# Patient Record
Sex: MALE | Race: WHITE | Employment: OTHER | URBAN - METROPOLITAN AREA
[De-identification: names, ages, dates, MRNs, and addresses within clinical notes are randomized per-mention and may not be internally consistent; named-entity substitution may affect disease eponyms.]

---

## 2021-01-07 ENCOUNTER — HOSPITAL ENCOUNTER (EMERGENCY)
Age: 66
Discharge: HOME | End: 2021-01-07
Attending: EMERGENCY MEDICINE
Payer: OTHER GOVERNMENT

## 2021-01-07 ENCOUNTER — APPOINTMENT (OUTPATIENT)
Dept: GENERAL RADIOLOGY | Age: 66
End: 2021-01-07
Payer: OTHER GOVERNMENT

## 2021-01-07 VITALS
RESPIRATION RATE: 17 BRPM | BODY MASS INDEX: 35.79 KG/M2 | HEART RATE: 75 BPM | WEIGHT: 228 LBS | HEIGHT: 67 IN | DIASTOLIC BLOOD PRESSURE: 90 MMHG | OXYGEN SATURATION: 96 % | TEMPERATURE: 98 F | SYSTOLIC BLOOD PRESSURE: 165 MMHG

## 2021-01-07 DIAGNOSIS — R06.02 SHORTNESS OF BREATH: Primary | ICD-10-CM

## 2021-01-07 LAB
ALBUMIN SERPL-MCNC: 4.5 G/DL (ref 3.5–5.2)
ALP BLD-CCNC: 122 U/L (ref 40–129)
ALT SERPL-CCNC: 35 U/L (ref 0–40)
ANION GAP SERPL CALCULATED.3IONS-SCNC: 10 MMOL/L (ref 7–16)
AST SERPL-CCNC: 23 U/L (ref 0–39)
BACTERIA: ABNORMAL /HPF
BASOPHILS ABSOLUTE: 0.06 E9/L (ref 0–0.2)
BASOPHILS RELATIVE PERCENT: 0.8 % (ref 0–2)
BILIRUB SERPL-MCNC: 0.2 MG/DL (ref 0–1.2)
BILIRUBIN URINE: NEGATIVE
BLOOD, URINE: NEGATIVE
BUN BLDV-MCNC: 15 MG/DL (ref 8–23)
CALCIUM SERPL-MCNC: 9.3 MG/DL (ref 8.6–10.2)
CHLORIDE BLD-SCNC: 98 MMOL/L (ref 98–107)
CLARITY: CLEAR
CO2: 29 MMOL/L (ref 22–29)
COLOR: YELLOW
CREAT SERPL-MCNC: 0.9 MG/DL (ref 0.7–1.2)
CRYSTALS, UA: ABNORMAL /HPF
EOSINOPHILS ABSOLUTE: 0.2 E9/L (ref 0.05–0.5)
EOSINOPHILS RELATIVE PERCENT: 2.8 % (ref 0–6)
GFR AFRICAN AMERICAN: >60
GFR NON-AFRICAN AMERICAN: >60 ML/MIN/1.73
GLUCOSE BLD-MCNC: 267 MG/DL (ref 74–99)
GLUCOSE URINE: 500 MG/DL
HCT VFR BLD CALC: 43.5 % (ref 37–54)
HEMOGLOBIN: 14.2 G/DL (ref 12.5–16.5)
IMMATURE GRANULOCYTES #: 0.07 E9/L
IMMATURE GRANULOCYTES %: 1 % (ref 0–5)
KETONES, URINE: NEGATIVE MG/DL
LEUKOCYTE ESTERASE, URINE: NEGATIVE
LYMPHOCYTES ABSOLUTE: 1.92 E9/L (ref 1.5–4)
LYMPHOCYTES RELATIVE PERCENT: 27.1 % (ref 20–42)
MCH RBC QN AUTO: 27.4 PG (ref 26–35)
MCHC RBC AUTO-ENTMCNC: 32.6 % (ref 32–34.5)
MCV RBC AUTO: 83.8 FL (ref 80–99.9)
MONOCYTES ABSOLUTE: 0.67 E9/L (ref 0.1–0.95)
MONOCYTES RELATIVE PERCENT: 9.5 % (ref 2–12)
NEUTROPHILS ABSOLUTE: 4.16 E9/L (ref 1.8–7.3)
NEUTROPHILS RELATIVE PERCENT: 58.8 % (ref 43–80)
NITRITE, URINE: NEGATIVE
PDW BLD-RTO: 12.9 FL (ref 11.5–15)
PH UA: 5.5 (ref 5–9)
PLATELET # BLD: 269 E9/L (ref 130–450)
PMV BLD AUTO: 10.9 FL (ref 7–12)
POTASSIUM REFLEX MAGNESIUM: 3.6 MMOL/L (ref 3.5–5)
PRO-BNP: 59 PG/ML (ref 0–125)
PROTEIN UA: ABNORMAL MG/DL
RBC # BLD: 5.19 E12/L (ref 3.8–5.8)
RBC UA: ABNORMAL /HPF (ref 0–2)
SODIUM BLD-SCNC: 137 MMOL/L (ref 132–146)
SPECIFIC GRAVITY UA: >=1.03 (ref 1–1.03)
TOTAL PROTEIN: 7.3 G/DL (ref 6.4–8.3)
TROPONIN: <0.01 NG/ML (ref 0–0.03)
UROBILINOGEN, URINE: 0.2 E.U./DL
WBC # BLD: 7.1 E9/L (ref 4.5–11.5)
WBC UA: ABNORMAL /HPF (ref 0–5)

## 2021-01-07 PROCEDURE — 87088 URINE BACTERIA CULTURE: CPT

## 2021-01-07 PROCEDURE — 81001 URINALYSIS AUTO W/SCOPE: CPT

## 2021-01-07 PROCEDURE — 85025 COMPLETE CBC W/AUTO DIFF WBC: CPT

## 2021-01-07 PROCEDURE — 93005 ELECTROCARDIOGRAM TRACING: CPT | Performed by: STUDENT IN AN ORGANIZED HEALTH CARE EDUCATION/TRAINING PROGRAM

## 2021-01-07 PROCEDURE — 71045 X-RAY EXAM CHEST 1 VIEW: CPT

## 2021-01-07 PROCEDURE — 83880 ASSAY OF NATRIURETIC PEPTIDE: CPT

## 2021-01-07 PROCEDURE — 99284 EMERGENCY DEPT VISIT MOD MDM: CPT

## 2021-01-07 PROCEDURE — 80053 COMPREHEN METABOLIC PANEL: CPT

## 2021-01-07 PROCEDURE — 84484 ASSAY OF TROPONIN QUANT: CPT

## 2021-01-07 ASSESSMENT — ENCOUNTER SYMPTOMS
NAUSEA: 0
COUGH: 0
DIARRHEA: 0
BACK PAIN: 0
WHEEZING: 0
SORE THROAT: 0
ABDOMINAL PAIN: 0
SHORTNESS OF BREATH: 1
RHINORRHEA: 0
VOMITING: 0

## 2021-01-07 NOTE — ED NOTES
Pt resting in room. Nad. Vss. Came over from the [facility] due to SOB. Pt made an appointment at the [facility] due to increased KULWINDER when laying flat and was sent here due to unstable angina. Pt does have pmh of kidney cysts. Denies chest pain. Ccm and pulse ox in place.  [facility]     Crispin Allen RN  01/07/21 5908

## 2021-01-07 NOTE — ED PROVIDER NOTES
[facility]  Department of Emergency Medicine     Written by: Ruy Michele DO  Patient Name: Balnca Ferrera  Attending Provider: No att. providers found  Admit Date: 2021  5:28 PM  MRN: [id number]                   : 1955        Chief Complaint   Patient presents with    Shortness of Breath     [facility] rib pain w SOB when lying down x 2 years. - Chief complaint    The history is provided by the patient. Patient is a 61-year-old male with past medical history of anxiety and hypertension who presents to the ED due to shortness of breath, malodorous urine, and fatigue all of which have been intermittently occurring over the past 1 to 2 years. Patient states that he had an appointment at the [facility] today due to \"protein in my urine\", at which no work-up was done and he was sent to the ER due to his description of symptoms. Patient is asymptomatic on arrival, history is difficult to ascertain, patient is a poor historian. He does endorse dyspnea on exertion, orthopnea, and symptoms of acid reflux especially at night when lying down after he eats a heavy meal.  He denies any recent illnesses, fevers, chills, sore throat or cough, chest pain, palpitations, abdominal pain, nausea, vomiting, diarrhea, lower extremity edema or tenderness. Review of Systems   Constitutional: Positive for fatigue. Negative for chills and fever. HENT: Negative for congestion, rhinorrhea and sore throat. Eyes: Negative for visual disturbance. Respiratory: Positive for shortness of breath. Negative for cough and wheezing. Cardiovascular: Negative for chest pain, palpitations and leg swelling. Gastrointestinal: Negative for abdominal pain, diarrhea, nausea and vomiting. Endocrine: Negative for polydipsia and polyuria. Genitourinary: Negative for dysuria, frequency and hematuria.         Malodorous urine     Musculoskeletal: Negative for back pain and myalgias. Skin: Negative for rash and wound. Neurological: Negative for weakness and headaches. Psychiatric/Behavioral: Negative for confusion. Physical Exam  Vitals signs and nursing note reviewed. Constitutional:       General: He is not in acute distress. Appearance: He is not ill-appearing. HENT:      Head: Normocephalic and atraumatic. Right Ear: External ear normal.      Left Ear: External ear normal.      Nose: Nose normal. No rhinorrhea. Mouth/Throat:      Mouth: Mucous membranes are moist.      Pharynx: Oropharynx is clear. Eyes:      Extraocular Movements: Extraocular movements intact. Conjunctiva/sclera: Conjunctivae normal.      Pupils: Pupils are equal, round, and reactive to light. Neck:      Musculoskeletal: Normal range of motion and neck supple. Cardiovascular:      Rate and Rhythm: Normal rate and regular rhythm. Pulses: Normal pulses. Heart sounds: Normal heart sounds. Pulmonary:      Effort: Pulmonary effort is normal. No respiratory distress. Breath sounds: Normal breath sounds. No wheezing or rales. Abdominal:      General: Bowel sounds are normal.      Palpations: Abdomen is soft. Tenderness: There is no abdominal tenderness. There is no guarding. Comments: Protuberant due to body habitus     Musculoskeletal: Normal range of motion. General: No tenderness. Right lower leg: He exhibits no tenderness. No edema. Left lower leg: He exhibits no tenderness. No edema. Skin:     General: Skin is warm and dry. Capillary Refill: Capillary refill takes less than 2 seconds. Neurological:      General: No focal deficit present. Mental Status: He is alert and oriented to person, place, and time. Sensory: No sensory deficit.    Psychiatric:         Mood and Affect: Mood normal.         Behavior: Behavior normal.          Procedures       MDM     72-year-old male with past medical history of anxiety and hypertension who presents to the ED due to shortness of breath, malodorous urine, and fatigue all of which have been intermittently occurring over the past 1 to 2 years. On arrival patient's vitals are stable, he is in no acute distress, denies any current symptoms. EKG shows normal sinus rhythm. Chest x-ray shows no acute abnormalities. 500 glucose in urine and blood glucose 267, labs otherwise unremarkable. Troponin negative. BNP 61.  Patient informed of his lab results, advised to follow-up with his PCP outpatient; feel he is appropriate for discharge with return precautions. Discussed results and plan with the patient, he is amenable and his questions were answered at this time. He was made aware of signs/symptoms for which to return to the ER. I have discussed this patient with my attending, who has seen the patient and agrees with this disposition. Patient was seen and evaluated by myself and my attending No att. providers found. Assessment and Plan discussed with attending provider, please see attestation for final plan of care. ED Course as of Jan 07 2340   Thu Jan 07, 2021   1736 77; Normal sinus rhythm, LAD, normal AR interval, normal QRS duration, no QT prolongation, no ST elevations or abnormal T wave inversions. [VG]   2034 Labs unremarkable, on reassessment patient remains symptom-free and is resting comfortably in bed. Feel this patient is appropriate for discharge with outpatient follow-up and strict return precautions; discussed results and plan with the patient, he is amenable and his questions were answered at this time. [VG]      ED Course User Index  [VG] Don Six, DO       --------------------------------------------- PAST HISTORY ---------------------------------------------  Past Medical History:  has no past medical history on file. Past Surgical History:  has no past surgical history on file.     Social History:  reports that he has never smoked. He has never used smokeless tobacco. He reports previous alcohol use. Family History: family history is not on file. The patients home medications have been reviewed. Allergies: Patient has no known allergies.     -------------------------------------------------- RESULTS -------------------------------------------------  Labs:  Results for orders placed or performed during the hospital encounter of 01/07/21   CBC Auto Differential   Result Value Ref Range    WBC 7.1 4.5 - 11.5 E9/L    RBC 5.19 3.80 - 5.80 E12/L    Hemoglobin 14.2 12.5 - 16.5 g/dL    Hematocrit 43.5 37.0 - 54.0 %    MCV 83.8 80.0 - 99.9 fL    MCH 27.4 26.0 - 35.0 pg    MCHC 32.6 32.0 - 34.5 %    RDW 12.9 11.5 - 15.0 fL    Platelets 506 852 - 198 E9/L    MPV 10.9 7.0 - 12.0 fL    Neutrophils % 58.8 43.0 - 80.0 %    Immature Granulocytes % 1.0 0.0 - 5.0 %    Lymphocytes % 27.1 20.0 - 42.0 %    Monocytes % 9.5 2.0 - 12.0 %    Eosinophils % 2.8 0.0 - 6.0 %    Basophils % 0.8 0.0 - 2.0 %    Neutrophils Absolute 4.16 1.80 - 7.30 E9/L    Immature Granulocytes # 0.07 E9/L    Lymphocytes Absolute 1.92 1.50 - 4.00 E9/L    Monocytes Absolute 0.67 0.10 - 0.95 E9/L    Eosinophils Absolute 0.20 0.05 - 0.50 E9/L    Basophils Absolute 0.06 0.00 - 0.20 E9/L   Comprehensive Metabolic Panel w/ Reflex to MG   Result Value Ref Range    Sodium 137 132 - 146 mmol/L    Potassium reflex Magnesium 3.6 3.5 - 5.0 mmol/L    Chloride 98 98 - 107 mmol/L    CO2 29 22 - 29 mmol/L    Anion Gap 10 7 - 16 mmol/L    Glucose 267 (H) 74 - 99 mg/dL    BUN 15 8 - 23 mg/dL    CREATININE 0.9 0.7 - 1.2 mg/dL    GFR Non-African American >60 >=60 mL/min/1.73    GFR African American >60     Calcium 9.3 8.6 - 10.2 mg/dL    Total Protein 7.3 6.4 - 8.3 g/dL    Alb 4.5 3.5 - 5.2 g/dL    Total Bilirubin 0.2 0.0 - 1.2 mg/dL    Alkaline Phosphatase 122 40 - 129 U/L    ALT 35 0 - 40 U/L    AST 23 0 - 39 U/L   Troponin   Result Value Ref Range    Troponin <0.01 0.00 - 0.03 ng/mL   Brain Natriuretic Peptide   Result Value Ref Range    Pro-BNP 59 0 - 125 pg/mL   Urinalysis, reflex to microscopic   Result Value Ref Range    Color, UA Yellow Straw/Yellow    Clarity, UA Clear Clear    Glucose, Ur 500 (A) Negative mg/dL    Bilirubin Urine Negative Negative    Ketones, Urine Negative Negative mg/dL    Specific Gravity, UA >=1.030 1.005 - 1.030    Blood, Urine Negative Negative    pH, UA 5.5 5.0 - 9.0    Protein, UA TRACE Negative mg/dL    Urobilinogen, Urine 0.2 <2.0 E.U./dL    Nitrite, Urine Negative Negative    Leukocyte Esterase, Urine Negative Negative   Microscopic Urinalysis   Result Value Ref Range    WBC, UA NONE 0 - 5 /HPF    RBC, UA NONE 0 - 2 /HPF    Bacteria, UA NONE SEEN None Seen /HPF    Crystals, UA Few (A) None Seen /HPF       Radiology:  XR CHEST PORTABLE   Final Result   No acute process. EKG:  This EKG is signed and interpreted by the emergency department physician. Rate: 77  Rhythm: Sinus  Interpretation: Normal sinus rhythm, normal MA interval, normal QRS duration, no QT prolongation, no ST elevations or abnormal T wave inversions; poor R wave progression of precordial leads      ------------------------- NURSING NOTES AND VITALS REVIEWED ---------------------------  Date / Time Roomed:  1/7/2021  5:28 PM  ED Bed Assignment:  19/19    The nursing notes within the ED encounter and vital signs as below have been reviewed. BP (!) 165/90   Pulse 75   Temp 98 °F (36.7 °C)   Resp 17   Ht 5' 7\" (1.702 m)   Wt 228 lb (103.4 kg)   SpO2 96%   BMI 35.71 kg/m²   Oxygen Saturation Interpretation: Normal      ------------------------------------------ PROGRESS NOTES ------------------------------------------  11:39 PM EST  I have spoken with the patient and discussed todays results, in addition to providing specific details for the plan of care and counseling regarding the diagnosis and prognosis. Their questions are answered at this time and they are agreeable with the plan. I discussed at length with them reasons for immediate return here for re evaluation. They will followup with their primary care physician by calling their office tomorrow. --------------------------------- ADDITIONAL PROVIDER NOTES ---------------------------------  At this time the patient is without objective evidence of an acute process requiring hospitalization or inpatient management. They have remained hemodynamically stable throughout their entire ED visit and are stable for discharge with outpatient follow-up. The plan has been discussed in detail and they are aware of the specific conditions for emergent return, as well as the importance of follow-up. There are no discharge medications for this patient. Diagnosis:  1. Shortness of breath        Disposition:  Patient's disposition: Discharge to home  Patient's condition is stable.          Alanna Devi DO  Resident  01/07/21 0241

## 2021-01-08 LAB
EKG ATRIAL RATE: 77 BPM
EKG P AXIS: 55 DEGREES
EKG P-R INTERVAL: 144 MS
EKG Q-T INTERVAL: 382 MS
EKG QRS DURATION: 92 MS
EKG QTC CALCULATION (BAZETT): 432 MS
EKG R AXIS: -19 DEGREES
EKG T AXIS: 70 DEGREES
EKG VENTRICULAR RATE: 77 BPM

## 2021-01-09 LAB — URINE CULTURE, ROUTINE: NORMAL

## 2021-09-22 ENCOUNTER — HOSPITAL ENCOUNTER (INPATIENT)
Age: 66
LOS: 40 days | Discharge: NURSING, REHAB OR LONG-TERM CARE | DRG: 871 | End: 2021-11-02
Attending: EMERGENCY MEDICINE | Admitting: INTERNAL MEDICINE
Payer: OTHER GOVERNMENT

## 2021-09-22 ENCOUNTER — APPOINTMENT (OUTPATIENT)
Dept: CT IMAGING | Age: 66
DRG: 871 | End: 2021-09-22
Payer: OTHER GOVERNMENT

## 2021-09-22 ENCOUNTER — APPOINTMENT (OUTPATIENT)
Dept: GENERAL RADIOLOGY | Age: 66
DRG: 871 | End: 2021-09-22
Payer: OTHER GOVERNMENT

## 2021-09-22 DIAGNOSIS — J96.01 ACUTE RESPIRATORY FAILURE WITH HYPOXIA (HCC): ICD-10-CM

## 2021-09-22 DIAGNOSIS — U07.1 COVID-19: Primary | ICD-10-CM

## 2021-09-22 DIAGNOSIS — E87.6 HYPOKALEMIA: ICD-10-CM

## 2021-09-22 DIAGNOSIS — F41.9 ANXIETY: ICD-10-CM

## 2021-09-22 LAB
ALBUMIN SERPL-MCNC: 4 G/DL (ref 3.5–5.2)
ALP BLD-CCNC: 63 U/L (ref 40–129)
ALT SERPL-CCNC: 58 U/L (ref 0–40)
ANION GAP SERPL CALCULATED.3IONS-SCNC: 11 MMOL/L (ref 7–16)
AST SERPL-CCNC: 54 U/L (ref 0–39)
BASOPHILS ABSOLUTE: 0.01 E9/L (ref 0–0.2)
BASOPHILS RELATIVE PERCENT: 0.2 % (ref 0–2)
BILIRUB SERPL-MCNC: 0.3 MG/DL (ref 0–1.2)
BUN BLDV-MCNC: 18 MG/DL (ref 6–23)
CALCIUM SERPL-MCNC: 8.7 MG/DL (ref 8.6–10.2)
CHLORIDE BLD-SCNC: 90 MMOL/L (ref 98–107)
CO2: 29 MMOL/L (ref 22–29)
CREAT SERPL-MCNC: 0.8 MG/DL (ref 0.7–1.2)
EOSINOPHILS ABSOLUTE: 0 E9/L (ref 0.05–0.5)
EOSINOPHILS RELATIVE PERCENT: 0 % (ref 0–6)
GFR AFRICAN AMERICAN: >60
GFR NON-AFRICAN AMERICAN: >60 ML/MIN/1.73
GLUCOSE BLD-MCNC: 177 MG/DL (ref 74–99)
HCT VFR BLD CALC: 41.7 % (ref 37–54)
HEMOGLOBIN: 13.5 G/DL (ref 12.5–16.5)
IMMATURE GRANULOCYTES #: 0.02 E9/L
IMMATURE GRANULOCYTES %: 0.4 % (ref 0–5)
LACTIC ACID, SEPSIS: 1.1 MMOL/L (ref 0.5–1.9)
LYMPHOCYTES ABSOLUTE: 0.72 E9/L (ref 1.5–4)
LYMPHOCYTES RELATIVE PERCENT: 14.1 % (ref 20–42)
MCH RBC QN AUTO: 26.8 PG (ref 26–35)
MCHC RBC AUTO-ENTMCNC: 32.4 % (ref 32–34.5)
MCV RBC AUTO: 82.9 FL (ref 80–99.9)
MONOCYTES ABSOLUTE: 0.44 E9/L (ref 0.1–0.95)
MONOCYTES RELATIVE PERCENT: 8.6 % (ref 2–12)
NEUTROPHILS ABSOLUTE: 3.9 E9/L (ref 1.8–7.3)
NEUTROPHILS RELATIVE PERCENT: 76.7 % (ref 43–80)
PDW BLD-RTO: 14.1 FL (ref 11.5–15)
PLATELET # BLD: 207 E9/L (ref 130–450)
PMV BLD AUTO: 10.6 FL (ref 7–12)
POTASSIUM SERPL-SCNC: 3 MMOL/L (ref 3.5–5)
RBC # BLD: 5.03 E12/L (ref 3.8–5.8)
SARS-COV-2, NAAT: DETECTED
SODIUM BLD-SCNC: 130 MMOL/L (ref 132–146)
TOTAL PROTEIN: 7.2 G/DL (ref 6.4–8.3)
TROPONIN, HIGH SENSITIVITY: 16 NG/L (ref 0–11)
WBC # BLD: 5.1 E9/L (ref 4.5–11.5)

## 2021-09-22 PROCEDURE — 85025 COMPLETE CBC W/AUTO DIFF WBC: CPT

## 2021-09-22 PROCEDURE — 6360000004 HC RX CONTRAST MEDICATION: Performed by: RADIOLOGY

## 2021-09-22 PROCEDURE — 2580000003 HC RX 258: Performed by: EMERGENCY MEDICINE

## 2021-09-22 PROCEDURE — 96375 TX/PRO/DX INJ NEW DRUG ADDON: CPT

## 2021-09-22 PROCEDURE — 93005 ELECTROCARDIOGRAM TRACING: CPT | Performed by: EMERGENCY MEDICINE

## 2021-09-22 PROCEDURE — 84484 ASSAY OF TROPONIN QUANT: CPT

## 2021-09-22 PROCEDURE — 71045 X-RAY EXAM CHEST 1 VIEW: CPT

## 2021-09-22 PROCEDURE — 6370000000 HC RX 637 (ALT 250 FOR IP): Performed by: EMERGENCY MEDICINE

## 2021-09-22 PROCEDURE — 71275 CT ANGIOGRAPHY CHEST: CPT

## 2021-09-22 PROCEDURE — 96374 THER/PROPH/DIAG INJ IV PUSH: CPT

## 2021-09-22 PROCEDURE — 99285 EMERGENCY DEPT VISIT HI MDM: CPT

## 2021-09-22 PROCEDURE — 83605 ASSAY OF LACTIC ACID: CPT

## 2021-09-22 PROCEDURE — 80053 COMPREHEN METABOLIC PANEL: CPT

## 2021-09-22 PROCEDURE — 87635 SARS-COV-2 COVID-19 AMP PRB: CPT

## 2021-09-22 PROCEDURE — 6360000002 HC RX W HCPCS: Performed by: EMERGENCY MEDICINE

## 2021-09-22 RX ORDER — DEXAMETHASONE SODIUM PHOSPHATE 10 MG/ML
10 INJECTION, SOLUTION INTRAMUSCULAR; INTRAVENOUS ONCE
Status: COMPLETED | OUTPATIENT
Start: 2021-09-22 | End: 2021-09-22

## 2021-09-22 RX ORDER — 0.9 % SODIUM CHLORIDE 0.9 %
500 INTRAVENOUS SOLUTION INTRAVENOUS ONCE
Status: COMPLETED | OUTPATIENT
Start: 2021-09-22 | End: 2021-09-22

## 2021-09-22 RX ORDER — ACETAMINOPHEN 500 MG
1000 TABLET ORAL ONCE
Status: COMPLETED | OUTPATIENT
Start: 2021-09-22 | End: 2021-09-22

## 2021-09-22 RX ORDER — ONDANSETRON 2 MG/ML
4 INJECTION INTRAMUSCULAR; INTRAVENOUS ONCE
Status: COMPLETED | OUTPATIENT
Start: 2021-09-22 | End: 2021-09-22

## 2021-09-22 RX ADMIN — SODIUM CHLORIDE 500 ML: 9 INJECTION, SOLUTION INTRAVENOUS at 22:23

## 2021-09-22 RX ADMIN — DEXAMETHASONE SODIUM PHOSPHATE 10 MG: 10 INJECTION, SOLUTION INTRAMUSCULAR; INTRAVENOUS at 22:22

## 2021-09-22 RX ADMIN — IOPAMIDOL 75 ML: 755 INJECTION, SOLUTION INTRAVENOUS at 23:31

## 2021-09-22 RX ADMIN — ONDANSETRON 4 MG: 2 INJECTION INTRAMUSCULAR; INTRAVENOUS at 22:22

## 2021-09-22 RX ADMIN — ACETAMINOPHEN 1000 MG: 500 TABLET ORAL at 22:22

## 2021-09-22 ASSESSMENT — PAIN DESCRIPTION - LOCATION: LOCATION: GENERALIZED

## 2021-09-22 ASSESSMENT — PAIN SCALES - GENERAL
PAINLEVEL_OUTOF10: 5
PAINLEVEL_OUTOF10: 5

## 2021-09-22 ASSESSMENT — PAIN DESCRIPTION - PAIN TYPE: TYPE: ACUTE PAIN

## 2021-09-23 PROBLEM — U07.1 COVID-19: Status: ACTIVE | Noted: 2021-09-23

## 2021-09-23 PROBLEM — U07.1 ACUTE RESPIRATORY FAILURE DUE TO COVID-19 (HCC): Status: ACTIVE | Noted: 2021-09-23

## 2021-09-23 PROBLEM — J96.00 ACUTE RESPIRATORY FAILURE DUE TO COVID-19 (HCC): Status: ACTIVE | Noted: 2021-09-23

## 2021-09-23 LAB
ALBUMIN SERPL-MCNC: 4.1 G/DL (ref 3.5–5.2)
ALP BLD-CCNC: 60 U/L (ref 40–129)
ALT SERPL-CCNC: 79 U/L (ref 0–40)
ANION GAP SERPL CALCULATED.3IONS-SCNC: 11 MMOL/L (ref 7–16)
AST SERPL-CCNC: 82 U/L (ref 0–39)
BILIRUB SERPL-MCNC: 0.3 MG/DL (ref 0–1.2)
BILIRUBIN DIRECT: <0.2 MG/DL (ref 0–0.3)
BILIRUBIN, INDIRECT: ABNORMAL MG/DL (ref 0–1)
BUN BLDV-MCNC: 19 MG/DL (ref 6–23)
C-REACTIVE PROTEIN: 7.9 MG/DL (ref 0–0.4)
CALCIUM SERPL-MCNC: 8.6 MG/DL (ref 8.6–10.2)
CHLORIDE BLD-SCNC: 92 MMOL/L (ref 98–107)
CO2: 30 MMOL/L (ref 22–29)
CREAT SERPL-MCNC: 0.8 MG/DL (ref 0.7–1.2)
D DIMER: 213 NG/ML DDU
EKG ATRIAL RATE: 82 BPM
EKG P AXIS: 45 DEGREES
EKG P-R INTERVAL: 170 MS
EKG Q-T INTERVAL: 384 MS
EKG QRS DURATION: 92 MS
EKG QTC CALCULATION (BAZETT): 448 MS
EKG R AXIS: -14 DEGREES
EKG T AXIS: 26 DEGREES
EKG VENTRICULAR RATE: 82 BPM
FERRITIN: 991 NG/ML
GFR AFRICAN AMERICAN: >60
GFR NON-AFRICAN AMERICAN: >60 ML/MIN/1.73
GLUCOSE BLD-MCNC: 224 MG/DL (ref 74–99)
HCT VFR BLD CALC: 51.6 % (ref 37–54)
HEMOGLOBIN: 16.4 G/DL (ref 12.5–16.5)
LACTATE DEHYDROGENASE: 299 U/L (ref 135–225)
MAGNESIUM: 1.8 MG/DL (ref 1.6–2.6)
MCH RBC QN AUTO: 26.8 PG (ref 26–35)
MCHC RBC AUTO-ENTMCNC: 31.8 % (ref 32–34.5)
MCV RBC AUTO: 84.3 FL (ref 80–99.9)
METER GLUCOSE: 174 MG/DL (ref 74–99)
METER GLUCOSE: 236 MG/DL (ref 74–99)
METER GLUCOSE: 281 MG/DL (ref 74–99)
PDW BLD-RTO: 14.4 FL (ref 11.5–15)
PHOSPHORUS: 4.1 MG/DL (ref 2.5–4.5)
PLATELET # BLD: 143 E9/L (ref 130–450)
PMV BLD AUTO: 10.5 FL (ref 7–12)
POTASSIUM REFLEX MAGNESIUM: 3.6 MMOL/L (ref 3.5–5)
RBC # BLD: 6.12 E12/L (ref 3.8–5.8)
SODIUM BLD-SCNC: 133 MMOL/L (ref 132–146)
TOTAL PROTEIN: 7.1 G/DL (ref 6.4–8.3)
TROPONIN, HIGH SENSITIVITY: 16 NG/L (ref 0–11)
WBC # BLD: 3.7 E9/L (ref 4.5–11.5)

## 2021-09-23 PROCEDURE — 2580000003 HC RX 258: Performed by: INTERNAL MEDICINE

## 2021-09-23 PROCEDURE — 6360000002 HC RX W HCPCS: Performed by: INTERNAL MEDICINE

## 2021-09-23 PROCEDURE — 84484 ASSAY OF TROPONIN QUANT: CPT

## 2021-09-23 PROCEDURE — 82728 ASSAY OF FERRITIN: CPT

## 2021-09-23 PROCEDURE — 80048 BASIC METABOLIC PNL TOTAL CA: CPT

## 2021-09-23 PROCEDURE — 99222 1ST HOSP IP/OBS MODERATE 55: CPT | Performed by: INTERNAL MEDICINE

## 2021-09-23 PROCEDURE — 36415 COLL VENOUS BLD VENIPUNCTURE: CPT

## 2021-09-23 PROCEDURE — 82962 GLUCOSE BLOOD TEST: CPT

## 2021-09-23 PROCEDURE — 6370000000 HC RX 637 (ALT 250 FOR IP): Performed by: INTERNAL MEDICINE

## 2021-09-23 PROCEDURE — 80076 HEPATIC FUNCTION PANEL: CPT

## 2021-09-23 PROCEDURE — 85378 FIBRIN DEGRADE SEMIQUANT: CPT

## 2021-09-23 PROCEDURE — 85027 COMPLETE CBC AUTOMATED: CPT

## 2021-09-23 PROCEDURE — 6370000000 HC RX 637 (ALT 250 FOR IP): Performed by: EMERGENCY MEDICINE

## 2021-09-23 PROCEDURE — 83615 LACTATE (LD) (LDH) ENZYME: CPT

## 2021-09-23 PROCEDURE — 1200000000 HC SEMI PRIVATE

## 2021-09-23 PROCEDURE — 83735 ASSAY OF MAGNESIUM: CPT

## 2021-09-23 PROCEDURE — 84100 ASSAY OF PHOSPHORUS: CPT

## 2021-09-23 PROCEDURE — 86140 C-REACTIVE PROTEIN: CPT

## 2021-09-23 RX ORDER — DEXAMETHASONE SODIUM PHOSPHATE 10 MG/ML
6 INJECTION, SOLUTION INTRAMUSCULAR; INTRAVENOUS ONCE
Status: DISCONTINUED | OUTPATIENT
Start: 2021-09-23 | End: 2021-09-23 | Stop reason: RX

## 2021-09-23 RX ORDER — POTASSIUM CHLORIDE 20 MEQ/1
40 TABLET, EXTENDED RELEASE ORAL ONCE
Status: COMPLETED | OUTPATIENT
Start: 2021-09-23 | End: 2021-09-23

## 2021-09-23 RX ORDER — NICOTINE POLACRILEX 4 MG
15 LOZENGE BUCCAL PRN
Status: DISCONTINUED | OUTPATIENT
Start: 2021-09-23 | End: 2021-11-02 | Stop reason: HOSPADM

## 2021-09-23 RX ORDER — AMLODIPINE BESYLATE 5 MG/1
5 TABLET ORAL DAILY
Status: DISCONTINUED | OUTPATIENT
Start: 2021-09-23 | End: 2021-11-02 | Stop reason: HOSPADM

## 2021-09-23 RX ORDER — ONDANSETRON 2 MG/ML
4 INJECTION INTRAMUSCULAR; INTRAVENOUS EVERY 6 HOURS PRN
Status: DISCONTINUED | OUTPATIENT
Start: 2021-09-23 | End: 2021-11-02 | Stop reason: HOSPADM

## 2021-09-23 RX ORDER — AMLODIPINE BESYLATE 5 MG/1
5 TABLET ORAL DAILY
COMMUNITY

## 2021-09-23 RX ORDER — SODIUM CHLORIDE 9 MG/ML
INJECTION, SOLUTION INTRAVENOUS CONTINUOUS
Status: DISCONTINUED | OUTPATIENT
Start: 2021-09-23 | End: 2021-09-27

## 2021-09-23 RX ORDER — DEXAMETHASONE SODIUM PHOSPHATE 10 MG/ML
6 INJECTION INTRAMUSCULAR; INTRAVENOUS ONCE
Status: DISCONTINUED | OUTPATIENT
Start: 2021-09-23 | End: 2021-09-23

## 2021-09-23 RX ORDER — ONDANSETRON 4 MG/1
4 TABLET, ORALLY DISINTEGRATING ORAL EVERY 8 HOURS PRN
Status: DISCONTINUED | OUTPATIENT
Start: 2021-09-23 | End: 2021-11-02 | Stop reason: HOSPADM

## 2021-09-23 RX ORDER — PAROXETINE 30 MG/1
30 TABLET, FILM COATED ORAL 2 TIMES DAILY
COMMUNITY

## 2021-09-23 RX ORDER — SODIUM CHLORIDE 9 MG/ML
25 INJECTION, SOLUTION INTRAVENOUS PRN
Status: DISCONTINUED | OUTPATIENT
Start: 2021-09-23 | End: 2021-11-02 | Stop reason: HOSPADM

## 2021-09-23 RX ORDER — POLYETHYLENE GLYCOL 3350 17 G/17G
17 POWDER, FOR SOLUTION ORAL DAILY PRN
Status: DISCONTINUED | OUTPATIENT
Start: 2021-09-23 | End: 2021-11-02 | Stop reason: HOSPADM

## 2021-09-23 RX ORDER — DEXTROSE MONOHYDRATE 25 G/50ML
12.5 INJECTION, SOLUTION INTRAVENOUS PRN
Status: DISCONTINUED | OUTPATIENT
Start: 2021-09-23 | End: 2021-11-02 | Stop reason: HOSPADM

## 2021-09-23 RX ORDER — DEXTROSE MONOHYDRATE 50 MG/ML
100 INJECTION, SOLUTION INTRAVENOUS PRN
Status: DISCONTINUED | OUTPATIENT
Start: 2021-09-23 | End: 2021-11-02 | Stop reason: HOSPADM

## 2021-09-23 RX ORDER — ACETAMINOPHEN 325 MG/1
650 TABLET ORAL EVERY 6 HOURS PRN
Status: DISCONTINUED | OUTPATIENT
Start: 2021-09-23 | End: 2021-11-02 | Stop reason: HOSPADM

## 2021-09-23 RX ORDER — SODIUM CHLORIDE 0.9 % (FLUSH) 0.9 %
5-40 SYRINGE (ML) INJECTION PRN
Status: DISCONTINUED | OUTPATIENT
Start: 2021-09-23 | End: 2021-11-02 | Stop reason: HOSPADM

## 2021-09-23 RX ORDER — ACETAMINOPHEN 650 MG/1
650 SUPPOSITORY RECTAL EVERY 6 HOURS PRN
Status: DISCONTINUED | OUTPATIENT
Start: 2021-09-23 | End: 2021-11-02 | Stop reason: HOSPADM

## 2021-09-23 RX ORDER — SODIUM CHLORIDE 0.9 % (FLUSH) 0.9 %
5-40 SYRINGE (ML) INJECTION EVERY 12 HOURS SCHEDULED
Status: DISCONTINUED | OUTPATIENT
Start: 2021-09-23 | End: 2021-11-02 | Stop reason: HOSPADM

## 2021-09-23 RX ADMIN — AMLODIPINE BESYLATE 5 MG: 5 TABLET ORAL at 09:32

## 2021-09-23 RX ADMIN — PAROXETINE 30 MG: 10 TABLET, FILM COATED ORAL at 20:57

## 2021-09-23 RX ADMIN — INSULIN LISPRO 2 UNITS: 100 INJECTION, SOLUTION INTRAVENOUS; SUBCUTANEOUS at 12:06

## 2021-09-23 RX ADMIN — Medication 10 ML: at 09:32

## 2021-09-23 RX ADMIN — PAROXETINE 30 MG: 10 TABLET, FILM COATED ORAL at 12:07

## 2021-09-23 RX ADMIN — SODIUM CHLORIDE: 9 INJECTION, SOLUTION INTRAVENOUS at 20:56

## 2021-09-23 RX ADMIN — INSULIN LISPRO 2 UNITS: 100 INJECTION, SOLUTION INTRAVENOUS; SUBCUTANEOUS at 21:26

## 2021-09-23 RX ADMIN — Medication 10 ML: at 21:06

## 2021-09-23 RX ADMIN — ENOXAPARIN SODIUM 40 MG: 40 INJECTION SUBCUTANEOUS at 09:32

## 2021-09-23 RX ADMIN — INSULIN LISPRO 1 UNITS: 100 INJECTION, SOLUTION INTRAVENOUS; SUBCUTANEOUS at 16:34

## 2021-09-23 RX ADMIN — SODIUM CHLORIDE: 9 INJECTION, SOLUTION INTRAVENOUS at 12:18

## 2021-09-23 RX ADMIN — DEXAMETHASONE 6 MG: 4 TABLET ORAL at 12:07

## 2021-09-23 RX ADMIN — POTASSIUM CHLORIDE 40 MEQ: 1500 TABLET, EXTENDED RELEASE ORAL at 01:40

## 2021-09-23 RX ADMIN — ACETAMINOPHEN 650 MG: 325 TABLET ORAL at 12:24

## 2021-09-23 RX ADMIN — ACETAMINOPHEN 650 MG: 325 TABLET ORAL at 21:06

## 2021-09-23 ASSESSMENT — PAIN SCALES - GENERAL
PAINLEVEL_OUTOF10: 6
PAINLEVEL_OUTOF10: 7
PAINLEVEL_OUTOF10: 5
PAINLEVEL_OUTOF10: 4
PAINLEVEL_OUTOF10: 5
PAINLEVEL_OUTOF10: 0
PAINLEVEL_OUTOF10: 1

## 2021-09-23 ASSESSMENT — PAIN DESCRIPTION - LOCATION
LOCATION: HEAD

## 2021-09-23 ASSESSMENT — PAIN DESCRIPTION - ONSET: ONSET: GRADUAL

## 2021-09-23 ASSESSMENT — PAIN DESCRIPTION - DESCRIPTORS
DESCRIPTORS: ACHING

## 2021-09-23 ASSESSMENT — PAIN DESCRIPTION - PROGRESSION: CLINICAL_PROGRESSION: GRADUALLY IMPROVING

## 2021-09-23 ASSESSMENT — PAIN - FUNCTIONAL ASSESSMENT: PAIN_FUNCTIONAL_ASSESSMENT: ACTIVITIES ARE NOT PREVENTED

## 2021-09-23 ASSESSMENT — PAIN DESCRIPTION - PAIN TYPE
TYPE: ACUTE PAIN

## 2021-09-23 NOTE — H&P
[facility] History and Physical    CHIEF COMPLAINT: Dyspnea    History of Present Illness: Patient is a 28-year-old male with past medical history significant for hypertension who presented to the emergency department with 1 week of generalized symptoms, mainly fatigue. He also has had worsening shortness of breath over the past few days. This is worsened by exertion. Is also had intermittent diarrhea as well as nausea, fever and chills, nonproductive cough. He reports doing a over-the-counter Covid test which was positive. He presented to the emergency department for further evaluation. In the ED, he is noted to be hypoxic on room air and was placed on supplemental oxygen. His work-up is significant for positive Covid test as well as a CT of the chest showing no evidence of PE, but bilateral infiltrates concerning for COVID-19 pneumonia. Medicine was asked to admit for further treatment. REVIEW OF SYSTEMS:  A comprehensive review of systems was negative except for: what is in the HPI    PMH:  Past Medical History:   Diagnosis Date    Hypertension        Surgical History:  History reviewed. No pertinent surgical history. Medications Prior to Admission:    Prior to Admission medications    Not on File       Allergies:    Patient has no known allergies. Social History:    reports that he has never smoked. He has never used smokeless tobacco.    Family History:   family history is not on file. Reviewed, not pertinent to admission.       PHYSICAL EXAM:  Vitals:  BP (!) 172/87   Pulse 86   Temp 102.1 °F (38.9 °C) (Oral)   Resp 18   SpO2 99%     General Appearance: alert and oriented to person, place and time and in no acute distress  Skin: warm and dry  Head: normocephalic and atraumatic  Eyes: pupils equal, round, and reactive to light, extraocular eye movements intact, conjunctivae normal  Neck: neck supple and non tender without mass   Pulmonary/Chest: clear to auscultation bilaterally- no wheezes, rales or rhonchi, normal air movement, no respiratory distress  Cardiovascular: normal rate, normal S1 and S2 and no carotid bruits  Abdomen: soft, non-tender, non-distended, normal bowel sounds, no masses or organomegaly  Extremities: no cyanosis, no clubbing and no edema  Neurologic: no cranial nerve deficit and speech normal        LABS:  Recent Labs     09/22/21 2218   *   K 3.0*   CL 90*   CO2 29   BUN 18   CREATININE 0.8   GLUCOSE 177*   CALCIUM 8.7       Recent Labs     09/22/21 2218   WBC 5.1   RBC 5.03   HGB 13.5   HCT 41.7   MCV 82.9   MCH 26.8   MCHC 32.4   RDW 14.1      MPV 10.6       No results for input(s): POCGLU in the last 72 hours. CBC:   Lab Results   Component Value Date    WBC 5.1 09/22/2021    RBC 5.03 09/22/2021    HGB 13.5 09/22/2021    HCT 41.7 09/22/2021    MCV 82.9 09/22/2021    MCH 26.8 09/22/2021    MCHC 32.4 09/22/2021    RDW 14.1 09/22/2021     09/22/2021    MPV 10.6 09/22/2021     CMP:    Lab Results   Component Value Date     09/22/2021    K 3.0 09/22/2021    CL 90 09/22/2021    CO2 29 09/22/2021    BUN 18 09/22/2021    CREATININE 0.8 09/22/2021    GFRAA >60 09/22/2021    LABGLOM >60 09/22/2021    GLUCOSE 177 09/22/2021    PROT 7.2 09/22/2021    LABALBU 4.0 09/22/2021    CALCIUM 8.7 09/22/2021    BILITOT 0.3 09/22/2021    ALKPHOS 63 09/22/2021    AST 54 09/22/2021    ALT 58 09/22/2021       Radiology:   CTA PULMONARY W CONTRAST   Final Result   Within described exam limitations, no evidence of pulmonary embolism. Scattered multifocal low density infiltrates throughout both lungs, most   suggestive of COVID pneumonia in context of current pandemic. At least moderate diffuse hepatic steatosis. Cholecystectomy. RECOMMENDATIONS:   Multiple pulmonary nodules. Most severe: 4 mm left solid pulmonary nodule   within the upper lobe.  A non-contrast Chest CT at 12 months is optional. If   performed and the nodule is stable at 12 months, no further follow-up is   recommended. These guidelines do not apply to immunocompromised patients and patients with   cancer. Follow up in patients with significant comorbidities as clinically   warranted. For lung cancer screening, adhere to Lung-RADS guidelines. Reference: Radiology. 2017; 284(1):228-43. XR CHEST PORTABLE   Final Result   There are bilateral multifocal ground-glass areas of consolidation with the   lungs concerning for in infectious or inflammatory process and developing   ARDS or viral pneumonia could give this appearance. ASSESSMENT:      Active Problems:    COVID-19  Resolved Problems:    * No resolved hospital problems. *    PLAN:    Acute hypoxic respiratory failure  COVID-19 pneumonia  77-year-old male with 1 week of generalized symptoms and a few days of worsening dyspnea on exertion, found to be hypoxic in the ED on room air and placed on supplemental oxygen. Work-up is significant for CTA of the chest with no evidence of PE, but bilateral infiltrates and a positive COVID-19 swab. -Admit to medical unit  -Maintain on telemetry  -Supplemental oxygen, wean as tolerated  -Decadron daily  -Check CRP, LD, ferritin    Code Status: Full  DVT prophylaxis: Lovenox      NOTE: This report was transcribed using voice recognition software. Every effort was made to ensure accuracy; however, inadvertent computerized transcription errors may be present.   Electronically signed by Aundrea Walsh DO on 9/23/2021 at 1:40 AM

## 2021-09-23 NOTE — ED PROVIDER NOTES
HPI:  9/22/21,   Time: 10:10 PM EDT       Alberto Lopez is a 61 y.o. male presenting to the ED for increasing fatigue and shortness of breath, beginning 1 week ago. The complaint has been persistent, moderate in severity, and worsened by light exertion. Patient 72-year-old gentleman states he has not felt well for 1 week he has had nausea occasional diarrhea no abdominal pain no vomiting. He said intermittent fevers or chills. He is had a dry cough nothing productive increasing shortness of breath with ambulating no chest pain no pleuritic pain. Is been feeling more short of breath. Friend who is a nurse come over and \"test him and he was positive for Covid today\". Patient denies any urinary complaints he was not vaccinated for Covid. He is a non-smoker. No history of asthma or COPD. No recent leg pain or leg swelling. Review of Systems:   Pertinent positives and negatives are stated within HPI, all other systems reviewed and are negative.          --------------------------------------------- PAST HISTORY ---------------------------------------------  Past Medical History:  has a past medical history of Hypertension. Past Surgical History:  has no past surgical history on file. Social History:  reports that he has never smoked. He has never used smokeless tobacco.    Family History: family history is not on file. The patients home medications have been reviewed. Allergies: Patient has no known allergies.         ---------------------------------------------------PHYSICAL EXAM--------------------------------------    Constitutional/General: Alert and oriented x3, ill  appearing, non toxic in NAD   Head: Normocephalic and atraumatic  Eyes: PERRL, EOMI, conjunctive normal, sclera non icteric  Mouth: Oropharynx clear, handling secretions, no trismus, no asymmetry of the posterior oropharynx or uvular edema  Neck: Supple, full ROM, non tender to palpation in the midline, no stridor, no crepitus, no meningeal signs  Respiratory: Patient is clear from bilaterally slightly decreased breath sounds at the bases. No wheezes rales or rhonchi. No tachypnea or accessory muscle use or retractions speaks in moderate length sentences but obvious fatigue. Cardiovascular:  Regular rate. Regular rhythm. No murmurs, gallops, or rubs. 2+ distal pulses  Chest: No chest wall tenderness  GI:  Abdomen Soft, Non tender, Non distended. +BS. No organomegaly, no palpable masses,  No rebound, guarding, or rigidity. Musculoskeletal: Moves all extremities x 4. Warm and well perfused, no clubbing, cyanosis, or edema. Capillary refill <3 seconds  Integument: skin warm and dry. No rashes. Lymphatic: no lymphadenopathy noted  Neurologic: GCS 15, no focal deficits, symmetric strength 5/5 in the upper and lower extremities bilaterally  Psychiatric: Normal Affect    -------------------------------------------------- RESULTS -------------------------------------------------  I have personally reviewed all laboratory and imaging results for this patient. Results are listed below.      LABS:  Results for orders placed or performed during the hospital encounter of 09/22/21   COVID-19, Rapid    Specimen: Nasopharyngeal Swab   Result Value Ref Range    SARS-CoV-2, NAAT DETECTED (A) Not Detected   CBC Auto Differential   Result Value Ref Range    WBC 5.1 4.5 - 11.5 E9/L    RBC 5.03 3.80 - 5.80 E12/L    Hemoglobin 13.5 12.5 - 16.5 g/dL    Hematocrit 41.7 37.0 - 54.0 %    MCV 82.9 80.0 - 99.9 fL    MCH 26.8 26.0 - 35.0 pg    MCHC 32.4 32.0 - 34.5 %    RDW 14.1 11.5 - 15.0 fL    Platelets 837 518 - 809 E9/L    MPV 10.6 7.0 - 12.0 fL    Neutrophils % 76.7 43.0 - 80.0 %    Immature Granulocytes % 0.4 0.0 - 5.0 %    Lymphocytes % 14.1 (L) 20.0 - 42.0 %    Monocytes % 8.6 2.0 - 12.0 %    Eosinophils % 0.0 0.0 - 6.0 %    Basophils % 0.2 0.0 - 2.0 %    Neutrophils Absolute 3.90 1.80 - 7.30 E9/L    Immature Granulocytes # 0.02 E9/L Lymphocytes Absolute 0.72 (L) 1.50 - 4.00 E9/L    Monocytes Absolute 0.44 0.10 - 0.95 E9/L    Eosinophils Absolute 0.00 (L) 0.05 - 0.50 E9/L    Basophils Absolute 0.01 0.00 - 0.20 E9/L   Comprehensive Metabolic Panel   Result Value Ref Range    Sodium 130 (L) 132 - 146 mmol/L    Potassium 3.0 (L) 3.5 - 5.0 mmol/L    Chloride 90 (L) 98 - 107 mmol/L    CO2 29 22 - 29 mmol/L    Anion Gap 11 7 - 16 mmol/L    Glucose 177 (H) 74 - 99 mg/dL    BUN 18 6 - 23 mg/dL    CREATININE 0.8 0.7 - 1.2 mg/dL    GFR Non-African American >60 >=60 mL/min/1.73    GFR African American >60     Calcium 8.7 8.6 - 10.2 mg/dL    Total Protein 7.2 6.4 - 8.3 g/dL    Albumin 4.0 3.5 - 5.2 g/dL    Total Bilirubin 0.3 0.0 - 1.2 mg/dL    Alkaline Phosphatase 63 40 - 129 U/L    ALT 58 (H) 0 - 40 U/L    AST 54 (H) 0 - 39 U/L   Troponin   Result Value Ref Range    Troponin, High Sensitivity 16 (H) 0 - 11 ng/L   Lactate, Sepsis   Result Value Ref Range    Lactic Acid, Sepsis 1.1 0.5 - 1.9 mmol/L   EKG 12 Lead   Result Value Ref Range    Ventricular Rate 82 BPM    Atrial Rate 82 BPM    P-R Interval 170 ms    QRS Duration 92 ms    Q-T Interval 384 ms    QTc Calculation (Bazett) 448 ms    P Axis 45 degrees    R Axis -14 degrees    T Axis 26 degrees       RADIOLOGY:  Interpreted by Radiologist.  CTA PULMONARY W CONTRAST   Final Result   Within described exam limitations, no evidence of pulmonary embolism. Scattered multifocal low density infiltrates throughout both lungs, most   suggestive of COVID pneumonia in context of current pandemic. At least moderate diffuse hepatic steatosis. Cholecystectomy. RECOMMENDATIONS:   Multiple pulmonary nodules. Most severe: 4 mm left solid pulmonary nodule   within the upper lobe. A non-contrast Chest CT at 12 months is optional. If   performed and the nodule is stable at 12 months, no further follow-up is   recommended.    These guidelines do not apply to immunocompromised patients and patients with cancer. Follow up in patients with significant comorbidities as clinically   warranted. For lung cancer screening, adhere to Lung-RADS guidelines. Reference: Radiology. 2017; 284(1):228-43. XR CHEST PORTABLE   Final Result   There are bilateral multifocal ground-glass areas of consolidation with the   lungs concerning for in infectious or inflammatory process and developing   ARDS or viral pneumonia could give this appearance. EKG:  EKG showed normal sinus rhythm 82 beats minute no signs of any ST changes. No previous EKG for comparison . EKG interpreted by myself.      ------------------------- NURSING NOTES AND VITALS REVIEWED ---------------------------   The nursing notes within the ED encounter and vital signs as below have been reviewed by myself. BP (!) 172/87   Pulse 86   Temp 102.1 °F (38.9 °C) (Oral)   Resp 18   SpO2 99%   Oxygen Saturation Interpretation: Abnormal    The patients available past medical records and past encounters were reviewed. ------------------------------ ED COURSE/MEDICAL DECISION MAKING----------------------  Medications   dexamethasone (PF) (DECADRON) injection 10 mg (10 mg IntraVENous Given 9/22/21 2222)   0.9 % sodium chloride bolus (500 mLs IntraVENous New Bag 9/22/21 2223)   ondansetron (ZOFRAN) injection 4 mg (4 mg IntraVENous Given 9/22/21 2222)   acetaminophen (TYLENOL) tablet 1,000 mg (1,000 mg Oral Given 9/22/21 2222)   iopamidol (ISOVUE-370) 76 % injection 75 mL (75 mLs IntraVENous Given 9/22/21 2331)   potassium chloride (KLOR-CON M) extended release tablet 40 mEq (40 mEq Oral Given 9/23/21 0140)         ED COURSE:  ED Course as of Sep 23 0141   Thu Sep 23, 2021   0138 Spoke to hospitalist,Dr. Zenaida Fatima, who accepted the patient for admission.     [KK]      ED Course User Index  [KK] Jose Alfredo Smith MD       Medical Decision Making:    Patient presented emergency room 68-year-old gentleman not vaccinated for Covid states he tested positive as an outpatient today saturation was 88% on room air with ambulation reports increased fatigue nausea and shortness of breath. Tylenol for fever rehydrate give Decadron Zofran IV fluids to do a CT of the chest to rule for PE. Patient will require admission does well on 2 L nasal cannula 97%. Differential diagnosis hypoxia Covid pneumonia CHF PE dehydration ELIAS dysrhythmia MI      This patient has remained hemodynamically stable during their ED course. EKG shows normal sinus rhythm 82 beats minute no signs of any ST changes. Patient was patient placed on 2 L nasal cannula as he does desaturate with ambulation given IV fluids IV Decadron and IV Zofran as well. Doing well on 2 L at 97% on room air. CBC was normal.  Chemistry was normal other than a potassium of 3.0 this was orally repleted. Initial troponin was 16-second was sixteen delta of zero. Lactic acid was normal at 1.1. Covid test was confirmed positive. Chest x-ray showed bilateral Groundglass opacities. CTA of the chest was performed that showed no signs of PE chest bilateral Covid pneumonia. So she was made to admit the patient with marked dyspnea on exertion and saturations dropped into the 80s. Does well on 2 L nasal cannula. Admitted to the hospitalist service to monitored bed. Re-Evaluations:             Re-evaluation. Patients symptoms are improving    Re-examination  9/22/21   10:10 PM EDT          Vital Signs:   Vitals:    09/22/21 2145 09/22/21 2147 09/22/21 2234   BP: (!) 188/79  (!) 172/87   Pulse: 81  86   Resp: 18  18   Temp: 102.1 °F (38.9 °C)     TempSrc: Oral     SpO2: (!) 88% 97% 99%           Counseling: The emergency provider has spoken with the patient and discussed todays results, in addition to providing specific details for the plan of care and counseling regarding the diagnosis and prognosis. Questions are answered at this time and they are agreeable with the plan. CRITICAL CARE:  36 MINUTES. Please note that the withdrawal or failure to initiate urgent interventions for this patient would likely result in a life threatening deterioration or permanent disability. Accordingly this patient received the above mentioned time, excluding separately billable procedures. --------------------------------- IMPRESSION AND DISPOSITION ---------------------------------    IMPRESSION  1. COVID-19    2. Acute respiratory failure with hypoxia (HCC)    3. Hypokalemia        DISPOSITION  Disposition: Admit to telemetry  Patient condition is fair    NOTE: This report was transcribed using voice recognition software.  Every effort was made to ensure accuracy; however, inadvertent computerized transcription errors may be present        Abi Olivares MD  09/23/21 2120 no

## 2021-09-23 NOTE — ED NOTES
Bed: 22  Expected date:   Expected time:   Means of arrival:   Comments:  350 Desmond Gusman RN  09/22/21 9300

## 2021-09-24 LAB
ALBUMIN SERPL-MCNC: 3.7 G/DL (ref 3.5–5.2)
ALP BLD-CCNC: 61 U/L (ref 40–129)
ALT SERPL-CCNC: 61 U/L (ref 0–40)
ANION GAP SERPL CALCULATED.3IONS-SCNC: 11 MMOL/L (ref 7–16)
AST SERPL-CCNC: 44 U/L (ref 0–39)
BASOPHILS ABSOLUTE: 0.01 E9/L (ref 0–0.2)
BASOPHILS RELATIVE PERCENT: 0.1 % (ref 0–2)
BILIRUB SERPL-MCNC: 0.3 MG/DL (ref 0–1.2)
BUN BLDV-MCNC: 19 MG/DL (ref 6–23)
C-REACTIVE PROTEIN: 3 MG/DL (ref 0–0.4)
CALCIUM SERPL-MCNC: 8.5 MG/DL (ref 8.6–10.2)
CHLORIDE BLD-SCNC: 97 MMOL/L (ref 98–107)
CO2: 26 MMOL/L (ref 22–29)
CREAT SERPL-MCNC: 0.7 MG/DL (ref 0.7–1.2)
D DIMER: <200 NG/ML DDU
EOSINOPHILS ABSOLUTE: 0 E9/L (ref 0.05–0.5)
EOSINOPHILS RELATIVE PERCENT: 0 % (ref 0–6)
GFR AFRICAN AMERICAN: >60
GFR NON-AFRICAN AMERICAN: >60 ML/MIN/1.73
GLUCOSE BLD-MCNC: 130 MG/DL (ref 74–99)
HCT VFR BLD CALC: 38.7 % (ref 37–54)
HEMOGLOBIN: 12.6 G/DL (ref 12.5–16.5)
IMMATURE GRANULOCYTES #: 0.05 E9/L
IMMATURE GRANULOCYTES %: 0.7 % (ref 0–5)
LYMPHOCYTES ABSOLUTE: 1.16 E9/L (ref 1.5–4)
LYMPHOCYTES RELATIVE PERCENT: 15.4 % (ref 20–42)
MAGNESIUM: 1.5 MG/DL (ref 1.6–2.6)
MCH RBC QN AUTO: 26.9 PG (ref 26–35)
MCHC RBC AUTO-ENTMCNC: 32.6 % (ref 32–34.5)
MCV RBC AUTO: 82.5 FL (ref 80–99.9)
METER GLUCOSE: 124 MG/DL (ref 74–99)
METER GLUCOSE: 134 MG/DL (ref 74–99)
METER GLUCOSE: 137 MG/DL (ref 74–99)
METER GLUCOSE: 150 MG/DL (ref 74–99)
MONOCYTES ABSOLUTE: 0.45 E9/L (ref 0.1–0.95)
MONOCYTES RELATIVE PERCENT: 6 % (ref 2–12)
NEUTROPHILS ABSOLUTE: 5.84 E9/L (ref 1.8–7.3)
NEUTROPHILS RELATIVE PERCENT: 77.8 % (ref 43–80)
PDW BLD-RTO: 14.2 FL (ref 11.5–15)
PHOSPHORUS: 2 MG/DL (ref 2.5–4.5)
PLATELET # BLD: 221 E9/L (ref 130–450)
PMV BLD AUTO: 10.5 FL (ref 7–12)
POTASSIUM SERPL-SCNC: 3.4 MMOL/L (ref 3.5–5)
PROCALCITONIN: 0.12 NG/ML (ref 0–0.08)
RBC # BLD: 4.69 E12/L (ref 3.8–5.8)
SODIUM BLD-SCNC: 134 MMOL/L (ref 132–146)
TOTAL PROTEIN: 6.6 G/DL (ref 6.4–8.3)
WBC # BLD: 7.5 E9/L (ref 4.5–11.5)

## 2021-09-24 PROCEDURE — 84100 ASSAY OF PHOSPHORUS: CPT

## 2021-09-24 PROCEDURE — 36415 COLL VENOUS BLD VENIPUNCTURE: CPT

## 2021-09-24 PROCEDURE — 6360000002 HC RX W HCPCS: Performed by: INTERNAL MEDICINE

## 2021-09-24 PROCEDURE — 99233 SBSQ HOSP IP/OBS HIGH 50: CPT | Performed by: INTERNAL MEDICINE

## 2021-09-24 PROCEDURE — 85378 FIBRIN DEGRADE SEMIQUANT: CPT

## 2021-09-24 PROCEDURE — 6370000000 HC RX 637 (ALT 250 FOR IP): Performed by: FAMILY MEDICINE

## 2021-09-24 PROCEDURE — 6370000000 HC RX 637 (ALT 250 FOR IP): Performed by: INTERNAL MEDICINE

## 2021-09-24 PROCEDURE — 85025 COMPLETE CBC W/AUTO DIFF WBC: CPT

## 2021-09-24 PROCEDURE — 2580000003 HC RX 258: Performed by: INTERNAL MEDICINE

## 2021-09-24 PROCEDURE — 83735 ASSAY OF MAGNESIUM: CPT

## 2021-09-24 PROCEDURE — 80053 COMPREHEN METABOLIC PANEL: CPT

## 2021-09-24 PROCEDURE — 2500000003 HC RX 250 WO HCPCS: Performed by: INTERNAL MEDICINE

## 2021-09-24 PROCEDURE — 84145 PROCALCITONIN (PCT): CPT

## 2021-09-24 PROCEDURE — 86140 C-REACTIVE PROTEIN: CPT

## 2021-09-24 PROCEDURE — 82962 GLUCOSE BLOOD TEST: CPT

## 2021-09-24 PROCEDURE — 1200000000 HC SEMI PRIVATE

## 2021-09-24 RX ORDER — LANOLIN ALCOHOL/MO/W.PET/CERES
4.5 CREAM (GRAM) TOPICAL NIGHTLY PRN
Status: DISCONTINUED | OUTPATIENT
Start: 2021-09-24 | End: 2021-11-02 | Stop reason: HOSPADM

## 2021-09-24 RX ORDER — MAGNESIUM SULFATE IN WATER 40 MG/ML
2000 INJECTION, SOLUTION INTRAVENOUS ONCE
Status: COMPLETED | OUTPATIENT
Start: 2021-09-24 | End: 2021-09-24

## 2021-09-24 RX ADMIN — SODIUM CHLORIDE: 9 INJECTION, SOLUTION INTRAVENOUS at 17:40

## 2021-09-24 RX ADMIN — DEXAMETHASONE 6 MG: 4 TABLET ORAL at 10:38

## 2021-09-24 RX ADMIN — Medication 4.5 MG: at 22:57

## 2021-09-24 RX ADMIN — BARICITINIB 4 MG: 2 TABLET, FILM COATED ORAL at 10:38

## 2021-09-24 RX ADMIN — ONDANSETRON 4 MG: 2 INJECTION INTRAMUSCULAR; INTRAVENOUS at 10:37

## 2021-09-24 RX ADMIN — PAROXETINE 30 MG: 10 TABLET, FILM COATED ORAL at 20:25

## 2021-09-24 RX ADMIN — POTASSIUM PHOSPHATE, MONOBASIC AND POTASSIUM PHOSPHATE, DIBASIC 10 MMOL: 224; 236 INJECTION, SOLUTION, CONCENTRATE INTRAVENOUS at 17:40

## 2021-09-24 RX ADMIN — PAROXETINE 30 MG: 10 TABLET, FILM COATED ORAL at 10:39

## 2021-09-24 RX ADMIN — INSULIN LISPRO 1 UNITS: 100 INJECTION, SOLUTION INTRAVENOUS; SUBCUTANEOUS at 17:40

## 2021-09-24 RX ADMIN — ACETAMINOPHEN 650 MG: 325 TABLET ORAL at 20:25

## 2021-09-24 RX ADMIN — AMLODIPINE BESYLATE 5 MG: 5 TABLET ORAL at 10:39

## 2021-09-24 RX ADMIN — ENOXAPARIN SODIUM 40 MG: 40 INJECTION SUBCUTANEOUS at 10:39

## 2021-09-24 RX ADMIN — Medication 10 ML: at 10:39

## 2021-09-24 RX ADMIN — Medication 10 ML: at 20:25

## 2021-09-24 RX ADMIN — MAGNESIUM SULFATE HEPTAHYDRATE 2000 MG: 40 INJECTION, SOLUTION INTRAVENOUS at 10:40

## 2021-09-24 ASSESSMENT — PAIN DESCRIPTION - PAIN TYPE: TYPE: ACUTE PAIN

## 2021-09-24 ASSESSMENT — PAIN SCALES - GENERAL
PAINLEVEL_OUTOF10: 7
PAINLEVEL_OUTOF10: 0
PAINLEVEL_OUTOF10: 1

## 2021-09-24 ASSESSMENT — PAIN DESCRIPTION - LOCATION: LOCATION: HEAD

## 2021-09-24 NOTE — PROGRESS NOTES
Pt seen and examined earlier today by night physician who admitted pt. Chart reviewed and updated by nursing    Give iv fluids and with continued improvement and no further hypoxia, consider discharge tomorrow.

## 2021-09-25 LAB
ALBUMIN SERPL-MCNC: 3.4 G/DL (ref 3.5–5.2)
ALP BLD-CCNC: 52 U/L (ref 40–129)
ALT SERPL-CCNC: 103 U/L (ref 0–40)
ANION GAP SERPL CALCULATED.3IONS-SCNC: 11 MMOL/L (ref 7–16)
AST SERPL-CCNC: 94 U/L (ref 0–39)
BASOPHILS ABSOLUTE: 0.01 E9/L (ref 0–0.2)
BASOPHILS RELATIVE PERCENT: 0.2 % (ref 0–2)
BILIRUB SERPL-MCNC: 0.4 MG/DL (ref 0–1.2)
BUN BLDV-MCNC: 16 MG/DL (ref 6–23)
CALCIUM SERPL-MCNC: 7.9 MG/DL (ref 8.6–10.2)
CHLORIDE BLD-SCNC: 95 MMOL/L (ref 98–107)
CO2: 28 MMOL/L (ref 22–29)
CREAT SERPL-MCNC: 0.8 MG/DL (ref 0.7–1.2)
D DIMER: 382 NG/ML DDU
EOSINOPHILS ABSOLUTE: 0 E9/L (ref 0.05–0.5)
EOSINOPHILS RELATIVE PERCENT: 0 % (ref 0–6)
GFR AFRICAN AMERICAN: >60
GFR NON-AFRICAN AMERICAN: >60 ML/MIN/1.73
GLUCOSE BLD-MCNC: 145 MG/DL (ref 74–99)
HCT VFR BLD CALC: 34.4 % (ref 37–54)
HEMOGLOBIN: 10.7 G/DL (ref 12.5–16.5)
IMMATURE GRANULOCYTES #: 0.03 E9/L
IMMATURE GRANULOCYTES %: 0.5 % (ref 0–5)
LYMPHOCYTES ABSOLUTE: 0.81 E9/L (ref 1.5–4)
LYMPHOCYTES RELATIVE PERCENT: 14.3 % (ref 20–42)
MCH RBC QN AUTO: 26.8 PG (ref 26–35)
MCHC RBC AUTO-ENTMCNC: 31.1 % (ref 32–34.5)
MCV RBC AUTO: 86.2 FL (ref 80–99.9)
METER GLUCOSE: 118 MG/DL (ref 74–99)
METER GLUCOSE: 180 MG/DL (ref 74–99)
METER GLUCOSE: 196 MG/DL (ref 74–99)
METER GLUCOSE: 231 MG/DL (ref 74–99)
MONOCYTES ABSOLUTE: 0.34 E9/L (ref 0.1–0.95)
MONOCYTES RELATIVE PERCENT: 6 % (ref 2–12)
NEUTROPHILS ABSOLUTE: 4.49 E9/L (ref 1.8–7.3)
NEUTROPHILS RELATIVE PERCENT: 79 % (ref 43–80)
PDW BLD-RTO: 14.6 FL (ref 11.5–15)
PLATELET # BLD: 211 E9/L (ref 130–450)
PMV BLD AUTO: 10.6 FL (ref 7–12)
POTASSIUM SERPL-SCNC: 3.7 MMOL/L (ref 3.5–5)
RBC # BLD: 3.99 E12/L (ref 3.8–5.8)
SODIUM BLD-SCNC: 134 MMOL/L (ref 132–146)
TOTAL PROTEIN: 6.3 G/DL (ref 6.4–8.3)
WBC # BLD: 5.7 E9/L (ref 4.5–11.5)

## 2021-09-25 PROCEDURE — 6370000000 HC RX 637 (ALT 250 FOR IP): Performed by: INTERNAL MEDICINE

## 2021-09-25 PROCEDURE — 2700000000 HC OXYGEN THERAPY PER DAY

## 2021-09-25 PROCEDURE — 85025 COMPLETE CBC W/AUTO DIFF WBC: CPT

## 2021-09-25 PROCEDURE — 99232 SBSQ HOSP IP/OBS MODERATE 35: CPT | Performed by: INTERNAL MEDICINE

## 2021-09-25 PROCEDURE — 82962 GLUCOSE BLOOD TEST: CPT

## 2021-09-25 PROCEDURE — 80053 COMPREHEN METABOLIC PANEL: CPT

## 2021-09-25 PROCEDURE — 6360000002 HC RX W HCPCS: Performed by: INTERNAL MEDICINE

## 2021-09-25 PROCEDURE — 1200000000 HC SEMI PRIVATE

## 2021-09-25 PROCEDURE — 85378 FIBRIN DEGRADE SEMIQUANT: CPT

## 2021-09-25 PROCEDURE — 36415 COLL VENOUS BLD VENIPUNCTURE: CPT

## 2021-09-25 RX ADMIN — PAROXETINE 30 MG: 10 TABLET, FILM COATED ORAL at 08:10

## 2021-09-25 RX ADMIN — INSULIN LISPRO 2 UNITS: 100 INJECTION, SOLUTION INTRAVENOUS; SUBCUTANEOUS at 16:29

## 2021-09-25 RX ADMIN — AMLODIPINE BESYLATE 5 MG: 5 TABLET ORAL at 08:11

## 2021-09-25 RX ADMIN — ACETAMINOPHEN 650 MG: 325 TABLET ORAL at 22:31

## 2021-09-25 RX ADMIN — BARICITINIB 4 MG: 2 TABLET, FILM COATED ORAL at 08:28

## 2021-09-25 RX ADMIN — DEXAMETHASONE 6 MG: 4 TABLET ORAL at 08:10

## 2021-09-25 RX ADMIN — ACETAMINOPHEN 650 MG: 325 TABLET ORAL at 08:09

## 2021-09-25 RX ADMIN — INSULIN LISPRO 1 UNITS: 100 INJECTION, SOLUTION INTRAVENOUS; SUBCUTANEOUS at 12:14

## 2021-09-25 RX ADMIN — PAROXETINE 30 MG: 10 TABLET, FILM COATED ORAL at 22:24

## 2021-09-25 ASSESSMENT — PAIN - FUNCTIONAL ASSESSMENT: PAIN_FUNCTIONAL_ASSESSMENT: ACTIVITIES ARE NOT PREVENTED

## 2021-09-25 ASSESSMENT — PAIN DESCRIPTION - FREQUENCY: FREQUENCY: CONTINUOUS

## 2021-09-25 ASSESSMENT — PAIN SCALES - GENERAL
PAINLEVEL_OUTOF10: 0
PAINLEVEL_OUTOF10: 8
PAINLEVEL_OUTOF10: 8
PAINLEVEL_OUTOF10: 0
PAINLEVEL_OUTOF10: 0
PAINLEVEL_OUTOF10: 4

## 2021-09-25 ASSESSMENT — PAIN DESCRIPTION - PROGRESSION: CLINICAL_PROGRESSION: GRADUALLY WORSENING

## 2021-09-25 ASSESSMENT — PAIN DESCRIPTION - ONSET: ONSET: GRADUAL

## 2021-09-25 ASSESSMENT — PAIN DESCRIPTION - DESCRIPTORS: DESCRIPTORS: ACHING

## 2021-09-25 ASSESSMENT — PAIN DESCRIPTION - LOCATION: LOCATION: HEAD

## 2021-09-25 ASSESSMENT — PAIN DESCRIPTION - ORIENTATION: ORIENTATION: MID

## 2021-09-25 ASSESSMENT — PAIN DESCRIPTION - PAIN TYPE: TYPE: ACUTE PAIN

## 2021-09-25 NOTE — PROGRESS NOTES
[facility] Hospitalist   Progress Note    Admitting Date and Time: 9/22/2021  9:32 PM  Admit Dx: Hypokalemia [E87.6]  Acute respiratory failure with hypoxia ([address] [J96.01]  COVID-19 [U07.1]  Acute respiratory failure due to COVID-19 (HCC) [U07.1, J96.00]    Subjective:    Patient was admitted with Hypokalemia [E87.6]  Acute respiratory failure with hypoxia ([address] [J96.01]  COVID-19 [U07.1]  Acute respiratory failure due to COVID-19 ([address] [U07.1, J96.00]. Patient denies fever, chills, cp, n/v. Pt with some sob.       baricitinib  4 mg Oral Daily    sodium chloride flush  5-40 mL IntraVENous 2 times per day    enoxaparin  40 mg SubCUTAneous Daily    PARoxetine  30 mg Oral BID    amLODIPine  5 mg Oral Daily    insulin lispro  0-6 Units SubCUTAneous TID WC    insulin lispro  0-3 Units SubCUTAneous Nightly    dexamethasone  6 mg Oral Daily     melatonin, 4.5 mg, Nightly PRN  sodium chloride flush, 5-40 mL, PRN  sodium chloride, 25 mL, PRN  ondansetron, 4 mg, Q8H PRN   Or  ondansetron, 4 mg, Q6H PRN  polyethylene glycol, 17 g, Daily PRN  acetaminophen, 650 mg, Q6H PRN   Or  acetaminophen, 650 mg, Q6H PRN  glucose, 15 g, PRN  dextrose, 12.5 g, PRN  glucagon (rDNA), 1 mg, PRN  dextrose, 100 mL/hr, PRN         Objective:    BP (!) 162/84   Pulse 71   Temp 98.8 °F (37.1 °C) (Oral)   Resp 22   Ht 5' 7\" (1.702 m)   Wt 184 lb 11.9 oz (83.8 kg)   SpO2 94%   BMI 28.94 kg/m²   Skin: warm and dry, no rash or erythema  Pulmonary/Chest: clear to auscultation bilaterally- no wheezes, rales or rhonchi, normal air movement, no respiratory distress  Cardiovascular: rhythm reg at rate of 72  Abdomen: soft, non-tender, non-distended, normal bowel sounds, no masses or organomegaly  Extremities: no cyanosis, no clubbing and no edema      Recent Labs     09/23/21  0435 09/24/21  0850 09/25/21  0505    134 134   K 3.6 3.4* 3.7   CL 92* 97* 95*   CO2 30* 26 28   BUN 19 19 16   CREATININE 0.8 0.7 0.8   GLUCOSE 224* 130* 145*   CALCIUM 8.6 8.5* 7.9*       Recent Labs     09/23/21  0435 09/24/21  0850 09/25/21  0505   WBC 3.7* 7.5 5.7   RBC 6.12* 4.69 3.99   HGB 16.4 12.6 10.7*   HCT 51.6 38.7 34.4*   MCV 84.3 82.5 86.2   MCH 26.8 26.9 26.8   MCHC 31.8* 32.6 31.1*   RDW 14.4 14.2 14.6    221 211   MPV 10.5 10.5 10.6       CBC with Differential:    Lab Results   Component Value Date    WBC 5.7 09/25/2021    RBC 3.99 09/25/2021    HGB 10.7 09/25/2021    HCT 34.4 09/25/2021     09/25/2021    MCV 86.2 09/25/2021    MCH 26.8 09/25/2021    MCHC 31.1 09/25/2021    RDW 14.6 09/25/2021    LYMPHOPCT 14.3 09/25/2021    MONOPCT 6.0 09/25/2021    BASOPCT 0.2 09/25/2021    MONOSABS 0.34 09/25/2021    LYMPHSABS 0.81 09/25/2021    EOSABS 0.00 09/25/2021    BASOSABS 0.01 09/25/2021     CMP:    Lab Results   Component Value Date     09/25/2021    K 3.7 09/25/2021    K 3.6 09/23/2021    CL 95 09/25/2021    CO2 28 09/25/2021    BUN 16 09/25/2021    CREATININE 0.8 09/25/2021    GFRAA >60 09/25/2021    LABGLOM >60 09/25/2021    GLUCOSE 145 09/25/2021    PROT 6.3 09/25/2021    LABALBU 3.4 09/25/2021    CALCIUM 7.9 09/25/2021    BILITOT 0.4 09/25/2021    ALKPHOS 52 09/25/2021    AST 94 09/25/2021     09/25/2021        Radiology:   CTA PULMONARY W CONTRAST   Final Result   Within described exam limitations, no evidence of pulmonary embolism. Scattered multifocal low density infiltrates throughout both lungs, most   suggestive of COVID pneumonia in context of current pandemic. At least moderate diffuse hepatic steatosis. Cholecystectomy. RECOMMENDATIONS:   Multiple pulmonary nodules. Most severe: 4 mm left solid pulmonary nodule   within the upper lobe. A non-contrast Chest CT at 12 months is optional. If   performed and the nodule is stable at 12 months, no further follow-up is   recommended. These guidelines do not apply to immunocompromised patients and patients with   cancer.  Follow up in patients with significant comorbidities as clinically   warranted. For lung cancer screening, adhere to Lung-RADS guidelines. Reference: Radiology. 2017; 284(1):228-43. XR CHEST PORTABLE   Final Result   There are bilateral multifocal ground-glass areas of consolidation with the   lungs concerning for in infectious or inflammatory process and developing   ARDS or viral pneumonia could give this appearance. Assessment:    Active Problems:    COVID-19    Acute respiratory failure due to COVID-19 [facility])    Acute respiratory failure with hypoxia (HCC)    Pneumonia due to COVID-19 virus    Elevated LFTs    Hypokalemia  Resolved Problems:    * No resolved hospital problems. *      Plan:  1. Acute respiratory failure with hypoxia (88%o2sat) continue o2 and adjust as needed  2. Pneumonia due to covid 19 continue to monitor labs. continue steroids and baricitinib. 3. Elevated lfts monitor  4. Hyperglycemia monitor and tx with insulin ssi  5. Hypokalemia monitor and replace prn  improving  6. Hypophosphatemia monitor and replace prn  7. Hypomagnesemia monitor and replace prn  8.  Htn continue med        Electronically signed by Robbin Handley DO on 9/25/2021 at 3:16 PM

## 2021-09-26 LAB
ALBUMIN SERPL-MCNC: 3.3 G/DL (ref 3.5–5.2)
ALP BLD-CCNC: 55 U/L (ref 40–129)
ALT SERPL-CCNC: 87 U/L (ref 0–40)
ANION GAP SERPL CALCULATED.3IONS-SCNC: 12 MMOL/L (ref 7–16)
AST SERPL-CCNC: 51 U/L (ref 0–39)
BASOPHILS ABSOLUTE: 0.01 E9/L (ref 0–0.2)
BASOPHILS RELATIVE PERCENT: 0.1 % (ref 0–2)
BILIRUB SERPL-MCNC: 0.4 MG/DL (ref 0–1.2)
BUN BLDV-MCNC: 19 MG/DL (ref 6–23)
CALCIUM SERPL-MCNC: 8.2 MG/DL (ref 8.6–10.2)
CHLORIDE BLD-SCNC: 97 MMOL/L (ref 98–107)
CO2: 26 MMOL/L (ref 22–29)
CREAT SERPL-MCNC: 0.6 MG/DL (ref 0.7–1.2)
EOSINOPHILS ABSOLUTE: 0 E9/L (ref 0.05–0.5)
EOSINOPHILS RELATIVE PERCENT: 0 % (ref 0–6)
GFR AFRICAN AMERICAN: >60
GFR NON-AFRICAN AMERICAN: >60 ML/MIN/1.73
GLUCOSE BLD-MCNC: 161 MG/DL (ref 74–99)
HCT VFR BLD CALC: 38 % (ref 37–54)
HEMOGLOBIN: 12.5 G/DL (ref 12.5–16.5)
IMMATURE GRANULOCYTES #: 0.06 E9/L
IMMATURE GRANULOCYTES %: 0.8 % (ref 0–5)
LYMPHOCYTES ABSOLUTE: 0.77 E9/L (ref 1.5–4)
LYMPHOCYTES RELATIVE PERCENT: 10.1 % (ref 20–42)
MCH RBC QN AUTO: 27.1 PG (ref 26–35)
MCHC RBC AUTO-ENTMCNC: 32.9 % (ref 32–34.5)
MCV RBC AUTO: 82.3 FL (ref 80–99.9)
METER GLUCOSE: 139 MG/DL (ref 74–99)
METER GLUCOSE: 170 MG/DL (ref 74–99)
METER GLUCOSE: 182 MG/DL (ref 74–99)
METER GLUCOSE: 193 MG/DL (ref 74–99)
MONOCYTES ABSOLUTE: 0.49 E9/L (ref 0.1–0.95)
MONOCYTES RELATIVE PERCENT: 6.5 % (ref 2–12)
NEUTROPHILS ABSOLUTE: 6.26 E9/L (ref 1.8–7.3)
NEUTROPHILS RELATIVE PERCENT: 82.5 % (ref 43–80)
PDW BLD-RTO: 13.9 FL (ref 11.5–15)
PLATELET # BLD: 237 E9/L (ref 130–450)
PMV BLD AUTO: 10.3 FL (ref 7–12)
POTASSIUM SERPL-SCNC: 3.8 MMOL/L (ref 3.5–5)
RBC # BLD: 4.62 E12/L (ref 3.8–5.8)
SODIUM BLD-SCNC: 135 MMOL/L (ref 132–146)
TOTAL PROTEIN: 6.4 G/DL (ref 6.4–8.3)
WBC # BLD: 7.6 E9/L (ref 4.5–11.5)

## 2021-09-26 PROCEDURE — 6370000000 HC RX 637 (ALT 250 FOR IP): Performed by: INTERNAL MEDICINE

## 2021-09-26 PROCEDURE — 99232 SBSQ HOSP IP/OBS MODERATE 35: CPT | Performed by: INTERNAL MEDICINE

## 2021-09-26 PROCEDURE — 82962 GLUCOSE BLOOD TEST: CPT

## 2021-09-26 PROCEDURE — 2580000003 HC RX 258: Performed by: INTERNAL MEDICINE

## 2021-09-26 PROCEDURE — 6360000002 HC RX W HCPCS: Performed by: INTERNAL MEDICINE

## 2021-09-26 PROCEDURE — 80053 COMPREHEN METABOLIC PANEL: CPT

## 2021-09-26 PROCEDURE — 2700000000 HC OXYGEN THERAPY PER DAY

## 2021-09-26 PROCEDURE — 6370000000 HC RX 637 (ALT 250 FOR IP): Performed by: FAMILY MEDICINE

## 2021-09-26 PROCEDURE — 85025 COMPLETE CBC W/AUTO DIFF WBC: CPT

## 2021-09-26 PROCEDURE — 1200000000 HC SEMI PRIVATE

## 2021-09-26 PROCEDURE — 36415 COLL VENOUS BLD VENIPUNCTURE: CPT

## 2021-09-26 RX ORDER — DEXAMETHASONE SODIUM PHOSPHATE 10 MG/ML
6 INJECTION INTRAMUSCULAR; INTRAVENOUS EVERY 12 HOURS
Status: DISCONTINUED | OUTPATIENT
Start: 2021-09-27 | End: 2021-09-26 | Stop reason: SDUPTHER

## 2021-09-26 RX ORDER — DEXAMETHASONE SODIUM PHOSPHATE 10 MG/ML
10 INJECTION INTRAMUSCULAR; INTRAVENOUS DAILY
Status: DISCONTINUED | OUTPATIENT
Start: 2021-09-26 | End: 2021-09-26

## 2021-09-26 RX ORDER — DEXAMETHASONE SODIUM PHOSPHATE 4 MG/ML
6 INJECTION, SOLUTION INTRA-ARTICULAR; INTRALESIONAL; INTRAMUSCULAR; INTRAVENOUS; SOFT TISSUE EVERY 12 HOURS
Status: DISCONTINUED | OUTPATIENT
Start: 2021-09-27 | End: 2021-09-27

## 2021-09-26 RX ORDER — GUAIFENESIN/DEXTROMETHORPHAN 100-10MG/5
5 SYRUP ORAL EVERY 4 HOURS PRN
Status: DISCONTINUED | OUTPATIENT
Start: 2021-09-26 | End: 2021-09-30

## 2021-09-26 RX ORDER — ALBUTEROL SULFATE 90 UG/1
2 AEROSOL, METERED RESPIRATORY (INHALATION) 4 TIMES DAILY
Status: DISCONTINUED | OUTPATIENT
Start: 2021-09-26 | End: 2021-11-02 | Stop reason: HOSPADM

## 2021-09-26 RX ADMIN — Medication 10 ML: at 20:51

## 2021-09-26 RX ADMIN — SODIUM CHLORIDE: 9 INJECTION, SOLUTION INTRAVENOUS at 17:38

## 2021-09-26 RX ADMIN — BARICITINIB 4 MG: 2 TABLET, FILM COATED ORAL at 08:13

## 2021-09-26 RX ADMIN — AMLODIPINE BESYLATE 5 MG: 5 TABLET ORAL at 08:13

## 2021-09-26 RX ADMIN — Medication 10 ML: at 08:19

## 2021-09-26 RX ADMIN — GUAIFENESIN AND DEXTROMETHORPHAN 5 ML: 100; 10 SYRUP ORAL at 08:13

## 2021-09-26 RX ADMIN — DEXAMETHASONE 6 MG: 4 TABLET ORAL at 08:13

## 2021-09-26 RX ADMIN — Medication 4.5 MG: at 20:49

## 2021-09-26 RX ADMIN — ALBUTEROL SULFATE 2 PUFF: 90 AEROSOL, METERED RESPIRATORY (INHALATION) at 20:50

## 2021-09-26 RX ADMIN — PAROXETINE 30 MG: 10 TABLET, FILM COATED ORAL at 20:49

## 2021-09-26 RX ADMIN — ACETAMINOPHEN 650 MG: 325 TABLET ORAL at 08:14

## 2021-09-26 RX ADMIN — GUAIFENESIN AND DEXTROMETHORPHAN 5 ML: 100; 10 SYRUP ORAL at 23:16

## 2021-09-26 RX ADMIN — INSULIN LISPRO 1 UNITS: 100 INJECTION, SOLUTION INTRAVENOUS; SUBCUTANEOUS at 11:29

## 2021-09-26 RX ADMIN — PAROXETINE 30 MG: 10 TABLET, FILM COATED ORAL at 08:14

## 2021-09-26 RX ADMIN — ONDANSETRON 4 MG: 2 INJECTION INTRAMUSCULAR; INTRAVENOUS at 17:51

## 2021-09-26 RX ADMIN — INSULIN LISPRO 1 UNITS: 100 INJECTION, SOLUTION INTRAVENOUS; SUBCUTANEOUS at 17:07

## 2021-09-26 RX ADMIN — DEXAMETHASONE SODIUM PHOSPHATE 10 MG: 10 INJECTION INTRAMUSCULAR; INTRAVENOUS at 17:38

## 2021-09-26 RX ADMIN — INSULIN LISPRO 1 UNITS: 100 INJECTION, SOLUTION INTRAVENOUS; SUBCUTANEOUS at 20:57

## 2021-09-26 RX ADMIN — GUAIFENESIN AND DEXTROMETHORPHAN 5 ML: 100; 10 SYRUP ORAL at 16:01

## 2021-09-26 ASSESSMENT — PAIN DESCRIPTION - FREQUENCY: FREQUENCY: CONTINUOUS

## 2021-09-26 ASSESSMENT — PAIN SCALES - GENERAL
PAINLEVEL_OUTOF10: 8
PAINLEVEL_OUTOF10: 8
PAINLEVEL_OUTOF10: 0
PAINLEVEL_OUTOF10: 0

## 2021-09-26 ASSESSMENT — PAIN DESCRIPTION - ORIENTATION: ORIENTATION: MID

## 2021-09-26 ASSESSMENT — PAIN - FUNCTIONAL ASSESSMENT: PAIN_FUNCTIONAL_ASSESSMENT: PREVENTS OR INTERFERES SOME ACTIVE ACTIVITIES AND ADLS

## 2021-09-26 ASSESSMENT — PAIN DESCRIPTION - LOCATION: LOCATION: CHEST

## 2021-09-26 ASSESSMENT — PAIN DESCRIPTION - PAIN TYPE: TYPE: ACUTE PAIN

## 2021-09-26 ASSESSMENT — PAIN DESCRIPTION - ONSET: ONSET: ON-GOING

## 2021-09-26 ASSESSMENT — PAIN DESCRIPTION - PROGRESSION: CLINICAL_PROGRESSION: NOT CHANGED

## 2021-09-26 ASSESSMENT — PAIN DESCRIPTION - DESCRIPTORS: DESCRIPTORS: HEAVINESS

## 2021-09-26 NOTE — PROGRESS NOTES
Consult placed to Dr. Jelly Grimm (Dr. Emigdio Hunter covering)    Dr. Emigdio Hunter called back and discussed consult.

## 2021-09-26 NOTE — PROGRESS NOTES
Vital Signs taken during hourly rounding. Dr. Jackie Amezquita was on the floor during assessment and is aware of pt's current condition. Educated pt on importance of Incentive Spirometer, proning, and moving around more frequently.

## 2021-09-26 NOTE — PLAN OF CARE
Problem: Airway Clearance - Ineffective  Goal: Achieve or maintain patent airway  6/22/0799 7066 by Lavern Davis RN  Outcome: Ongoing  9/25/2021 1828 by Richard Arndt RN  Outcome: Met This Shift     Problem: Gas Exchange - Impaired  Goal: Absence of hypoxia  2/96/0331 3033 by Lavern Davis RN  Outcome: Ongoing  9/25/2021 1828 by Richard Arndt RN  Outcome: Met This Shift  Goal: Promote optimal lung function  6/74/5208 3342 by Lavern Davis RN  Outcome: Ongoing  9/25/2021 1828 by Richard Arndt RN  Outcome: Met This Shift     Problem: Breathing Pattern - Ineffective  Goal: Ability to achieve and maintain a regular respiratory rate  7/35/1067 8168 by Lavern Davis RN  Outcome: Ongoing  9/25/2021 1828 by Richard Arndt RN  Outcome: Met This Shift

## 2021-09-26 NOTE — PROGRESS NOTES
Assisted patient to the chair. Tolerated ambulation well. Sat in chair for approx. 15 mins and used incentive spirometer before transferring back to the bed. SpO2 sustained in the low 90s during and after ambulation. Further educated on the importance of increased movement and using the incentive spirometer. Patient verbalized and demonstrated understanding; was very willing to learn.

## 2021-09-26 NOTE — PROGRESS NOTES
Patient on 15 liters high flow. Doctor contacted for breathing treatments non ordered.  Told to monitor patient

## 2021-09-26 NOTE — PLAN OF CARE
Deficit  Goal: Maintain normal heart rhythm  9/26/2021 1803 by Skyler Hodge RN  Outcome: Met This Shift  5/31/3509 7937 by Susan Rowe RN  Outcome: Ongoing  Goal: Maintain absence of muscle cramping  9/26/2021 1803 by Skyler Hodge RN  Outcome: Met This Shift  2/88/5921 9502 by Susan Rowe RN  Outcome: Ongoing  Goal: Maintain normal serum potassium, sodium, calcium, phosphorus, and pH  9/26/2021 1803 by Skyler Hodge RN  Outcome: Met This Shift  0/83/0569 6455 by Susan Rowe RN  Outcome: Ongoing     Problem: Loneliness or Risk for Loneliness  Goal: Demonstrate positive use of time alone when socialization is not possible  9/26/2021 1803 by Skyler Hodge RN  Outcome: Met This Shift  3/58/1477 2456 by Susan Rowe RN  Outcome: Ongoing     Problem: Fatigue  Goal: Verbalize increase energy and improved vitality  9/26/2021 1803 by Skyler Hodge RN  Outcome: Met This Shift  3/11/8608 5609 by Susan Rowe RN  Outcome: Ongoing     Problem: Patient Education: Go to Patient Education Activity  Goal: Patient/Family Education  9/26/2021 1803 by Skyler Hodge RN  Outcome: Met This Shift  1/60/8826 9810 by Susan Rowe RN  Outcome: Ongoing     Problem: Pain:  Goal: Pain level will decrease  Description: Pain level will decrease  9/26/2021 1803 by Skyler Hodge RN  Outcome: Met This Shift  5/88/9479 5712 by Susan Rowe RN  Outcome: Ongoing  Goal: Control of acute pain  Description: Control of acute pain  9/26/2021 1803 by Skyler Hodge RN  Outcome: Met This Shift  6/20/8112 8220 by Susan Rowe RN  Outcome: Ongoing  Goal: Control of chronic pain  Description: Control of chronic pain  9/26/2021 1803 by Skyler Hodge RN  Outcome: Met This Shift  4/32/5765 6726 by Susan Rowe RN  Outcome: Ongoing

## 2021-09-27 ENCOUNTER — APPOINTMENT (OUTPATIENT)
Dept: GENERAL RADIOLOGY | Age: 66
DRG: 871 | End: 2021-09-27
Payer: OTHER GOVERNMENT

## 2021-09-27 LAB
ALBUMIN SERPL-MCNC: 3.1 G/DL (ref 3.5–5.2)
ALP BLD-CCNC: 55 U/L (ref 40–129)
ALT SERPL-CCNC: 59 U/L (ref 0–40)
ANION GAP SERPL CALCULATED.3IONS-SCNC: 11 MMOL/L (ref 7–16)
AST SERPL-CCNC: 36 U/L (ref 0–39)
B.E.: 0.6 MMOL/L (ref -3–3)
BASOPHILS ABSOLUTE: 0.01 E9/L (ref 0–0.2)
BASOPHILS RELATIVE PERCENT: 0.1 % (ref 0–2)
BILIRUB SERPL-MCNC: 0.3 MG/DL (ref 0–1.2)
BUN BLDV-MCNC: 22 MG/DL (ref 6–23)
C-REACTIVE PROTEIN: 11.5 MG/DL (ref 0–0.4)
CALCIUM SERPL-MCNC: 8.2 MG/DL (ref 8.6–10.2)
CHLORIDE BLD-SCNC: 99 MMOL/L (ref 98–107)
CO2: 24 MMOL/L (ref 22–29)
COHB: 0.7 % (ref 0–1.5)
CREAT SERPL-MCNC: 0.6 MG/DL (ref 0.7–1.2)
CRITICAL: ABNORMAL
D DIMER: 862 NG/ML DDU
DATE ANALYZED: ABNORMAL
DATE OF COLLECTION: ABNORMAL
EOSINOPHILS ABSOLUTE: 0 E9/L (ref 0.05–0.5)
EOSINOPHILS RELATIVE PERCENT: 0 % (ref 0–6)
FERRITIN: 1511 NG/ML
GFR AFRICAN AMERICAN: >60
GFR NON-AFRICAN AMERICAN: >60 ML/MIN/1.73
GLUCOSE BLD-MCNC: 184 MG/DL (ref 74–99)
HCO3: 25 MMOL/L (ref 22–26)
HCT VFR BLD CALC: 38.3 % (ref 37–54)
HEMOGLOBIN: 12.3 G/DL (ref 12.5–16.5)
HHB: 14.8 % (ref 0–5)
IMMATURE GRANULOCYTES #: 0.08 E9/L
IMMATURE GRANULOCYTES %: 1.1 % (ref 0–5)
LAB: ABNORMAL
LYMPHOCYTES ABSOLUTE: 0.61 E9/L (ref 1.5–4)
LYMPHOCYTES RELATIVE PERCENT: 8.4 % (ref 20–42)
Lab: ABNORMAL
MAGNESIUM: 2.3 MG/DL (ref 1.6–2.6)
MCH RBC QN AUTO: 26.5 PG (ref 26–35)
MCHC RBC AUTO-ENTMCNC: 32.1 % (ref 32–34.5)
MCV RBC AUTO: 82.5 FL (ref 80–99.9)
METER GLUCOSE: 178 MG/DL (ref 74–99)
METER GLUCOSE: 180 MG/DL (ref 74–99)
METER GLUCOSE: 184 MG/DL (ref 74–99)
METER GLUCOSE: 205 MG/DL (ref 74–99)
METHB: 0.3 % (ref 0–1.5)
MODE: ABNORMAL
MONOCYTES ABSOLUTE: 0.44 E9/L (ref 0.1–0.95)
MONOCYTES RELATIVE PERCENT: 6 % (ref 2–12)
NEUTROPHILS ABSOLUTE: 6.16 E9/L (ref 1.8–7.3)
NEUTROPHILS RELATIVE PERCENT: 84.4 % (ref 43–80)
O2 CONTENT: 15.4 ML/DL
O2 SATURATION: 85.1 % (ref 92–98.5)
O2HB: 84.2 % (ref 94–97)
OPERATOR ID: 8213
PATIENT TEMP: 37 C
PCO2: 39.1 MMHG (ref 35–45)
PDW BLD-RTO: 14 FL (ref 11.5–15)
PH BLOOD GAS: 7.42 (ref 7.35–7.45)
PHOSPHORUS: 2.7 MG/DL (ref 2.5–4.5)
PLATELET # BLD: 296 E9/L (ref 130–450)
PMV BLD AUTO: 10.7 FL (ref 7–12)
PO2: 50.3 MMHG (ref 75–100)
POTASSIUM SERPL-SCNC: 4 MMOL/L (ref 3.5–5)
RBC # BLD: 4.64 E12/L (ref 3.8–5.8)
SODIUM BLD-SCNC: 134 MMOL/L (ref 132–146)
SOURCE, BLOOD GAS: ABNORMAL
THB: 13 G/DL (ref 11.5–16.5)
TIME ANALYZED: 741
TOTAL PROTEIN: 6.2 G/DL (ref 6.4–8.3)
WBC # BLD: 7.3 E9/L (ref 4.5–11.5)

## 2021-09-27 PROCEDURE — 36415 COLL VENOUS BLD VENIPUNCTURE: CPT

## 2021-09-27 PROCEDURE — 2700000000 HC OXYGEN THERAPY PER DAY

## 2021-09-27 PROCEDURE — 6360000002 HC RX W HCPCS: Performed by: INTERNAL MEDICINE

## 2021-09-27 PROCEDURE — 82805 BLOOD GASES W/O2 SATURATION: CPT

## 2021-09-27 PROCEDURE — 82728 ASSAY OF FERRITIN: CPT

## 2021-09-27 PROCEDURE — 85025 COMPLETE CBC W/AUTO DIFF WBC: CPT

## 2021-09-27 PROCEDURE — 85378 FIBRIN DEGRADE SEMIQUANT: CPT

## 2021-09-27 PROCEDURE — 1200000000 HC SEMI PRIVATE

## 2021-09-27 PROCEDURE — 94761 N-INVAS EAR/PLS OXIMETRY MLT: CPT

## 2021-09-27 PROCEDURE — 83735 ASSAY OF MAGNESIUM: CPT

## 2021-09-27 PROCEDURE — 6370000000 HC RX 637 (ALT 250 FOR IP): Performed by: INTERNAL MEDICINE

## 2021-09-27 PROCEDURE — 71045 X-RAY EXAM CHEST 1 VIEW: CPT

## 2021-09-27 PROCEDURE — 80053 COMPREHEN METABOLIC PANEL: CPT

## 2021-09-27 PROCEDURE — 82962 GLUCOSE BLOOD TEST: CPT

## 2021-09-27 PROCEDURE — 99291 CRITICAL CARE FIRST HOUR: CPT | Performed by: INTERNAL MEDICINE

## 2021-09-27 PROCEDURE — 86140 C-REACTIVE PROTEIN: CPT

## 2021-09-27 PROCEDURE — 84100 ASSAY OF PHOSPHORUS: CPT

## 2021-09-27 PROCEDURE — 6370000000 HC RX 637 (ALT 250 FOR IP): Performed by: FAMILY MEDICINE

## 2021-09-27 PROCEDURE — 94660 CPAP INITIATION&MGMT: CPT

## 2021-09-27 PROCEDURE — 2580000003 HC RX 258: Performed by: INTERNAL MEDICINE

## 2021-09-27 RX ORDER — MAGNESIUM HYDROXIDE/ALUMINUM HYDROXICE/SIMETHICONE 120; 1200; 1200 MG/30ML; MG/30ML; MG/30ML
15 SUSPENSION ORAL EVERY 6 HOURS PRN
Status: DISCONTINUED | OUTPATIENT
Start: 2021-09-27 | End: 2021-11-02 | Stop reason: HOSPADM

## 2021-09-27 RX ORDER — DEXAMETHASONE SODIUM PHOSPHATE 4 MG/ML
10 INJECTION, SOLUTION INTRA-ARTICULAR; INTRALESIONAL; INTRAMUSCULAR; INTRAVENOUS; SOFT TISSUE EVERY 12 HOURS
Status: DISCONTINUED | OUTPATIENT
Start: 2021-09-27 | End: 2021-10-19

## 2021-09-27 RX ADMIN — PAROXETINE 30 MG: 10 TABLET, FILM COATED ORAL at 08:31

## 2021-09-27 RX ADMIN — Medication 10 ML: at 21:42

## 2021-09-27 RX ADMIN — BARICITINIB 4 MG: 1 TABLET, FILM COATED ORAL at 11:25

## 2021-09-27 RX ADMIN — INSULIN LISPRO 1 UNITS: 100 INJECTION, SOLUTION INTRAVENOUS; SUBCUTANEOUS at 13:41

## 2021-09-27 RX ADMIN — AMLODIPINE BESYLATE 5 MG: 5 TABLET ORAL at 08:31

## 2021-09-27 RX ADMIN — Medication 4.5 MG: at 21:40

## 2021-09-27 RX ADMIN — ALBUTEROL SULFATE 2 PUFF: 90 AEROSOL, METERED RESPIRATORY (INHALATION) at 13:22

## 2021-09-27 RX ADMIN — ALBUTEROL SULFATE 2 PUFF: 90 AEROSOL, METERED RESPIRATORY (INHALATION) at 08:37

## 2021-09-27 RX ADMIN — ALBUTEROL SULFATE 2 PUFF: 90 AEROSOL, METERED RESPIRATORY (INHALATION) at 17:19

## 2021-09-27 RX ADMIN — ALUMINUM HYDROXIDE, MAGNESIUM HYDROXIDE, AND SIMETHICONE 15 ML: 200; 200; 20 SUSPENSION ORAL at 00:14

## 2021-09-27 RX ADMIN — DEXAMETHASONE SODIUM PHOSPHATE 10 MG: 4 INJECTION, SOLUTION INTRA-ARTICULAR; INTRALESIONAL; INTRAMUSCULAR; INTRAVENOUS; SOFT TISSUE at 21:39

## 2021-09-27 RX ADMIN — ACETAMINOPHEN 650 MG: 325 TABLET ORAL at 07:28

## 2021-09-27 RX ADMIN — PAROXETINE 30 MG: 10 TABLET, FILM COATED ORAL at 22:33

## 2021-09-27 RX ADMIN — DEXAMETHASONE SODIUM PHOSPHATE 10 MG: 4 INJECTION, SOLUTION INTRA-ARTICULAR; INTRALESIONAL; INTRAMUSCULAR; INTRAVENOUS; SOFT TISSUE at 08:31

## 2021-09-27 RX ADMIN — INSULIN LISPRO 1 UNITS: 100 INJECTION, SOLUTION INTRAVENOUS; SUBCUTANEOUS at 17:30

## 2021-09-27 RX ADMIN — INSULIN LISPRO 1 UNITS: 100 INJECTION, SOLUTION INTRAVENOUS; SUBCUTANEOUS at 08:00

## 2021-09-27 RX ADMIN — ALBUTEROL SULFATE 2 PUFF: 90 AEROSOL, METERED RESPIRATORY (INHALATION) at 21:41

## 2021-09-27 RX ADMIN — ENOXAPARIN SODIUM 80 MG: 100 INJECTION SUBCUTANEOUS at 21:40

## 2021-09-27 RX ADMIN — INSULIN LISPRO 1 UNITS: 100 INJECTION, SOLUTION INTRAVENOUS; SUBCUTANEOUS at 21:56

## 2021-09-27 RX ADMIN — ENOXAPARIN SODIUM 40 MG: 40 INJECTION SUBCUTANEOUS at 08:31

## 2021-09-27 ASSESSMENT — PAIN SCALES - GENERAL
PAINLEVEL_OUTOF10: 0
PAINLEVEL_OUTOF10: 0

## 2021-09-27 NOTE — CONSULTS
Pulmonary Consultation    Admit Date: 9/22/2021  Requesting Physician: No primary care provider on file. Reason for consultation:  · COVID-19 pneumonia  HPI:  · The patient is a 65-year-old white male who presented to this institution after testing positive for COVID-19. Initially, the patient was doing quite well but over the past several days has really taken a turn for the worse. A chest radiograph shows substantial evolution of bilateral infiltrates were initially seen on the CT scan of the chest.  The CT scan also failed to evidence pulmonary emboli. · From pulmonary standpoint, the patient smoked 20 years ago and worked some in [employer]. His CT also showed several 4 mm nodules that will eventually need follow-up. · This p.m., the patient is overtly breathless as he tries to eat dinner. His saturations are between 75 and 80% on nasal cannula oxygen. He is not a big fan of BiPAP. Since admission, the patient has been given baricitinib and steroids. He was not given remdesivir. He is prophylactically anticoagulated. PMH:    Past Medical History:   Diagnosis Date    Hypertension      PSH:   History reviewed. No pertinent surgical history. Review of Systems:     · Constitutional: As noted in the HPI. · Eyes: No visual changes or diplopia. No scleral icterus. · ENT: No headaches, hearing loss or vertigo. No nasal congestion, or sore throat. · Cardiovascular: No chest pain, dyspnea on exertion, or palpitations. · Respiratory: See above  · Gastrointestinal: No abdominal pain, nausea or emesis. No diarrhea or rectal bleeding or melena. No change in bowel habits. · Genitourinary: No dysuria, urinary frequency, or incontinence. No hematuria. · Musculoskeletal: No gait disturbance, weakness or joint complaints. · Integumentary: No rash or pruritis. No abnormal pigmentation, hair or nail changes.   · Neurological: No headache, diplopia, dizziness, tremor, change in muscle strength, numbness or tingling. No change in gait, balance, coordination, mood, affect, memory, mentation, behavior. · Psychiatric: No anxiety or depression. · Endocrine: No temperature intolerance, excessive thirst, fluid intake, urinary frequency, excessive appetite, or recent weight change. · Hematologic/Lymphatic: No abnormal bruising or bleeding, blood clots or swollen lymph nodes. No anemia, fever, chills, night sweats, or swollen glands. · Allergic/Immunologic: No seasonal or perenial allergies. No history of hives or atopic dermatitis. Social History:  · Alcohol: No  · Tobacco:   Past  · Employment:  no silica or asbestos exposure  · Family:  No family history of lung disease    Medications:   sodium chloride      dextrose        dexamethasone  10 mg IntraVENous Q12H    baricitinib  4 mg Oral Daily    albuterol sulfate HFA  2 puff Inhalation 4x daily    sodium chloride flush  5-40 mL IntraVENous 2 times per day    enoxaparin  40 mg SubCUTAneous Daily    PARoxetine  30 mg Oral BID    amLODIPine  5 mg Oral Daily    insulin lispro  0-6 Units SubCUTAneous TID WC    insulin lispro  0-3 Units SubCUTAneous Nightly       Vitals:  Tmax:  VITALS:  BP (!) 149/77   Pulse 61   Temp 97.8 °F (36.6 °C) (Axillary)   Resp (!) 45   Ht 5' 7\" (1.702 m)   Wt 184 lb 11.9 oz (83.8 kg)   SpO2 99%   BMI 28.94 kg/m²   24HR INTAKE/OUTPUT:      Intake/Output Summary (Last 24 hours) at 2021 1226  Last data filed at 2021 0353  Gross per 24 hour   Intake --   Output 400 ml   Net -400 ml     CURRENT PULSE OXIMETRY:  SpO2: 99 %  24HR PULSE OXIMETRY RANGE:  SpO2  Av %  Min: 81 %  Max: 99 %    EXAM:  General: Moderate distress. Alert. Eyes: PERRL. No sclera icterus. No conjunctival injection. ENT: No discharge. Pharynx clear. Neck: Trachea midline. Normal thyroid. No jvd, no hjr. Resp: No wheezing. No accessory muscle use. Bilateral rales. No rhonchi. CV: Regular rate. Regular rhythm.  No and patients with   cancer. Follow up in patients with significant comorbidities as clinically   warranted. For lung cancer screening, adhere to Lung-RADS guidelines. Reference: Radiology. 2017; 284(1):228-43. XR CHEST PORTABLE   Final Result   There are bilateral multifocal ground-glass areas of consolidation with the   lungs concerning for in infectious or inflammatory process and developing   ARDS or viral pneumonia could give this appearance. .        Assessment:  1. COVID-19 pneumonia with significant worsening despite steroids and baricitinib      Plan:  1. Increase to full dose anticoagulation  2. The patient may benefit from an air Vo system versus BiPAP as he appears to be ventilating well, just not oxygenating well. 3. BiPAP as needed  4. Transfer to higher level of care as needed. Thanks for letting us see this patient in consultation. Total time in reviewing the previous admissions and records, reviewing the current x-rays, labs, and discussing with clinical staff including nursing and physicians, exceeded 50 minutes. Please contact us with any questions. Office [phone] or after hours through [employer], x [phone]. Please note that voice recognition technology was used (while wearing a Covid mandated mask) in the preparation of this note and make therefore it may contain inadvertent transcription errors. If the patient is a COVID 19 isolation patient, the above physical exam reflects that of the examining physician for the day. Clara Shirley MD,  M.D., F.C.C.P.     [facility], [address] [place] [address], [address] [place] [address]

## 2021-09-27 NOTE — PROGRESS NOTES
Staff went to room to do morning report. Patient satting at 69% on continuous pulse ox. Patient took off non-rebreather and high flow nasal cannula. Staff put oxygen back on patient. Patients oxygen went up to 80% and would not go above 82-83%. Rapid response called. Patient placed on BiPAP, satting at 96%.

## 2021-09-27 NOTE — PLAN OF CARE
Problem: Airway Clearance - Ineffective  Goal: Achieve or maintain patent airway  9/27/2021 0634 by Peri Fields RN  Outcome: Met This Shift  9/26/2021 1803 by Cristian Monteiro RN  Outcome: Met This Shift     Problem: Gas Exchange - Impaired  Goal: Absence of hypoxia  9/27/2021 0634 by Peri Fields RN  Outcome: Met This Shift  9/26/2021 1803 by Cristian Monteiro RN  Outcome: Met This Shift  Goal: Promote optimal lung function  9/27/2021 0634 by Peri Fields RN  Outcome: Met This Shift  9/26/2021 1803 by Cristian Monteiro RN  Outcome: Met This Shift     Problem: Breathing Pattern - Ineffective  Goal: Ability to achieve and maintain a regular respiratory rate  9/27/2021 0634 by Peri Fiedls RN  Outcome: Met This Shift  9/26/2021 1803 by Cristian Monteiro RN  Outcome: Met This Shift     Problem:  Body Temperature -  Risk of, Imbalanced  Goal: Ability to maintain a body temperature within defined limits  9/27/2021 0634 by Peri Fields RN  Outcome: Met This Shift  9/26/2021 1803 by Cristian Monteiro RN  Outcome: Met This Shift  Goal: Will regain or maintain usual level of consciousness  9/27/2021 0634 by Peri Fields RN  Outcome: Met This Shift  9/26/2021 1803 by Cristian Monteiro RN  Outcome: Met This Shift  Goal: Complications related to the disease process, condition or treatment will be avoided or minimized  9/27/2021 0634 by Peri Fields RN  Outcome: Met This Shift  9/26/2021 1803 by Cristian Monteiro RN  Outcome: Met This Shift     Problem: Isolation Precautions - Risk of Spread of Infection  Goal: Prevent transmission of infection  9/27/2021 0634 by Peri Fields RN  Outcome: Met This Shift  9/26/2021 1803 by Cristian Monteiro RN  Outcome: Met This Shift     Problem: Nutrition Deficits  Goal: Optimize nutritional status  9/27/2021 0634 by Peri Fields RN  Outcome: Met This Shift  9/26/2021 1803 by Cristian Monteiro RN  Outcome: Met This Shift Problem: Risk for Fluid Volume Deficit  Goal: Maintain normal heart rhythm  9/27/2021 0634 by Promise Mckeon RN  Outcome: Met This Shift  9/26/2021 1803 by Vernell Ortiz RN  Outcome: Met This Shift  Goal: Maintain absence of muscle cramping  9/27/2021 0634 by Promise Mckeon RN  Outcome: Met This Shift  9/26/2021 1803 by Vernell Ortiz RN  Outcome: Met This Shift  Goal: Maintain normal serum potassium, sodium, calcium, phosphorus, and pH  9/27/2021 0634 by Promise Mckeon RN  Outcome: Met This Shift  9/26/2021 1803 by Vernell Ortiz RN  Outcome: Met This Shift     Problem: Loneliness or Risk for Loneliness  Goal: Demonstrate positive use of time alone when socialization is not possible  9/27/2021 0634 by Promise Mckeon RN  Outcome: Met This Shift  9/26/2021 1803 by Vernell Ortiz RN  Outcome: Met This Shift     Problem: Fatigue  Goal: Verbalize increase energy and improved vitality  9/27/2021 0634 by Promise Mckeon RN  Outcome: Met This Shift  9/26/2021 1803 by Vernell Ortiz RN  Outcome: Met This Shift     Problem: Patient Education: Go to Patient Education Activity  Goal: Patient/Family Education  9/27/2021 0634 by Promise Mckeon RN  Outcome: Met This Shift  9/26/2021 1803 by Vernell Ortiz RN  Outcome: Met This Shift     Problem: Pain:  Goal: Pain level will decrease  Description: Pain level will decrease  9/27/2021 0634 by Promise Mckeon RN  Outcome: Met This Shift  9/26/2021 1803 by Vernell Ortiz RN  Outcome: Met This Shift  Goal: Control of acute pain  Description: Control of acute pain  9/27/2021 0634 by Promise Mckeon RN  Outcome: Met This Shift  9/26/2021 1803 by Vernell Ortiz RN  Outcome: Met This Shift  Goal: Control of chronic pain  Description: Control of chronic pain  9/27/2021 0634 by Promise Mckeon RN  Outcome: Met This Shift  9/26/2021 1803 by Vernell Ortiz RN  Outcome: Met This Shift

## 2021-09-27 NOTE — PROGRESS NOTES
Educated the patient on the importance of keeping nasal cannula in nose and non-rebreather on to keep pulse ox above 90%.

## 2021-09-27 NOTE — PROGRESS NOTES
Updated Donna Pink, daughter, on patient. Answered all questions. Okay to call her any time during day or night for update.

## 2021-09-27 NOTE — PROGRESS NOTES
[facility] Hospitalist   Progress Note    Admitting Date and Time: 9/22/2021  9:32 PM  Admit Dx: Hypokalemia [E87.6]  Acute respiratory failure with hypoxia ([address] [J96.01]  COVID-19 [U07.1]  Acute respiratory failure due to COVID-19 (HCC) [U07.1, J96.00]    Subjective:    Patient was admitted with Hypokalemia [E87.6]  Acute respiratory failure with hypoxia ([address] [J96.01]  COVID-19 [U07.1]  Acute respiratory failure due to COVID-19 ([address] [U07.1, J96.00]. Patient enies fever, chills, cp, n/v. Pt with some sob.  albuterol sulfate HFA  2 puff Inhalation 4x daily    [START ON 9/27/2021] dexamethasone  6 mg IntraVENous Q12H    baricitinib  4 mg Oral Daily    sodium chloride flush  5-40 mL IntraVENous 2 times per day    enoxaparin  40 mg SubCUTAneous Daily    PARoxetine  30 mg Oral BID    amLODIPine  5 mg Oral Daily    insulin lispro  0-6 Units SubCUTAneous TID WC    insulin lispro  0-3 Units SubCUTAneous Nightly     guaiFENesin-dextromethorphan, 5 mL, Q4H PRN  melatonin, 4.5 mg, Nightly PRN  sodium chloride flush, 5-40 mL, PRN  sodium chloride, 25 mL, PRN  ondansetron, 4 mg, Q8H PRN   Or  ondansetron, 4 mg, Q6H PRN  polyethylene glycol, 17 g, Daily PRN  acetaminophen, 650 mg, Q6H PRN   Or  acetaminophen, 650 mg, Q6H PRN  glucose, 15 g, PRN  dextrose, 12.5 g, PRN  glucagon (rDNA), 1 mg, PRN  dextrose, 100 mL/hr, PRN         Objective:    BP (!) 158/76   Pulse 68   Temp 98.7 °F (37.1 °C) (Oral)   Resp 22   Ht 5' 7\" (1.702 m)   Wt 184 lb 11.9 oz (83.8 kg)   SpO2 91%   BMI 28.94 kg/m²     General:  Appears uncomfortable. Answers questions appropriately and cooperative with exam  HEENT:  Mucous membranes moist. No erythema, rhinorrhea, or post-nasal drip noted. Neck:  No carotid bruits. Heart:  Rhythm regular at rate of 64  Lungs:  CTA. No wheeze, rales, or rhonchi  Abdomen:  Positive bowel sounds positive. Soft. Non-tender.  No guarding, rebound or rigidity. Breast/Rectal/Genitourinary: not pertinent. Extremities:  Negative for lower extremity edema  Skin:  Very warm. dry  Vascular: 2/4 Dorsalis Pedis pulses bilaterally. Neuro:  Cranial nerves 2-12 grossly intact, no focal weakness or change in sensation noted. Extraocular muscles intact. Pupils equal, round, reactive to light. Recent Labs     09/24/21  0850 09/25/21  0505 09/26/21 0337    134 135   K 3.4* 3.7 3.8   CL 97* 95* 97*   CO2 26 28 26   BUN 19 16 19   CREATININE 0.7 0.8 0.6*   GLUCOSE 130* 145* 161*   CALCIUM 8.5* 7.9* 8.2*       Recent Labs     09/24/21  0850 09/25/21  0505 09/26/21  0337   WBC 7.5 5.7 7.6   RBC 4.69 3.99 4.62   HGB 12.6 10.7* 12.5   HCT 38.7 34.4* 38.0   MCV 82.5 86.2 82.3   MCH 26.9 26.8 27.1   MCHC 32.6 31.1* 32.9   RDW 14.2 14.6 13.9    211 237   MPV 10.5 10.6 10.3       CBC with Differential:    Lab Results   Component Value Date    WBC 7.6 09/26/2021    RBC 4.62 09/26/2021    HGB 12.5 09/26/2021    HCT 38.0 09/26/2021     09/26/2021    MCV 82.3 09/26/2021    MCH 27.1 09/26/2021    MCHC 32.9 09/26/2021    RDW 13.9 09/26/2021    LYMPHOPCT 10.1 09/26/2021    MONOPCT 6.5 09/26/2021    BASOPCT 0.1 09/26/2021    MONOSABS 0.49 09/26/2021    LYMPHSABS 0.77 09/26/2021    EOSABS 0.00 09/26/2021    BASOSABS 0.01 09/26/2021     CMP:    Lab Results   Component Value Date     09/26/2021    K 3.8 09/26/2021    K 3.6 09/23/2021    CL 97 09/26/2021    CO2 26 09/26/2021    BUN 19 09/26/2021    CREATININE 0.6 09/26/2021    GFRAA >60 09/26/2021    LABGLOM >60 09/26/2021    GLUCOSE 161 09/26/2021    PROT 6.4 09/26/2021    LABALBU 3.3 09/26/2021    CALCIUM 8.2 09/26/2021    BILITOT 0.4 09/26/2021    ALKPHOS 55 09/26/2021    AST 51 09/26/2021    ALT 87 09/26/2021        Radiology:   CTA PULMONARY W CONTRAST   Final Result   Within described exam limitations, no evidence of pulmonary embolism.       Scattered multifocal low density infiltrates throughout both lungs, most   suggestive of COVID pneumonia in context of current pandemic. At least moderate diffuse hepatic steatosis. Cholecystectomy. RECOMMENDATIONS:   Multiple pulmonary nodules. Most severe: 4 mm left solid pulmonary nodule   within the upper lobe. A non-contrast Chest CT at 12 months is optional. If   performed and the nodule is stable at 12 months, no further follow-up is   recommended. These guidelines do not apply to immunocompromised patients and patients with   cancer. Follow up in patients with significant comorbidities as clinically   warranted. For lung cancer screening, adhere to Lung-RADS guidelines. Reference: Radiology. 2017; 284(1):228-43. XR CHEST PORTABLE   Final Result   There are bilateral multifocal ground-glass areas of consolidation with the   lungs concerning for in infectious or inflammatory process and developing   ARDS or viral pneumonia could give this appearance. Assessment:    Active Problems:    COVID-19    Acute respiratory failure due to COVID-19 [facility])    Acute respiratory failure with hypoxia (HCC)    Pneumonia due to COVID-19 virus    Elevated LFTs    Hypokalemia    Essential hypertension  Resolved Problems:    * No resolved hospital problems. *      Plan:  1. Acute respiratory failure with hypoxia (88%o2sat) continue o2 and adjust as needed  2. Pneumonia due to covid 19 continue to monitor labs. continue steroids and baricitinib. 3. Elevated lfts monitor  4. Hyperglycemia monitor and tx with insulin ssi  5. Hypokalemia monitor and replace prn  improving  6. Hypophosphatemia monitor and replace prn  7. Hypomagnesemia monitor and replace prn  8. Htn continue med    Pt seen earlier this morning and found to be febrile. Given increasing o2 requirements, will ask pulmonary to see pt.     Electronically signed by Ezra Kat DO on 9/26/2021 at 11:05 PM

## 2021-09-28 LAB
ALBUMIN SERPL-MCNC: 3.5 G/DL (ref 3.5–5.2)
ALP BLD-CCNC: 75 U/L (ref 40–129)
ALT SERPL-CCNC: 57 U/L (ref 0–40)
ANION GAP SERPL CALCULATED.3IONS-SCNC: 11 MMOL/L (ref 7–16)
AST SERPL-CCNC: 35 U/L (ref 0–39)
BASOPHILS ABSOLUTE: 0.02 E9/L (ref 0–0.2)
BASOPHILS RELATIVE PERCENT: 0.2 % (ref 0–2)
BILIRUB SERPL-MCNC: 0.4 MG/DL (ref 0–1.2)
BUN BLDV-MCNC: 26 MG/DL (ref 6–23)
C-REACTIVE PROTEIN: 5.3 MG/DL (ref 0–0.4)
CALCIUM SERPL-MCNC: 8.7 MG/DL (ref 8.6–10.2)
CHLORIDE BLD-SCNC: 100 MMOL/L (ref 98–107)
CO2: 27 MMOL/L (ref 22–29)
CREAT SERPL-MCNC: 0.6 MG/DL (ref 0.7–1.2)
D DIMER: 683 NG/ML DDU
EOSINOPHILS ABSOLUTE: 0 E9/L (ref 0.05–0.5)
EOSINOPHILS RELATIVE PERCENT: 0 % (ref 0–6)
FERRITIN: 1258 NG/ML
GFR AFRICAN AMERICAN: >60
GFR NON-AFRICAN AMERICAN: >60 ML/MIN/1.73
GLUCOSE BLD-MCNC: 192 MG/DL (ref 74–99)
HCT VFR BLD CALC: 37.3 % (ref 37–54)
HEMOGLOBIN: 12 G/DL (ref 12.5–16.5)
IMMATURE GRANULOCYTES #: 0.1 E9/L
IMMATURE GRANULOCYTES %: 1.1 % (ref 0–5)
LYMPHOCYTES ABSOLUTE: 0.74 E9/L (ref 1.5–4)
LYMPHOCYTES RELATIVE PERCENT: 8.2 % (ref 20–42)
MCH RBC QN AUTO: 26.7 PG (ref 26–35)
MCHC RBC AUTO-ENTMCNC: 32.2 % (ref 32–34.5)
MCV RBC AUTO: 82.9 FL (ref 80–99.9)
METER GLUCOSE: 187 MG/DL (ref 74–99)
METER GLUCOSE: 194 MG/DL (ref 74–99)
METER GLUCOSE: 194 MG/DL (ref 74–99)
METER GLUCOSE: 237 MG/DL (ref 74–99)
MONOCYTES ABSOLUTE: 0.51 E9/L (ref 0.1–0.95)
MONOCYTES RELATIVE PERCENT: 5.6 % (ref 2–12)
NEUTROPHILS ABSOLUTE: 7.68 E9/L (ref 1.8–7.3)
NEUTROPHILS RELATIVE PERCENT: 84.9 % (ref 43–80)
PDW BLD-RTO: 14.1 FL (ref 11.5–15)
PLATELET # BLD: 366 E9/L (ref 130–450)
PMV BLD AUTO: 10.6 FL (ref 7–12)
POTASSIUM SERPL-SCNC: 4.4 MMOL/L (ref 3.5–5)
RBC # BLD: 4.5 E12/L (ref 3.8–5.8)
SODIUM BLD-SCNC: 138 MMOL/L (ref 132–146)
TOTAL PROTEIN: 6.6 G/DL (ref 6.4–8.3)
WBC # BLD: 9.1 E9/L (ref 4.5–11.5)

## 2021-09-28 PROCEDURE — 82962 GLUCOSE BLOOD TEST: CPT

## 2021-09-28 PROCEDURE — 99232 SBSQ HOSP IP/OBS MODERATE 35: CPT | Performed by: INTERNAL MEDICINE

## 2021-09-28 PROCEDURE — 2580000003 HC RX 258: Performed by: INTERNAL MEDICINE

## 2021-09-28 PROCEDURE — 94660 CPAP INITIATION&MGMT: CPT

## 2021-09-28 PROCEDURE — 82728 ASSAY OF FERRITIN: CPT

## 2021-09-28 PROCEDURE — 6370000000 HC RX 637 (ALT 250 FOR IP): Performed by: FAMILY MEDICINE

## 2021-09-28 PROCEDURE — 85025 COMPLETE CBC W/AUTO DIFF WBC: CPT

## 2021-09-28 PROCEDURE — 36415 COLL VENOUS BLD VENIPUNCTURE: CPT

## 2021-09-28 PROCEDURE — 6370000000 HC RX 637 (ALT 250 FOR IP): Performed by: INTERNAL MEDICINE

## 2021-09-28 PROCEDURE — 86140 C-REACTIVE PROTEIN: CPT

## 2021-09-28 PROCEDURE — 6360000002 HC RX W HCPCS: Performed by: INTERNAL MEDICINE

## 2021-09-28 PROCEDURE — 2700000000 HC OXYGEN THERAPY PER DAY

## 2021-09-28 PROCEDURE — 80053 COMPREHEN METABOLIC PANEL: CPT

## 2021-09-28 PROCEDURE — 1200000000 HC SEMI PRIVATE

## 2021-09-28 PROCEDURE — 85378 FIBRIN DEGRADE SEMIQUANT: CPT

## 2021-09-28 RX ADMIN — DEXAMETHASONE SODIUM PHOSPHATE 10 MG: 4 INJECTION, SOLUTION INTRA-ARTICULAR; INTRALESIONAL; INTRAMUSCULAR; INTRAVENOUS; SOFT TISSUE at 22:00

## 2021-09-28 RX ADMIN — ALBUTEROL SULFATE 2 PUFF: 90 AEROSOL, METERED RESPIRATORY (INHALATION) at 08:19

## 2021-09-28 RX ADMIN — ALBUTEROL SULFATE 2 PUFF: 90 AEROSOL, METERED RESPIRATORY (INHALATION) at 16:27

## 2021-09-28 RX ADMIN — ENOXAPARIN SODIUM 80 MG: 100 INJECTION SUBCUTANEOUS at 08:17

## 2021-09-28 RX ADMIN — Medication 4.5 MG: at 22:03

## 2021-09-28 RX ADMIN — PAROXETINE 30 MG: 10 TABLET, FILM COATED ORAL at 22:04

## 2021-09-28 RX ADMIN — DEXAMETHASONE SODIUM PHOSPHATE 10 MG: 4 INJECTION, SOLUTION INTRA-ARTICULAR; INTRALESIONAL; INTRAMUSCULAR; INTRAVENOUS; SOFT TISSUE at 08:18

## 2021-09-28 RX ADMIN — INSULIN LISPRO 1 UNITS: 100 INJECTION, SOLUTION INTRAVENOUS; SUBCUTANEOUS at 22:21

## 2021-09-28 RX ADMIN — Medication 10 ML: at 08:19

## 2021-09-28 RX ADMIN — PAROXETINE 30 MG: 10 TABLET, FILM COATED ORAL at 08:18

## 2021-09-28 RX ADMIN — INSULIN LISPRO 2 UNITS: 100 INJECTION, SOLUTION INTRAVENOUS; SUBCUTANEOUS at 12:01

## 2021-09-28 RX ADMIN — BARICITINIB 4 MG: 1 TABLET, FILM COATED ORAL at 08:17

## 2021-09-28 RX ADMIN — Medication 10 ML: at 22:08

## 2021-09-28 RX ADMIN — INSULIN LISPRO 1 UNITS: 100 INJECTION, SOLUTION INTRAVENOUS; SUBCUTANEOUS at 08:10

## 2021-09-28 RX ADMIN — ACETAMINOPHEN 650 MG: 325 TABLET ORAL at 22:02

## 2021-09-28 RX ADMIN — INSULIN LISPRO 1 UNITS: 100 INJECTION, SOLUTION INTRAVENOUS; SUBCUTANEOUS at 18:13

## 2021-09-28 RX ADMIN — ALBUTEROL SULFATE 2 PUFF: 90 AEROSOL, METERED RESPIRATORY (INHALATION) at 22:00

## 2021-09-28 RX ADMIN — GUAIFENESIN AND DEXTROMETHORPHAN 5 ML: 100; 10 SYRUP ORAL at 22:03

## 2021-09-28 RX ADMIN — GUAIFENESIN AND DEXTROMETHORPHAN 5 ML: 100; 10 SYRUP ORAL at 13:45

## 2021-09-28 RX ADMIN — AMLODIPINE BESYLATE 5 MG: 5 TABLET ORAL at 08:20

## 2021-09-28 RX ADMIN — ALBUTEROL SULFATE 2 PUFF: 90 AEROSOL, METERED RESPIRATORY (INHALATION) at 11:49

## 2021-09-28 RX ADMIN — ENOXAPARIN SODIUM 80 MG: 100 INJECTION SUBCUTANEOUS at 22:04

## 2021-09-28 ASSESSMENT — PAIN DESCRIPTION - FREQUENCY: FREQUENCY: INTERMITTENT

## 2021-09-28 ASSESSMENT — PAIN SCALES - GENERAL
PAINLEVEL_OUTOF10: 7
PAINLEVEL_OUTOF10: 7
PAINLEVEL_OUTOF10: 0
PAINLEVEL_OUTOF10: 0

## 2021-09-28 ASSESSMENT — PAIN DESCRIPTION - PROGRESSION: CLINICAL_PROGRESSION: NOT CHANGED

## 2021-09-28 ASSESSMENT — PAIN DESCRIPTION - ORIENTATION: ORIENTATION: RIGHT;LEFT;MID

## 2021-09-28 ASSESSMENT — PAIN DESCRIPTION - ONSET: ONSET: ON-GOING

## 2021-09-28 ASSESSMENT — PAIN DESCRIPTION - LOCATION: LOCATION: HEAD

## 2021-09-28 ASSESSMENT — PAIN - FUNCTIONAL ASSESSMENT: PAIN_FUNCTIONAL_ASSESSMENT: PREVENTS OR INTERFERES SOME ACTIVE ACTIVITIES AND ADLS

## 2021-09-28 ASSESSMENT — PAIN DESCRIPTION - DESCRIPTORS: DESCRIPTORS: HEADACHE;SORE

## 2021-09-28 ASSESSMENT — PAIN DESCRIPTION - PAIN TYPE: TYPE: ACUTE PAIN

## 2021-09-28 NOTE — PROGRESS NOTES
Patient called out on call light, stating that he felt short of breath. Patient was fluctuating between 88-90% on 60 L 100% high flow oxygen. Encouraged patient to take slow deep breaths and reassured him that his oxygen level was WNL Patient still felt short of breath, so bipap was reapplied. Oxygen level now 92%. Will monitor this patient closely.

## 2021-09-28 NOTE — PROGRESS NOTES
Patient on heated high flow 60L, 100%, saturating at 90-92% and resting comfortably. Instructed on the importance of using the call light to ask for help to get up to the bathroom. Bed alarm initiated.

## 2021-09-28 NOTE — PROGRESS NOTES
[facility] Hospitalist   Progress Note    Admitting Date and Time: 9/22/2021  9:32 PM  Admit Dx: Hypokalemia [E87.6]  Acute respiratory failure with hypoxia ([address] [J96.01]  COVID-19 [U07.1]  Acute respiratory failure due to COVID-19 (HCC) [U07.1, J96.00]      Pt seen during RRT during this morning. Please see below for review of event. Subjective:    Patient was admitted with Hypokalemia [E87.6]  Acute respiratory failure with hypoxia ([address] [J96.01]  COVID-19 [U07.1]  Acute respiratory failure due to COVID-19 ([address] [U07.1, J96.00]. Patient denies fever, chills, cp, n/v. Pt with sob.  dexamethasone  10 mg IntraVENous Q12H    baricitinib  4 mg Oral Daily    enoxaparin  1 mg/kg SubCUTAneous BID    albuterol sulfate HFA  2 puff Inhalation 4x daily    sodium chloride flush  5-40 mL IntraVENous 2 times per day    PARoxetine  30 mg Oral BID    amLODIPine  5 mg Oral Daily    insulin lispro  0-6 Units SubCUTAneous TID WC    insulin lispro  0-3 Units SubCUTAneous Nightly     aluminum & magnesium hydroxide-simethicone, 15 mL, Q6H PRN  guaiFENesin-dextromethorphan, 5 mL, Q4H PRN  melatonin, 4.5 mg, Nightly PRN  sodium chloride flush, 5-40 mL, PRN  sodium chloride, 25 mL, PRN  ondansetron, 4 mg, Q8H PRN   Or  ondansetron, 4 mg, Q6H PRN  polyethylene glycol, 17 g, Daily PRN  acetaminophen, 650 mg, Q6H PRN   Or  acetaminophen, 650 mg, Q6H PRN  glucose, 15 g, PRN  dextrose, 12.5 g, PRN  glucagon (rDNA), 1 mg, PRN  dextrose, 100 mL/hr, PRN         Objective:        PHYSICAL EXAM:    Vitals:  BP (!) 149/77   Pulse 61   Temp 97.8 °F (36.6 °C) (Axillary)   Resp 28   Ht 5' 7\" (1.702 m)   Wt 184 lb 11.9 oz (83.8 kg)   SpO2 97%   BMI 28.94 kg/m²     General:  Appears uncomfortable. Answers questions appropriately and cooperative with exam  HEENT:  Mucous membranes moist. No erythema, rhinorrhea, or post-nasal drip noted. Neck:  No carotid bruits. Heart:  Rhythm regular at rate of 64  Lungs:  CTA. No wheeze, rales, or rhonchi. Pt tachypneic and mild use of accessory muscles  Abdomen:  Positive bowel sounds positive. Soft. Non-tender. No guarding, rebound or rigidity. Breast/Rectal/Genitourinary: not pertinent. Extremities:  Negative for lower extremity edema  Skin:  Warm and dry  Vascular: 2/4 Dorsalis Pedis pulses bilaterally. Neuro:  Cranial nerves 2-12 grossly intact, no focal weakness or change in sensation noted. Extraocular muscles intact. Pupils equal, round, reactive to light.             Recent Labs     09/25/21  0505 09/26/21 0337 09/27/21 0620    135 134   K 3.7 3.8 4.0   CL 95* 97* 99   CO2 28 26 24   BUN 16 19 22   CREATININE 0.8 0.6* 0.6*   GLUCOSE 145* 161* 184*   CALCIUM 7.9* 8.2* 8.2*       Recent Labs     09/25/21  0505 09/26/21 0337 09/27/21  0620   WBC 5.7 7.6 7.3   RBC 3.99 4.62 4.64   HGB 10.7* 12.5 12.3*   HCT 34.4* 38.0 38.3   MCV 86.2 82.3 82.5   MCH 26.8 27.1 26.5   MCHC 31.1* 32.9 32.1   RDW 14.6 13.9 14.0    237 296   MPV 10.6 10.3 10.7       CBC with Differential:    Lab Results   Component Value Date    WBC 7.3 09/27/2021    RBC 4.64 09/27/2021    HGB 12.3 09/27/2021    HCT 38.3 09/27/2021     09/27/2021    MCV 82.5 09/27/2021    MCH 26.5 09/27/2021    MCHC 32.1 09/27/2021    RDW 14.0 09/27/2021    LYMPHOPCT 8.4 09/27/2021    MONOPCT 6.0 09/27/2021    BASOPCT 0.1 09/27/2021    MONOSABS 0.44 09/27/2021    LYMPHSABS 0.61 09/27/2021    EOSABS 0.00 09/27/2021    BASOSABS 0.01 09/27/2021     CMP:    Lab Results   Component Value Date     09/27/2021    K 4.0 09/27/2021    K 3.6 09/23/2021    CL 99 09/27/2021    CO2 24 09/27/2021    BUN 22 09/27/2021    CREATININE 0.6 09/27/2021    GFRAA >60 09/27/2021    LABGLOM >60 09/27/2021    GLUCOSE 184 09/27/2021    PROT 6.2 09/27/2021    LABALBU 3.1 09/27/2021    CALCIUM 8.2 09/27/2021    BILITOT 0.3 09/27/2021    ALKPHOS 55 09/27/2021    AST 36 09/27/2021    ALT 59 09/27/2021     Magnesium:    Lab Results Component Value Date    MG 2.3 09/27/2021     Phosphorus:    Lab Results   Component Value Date    PHOS 2.7 09/27/2021     ABG:    Lab Results   Component Value Date    PH 7.423 09/27/2021    PCO2 39.1 09/27/2021    PO2 50.3 09/27/2021    HCO3 25.0 09/27/2021    BE 0.6 09/27/2021    O2SAT 85.1 09/27/2021        Radiology:   XR CHEST PORTABLE   Final Result   1. Significant worsening of the multifocal bilateral pneumonia when compared   with the prior CT scan of 09/22/2021         CTA PULMONARY W CONTRAST   Final Result   Within described exam limitations, no evidence of pulmonary embolism. Scattered multifocal low density infiltrates throughout both lungs, most   suggestive of COVID pneumonia in context of current pandemic. At least moderate diffuse hepatic steatosis. Cholecystectomy. RECOMMENDATIONS:   Multiple pulmonary nodules. Most severe: 4 mm left solid pulmonary nodule   within the upper lobe. A non-contrast Chest CT at 12 months is optional. If   performed and the nodule is stable at 12 months, no further follow-up is   recommended. These guidelines do not apply to immunocompromised patients and patients with   cancer. Follow up in patients with significant comorbidities as clinically   warranted. For lung cancer screening, adhere to Lung-RADS guidelines. Reference: Radiology. 2017; 284(1):228-43. XR CHEST PORTABLE   Final Result   There are bilateral multifocal ground-glass areas of consolidation with the   lungs concerning for in infectious or inflammatory process and developing   ARDS or viral pneumonia could give this appearance. Assessment:    Active Problems:    COVID-19    Acute respiratory failure due to COVID-19 [facility])    Acute respiratory failure with hypoxia (HCC)    Pneumonia due to COVID-19 virus    Elevated LFTs    Hypokalemia    Essential hypertension  Resolved Problems:    * No resolved hospital problems. *      Plan:  1.  Acute respiratory failure with hypoxia (88%o2sat) continue o2 and adjust as needed  2. Pneumonia due to covid 19 continue to monitor labs. continue steroids and baricitinib. 3. Elevated lfts monitor  4. Hyperglycemia monitor and tx with insulin ssi  5. Hypokalemia monitor and replace prn  improving  6. Hypophosphatemia monitor and replace prn  7. Hypomagnesemia monitor and replace prn  8. Htn continue med    CTSP for hypoxia    Chart reviewed and updated by nursing    Pt noted to be taking his oxygen during the night and was found off nrb and nc. Pt c/o sob but denied fevers, chills,n/v. Exam as above. Pt was placed back on oxygen but saturation still only in mid 80's at best.    Fluids were stopped given that bp was doing well. Pt given tylenol despite afebrile as pt had some distress yesterday and I found him to be febrile. Workup ordered     Pt placed on bipap. Saturations were in mid 90's and pt states he felt better. Decision to keep pt on current floor. Pt reminded to not take o2 off. Discussed with him that this could put him a position where he may needed to be tubed and ventilated in icu. Pulmonary consulted and note reviewed. Input appreciated.      Critical care time is 35 min      Electronically signed by Ezra Kat DO on 9/27/2021 at 9:17 PM

## 2021-09-28 NOTE — PROGRESS NOTES
Pulmonary Progress Note    Admit Date: 2021  Hospital day                               PCP: No primary care provider on file. Chief Complaint (s):  Patient Active Problem List   Diagnosis    COVID-19    Acute respiratory failure due to COVID-19 [facility])    Acute respiratory failure with hypoxia ([address]    Pneumonia due to COVID-19 virus    Elevated LFTs    Hypokalemia    Essential hypertension       Subjective:  · Significant dyspnea this p.m. BiPAP pressures adjusted to increase EPAP and IPAP. Oxygenation improved to 94% saturation. Vitals:  VITALS:  BP (!) 120/58   Pulse 58   Temp 97.1 °F (36.2 °C) (Axillary)   Resp 28   Ht 5' 7\" (1.702 m)   Wt 184 lb 11.9 oz (83.8 kg)   SpO2 93%   BMI 28.94 kg/m²     24HR INTAKE/OUTPUT:      Intake/Output Summary (Last 24 hours) at 2021 1753  Last data filed at 2021 0234  Gross per 24 hour   Intake 240 ml   Output 700 ml   Net -460 ml       24HR PULSE OXIMETRY RANGE:    SpO2  Av %  Min: 89 %  Max: 100 %    Medications:  IV:   sodium chloride      dextrose         Scheduled Meds:   dexamethasone  10 mg IntraVENous Q12H    baricitinib  4 mg Oral Daily    enoxaparin  1 mg/kg SubCUTAneous BID    albuterol sulfate HFA  2 puff Inhalation 4x daily    sodium chloride flush  5-40 mL IntraVENous 2 times per day    PARoxetine  30 mg Oral BID    amLODIPine  5 mg Oral Daily    insulin lispro  0-6 Units SubCUTAneous TID WC    insulin lispro  0-3 Units SubCUTAneous Nightly       Diet:   ADULT DIET; Regular     EXAM:  General: No distress. Alert. Eyes: PERRL. No sclera icterus. No conjunctival injection. ENT: No discharge. Pharynx clear. Neck: Trachea midline. Normal thyroid. Resp: No accessory muscle use. No rales. No wheezing. No rhonchi. CV: Regular rate. Regular rhythm. No murmur or rub. Abd: Non-tender. Non-distended. No masses. No organomegaly. Normal bowel sounds. Skin: Warm and dry.  No nodule on exposed extremities. No rash on exposed extremities. Ext: No cyanosis, clubbing, edema  Lymph: No cervical LAD. No supraclavicular LAD. M/S: No cyanosis. No joint deformity. No clubbing. Neuro: Awake. Follows commands. Positive pupils/gag/corneals. Normal pain response. Results:  CBC:   Recent Labs     09/26/21  0337 09/27/21 0620 09/28/21 0621   WBC 7.6 7.3 9.1   HGB 12.5 12.3* 12.0*   HCT 38.0 38.3 37.3   MCV 82.3 82.5 82.9    296 366     BMP:   Recent Labs     09/26/21  0337 09/27/21 0620 09/27/21  1500 09/28/21 0621    134  --  138   K 3.8 4.0  --  4.4   CL 97* 99  --  100   CO2 26 24  --  27   PHOS  --   --  2.7  --    BUN 19 22  --  26*   CREATININE 0.6* 0.6*  --  0.6*     LIVER PROFILE:   Recent Labs     09/26/21 0337 09/27/21 0620 09/28/21 0621   AST 51* 36 35   ALT 87* 59* 57*   BILITOT 0.4 0.3 0.4   ALKPHOS 55 55 75     PT/INR: No results for input(s): PROTIME, INR in the last 72 hours. APTT: No results for input(s): APTT in the last 72 hours. Pathology:  1. N/A      Microbiology:  1. None    Recent ABG:   Recent Labs     09/27/21  0741   PH 7.423   PO2 50.3*   PCO2 39.1   HCO3 25.0   BE 0.6   O2SAT 85.1*   METHB 0.3   O2HB 84.2*   COHB 0.7   O2CON 15.4   HHB 14.8*   THB 13.0     FiO2 : 100 %       Recent Films:  XR CHEST PORTABLE   Final Result   1. Significant worsening of the multifocal bilateral pneumonia when compared   with the prior CT scan of 09/22/2021         CTA PULMONARY W CONTRAST   Final Result   Within described exam limitations, no evidence of pulmonary embolism. Scattered multifocal low density infiltrates throughout both lungs, most   suggestive of COVID pneumonia in context of current pandemic. At least moderate diffuse hepatic steatosis. Cholecystectomy. RECOMMENDATIONS:   Multiple pulmonary nodules. Most severe: 4 mm left solid pulmonary nodule   within the upper lobe.  A non-contrast Chest CT at 12 months is optional. If   performed and the nodule is stable at 12 months, no further follow-up is   recommended. These guidelines do not apply to immunocompromised patients and patients with   cancer. Follow up in patients with significant comorbidities as clinically   warranted. For lung cancer screening, adhere to Lung-RADS guidelines. Reference: Radiology. 2017; 284(1):228-43. XR CHEST PORTABLE   Final Result   There are bilateral multifocal ground-glass areas of consolidation with the   lungs concerning for in infectious or inflammatory process and developing   ARDS or viral pneumonia could give this appearance.                      Assessment:  1. COVID-19 pneumonia with significant worsening despite steroids and baricitinib        Plan:  1. Continue full dose anticoagulation  2. BiPAP adjusted at the bedside. 3. Transfer to higher level of care as needed.       Time at the bedside, reviewing labs and radiographs, reviewing updated notes and consultations, discussing with staff and family was more than 35 minutes. Please note that voice recognition technology was used in the preparation of this note and make therefore it may contain inadvertent transcription errors. If the patient is a COVID 19 isolation patient, the above physical exam reflects that of the examining physician for the day. Sugey Rodriguez MD,  MFRANCIS., F.C.C.P.     [facility], [address] [place] [address], [address] [place] [address]

## 2021-09-28 NOTE — PROGRESS NOTES
09/28/21 0916   NIV Type   Skin Assessment Clean, dry, & intact   Skin Protection for O2 Device Yes   Location Nose   NIV Started/Stopped On  (pt found on )   Equipment Type v60   Mode Bilevel   Mask Type Full face mask   Mask Size Large   Settings/Measurements   IPAP 18 cmH20   CPAP/EPAP 6 cmH2O   Rate Ordered 12  (BUR)   Resp (!) 47  (pt found in bathroom on bipap)   FiO2  100 %   I Time/ I Time % 0.9 s   Vt Exhaled 672 mL   Minute Volume 26.4 Liters   Mask Leak (lpm) 73 lpm   Comfort Level Fair   Using Accessory Muscles Yes   SpO2 88 [] : Fellow

## 2021-09-28 NOTE — CARE COORDINATION
Social Work / Discharge Planning : Sudie Schilder now on 60 Liters of [place]. Patient has [address]. SW has several calls out to see Vern cardenas. AWait return call. TANMAY to follow.  Electronically signed by ALIZA Alford on 9/28/21 at 12:07 PM EDT

## 2021-09-29 LAB
ALBUMIN SERPL-MCNC: 3.1 G/DL (ref 3.5–5.2)
ALP BLD-CCNC: 91 U/L (ref 40–129)
ALT SERPL-CCNC: 54 U/L (ref 0–40)
ANION GAP SERPL CALCULATED.3IONS-SCNC: 9 MMOL/L (ref 7–16)
AST SERPL-CCNC: 36 U/L (ref 0–39)
BILIRUB SERPL-MCNC: 0.4 MG/DL (ref 0–1.2)
BUN BLDV-MCNC: 29 MG/DL (ref 6–23)
C-REACTIVE PROTEIN: 2.2 MG/DL (ref 0–0.4)
CALCIUM SERPL-MCNC: 8.2 MG/DL (ref 8.6–10.2)
CHLORIDE BLD-SCNC: 100 MMOL/L (ref 98–107)
CO2: 28 MMOL/L (ref 22–29)
CREAT SERPL-MCNC: 0.6 MG/DL (ref 0.7–1.2)
D DIMER: 745 NG/ML DDU
FERRITIN: 1099 NG/ML
GFR AFRICAN AMERICAN: >60
GFR NON-AFRICAN AMERICAN: >60 ML/MIN/1.73
GLUCOSE BLD-MCNC: 225 MG/DL (ref 74–99)
METER GLUCOSE: 175 MG/DL (ref 74–99)
METER GLUCOSE: 178 MG/DL (ref 74–99)
METER GLUCOSE: 193 MG/DL (ref 74–99)
METER GLUCOSE: 205 MG/DL (ref 74–99)
POTASSIUM SERPL-SCNC: 4.1 MMOL/L (ref 3.5–5)
PROCALCITONIN: 0.2 NG/ML (ref 0–0.08)
SODIUM BLD-SCNC: 137 MMOL/L (ref 132–146)
TOTAL PROTEIN: 6.1 G/DL (ref 6.4–8.3)

## 2021-09-29 PROCEDURE — 2700000000 HC OXYGEN THERAPY PER DAY

## 2021-09-29 PROCEDURE — 6360000002 HC RX W HCPCS: Performed by: INTERNAL MEDICINE

## 2021-09-29 PROCEDURE — 82728 ASSAY OF FERRITIN: CPT

## 2021-09-29 PROCEDURE — 2580000003 HC RX 258: Performed by: INTERNAL MEDICINE

## 2021-09-29 PROCEDURE — 36415 COLL VENOUS BLD VENIPUNCTURE: CPT

## 2021-09-29 PROCEDURE — 99232 SBSQ HOSP IP/OBS MODERATE 35: CPT | Performed by: INTERNAL MEDICINE

## 2021-09-29 PROCEDURE — 6370000000 HC RX 637 (ALT 250 FOR IP): Performed by: INTERNAL MEDICINE

## 2021-09-29 PROCEDURE — 94761 N-INVAS EAR/PLS OXIMETRY MLT: CPT

## 2021-09-29 PROCEDURE — 82962 GLUCOSE BLOOD TEST: CPT

## 2021-09-29 PROCEDURE — 94660 CPAP INITIATION&MGMT: CPT

## 2021-09-29 PROCEDURE — 84145 PROCALCITONIN (PCT): CPT

## 2021-09-29 PROCEDURE — 86140 C-REACTIVE PROTEIN: CPT

## 2021-09-29 PROCEDURE — 80053 COMPREHEN METABOLIC PANEL: CPT

## 2021-09-29 PROCEDURE — 1200000000 HC SEMI PRIVATE

## 2021-09-29 PROCEDURE — 85378 FIBRIN DEGRADE SEMIQUANT: CPT

## 2021-09-29 RX ADMIN — INSULIN LISPRO 1 UNITS: 100 INJECTION, SOLUTION INTRAVENOUS; SUBCUTANEOUS at 21:48

## 2021-09-29 RX ADMIN — GUAIFENESIN AND DEXTROMETHORPHAN 5 ML: 100; 10 SYRUP ORAL at 10:11

## 2021-09-29 RX ADMIN — ALBUTEROL SULFATE 2 PUFF: 90 AEROSOL, METERED RESPIRATORY (INHALATION) at 16:00

## 2021-09-29 RX ADMIN — GUAIFENESIN AND DEXTROMETHORPHAN 5 ML: 100; 10 SYRUP ORAL at 16:00

## 2021-09-29 RX ADMIN — INSULIN LISPRO 1 UNITS: 100 INJECTION, SOLUTION INTRAVENOUS; SUBCUTANEOUS at 12:17

## 2021-09-29 RX ADMIN — ENOXAPARIN SODIUM 80 MG: 100 INJECTION SUBCUTANEOUS at 21:24

## 2021-09-29 RX ADMIN — Medication 10 ML: at 10:10

## 2021-09-29 RX ADMIN — AMLODIPINE BESYLATE 5 MG: 5 TABLET ORAL at 10:10

## 2021-09-29 RX ADMIN — INSULIN LISPRO 1 UNITS: 100 INJECTION, SOLUTION INTRAVENOUS; SUBCUTANEOUS at 06:49

## 2021-09-29 RX ADMIN — PAROXETINE 30 MG: 10 TABLET, FILM COATED ORAL at 10:09

## 2021-09-29 RX ADMIN — Medication 10 ML: at 21:25

## 2021-09-29 RX ADMIN — ALBUTEROL SULFATE 2 PUFF: 90 AEROSOL, METERED RESPIRATORY (INHALATION) at 12:16

## 2021-09-29 RX ADMIN — BARICITINIB 4 MG: 1 TABLET, FILM COATED ORAL at 10:09

## 2021-09-29 RX ADMIN — INSULIN LISPRO 1 UNITS: 100 INJECTION, SOLUTION INTRAVENOUS; SUBCUTANEOUS at 16:01

## 2021-09-29 RX ADMIN — ENOXAPARIN SODIUM 80 MG: 100 INJECTION SUBCUTANEOUS at 10:09

## 2021-09-29 RX ADMIN — PAROXETINE 30 MG: 10 TABLET, FILM COATED ORAL at 21:23

## 2021-09-29 RX ADMIN — DEXAMETHASONE SODIUM PHOSPHATE 10 MG: 4 INJECTION, SOLUTION INTRA-ARTICULAR; INTRALESIONAL; INTRAMUSCULAR; INTRAVENOUS; SOFT TISSUE at 10:08

## 2021-09-29 RX ADMIN — DEXAMETHASONE SODIUM PHOSPHATE 10 MG: 4 INJECTION, SOLUTION INTRA-ARTICULAR; INTRALESIONAL; INTRAMUSCULAR; INTRAVENOUS; SOFT TISSUE at 21:23

## 2021-09-29 RX ADMIN — GUAIFENESIN AND DEXTROMETHORPHAN 5 ML: 100; 10 SYRUP ORAL at 21:23

## 2021-09-29 RX ADMIN — ALBUTEROL SULFATE 2 PUFF: 90 AEROSOL, METERED RESPIRATORY (INHALATION) at 10:06

## 2021-09-29 RX ADMIN — ALBUTEROL SULFATE 2 PUFF: 90 AEROSOL, METERED RESPIRATORY (INHALATION) at 21:24

## 2021-09-29 ASSESSMENT — PAIN DESCRIPTION - FREQUENCY: FREQUENCY: INTERMITTENT

## 2021-09-29 ASSESSMENT — PAIN DESCRIPTION - PROGRESSION: CLINICAL_PROGRESSION: NOT CHANGED

## 2021-09-29 ASSESSMENT — PAIN DESCRIPTION - ONSET: ONSET: ON-GOING

## 2021-09-29 ASSESSMENT — PAIN SCALES - GENERAL
PAINLEVEL_OUTOF10: 3
PAINLEVEL_OUTOF10: 0

## 2021-09-29 ASSESSMENT — PAIN DESCRIPTION - DESCRIPTORS: DESCRIPTORS: HEADACHE;DISCOMFORT

## 2021-09-29 ASSESSMENT — PAIN DESCRIPTION - LOCATION: LOCATION: HEAD

## 2021-09-29 ASSESSMENT — PAIN DESCRIPTION - PAIN TYPE: TYPE: ACUTE PAIN

## 2021-09-29 ASSESSMENT — PAIN - FUNCTIONAL ASSESSMENT: PAIN_FUNCTIONAL_ASSESSMENT: PREVENTS OR INTERFERES SOME ACTIVE ACTIVITIES AND ADLS

## 2021-09-29 NOTE — PROGRESS NOTES
Pt removed oxygen and SAO2 45% on room air. PAP with FIO2 100% reapplied as previously and  SAO2 98%. Teaching done with pt regarding need of high levels of oxygen and possible complications if removed including death. Pt verbalized understanding and is agreeable to having PAP remain in place at this time.

## 2021-09-29 NOTE — PLAN OF CARE
Problem: Breathing Pattern - Ineffective  Goal: Ability to achieve and maintain a regular respiratory rate  Outcome: Met This Shift

## 2021-09-29 NOTE — CARE COORDINATION
Social Work / Discharge Planning : Patient remains on 60 liters of HF at [address] spoke to Douglas Viramontes at [facility] and they accept patient Vern Guillen but is researching if he has medicare benefit. SW unsuccessful speaking to patient due to current status. Call placed to daughter Ning Mckeon. VM left. AWait response. SW to follow.  Electronically signed by ALIZA Mcpherson on 9/29/21 at 1:14 PM EDT

## 2021-09-29 NOTE — PROGRESS NOTES
Marilu Kitchen Hospitalist   Progress Note    Admitting Date and Time: 9/22/2021  9:32 PM  Admit Dx: Hypokalemia [E87.6]  Acute respiratory failure with hypoxia ([address] [J96.01]  COVID-19 [U07.1]  Acute respiratory failure due to COVID-19 (HCC) [U07.1, J96.00]    Subjective:    Patient was admitted with Hypokalemia [E87.6]  Acute respiratory failure with hypoxia ([address] [J96.01]  COVID-19 [U07.1]  Acute respiratory failure due to COVID-19 ([address] [U07.1, J96.00]. Patient denies fever, chills, cp, n/v.  Pt with sob but notes some mild improvement     dexamethasone  10 mg IntraVENous Q12H    baricitinib  4 mg Oral Daily    enoxaparin  1 mg/kg SubCUTAneous BID    albuterol sulfate HFA  2 puff Inhalation 4x daily    sodium chloride flush  5-40 mL IntraVENous 2 times per day    PARoxetine  30 mg Oral BID    amLODIPine  5 mg Oral Daily    insulin lispro  0-6 Units SubCUTAneous TID WC    insulin lispro  0-3 Units SubCUTAneous Nightly     aluminum & magnesium hydroxide-simethicone, 15 mL, Q6H PRN  guaiFENesin-dextromethorphan, 5 mL, Q4H PRN  melatonin, 4.5 mg, Nightly PRN  sodium chloride flush, 5-40 mL, PRN  sodium chloride, 25 mL, PRN  ondansetron, 4 mg, Q8H PRN   Or  ondansetron, 4 mg, Q6H PRN  polyethylene glycol, 17 g, Daily PRN  acetaminophen, 650 mg, Q6H PRN   Or  acetaminophen, 650 mg, Q6H PRN  glucose, 15 g, PRN  dextrose, 12.5 g, PRN  glucagon (rDNA), 1 mg, PRN  dextrose, 100 mL/hr, PRN         Objective:    BP (!) 146/81   Pulse 67   Temp 98.2 °F (36.8 °C) (Oral)   Resp 30   Ht 5' 7\" (1.702 m)   Wt 184 lb 11.9 oz (83.8 kg)   SpO2 92%   BMI 28.94 kg/m²   Skin: warm and dry, no rash or erythema  Pulmonary/Chest: clear to auscultation bilaterally- no wheezes, rales or rhonchi, normal air movement, no respiratory distress  Cardiovascular: rhythm reg at rate of 64  Abdomen: soft, non-tender, non-distended, normal bowel sounds, no masses or organomegaly  Extremities: no cyanosis, no clubbing and no edema      Recent Labs     09/26/21  0337 09/27/21  0620 09/28/21 0621    134 138   K 3.8 4.0 4.4   CL 97* 99 100   CO2 26 24 27   BUN 19 22 26*   CREATININE 0.6* 0.6* 0.6*   GLUCOSE 161* 184* 192*   CALCIUM 8.2* 8.2* 8.7       Recent Labs     09/26/21  0337 09/27/21  0620 09/28/21 0621   WBC 7.6 7.3 9.1   RBC 4.62 4.64 4.50   HGB 12.5 12.3* 12.0*   HCT 38.0 38.3 37.3   MCV 82.3 82.5 82.9   MCH 27.1 26.5 26.7   MCHC 32.9 32.1 32.2   RDW 13.9 14.0 14.1    296 366   MPV 10.3 10.7 10.6       CBC with Differential:    Lab Results   Component Value Date    WBC 9.1 09/28/2021    RBC 4.50 09/28/2021    HGB 12.0 09/28/2021    HCT 37.3 09/28/2021     09/28/2021    MCV 82.9 09/28/2021    MCH 26.7 09/28/2021    MCHC 32.2 09/28/2021    RDW 14.1 09/28/2021    LYMPHOPCT 8.2 09/28/2021    MONOPCT 5.6 09/28/2021    BASOPCT 0.2 09/28/2021    MONOSABS 0.51 09/28/2021    LYMPHSABS 0.74 09/28/2021    EOSABS 0.00 09/28/2021    BASOSABS 0.02 09/28/2021     CMP:    Lab Results   Component Value Date     09/28/2021    K 4.4 09/28/2021    K 3.6 09/23/2021     09/28/2021    CO2 27 09/28/2021    BUN 26 09/28/2021    CREATININE 0.6 09/28/2021    GFRAA >60 09/28/2021    LABGLOM >60 09/28/2021    GLUCOSE 192 09/28/2021    PROT 6.6 09/28/2021    LABALBU 3.5 09/28/2021    CALCIUM 8.7 09/28/2021    BILITOT 0.4 09/28/2021    ALKPHOS 75 09/28/2021    AST 35 09/28/2021    ALT 57 09/28/2021        Radiology:   XR CHEST PORTABLE   Final Result   1. Significant worsening of the multifocal bilateral pneumonia when compared   with the prior CT scan of 09/22/2021         CTA PULMONARY W CONTRAST   Final Result   Within described exam limitations, no evidence of pulmonary embolism. Scattered multifocal low density infiltrates throughout both lungs, most   suggestive of COVID pneumonia in context of current pandemic. At least moderate diffuse hepatic steatosis. Cholecystectomy.       RECOMMENDATIONS: Multiple pulmonary nodules. Most severe: 4 mm left solid pulmonary nodule   within the upper lobe. A non-contrast Chest CT at 12 months is optional. If   performed and the nodule is stable at 12 months, no further follow-up is   recommended. These guidelines do not apply to immunocompromised patients and patients with   cancer. Follow up in patients with significant comorbidities as clinically   warranted. For lung cancer screening, adhere to Lung-RADS guidelines. Reference: Radiology. 2017; 284(1):228-43. XR CHEST PORTABLE   Final Result   There are bilateral multifocal ground-glass areas of consolidation with the   lungs concerning for in infectious or inflammatory process and developing   ARDS or viral pneumonia could give this appearance. Assessment:    Active Problems:    COVID-19    Acute respiratory failure due to COVID-19 [facility])    Acute respiratory failure with hypoxia (HCC)    Pneumonia due to COVID-19 virus    Elevated LFTs    Hypokalemia    Essential hypertension  Resolved Problems:    * No resolved hospital problems. *      Plan:  1. Acute respiratory failure with hypoxia (88%o2sat) continue o2 and adjust as needed. Pulmonary following. bipap per pulm  2. Pneumonia due to covid 19 continue to monitor labs. continue steroids and baricitinib. 3. Elevated lfts monitor    4. Hyperglycemia monitor and tx with insulin ssi  5. Hypokalemia monitor and replace prn  improving  6. Hypophosphatemia monitor and replace prn  7. Hypomagnesemia monitor and replace prn  8.  Htn continue med    Encourage IS and increase activity as able      Electronically signed by Primo Romero DO on 9/28/2021 at 11:49 PM

## 2021-09-29 NOTE — PROGRESS NOTES
Date: 9/28/2021    Time: 11:30 PM    Patient Placed On BIPAP/CPAP/ Non-Invasive Ventilation? Yes    If no must comment. Facial area red/color change? No           If YES are Blister/Lesion present? No   If yes must notify nursing staff  BIPAP/CPAP skin barrier?   Yes    Skin barrier type:mepilexlite       Comments:        Trevor Blackburn RCP

## 2021-09-29 NOTE — PROGRESS NOTES
Pulmonary Progress Note    Admit Date: 2021  Hospital day                               PCP: No primary care provider on file. Chief Complaint (s):  Patient Active Problem List   Diagnosis    COVID-19    Acute respiratory failure due to COVID-19 [facility])    Acute respiratory failure with hypoxia ([address]    Pneumonia due to COVID-19 virus    Elevated LFTs    Hypokalemia    Essential hypertension       Subjective:  · Again this morning, the patient decided to see how he would do without supplemental oxygen. His saturation went to the mid 40% range. He is on a nonrebreather using the commode right now with a sat in the high 80s. Vitals:  VITALS:  BP (!) 147/84   Pulse 57   Temp 98.2 °F (36.8 °C) (Axillary)   Resp (!) 32   Ht 5' 7\" (1.702 m)   Wt 184 lb 11.9 oz (83.8 kg)   SpO2 96%   BMI 28.94 kg/m²     24HR INTAKE/OUTPUT:      Intake/Output Summary (Last 24 hours) at 2021 1422  Last data filed at 2021 1251  Gross per 24 hour   Intake --   Output 552 ml   Net -552 ml       24HR PULSE OXIMETRY RANGE:    SpO2  Av %  Min: 92 %  Max: 98 %    Medications:  IV:   sodium chloride      dextrose         Scheduled Meds:   dexamethasone  10 mg IntraVENous Q12H    baricitinib  4 mg Oral Daily    enoxaparin  1 mg/kg SubCUTAneous BID    albuterol sulfate HFA  2 puff Inhalation 4x daily    sodium chloride flush  5-40 mL IntraVENous 2 times per day    PARoxetine  30 mg Oral BID    amLODIPine  5 mg Oral Daily    insulin lispro  0-6 Units SubCUTAneous TID     insulin lispro  0-3 Units SubCUTAneous Nightly       Diet:   ADULT DIET; Regular     EXAM:  General: No distress. Alert. Eyes: PERRL. No sclera icterus. No conjunctival injection. ENT: No discharge. Pharynx clear. Neck: Trachea midline. Normal thyroid. Resp: No accessory muscle use. No rales. No wheezing. No rhonchi. CV: Regular rate. Regular rhythm. No murmur or rub. Abd: Non-tender. Non-distended.  No Chest CT at 12 months is optional. If   performed and the nodule is stable at 12 months, no further follow-up is   recommended. These guidelines do not apply to immunocompromised patients and patients with   cancer. Follow up in patients with significant comorbidities as clinically   warranted. For lung cancer screening, adhere to Lung-RADS guidelines. Reference: Radiology. 2017; 284(1):228-43. XR CHEST PORTABLE   Final Result   There are bilateral multifocal ground-glass areas of consolidation with the   lungs concerning for in infectious or inflammatory process and developing   ARDS or viral pneumonia could give this appearance.                      Assessment:  1. COVID-19 pneumonia with significant worsening despite steroids and baricitinib        Plan:  1. Continue full dose anticoagulation  2. BiPAP/avaps  3. Transfer to higher level of care as needed.       Time at the bedside, reviewing labs and radiographs, reviewing updated notes and consultations, discussing with staff and family was more than 35 minutes. Please note that voice recognition technology was used in the preparation of this note and make therefore it may contain inadvertent transcription errors. If the patient is a COVID 19 isolation patient, the above physical exam reflects that of the examining physician for the day. Rachana Beckford MD,  M.D., F.C.C.P.     [facility], [address] [place] [address], [address], [facility] [place]

## 2021-09-30 ENCOUNTER — APPOINTMENT (OUTPATIENT)
Dept: GENERAL RADIOLOGY | Age: 66
DRG: 871 | End: 2021-09-30
Payer: OTHER GOVERNMENT

## 2021-09-30 LAB
ALBUMIN SERPL-MCNC: 3.2 G/DL (ref 3.5–5.2)
ALP BLD-CCNC: 118 U/L (ref 40–129)
ALT SERPL-CCNC: 75 U/L (ref 0–40)
ANION GAP SERPL CALCULATED.3IONS-SCNC: 11 MMOL/L (ref 7–16)
AST SERPL-CCNC: 48 U/L (ref 0–39)
BILIRUB SERPL-MCNC: 0.6 MG/DL (ref 0–1.2)
BUN BLDV-MCNC: 26 MG/DL (ref 6–23)
C-REACTIVE PROTEIN: 1.7 MG/DL (ref 0–0.4)
CALCIUM SERPL-MCNC: 8.3 MG/DL (ref 8.6–10.2)
CHLORIDE BLD-SCNC: 101 MMOL/L (ref 98–107)
CO2: 26 MMOL/L (ref 22–29)
CREAT SERPL-MCNC: 0.6 MG/DL (ref 0.7–1.2)
D DIMER: 1252 NG/ML DDU
FERRITIN: 1122 NG/ML
GFR AFRICAN AMERICAN: >60
GFR NON-AFRICAN AMERICAN: >60 ML/MIN/1.73
GLUCOSE BLD-MCNC: 183 MG/DL (ref 74–99)
METER GLUCOSE: 159 MG/DL (ref 74–99)
METER GLUCOSE: 186 MG/DL (ref 74–99)
METER GLUCOSE: 203 MG/DL (ref 74–99)
METER GLUCOSE: 261 MG/DL (ref 74–99)
POTASSIUM SERPL-SCNC: 4.5 MMOL/L (ref 3.5–5)
SODIUM BLD-SCNC: 138 MMOL/L (ref 132–146)
TOTAL PROTEIN: 6 G/DL (ref 6.4–8.3)

## 2021-09-30 PROCEDURE — 99233 SBSQ HOSP IP/OBS HIGH 50: CPT | Performed by: INTERNAL MEDICINE

## 2021-09-30 PROCEDURE — 2700000000 HC OXYGEN THERAPY PER DAY

## 2021-09-30 PROCEDURE — 6360000002 HC RX W HCPCS: Performed by: INTERNAL MEDICINE

## 2021-09-30 PROCEDURE — 82728 ASSAY OF FERRITIN: CPT

## 2021-09-30 PROCEDURE — 71045 X-RAY EXAM CHEST 1 VIEW: CPT

## 2021-09-30 PROCEDURE — 94660 CPAP INITIATION&MGMT: CPT

## 2021-09-30 PROCEDURE — 86140 C-REACTIVE PROTEIN: CPT

## 2021-09-30 PROCEDURE — 80053 COMPREHEN METABOLIC PANEL: CPT

## 2021-09-30 PROCEDURE — 6370000000 HC RX 637 (ALT 250 FOR IP): Performed by: INTERNAL MEDICINE

## 2021-09-30 PROCEDURE — 82962 GLUCOSE BLOOD TEST: CPT

## 2021-09-30 PROCEDURE — 36415 COLL VENOUS BLD VENIPUNCTURE: CPT

## 2021-09-30 PROCEDURE — 85378 FIBRIN DEGRADE SEMIQUANT: CPT

## 2021-09-30 PROCEDURE — 2580000003 HC RX 258: Performed by: INTERNAL MEDICINE

## 2021-09-30 PROCEDURE — 1200000000 HC SEMI PRIVATE

## 2021-09-30 RX ORDER — ALPRAZOLAM 0.25 MG/1
0.25 TABLET ORAL EVERY 4 HOURS PRN
Status: DISCONTINUED | OUTPATIENT
Start: 2021-09-30 | End: 2021-10-03

## 2021-09-30 RX ORDER — CODEINE PHOSPHATE AND GUAIFENESIN 10; 100 MG/5ML; MG/5ML
10 SOLUTION ORAL
Status: DISCONTINUED | OUTPATIENT
Start: 2021-09-30 | End: 2021-10-26

## 2021-09-30 RX ORDER — ALPRAZOLAM 0.25 MG/1
0.25 TABLET ORAL ONCE
Status: COMPLETED | OUTPATIENT
Start: 2021-09-30 | End: 2021-09-30

## 2021-09-30 RX ADMIN — DEXAMETHASONE SODIUM PHOSPHATE 10 MG: 4 INJECTION, SOLUTION INTRA-ARTICULAR; INTRALESIONAL; INTRAMUSCULAR; INTRAVENOUS; SOFT TISSUE at 20:47

## 2021-09-30 RX ADMIN — GUAIFENESIN AND CODEINE PHOSPHATE 10 ML: 10; 100 LIQUID ORAL at 14:06

## 2021-09-30 RX ADMIN — INSULIN LISPRO 2 UNITS: 100 INJECTION, SOLUTION INTRAVENOUS; SUBCUTANEOUS at 17:20

## 2021-09-30 RX ADMIN — Medication 10 ML: at 09:21

## 2021-09-30 RX ADMIN — GUAIFENESIN AND DEXTROMETHORPHAN 5 ML: 100; 10 SYRUP ORAL at 09:20

## 2021-09-30 RX ADMIN — ENOXAPARIN SODIUM 80 MG: 100 INJECTION SUBCUTANEOUS at 20:47

## 2021-09-30 RX ADMIN — ALPRAZOLAM 0.25 MG: 0.25 TABLET ORAL at 14:07

## 2021-09-30 RX ADMIN — BARICITINIB 4 MG: 1 TABLET, FILM COATED ORAL at 09:20

## 2021-09-30 RX ADMIN — ALBUTEROL SULFATE 2 PUFF: 90 AEROSOL, METERED RESPIRATORY (INHALATION) at 11:39

## 2021-09-30 RX ADMIN — ALBUTEROL SULFATE 2 PUFF: 90 AEROSOL, METERED RESPIRATORY (INHALATION) at 06:24

## 2021-09-30 RX ADMIN — INSULIN LISPRO 1 UNITS: 100 INJECTION, SOLUTION INTRAVENOUS; SUBCUTANEOUS at 11:39

## 2021-09-30 RX ADMIN — AMLODIPINE BESYLATE 5 MG: 5 TABLET ORAL at 09:20

## 2021-09-30 RX ADMIN — ALBUTEROL SULFATE 2 PUFF: 90 AEROSOL, METERED RESPIRATORY (INHALATION) at 17:19

## 2021-09-30 RX ADMIN — GUAIFENESIN AND CODEINE PHOSPHATE 10 ML: 10; 100 LIQUID ORAL at 17:19

## 2021-09-30 RX ADMIN — PAROXETINE 30 MG: 10 TABLET, FILM COATED ORAL at 21:05

## 2021-09-30 RX ADMIN — PAROXETINE 30 MG: 10 TABLET, FILM COATED ORAL at 09:20

## 2021-09-30 RX ADMIN — ENOXAPARIN SODIUM 80 MG: 100 INJECTION SUBCUTANEOUS at 09:21

## 2021-09-30 RX ADMIN — INSULIN LISPRO 2 UNITS: 100 INJECTION, SOLUTION INTRAVENOUS; SUBCUTANEOUS at 20:47

## 2021-09-30 RX ADMIN — DEXAMETHASONE SODIUM PHOSPHATE 10 MG: 4 INJECTION, SOLUTION INTRA-ARTICULAR; INTRALESIONAL; INTRAMUSCULAR; INTRAVENOUS; SOFT TISSUE at 09:20

## 2021-09-30 RX ADMIN — INSULIN LISPRO 1 UNITS: 100 INJECTION, SOLUTION INTRAVENOUS; SUBCUTANEOUS at 06:38

## 2021-09-30 ASSESSMENT — PAIN SCALES - GENERAL
PAINLEVEL_OUTOF10: 0
PAINLEVEL_OUTOF10: 0

## 2021-09-30 NOTE — PROGRESS NOTES
09/30/21 0410   Oxygen Therapy/Pulse Ox   O2 Therapy Oxygen humidified  (airvo 60/100/1/2 full running standby)

## 2021-09-30 NOTE — PROGRESS NOTES
Marilu Kitchen Hospitalist   Progress Note    Admitting Date and Time: 9/22/2021  9:32 PM  Admit Dx: Hypokalemia [E87.6]  Acute respiratory failure with hypoxia ([address] [J96.01]  COVID-19 [U07.1]  Acute respiratory failure due to COVID-19 (HCC) [U07.1, J96.00]    Subjective:    Patient was admitted with Hypokalemia [E87.6]  Acute respiratory failure with hypoxia ([address] [J96.01]  COVID-19 [U07.1]  Acute respiratory failure due to COVID-19 ([address] [U07.1, J96.00]. Patient denies fever, chills, cp, n/v. Pt with some sob.       dexamethasone  10 mg IntraVENous Q12H    baricitinib  4 mg Oral Daily    enoxaparin  1 mg/kg SubCUTAneous BID    albuterol sulfate HFA  2 puff Inhalation 4x daily    sodium chloride flush  5-40 mL IntraVENous 2 times per day    PARoxetine  30 mg Oral BID    amLODIPine  5 mg Oral Daily    insulin lispro  0-6 Units SubCUTAneous TID WC    insulin lispro  0-3 Units SubCUTAneous Nightly     aluminum & magnesium hydroxide-simethicone, 15 mL, Q6H PRN  guaiFENesin-dextromethorphan, 5 mL, Q4H PRN  melatonin, 4.5 mg, Nightly PRN  sodium chloride flush, 5-40 mL, PRN  sodium chloride, 25 mL, PRN  ondansetron, 4 mg, Q8H PRN   Or  ondansetron, 4 mg, Q6H PRN  polyethylene glycol, 17 g, Daily PRN  acetaminophen, 650 mg, Q6H PRN   Or  acetaminophen, 650 mg, Q6H PRN  glucose, 15 g, PRN  dextrose, 12.5 g, PRN  glucagon (rDNA), 1 mg, PRN  dextrose, 100 mL/hr, PRN         Objective:    BP (!) 153/76   Pulse 57   Temp 96.9 °F (36.1 °C) (Temporal)   Resp 28   Ht 5' 7\" (1.702 m)   Wt 184 lb 11.9 oz (83.8 kg)   SpO2 99%   BMI 28.94 kg/m²   Skin: warm and dry, no rash or erythema  Pulmonary/Chest: clear to auscultation bilaterally- no wheezes, rales or rhonchi, normal air movement, no respiratory distress  Cardiovascular: rhythm reg at rate of 58  Abdomen: soft, non-tender, non-distended, normal bowel sounds, no masses or organomegaly  Extremities: no cyanosis, no clubbing and no edema      Recent Labs     09/27/21  0620 09/28/21  0621 09/29/21  1056    138 137   K 4.0 4.4 4.1   CL 99 100 100   CO2 24 27 28   BUN 22 26* 29*   CREATININE 0.6* 0.6* 0.6*   GLUCOSE 184* 192* 225*   CALCIUM 8.2* 8.7 8.2*       Recent Labs     09/27/21  0620 09/28/21  0621   WBC 7.3 9.1   RBC 4.64 4.50   HGB 12.3* 12.0*   HCT 38.3 37.3   MCV 82.5 82.9   MCH 26.5 26.7   MCHC 32.1 32.2   RDW 14.0 14.1    366   MPV 10.7 10.6       CMP:    Lab Results   Component Value Date     09/29/2021    K 4.1 09/29/2021    K 3.6 09/23/2021     09/29/2021    CO2 28 09/29/2021    BUN 29 09/29/2021    CREATININE 0.6 09/29/2021    GFRAA >60 09/29/2021    LABGLOM >60 09/29/2021    GLUCOSE 225 09/29/2021    PROT 6.1 09/29/2021    LABALBU 3.1 09/29/2021    CALCIUM 8.2 09/29/2021    BILITOT 0.4 09/29/2021    ALKPHOS 91 09/29/2021    AST 36 09/29/2021    ALT 54 09/29/2021        Radiology:   XR CHEST PORTABLE   Final Result   1. Significant worsening of the multifocal bilateral pneumonia when compared   with the prior CT scan of 09/22/2021         CTA PULMONARY W CONTRAST   Final Result   Within described exam limitations, no evidence of pulmonary embolism. Scattered multifocal low density infiltrates throughout both lungs, most   suggestive of COVID pneumonia in context of current pandemic. At least moderate diffuse hepatic steatosis. Cholecystectomy. RECOMMENDATIONS:   Multiple pulmonary nodules. Most severe: 4 mm left solid pulmonary nodule   within the upper lobe. A non-contrast Chest CT at 12 months is optional. If   performed and the nodule is stable at 12 months, no further follow-up is   recommended. These guidelines do not apply to immunocompromised patients and patients with   cancer. Follow up in patients with significant comorbidities as clinically   warranted. For lung cancer screening, adhere to Lung-RADS guidelines. Reference: Radiology. 2017; 284(1):228-43.          XR CHEST PORTABLE   Final Result   There are bilateral multifocal ground-glass areas of consolidation with the   lungs concerning for in infectious or inflammatory process and developing   ARDS or viral pneumonia could give this appearance. Assessment:    Active Problems:    COVID-19    Acute respiratory failure due to COVID-19 [facility])    Acute respiratory failure with hypoxia (HCC)    Pneumonia due to COVID-19 virus    Elevated LFTs    Hypokalemia    Essential hypertension  Resolved Problems:    * No resolved hospital problems. *      Plan:  1. Acute respiratory failure with hypoxia (88%o2sat) continue o2 and wean as able. Pulmonary following. bipap per pulm  2. Pneumonia due to covid 19 continue to monitor labs. continue steroids and baricitinib. 3. Elevated lfts monitor    4. Hyperglycemia monitor and tx with insulin ssi  5. Hypokalemia monitor and replace prn  improving  6. Hypophosphatemia monitor and replace prn  7. Hypomagnesemia monitor and replace prn  8. Htn continue med    Encourage activity and IS.  Pt notes some improvement symptomatically but still requiring high amount of oxygen    Electronically signed by Dilshad Silveira DO on 9/29/2021 at 11:50 PM

## 2021-09-30 NOTE — PROGRESS NOTES
Pulmonary Progress Note    Admit Date: 2021  Hospital day                               PCP: No primary care provider on file. Chief Complaint (s):  Patient Active Problem List   Diagnosis    COVID-19    Acute respiratory failure due to COVID-19 [facility])    Acute respiratory failure with hypoxia ([address]    Pneumonia due to COVID-19 virus    Elevated LFTs    Hypokalemia    Essential hypertension       Subjective:  · Oxygen levels remain tenuous with oxygen in place. Inflammatory indices are reviewed. Chest radiograph is unchanged. Vitals:  VITALS:  BP (!) 152/77   Pulse 61   Temp 97.3 °F (36.3 °C) (Axillary)   Resp 28   Ht 5' 7\" (1.702 m)   Wt 184 lb 11.9 oz (83.8 kg)   SpO2 98%   BMI 28.94 kg/m²     24HR INTAKE/OUTPUT:      Intake/Output Summary (Last 24 hours) at 2021 1813  Last data filed at 2021 0645  Gross per 24 hour   Intake 360 ml   Output 500 ml   Net -140 ml       24HR PULSE OXIMETRY RANGE:    SpO2  Av.2 %  Min: 94 %  Max: 99 %    Medications:  IV:   sodium chloride      dextrose         Scheduled Meds:   guaiFENesin-codeine  10 mL Oral Q4H While awake    dexamethasone  10 mg IntraVENous Q12H    baricitinib  4 mg Oral Daily    enoxaparin  1 mg/kg SubCUTAneous BID    albuterol sulfate HFA  2 puff Inhalation 4x daily    sodium chloride flush  5-40 mL IntraVENous 2 times per day    PARoxetine  30 mg Oral BID    amLODIPine  5 mg Oral Daily    insulin lispro  0-6 Units SubCUTAneous TID WC    insulin lispro  0-3 Units SubCUTAneous Nightly       Diet:   ADULT DIET; Regular; 5 carb choices (75 gm/meal)     EXAM:  General: No distress. Alert. Eyes: PERRL. No sclera icterus. No conjunctival injection. ENT: No discharge. Pharynx clear. Neck: Trachea midline. Normal thyroid. Resp: No accessory muscle use. No rales. No wheezing. No rhonchi. CV: Regular rate. Regular rhythm. No murmur or rub. Abd: Non-tender. Non-distended. No masses.  No recommended. These guidelines do not apply to immunocompromised patients and patients with   cancer. Follow up in patients with significant comorbidities as clinically   warranted. For lung cancer screening, adhere to Lung-RADS guidelines. Reference: Radiology. 2017; 284(1):228-43. XR CHEST PORTABLE   Final Result   There are bilateral multifocal ground-glass areas of consolidation with the   lungs concerning for in infectious or inflammatory process and developing   ARDS or viral pneumonia could give this appearance.                      Assessment:  1. COVID-19 pneumonia with significant worsening despite steroids and baricitinib. Inflammatory markers and chest radiograph are reviewed.        Plan:  1. Continue full dose anticoagulation  2. BiPAP/avaps  3. Transfer to higher level of care as needed.       Time at the bedside, reviewing labs and radiographs, reviewing updated notes and consultations, discussing with staff and family was more than 35 minutes. Please note that voice recognition technology was used in the preparation of this note and make therefore it may contain inadvertent transcription errors. If the patient is a COVID 19 isolation patient, the above physical exam reflects that of the examining physician for the day. Maryann Hodgkins, MD,  M.ZAY., F.C.C.P.     [facility], [address] [place] [address], [address] [place] [address]

## 2021-09-30 NOTE — PROGRESS NOTES
09/30/21 0041   Oxygen Therapy/Pulse Ox   O2 Therapy Oxygen  (airvo running standby at 60/100 and full)   $Oxygen $Daily Charge   O2 Device PAP (positive airway pressure)   FiO2  100 %  (decreased to 80)   Resp (!) 36   Skin Assessment Clean, dry, & intact   Skin Protection for O2 Device Yes

## 2021-09-30 NOTE — PROGRESS NOTES
[facility]) Hospitalist Progress Note    Admission Date  9/22/2021  9:32 PM  Chief Complaint Nausea, fatigue  Admit Dx   Hypokalemia [E87.6]  Acute respiratory failure with hypoxia (HCC) [J96.01]  COVID-19 [U07.1]  Acute respiratory failure due to COVID-19 (HCC) [U07.1, J96.00]    Subjective  History of Present Illness  Pt seen at bedside  Appears anxious  Satting 92 on NRB + HFNC  Says he feels like he is not breathing much better vs a week ago  +Cough, nonproductive, says this contributes a lot to his SOB  No GI complaints  No new issues identified today  Case was discussed with nursing; state pt has appeared extremely anxious    Review of Systems - 12-point review of systems has been reviewed and is otherwise negative except as listed in the HPI    Objective  Physical Exam  Vitals: BP (!) 152/77   Pulse 61   Temp 97.3 °F (36.3 °C) (Axillary)   Resp (!) 36   Ht 5' 7\" (1.702 m)   Wt 184 lb 11.9 oz (83.8 kg)   SpO2 97%   BMI 28.94 kg/m²   General: well-developed, well-nourished, no acute distress, cooperative  Skin: warm, dry, intact, normal color without cyanosis  HEENT: normocephalic, atraumatic, mucous membranes normal; +HFNC O2 and NRB on top of it  Respiratory: diminished to auscultation bilaterally without respiratory distress  Cardiovascular: regular rate and rhythm without murmur / rub / gallop  Abdominal: soft, nontender, nondistended, normoactive bowel sounds  Extremities: no mottling, pulses intact, no edema, many tattoos  Neurologic: awake, alert, no focal deficits  Psychiatric: normal affect, cooperative    Assessment / Plan  Acute hypoxic resp failure d/t COVID pneumonia   COVID+ as of 9/22   Imaging 3d ago shows significant worsening of multifocal bl infiltrates - recheck CXR   No fevers past 24h   O2 requirement currently NRB + 15L HFNC   Inflammatory markers - CRP and ferritin decreasing, d-dimer increasing  o Recheck in AM   Decadron 10 mg IV bid   Baricitinib day 9   Pulmonology input reviewed and appreciated    Symptom mgmt for cough, fevers, nausea, diarrhea, body aches if present   o Cough syrup to codeine q4h while awake    Anxiety - feel this is contributing to pt's SOB; start low-dose Xanax once now then prn    NIDDM - hold orals / diabetic diet / ISS + BG checks ACHS / hypoglycemic protocol / target -180 with  this AM; continue current regimen    Code status  Full    DVT prophylaxis Lovenox full therapeutic dosing  Disposition  To be determined; case mgmt note reviewed and Magi Duel following but cannot take until at least 10/5; have also discussed w liaison for [facility].     Electronically signed by Dioni Sauceda DO on 9/30/2021 at 12:49 PM

## 2021-09-30 NOTE — PROGRESS NOTES
09/30/21 0041   NIV Type   $NIV $Daily Charge   Skin Assessment Clean, dry, & intact   Skin Protection for O2 Device Yes   Mode Bilevel   Mask Type Full face mask   Mask Size Large   Settings/Measurements   IPAP 20 cmH20   CPAP/EPAP 10 cmH2O   Rate Ordered 12   Resp (!) 36   FiO2  100 %  (decreased to 80)   Vt Exhaled 798 mL   Minute Volume 31 Liters   Mask Leak (lpm) 31 lpm   Comfort Level Good   Using Accessory Muscles No   SpO2 100  (o2 weaned due to sat =100)   awake and asking about taking care of this problem

## 2021-09-30 NOTE — CARE COORDINATION
CASE MANAGEMENT. ... COVID + 9/22. Currently requiring bipap. Continues on iv decadron q12hrs and olumiant. Rufino Roman following for discharge. However, facility cannot accept patient until 20 days from start of s/s. Patients s/s began on 9/15. The earliest [facility]ufino Roman can take is 10/5. [facility]. Will need to discuss plans with patient once able to use the phone (on bipap). Will cont to follow along.

## 2021-09-30 NOTE — CARE COORDINATION
Social Work / Discharge Planning : SW was able to speak to patient sister Stella Zamora [phone]  as well as Umair Blank, patient daughter. . SW updated family in regards to current status and [facility] following. Both in agreement with LTAC . Prior to admit, patient very active. Patient has Pitney Wilmer and once stable, will initiate pre-cert in which Emmanuel Dair at [facility] updated CM that it can be a quick process. SW to follow.  Electronically signed by ALIZA Lea on 9/30/21 at 1:22 PM EDT

## 2021-09-30 NOTE — PROGRESS NOTES
Juan Daniel Sabillon notified of patients Ddimer    Electronically signed by Carolina Frey RN on 9/30/2021 at 12:51 PM

## 2021-10-01 LAB
ALBUMIN SERPL-MCNC: 2.9 G/DL (ref 3.5–5.2)
ALP BLD-CCNC: 146 U/L (ref 40–129)
ALT SERPL-CCNC: 76 U/L (ref 0–40)
ANION GAP SERPL CALCULATED.3IONS-SCNC: 8 MMOL/L (ref 7–16)
AST SERPL-CCNC: 44 U/L (ref 0–39)
BILIRUB SERPL-MCNC: 0.5 MG/DL (ref 0–1.2)
BUN BLDV-MCNC: 24 MG/DL (ref 6–23)
C-REACTIVE PROTEIN: 1.8 MG/DL (ref 0–0.4)
CALCIUM SERPL-MCNC: 7.9 MG/DL (ref 8.6–10.2)
CHLORIDE BLD-SCNC: 100 MMOL/L (ref 98–107)
CO2: 26 MMOL/L (ref 22–29)
CREAT SERPL-MCNC: 0.5 MG/DL (ref 0.7–1.2)
D DIMER: 1726 NG/ML DDU
FERRITIN: 1191 NG/ML
GFR AFRICAN AMERICAN: >60
GFR NON-AFRICAN AMERICAN: >60 ML/MIN/1.73
GLUCOSE BLD-MCNC: 189 MG/DL (ref 74–99)
HCT VFR BLD CALC: 35.8 % (ref 37–54)
HEMOGLOBIN: 11.3 G/DL (ref 12.5–16.5)
MCH RBC QN AUTO: 26.9 PG (ref 26–35)
MCHC RBC AUTO-ENTMCNC: 31.6 % (ref 32–34.5)
MCV RBC AUTO: 85.2 FL (ref 80–99.9)
METER GLUCOSE: 172 MG/DL (ref 74–99)
METER GLUCOSE: 177 MG/DL (ref 74–99)
METER GLUCOSE: 194 MG/DL (ref 74–99)
METER GLUCOSE: 199 MG/DL (ref 74–99)
PDW BLD-RTO: 14.2 FL (ref 11.5–15)
PLATELET # BLD: 405 E9/L (ref 130–450)
PMV BLD AUTO: 10.3 FL (ref 7–12)
POTASSIUM SERPL-SCNC: 4.5 MMOL/L (ref 3.5–5)
RBC # BLD: 4.2 E12/L (ref 3.8–5.8)
SODIUM BLD-SCNC: 134 MMOL/L (ref 132–146)
TOTAL PROTEIN: 5.9 G/DL (ref 6.4–8.3)
WBC # BLD: 11.6 E9/L (ref 4.5–11.5)

## 2021-10-01 PROCEDURE — 94660 CPAP INITIATION&MGMT: CPT

## 2021-10-01 PROCEDURE — 2700000000 HC OXYGEN THERAPY PER DAY

## 2021-10-01 PROCEDURE — 86140 C-REACTIVE PROTEIN: CPT

## 2021-10-01 PROCEDURE — 6370000000 HC RX 637 (ALT 250 FOR IP): Performed by: INTERNAL MEDICINE

## 2021-10-01 PROCEDURE — 82728 ASSAY OF FERRITIN: CPT

## 2021-10-01 PROCEDURE — 99233 SBSQ HOSP IP/OBS HIGH 50: CPT | Performed by: INTERNAL MEDICINE

## 2021-10-01 PROCEDURE — 6360000002 HC RX W HCPCS: Performed by: INTERNAL MEDICINE

## 2021-10-01 PROCEDURE — 1200000000 HC SEMI PRIVATE

## 2021-10-01 PROCEDURE — 82962 GLUCOSE BLOOD TEST: CPT

## 2021-10-01 PROCEDURE — 80053 COMPREHEN METABOLIC PANEL: CPT

## 2021-10-01 PROCEDURE — 85027 COMPLETE CBC AUTOMATED: CPT

## 2021-10-01 PROCEDURE — 85378 FIBRIN DEGRADE SEMIQUANT: CPT

## 2021-10-01 PROCEDURE — 2580000003 HC RX 258: Performed by: INTERNAL MEDICINE

## 2021-10-01 PROCEDURE — 36415 COLL VENOUS BLD VENIPUNCTURE: CPT

## 2021-10-01 RX ADMIN — Medication 10 ML: at 10:06

## 2021-10-01 RX ADMIN — GUAIFENESIN AND CODEINE PHOSPHATE 10 ML: 10; 100 LIQUID ORAL at 01:02

## 2021-10-01 RX ADMIN — PAROXETINE 30 MG: 10 TABLET, FILM COATED ORAL at 10:00

## 2021-10-01 RX ADMIN — Medication 10 ML: at 22:42

## 2021-10-01 RX ADMIN — ENOXAPARIN SODIUM 80 MG: 100 INJECTION SUBCUTANEOUS at 21:47

## 2021-10-01 RX ADMIN — INSULIN LISPRO 1 UNITS: 100 INJECTION, SOLUTION INTRAVENOUS; SUBCUTANEOUS at 11:56

## 2021-10-01 RX ADMIN — DEXAMETHASONE SODIUM PHOSPHATE 10 MG: 4 INJECTION, SOLUTION INTRA-ARTICULAR; INTRALESIONAL; INTRAMUSCULAR; INTRAVENOUS; SOFT TISSUE at 21:47

## 2021-10-01 RX ADMIN — ALBUTEROL SULFATE 2 PUFF: 90 AEROSOL, METERED RESPIRATORY (INHALATION) at 10:10

## 2021-10-01 RX ADMIN — GUAIFENESIN AND CODEINE PHOSPHATE 10 ML: 10; 100 LIQUID ORAL at 10:23

## 2021-10-01 RX ADMIN — DEXAMETHASONE SODIUM PHOSPHATE 10 MG: 4 INJECTION, SOLUTION INTRA-ARTICULAR; INTRALESIONAL; INTRAMUSCULAR; INTRAVENOUS; SOFT TISSUE at 10:03

## 2021-10-01 RX ADMIN — BARICITINIB 4 MG: 1 TABLET, FILM COATED ORAL at 10:00

## 2021-10-01 RX ADMIN — INSULIN LISPRO 1 UNITS: 100 INJECTION, SOLUTION INTRAVENOUS; SUBCUTANEOUS at 08:00

## 2021-10-01 RX ADMIN — GUAIFENESIN AND CODEINE PHOSPHATE 10 ML: 10; 100 LIQUID ORAL at 13:10

## 2021-10-01 RX ADMIN — AMLODIPINE BESYLATE 5 MG: 5 TABLET ORAL at 10:06

## 2021-10-01 RX ADMIN — Medication 10 ML: at 00:19

## 2021-10-01 RX ADMIN — ENOXAPARIN SODIUM 80 MG: 100 INJECTION SUBCUTANEOUS at 10:06

## 2021-10-01 RX ADMIN — GUAIFENESIN AND CODEINE PHOSPHATE 10 ML: 10; 100 LIQUID ORAL at 21:47

## 2021-10-01 RX ADMIN — ALBUTEROL SULFATE 2 PUFF: 90 AEROSOL, METERED RESPIRATORY (INHALATION) at 16:55

## 2021-10-01 RX ADMIN — GUAIFENESIN AND CODEINE PHOSPHATE 10 ML: 10; 100 LIQUID ORAL at 17:00

## 2021-10-01 RX ADMIN — INSULIN LISPRO 1 UNITS: 100 INJECTION, SOLUTION INTRAVENOUS; SUBCUTANEOUS at 16:22

## 2021-10-01 RX ADMIN — INSULIN LISPRO 1 UNITS: 100 INJECTION, SOLUTION INTRAVENOUS; SUBCUTANEOUS at 22:09

## 2021-10-01 RX ADMIN — PAROXETINE 30 MG: 10 TABLET, FILM COATED ORAL at 21:47

## 2021-10-01 RX ADMIN — ALBUTEROL SULFATE 2 PUFF: 90 AEROSOL, METERED RESPIRATORY (INHALATION) at 13:10

## 2021-10-01 ASSESSMENT — PAIN SCALES - GENERAL
PAINLEVEL_OUTOF10: 0
PAINLEVEL_OUTOF10: 0

## 2021-10-01 NOTE — PROGRESS NOTES
Date: 9/30/2021    Time: 10:50 PM    Patient Placed On BIPAP/CPAP/ Non-Invasive Ventilation? Yes    If no must comment. Facial area red/color change? No           If YES are Blister/Lesion present? No   If yes must notify nursing staff  BIPAP/CPAP skin barrier?   Yes    Skin barrier type:mepilexlite       Comments:        Sara Saldaña RCP

## 2021-10-01 NOTE — PROGRESS NOTES
Comprehensive Nutrition Assessment    Type and Reason for Visit:  Initial, RD Nutrition Re-Screen/LOS    Nutrition Recommendations/Plan: Continue Diet. Will Start Glucerna Diabetic ONS BID. Nutrition Assessment:  Pt adm w/ worsening SOB/Fatigue and intermittent nausea/diarrhea x ~1wk pta. No noted significant PMHx pta. Pt at risk d/t sporadic poor intake since adm d/t SOB/BiPAP 2/2 COVID19+ PNA. Will Start ONS and monitor. Malnutrition Assessment:  Malnutrition Status: At risk for malnutrition (Comment)    Context:  Acute Illness     Findings of the 6 clinical characteristics of malnutrition:  Energy Intake:  1 - 75% or less of estimated energy requirements for 7 or more days  Weight Loss:  Unable to assess (2/2 poor EMR wt hx pta)     Body Fat Loss:  Unable to assess (2/2 COVID Iso)     Muscle Mass Loss:  Unable to assess    Fluid Accumulation:  No significant fluid accumulation     Strength:  Not Performed    Estimated Daily Nutrient Needs:  Energy (kcal):  1983-1702 (MSJx1. 3SF); Weight Used for Energy Requirements:  Current     Protein (g):   (1.3-1.5); Weight Used for Protein Requirements:  Ideal        Fluid (ml/day):  0384-2397; Method Used for Fluid Requirements:  1 ml/kcal      Nutrition Related Findings:  A&O, dentition WNL, Abd/BS WDL, No edema, I/O's WNL, +BiPAP      Wounds:  None       Current Nutrition Therapies:    ADULT DIET; Regular; 5 carb choices (75 gm/meal)    Anthropometric Measures:  · Height: 5' 7\" (170.2 cm)  · Current Body Weight: 184 lb (83.5 kg) (actual 9/23)   · Admission Body Weight: 184 lb (83.5 kg) (actual 9/23)    · Usual Body Weight:  (UTO UBW 2/2 poor EMR wt hx pta)     · Ideal Body Weight: 148 lbs; % Ideal Body Weight     · BMI: 28.8  · Adjusted Body Weight:  ; No Adjustment   · Adjusted BMI:      · BMI Categories: Overweight (BMI 25.0-29. 9)       Nutrition Diagnosis:   · Inadequate oral intake related to impaired respiratory function (2/2 COVID19+ PNA) as evidenced by intake 51-75%, nausea, diarrhea      Nutrition Interventions:   Food and/or Nutrient Delivery:  Continue Current Diet, Start Oral Nutrition Supplement (Continue Diet. Will Start Glucerna Diabetic ONS BID.)  Nutrition Education/Counseling:  Education not indicated   Coordination of Nutrition Care:  Continue to monitor while inpatient    Goals:  PO intake >75% of meals/ONS. Nutrition Monitoring and Evaluation:   Behavioral-Environmental Outcomes:  None Identified   Food/Nutrient Intake Outcomes:  Diet Advancement/Tolerance, Food and Nutrient Intake, Supplement Intake  Physical Signs/Symptoms Outcomes:  Nutrition Focused Physical Findings, Biochemical Data, Diarrhea, GI Status, Nausea or Vomiting, Fluid Status or Edema, Skin, Weight     Discharge Planning:     Too soon to determine     Electronically signed by Cherie Pastor RD, LD on 10/1/21 at 2:37 PM EDT    Contact: ext [phone]

## 2021-10-01 NOTE — PROGRESS NOTES
[facility] Hospitalist Progress Note    Admission Date  9/22/2021  9:32 PM  Chief Complaint Nausea, fatigue  Admit Dx   Hypokalemia [E87.6]  Acute respiratory failure with hypoxia (HCC) [J96.01]  COVID-19 [U07.1]  Acute respiratory failure due to COVID-19 (HCC) [U07.1, J96.00]    Subjective  History of Present Illness  Seen at bedside, lying on his side w BiPAP in place  Nursing staff / resp therapist notes tachypnea and shallow breaths  Finally able to cut FiO2 on BiPAP down a bit to 80%, satting 99% while I was there  Actually did not c/o cough today  No new issues  No family present  Discussed w charge nurse    Review of Systems - 12-point review of systems has been reviewed and is otherwise negative except as listed in the HPI    Objective  Physical Exam  Vitals: BP (!) 151/70   Pulse 61   Temp 97.5 °F (36.4 °C) (Axillary)   Resp (!) 31   Ht 5' 7\" (1.702 m)   Wt 184 lb 11.9 oz (83.8 kg)   SpO2 98%   BMI 28.94 kg/m²   General: well-developed, well-nourished, no acute distress, cooperative  Skin: warm, dry, intact, normal color without cyanosis  HEENT: normocephalic, atraumatic, mucous membranes normal; +BiPAP  Respiratory: diminished to auscultation bilaterally without respiratory distress  Cardiovascular: regular rate and rhythm without murmur / rub / gallop  Abdominal: soft, nontender, nondistended, normoactive bowel sounds  Extremities: no mottling, pulses intact, no edema, many tattoos  Neurologic: awake, alert, no focal deficits  Psychiatric: normal affect, cooperative    Assessment / Plan  Acute hypoxic resp failure d/t COVID pneumonia   COVID+ as of 9/22   Imaging 3d ago shows significant worsening of multifocal bl infiltrates - recheck CXR   No fevers past 24h   O2 requirement currently BiPAP   Inflammatory markers - CRP and ferritin decreasing, d-dimer increasing  o CRP about the same at 1.8, ferritin about the same at 1200, dimer increasing at 1700 today - already on full dose therapeutic Lovenox   Decadron 10 mg IV bid   Baricitinib day 8   Pulmonology input reviewed and appreciated    Symptom mgmt for cough, fevers, nausea, diarrhea, body aches if present   o Cough syrup - codeine q4h while awake    Anxiety - feel this is contributing to pt's SOB; started low-dose Xanax 9/30    NIDDM - hold orals / diabetic diet / ISS + BG checks ACHS / hypoglycemic protocol / target -180 with  this AM; continue current regimen    Code status  Full    DVT prophylaxis Lovenox full therapeutic dosing  Disposition  To be determined; case mgmt note reviewed and Narinder Chavez following but cannot take until at least 10/5; have also discussed w liaison for [facility].     Electronically signed by Alexandro Chairez DO on 10/1/2021 at 1:56 PM

## 2021-10-01 NOTE — PROGRESS NOTES
Non-tender. Non-distended. No masses. No organomegaly. Normal bowel sounds. Skin: Warm and dry. No nodule on exposed extremities. No rash on exposed extremities. Ext: No cyanosis, clubbing, edema  Lymph: No cervical LAD. No supraclavicular LAD. M/S: No cyanosis. No joint deformity. No clubbing. Neuro: Awake. Follows commands. Positive pupils/gag/corneals. Normal pain response. Results:  CBC:   Recent Labs     10/01/21  0332   WBC 11.6*   HGB 11.3*   HCT 35.8*   MCV 85.2        BMP:   Recent Labs     09/29/21  1056 09/30/21  1125 10/01/21  0332    138 134   K 4.1 4.5 4.5    101 100   CO2 28 26 26   BUN 29* 26* 24*   CREATININE 0.6* 0.6* 0.5*     LIVER PROFILE:   Recent Labs     09/29/21  1056 09/30/21  1125 10/01/21  0332   AST 36 48* 44*   ALT 54* 75* 76*   BILITOT 0.4 0.6 0.5   ALKPHOS 91 118 146*     PT/INR: No results for input(s): PROTIME, INR in the last 72 hours. APTT: No results for input(s): APTT in the last 72 hours. Pathology:  1. N/A      Microbiology:  1. None    Recent ABG:   No results for input(s): PH, PO2, PCO2, HCO3, BE, O2SAT, METHB, O2HB, COHB, O2CON, HHB, THB in the last 72 hours. FiO2 : 80 %       Recent Films:  XR CHEST PORTABLE   Final Result   No interval change         XR CHEST PORTABLE   Final Result   1. Significant worsening of the multifocal bilateral pneumonia when compared   with the prior CT scan of 09/22/2021         CTA PULMONARY W CONTRAST   Final Result   Within described exam limitations, no evidence of pulmonary embolism. Scattered multifocal low density infiltrates throughout both lungs, most   suggestive of COVID pneumonia in context of current pandemic. At least moderate diffuse hepatic steatosis. Cholecystectomy. RECOMMENDATIONS:   Multiple pulmonary nodules. Most severe: 4 mm left solid pulmonary nodule   within the upper lobe.  A non-contrast Chest CT at 12 months is optional. If   performed and the nodule is stable at 12 months, no further follow-up is   recommended. These guidelines do not apply to immunocompromised patients and patients with   cancer. Follow up in patients with significant comorbidities as clinically   warranted. For lung cancer screening, adhere to Lung-RADS guidelines. Reference: Radiology. 2017; 284(1):228-43. XR CHEST PORTABLE   Final Result   There are bilateral multifocal ground-glass areas of consolidation with the   lungs concerning for in infectious or inflammatory process and developing   ARDS or viral pneumonia could give this appearance.                      Assessment:  1. COVID-19 pneumonia with significant worsening despite steroids and baricitinib. Inflammatory markers and chest radiograph are reviewed.        Plan:  1. Continue full dose anticoagulation  2. BiPAP/avaps pretty much continuously.       Time at the bedside, reviewing labs and radiographs, reviewing updated notes and consultations, discussing with staff and family was more than 35 minutes. Please note that voice recognition technology was used in the preparation of this note and make therefore it may contain inadvertent transcription errors. If the patient is a COVID 19 isolation patient, the above physical exam reflects that of the examining physician for the day. Hardeep Abreu MD,  M.D., F.C.C.P.     [facility], [address] [place] [address], [address], [facility]

## 2021-10-02 LAB
ALBUMIN SERPL-MCNC: 3 G/DL (ref 3.5–5.2)
ALP BLD-CCNC: 176 U/L (ref 40–129)
ALT SERPL-CCNC: 95 U/L (ref 0–40)
ANION GAP SERPL CALCULATED.3IONS-SCNC: 9 MMOL/L (ref 7–16)
AST SERPL-CCNC: 48 U/L (ref 0–39)
BILIRUB SERPL-MCNC: 0.6 MG/DL (ref 0–1.2)
BUN BLDV-MCNC: 24 MG/DL (ref 6–23)
C-REACTIVE PROTEIN: 4.2 MG/DL (ref 0–0.4)
CALCIUM SERPL-MCNC: 8 MG/DL (ref 8.6–10.2)
CHLORIDE BLD-SCNC: 100 MMOL/L (ref 98–107)
CO2: 27 MMOL/L (ref 22–29)
CREAT SERPL-MCNC: 0.6 MG/DL (ref 0.7–1.2)
D DIMER: 2611 NG/ML DDU
FERRITIN: 1286 NG/ML
GFR AFRICAN AMERICAN: >60
GFR NON-AFRICAN AMERICAN: >60 ML/MIN/1.73
GLUCOSE BLD-MCNC: 200 MG/DL (ref 74–99)
HCT VFR BLD CALC: 35.4 % (ref 37–54)
HEMOGLOBIN: 11.1 G/DL (ref 12.5–16.5)
MCH RBC QN AUTO: 26.7 PG (ref 26–35)
MCHC RBC AUTO-ENTMCNC: 31.4 % (ref 32–34.5)
MCV RBC AUTO: 85.1 FL (ref 80–99.9)
METER GLUCOSE: 176 MG/DL (ref 74–99)
METER GLUCOSE: 186 MG/DL (ref 74–99)
METER GLUCOSE: 205 MG/DL (ref 74–99)
METER GLUCOSE: 218 MG/DL (ref 74–99)
PDW BLD-RTO: 14 FL (ref 11.5–15)
PLATELET # BLD: 361 E9/L (ref 130–450)
PMV BLD AUTO: 10.6 FL (ref 7–12)
POTASSIUM SERPL-SCNC: 4.8 MMOL/L (ref 3.5–5)
RBC # BLD: 4.16 E12/L (ref 3.8–5.8)
SODIUM BLD-SCNC: 136 MMOL/L (ref 132–146)
TOTAL PROTEIN: 6 G/DL (ref 6.4–8.3)
WBC # BLD: 10.1 E9/L (ref 4.5–11.5)

## 2021-10-02 PROCEDURE — 1200000000 HC SEMI PRIVATE

## 2021-10-02 PROCEDURE — 85027 COMPLETE CBC AUTOMATED: CPT

## 2021-10-02 PROCEDURE — 6360000002 HC RX W HCPCS: Performed by: INTERNAL MEDICINE

## 2021-10-02 PROCEDURE — 2700000000 HC OXYGEN THERAPY PER DAY

## 2021-10-02 PROCEDURE — 94660 CPAP INITIATION&MGMT: CPT

## 2021-10-02 PROCEDURE — 86140 C-REACTIVE PROTEIN: CPT

## 2021-10-02 PROCEDURE — 36415 COLL VENOUS BLD VENIPUNCTURE: CPT

## 2021-10-02 PROCEDURE — 82728 ASSAY OF FERRITIN: CPT

## 2021-10-02 PROCEDURE — 80053 COMPREHEN METABOLIC PANEL: CPT

## 2021-10-02 PROCEDURE — 82962 GLUCOSE BLOOD TEST: CPT

## 2021-10-02 PROCEDURE — 2580000003 HC RX 258: Performed by: INTERNAL MEDICINE

## 2021-10-02 PROCEDURE — 99233 SBSQ HOSP IP/OBS HIGH 50: CPT | Performed by: INTERNAL MEDICINE

## 2021-10-02 PROCEDURE — 85378 FIBRIN DEGRADE SEMIQUANT: CPT

## 2021-10-02 PROCEDURE — 6370000000 HC RX 637 (ALT 250 FOR IP): Performed by: INTERNAL MEDICINE

## 2021-10-02 RX ADMIN — INSULIN LISPRO 2 UNITS: 100 INJECTION, SOLUTION INTRAVENOUS; SUBCUTANEOUS at 13:01

## 2021-10-02 RX ADMIN — INSULIN LISPRO 2 UNITS: 100 INJECTION, SOLUTION INTRAVENOUS; SUBCUTANEOUS at 17:36

## 2021-10-02 RX ADMIN — INSULIN LISPRO 1 UNITS: 100 INJECTION, SOLUTION INTRAVENOUS; SUBCUTANEOUS at 08:20

## 2021-10-02 RX ADMIN — ENOXAPARIN SODIUM 80 MG: 100 INJECTION SUBCUTANEOUS at 08:12

## 2021-10-02 RX ADMIN — PAROXETINE 30 MG: 10 TABLET, FILM COATED ORAL at 20:40

## 2021-10-02 RX ADMIN — DEXAMETHASONE SODIUM PHOSPHATE 10 MG: 4 INJECTION, SOLUTION INTRA-ARTICULAR; INTRALESIONAL; INTRAMUSCULAR; INTRAVENOUS; SOFT TISSUE at 20:40

## 2021-10-02 RX ADMIN — BARICITINIB 4 MG: 1 TABLET, FILM COATED ORAL at 08:20

## 2021-10-02 RX ADMIN — ALBUTEROL SULFATE 2 PUFF: 90 AEROSOL, METERED RESPIRATORY (INHALATION) at 08:19

## 2021-10-02 RX ADMIN — Medication 10 ML: at 08:20

## 2021-10-02 RX ADMIN — Medication 10 ML: at 23:25

## 2021-10-02 RX ADMIN — GUAIFENESIN AND CODEINE PHOSPHATE 10 ML: 10; 100 LIQUID ORAL at 06:07

## 2021-10-02 RX ADMIN — ALBUTEROL SULFATE 2 PUFF: 90 AEROSOL, METERED RESPIRATORY (INHALATION) at 17:02

## 2021-10-02 RX ADMIN — INSULIN LISPRO 1 UNITS: 100 INJECTION, SOLUTION INTRAVENOUS; SUBCUTANEOUS at 20:28

## 2021-10-02 RX ADMIN — GUAIFENESIN AND CODEINE PHOSPHATE 10 ML: 10; 100 LIQUID ORAL at 12:59

## 2021-10-02 RX ADMIN — ENOXAPARIN SODIUM 80 MG: 100 INJECTION SUBCUTANEOUS at 20:50

## 2021-10-02 RX ADMIN — AMLODIPINE BESYLATE 5 MG: 5 TABLET ORAL at 08:19

## 2021-10-02 RX ADMIN — GUAIFENESIN AND CODEINE PHOSPHATE 10 ML: 10; 100 LIQUID ORAL at 20:40

## 2021-10-02 RX ADMIN — PAROXETINE 30 MG: 10 TABLET, FILM COATED ORAL at 08:19

## 2021-10-02 RX ADMIN — ALBUTEROL SULFATE 2 PUFF: 90 AEROSOL, METERED RESPIRATORY (INHALATION) at 13:00

## 2021-10-02 RX ADMIN — DEXAMETHASONE SODIUM PHOSPHATE 10 MG: 4 INJECTION, SOLUTION INTRA-ARTICULAR; INTRALESIONAL; INTRAMUSCULAR; INTRAVENOUS; SOFT TISSUE at 08:13

## 2021-10-02 ASSESSMENT — PAIN SCALES - GENERAL: PAINLEVEL_OUTOF10: 0

## 2021-10-02 ASSESSMENT — PAIN DESCRIPTION - PAIN TYPE: TYPE: ACUTE PAIN

## 2021-10-02 NOTE — PLAN OF CARE
Problem: Airway Clearance - Ineffective  Goal: Achieve or maintain patent airway  Outcome: Met This Shift     Problem: Breathing Pattern - Ineffective  Goal: Ability to achieve and maintain a regular respiratory rate  Outcome: Met This Shift

## 2021-10-02 NOTE — PROGRESS NOTES
[facility]) Hospitalist Progress Note    Admission Date  9/22/2021  9:32 PM  Chief Complaint Nausea, fatigue  Admit Dx   Hypokalemia [E87.6]  Acute respiratory failure with hypoxia (HCC) [J96.01]  COVID-19 [U07.1]  Acute respiratory failure due to COVID-19 (HCC) [U07.1, J96.00]    Subjective  History of Present Illness  Seen at bedside, currently on BiPAP but has been using Optiflow more  FiO2 on BiPAP down a bit more to 70 today  Pt sleepy today, no distress / overt anxiety during my visit  No new issues  No family present  Discussed w charge nurse    Review of Systems - 12-point review of systems has been reviewed and is otherwise negative except as listed in the HPI    Objective  Physical Exam  Vitals: BP (!) 145/74   Pulse 60   Temp 97.7 °F (36.5 °C) (Axillary)   Resp 30   Ht 5' 7\" (1.702 m)   Wt 184 lb 11.9 oz (83.8 kg)   SpO2 96%   BMI 28.94 kg/m²   General: well-developed, well-nourished, no acute distress, cooperative  Skin: warm, dry, intact, normal color without cyanosis  HEENT: normocephalic, atraumatic, mucous membranes normal; +BiPAP  Respiratory: diminished to auscultation bilaterally without respiratory distress  Cardiovascular: regular rate and rhythm without murmur / rub / gallop  Abdominal: soft, nontender, nondistended, normoactive bowel sounds  Extremities: no mottling, pulses intact, no edema, many tattoos  Neurologic: awake, alert, no focal deficits  Psychiatric: normal affect, cooperative    Assessment / Plan  Acute hypoxic resp failure d/t COVID pneumonia   COVID+ as of 9/22   Imaging 3d ago shows significant worsening of multifocal bl infiltrates - recheck CXR 9/30 was unchanged   No fevers past 24h   O2 requirement currently BiPAP FiO2 70% alternating w Optiflow 60L @ 90% FiO2   Inflammatory markers - CRP and ferritin decreasing, d-dimer increasing  o CRP about the same at 1.8, ferritin about the same at 1200, dimer increasing at 1700 today - already on full dose therapeutic Lovenox   Decadron 10 mg IV bid   Baricitinib day 9   Pulmonology input reviewed and appreciated    Symptom mgmt for cough, fevers, nausea, diarrhea, body aches if present   o Cough syrup - codeine q4h while awake    Anxiety - feel this is contributing to pt's SOB; started low-dose Xanax 9/30    NIDDM - hold orals / diabetic diet / ISS + BG checks ACHS / hypoglycemic protocol / target -180 with  this AM; continue current regimen    Code status  Full    DVT prophylaxis Lovenox full therapeutic dosing  Disposition  To be determined; case mgmt note reviewed and Amber Waldrop following but cannot take until at least 10/5; have also discussed w liaison for [facility].     Electronically signed by Patience Ramirez DO on 10/2/2021 at 2:40 PM

## 2021-10-02 NOTE — PROGRESS NOTES
Pulmonary Progress Note    Admit Date: 2021  Hospital day                               PCP: No primary care provider on file. Chief Complaint (s):  Patient Active Problem List   Diagnosis    COVID-19    Acute respiratory failure due to COVID-19 [facility])    Acute respiratory failure with hypoxia ([address]    Pneumonia due to COVID-19 virus    Elevated LFTs    Hypokalemia    Essential hypertension       Subjective:  · Today, the SPO2 is up to 97%. Vitals:  VITALS:  BP (!) 145/74   Pulse 60   Temp 97.7 °F (36.5 °C) (Axillary)   Resp 30   Ht 5' 7\" (1.702 m)   Wt 184 lb 11.9 oz (83.8 kg)   SpO2 96%   BMI 28.94 kg/m²     24HR INTAKE/OUTPUT:      Intake/Output Summary (Last 24 hours) at 10/2/2021 1601  Last data filed at 10/2/2021 1408  Gross per 24 hour   Intake --   Output 800 ml   Net -800 ml       24HR PULSE OXIMETRY RANGE:    SpO2  Av.7 %  Min: 94 %  Max: 97 %    Medications:  IV:   sodium chloride      dextrose         Scheduled Meds:   guaiFENesin-codeine  10 mL Oral Q4H While awake    dexamethasone  10 mg IntraVENous Q12H    baricitinib  4 mg Oral Daily    enoxaparin  1 mg/kg SubCUTAneous BID    albuterol sulfate HFA  2 puff Inhalation 4x daily    sodium chloride flush  5-40 mL IntraVENous 2 times per day    PARoxetine  30 mg Oral BID    amLODIPine  5 mg Oral Daily    insulin lispro  0-6 Units SubCUTAneous TID WC    insulin lispro  0-3 Units SubCUTAneous Nightly       Diet:   ADULT DIET; Regular; 5 carb choices (75 gm/meal)  Adult Oral Nutrition Supplement; Diabetic Oral Supplement     EXAM:  General: No distress. Alert. Eyes: PERRL. No sclera icterus. No conjunctival injection. ENT: No discharge. Pharynx clear. Neck: Trachea midline. Normal thyroid. Resp: No accessory muscle use. No rales. No wheezing. No rhonchi. CV: Regular rate. Regular rhythm. No murmur or rub. Abd: Non-tender. Non-distended. No masses. No organomegaly. Normal bowel sounds.    Skin: Warm and dry. No nodule on exposed extremities. No rash on exposed extremities. Ext: No cyanosis, clubbing, edema  Lymph: No cervical LAD. No supraclavicular LAD. M/S: No cyanosis. No joint deformity. No clubbing. Neuro: Awake. Follows commands. Positive pupils/gag/corneals. Normal pain response. Results:  CBC:   Recent Labs     10/01/21  0332 10/02/21  0502   WBC 11.6* 10.1   HGB 11.3* 11.1*   HCT 35.8* 35.4*   MCV 85.2 85.1    361     BMP:   Recent Labs     09/30/21  1125 10/01/21  0332 10/02/21  0502    134 136   K 4.5 4.5 4.8    100 100   CO2 26 26 27   BUN 26* 24* 24*   CREATININE 0.6* 0.5* 0.6*     LIVER PROFILE:   Recent Labs     09/30/21  1125 10/01/21  0332 10/02/21  0502   AST 48* 44* 48*   ALT 75* 76* 95*   BILITOT 0.6 0.5 0.6   ALKPHOS 118 146* 176*     PT/INR: No results for input(s): PROTIME, INR in the last 72 hours. APTT: No results for input(s): APTT in the last 72 hours. Pathology:  1. N/A      Microbiology:  1. None    Recent ABG:   No results for input(s): PH, PO2, PCO2, HCO3, BE, O2SAT, METHB, O2HB, COHB, O2CON, HHB, THB in the last 72 hours. FiO2 : 90 %       Recent Films:  XR CHEST PORTABLE   Final Result   No interval change         XR CHEST PORTABLE   Final Result   1. Significant worsening of the multifocal bilateral pneumonia when compared   with the prior CT scan of 09/22/2021         CTA PULMONARY W CONTRAST   Final Result   Within described exam limitations, no evidence of pulmonary embolism. Scattered multifocal low density infiltrates throughout both lungs, most   suggestive of COVID pneumonia in context of current pandemic. At least moderate diffuse hepatic steatosis. Cholecystectomy. RECOMMENDATIONS:   Multiple pulmonary nodules. Most severe: 4 mm left solid pulmonary nodule   within the upper lobe.  A non-contrast Chest CT at 12 months is optional. If   performed and the nodule is stable at 12 months, no further follow-up is recommended. These guidelines do not apply to immunocompromised patients and patients with   cancer. Follow up in patients with significant comorbidities as clinically   warranted. For lung cancer screening, adhere to Lung-RADS guidelines. Reference: Radiology. 2017; 284(1):228-43. XR CHEST PORTABLE   Final Result   There are bilateral multifocal ground-glass areas of consolidation with the   lungs concerning for in infectious or inflammatory process and developing   ARDS or viral pneumonia could give this appearance.                      Assessment:  1. COVID-19 pneumonia with significant worsening despite steroids and baricitinib. Inflammatory markers and chest radiograph are reviewed.        Plan:  1. Continue full dose anticoagulation  2. Wean FiO2 as tolerated    Time at the bedside, reviewing labs and radiographs, reviewing updated notes and consultations, discussing with staff and family was more than 35 minutes. Please note that voice recognition technology was used in the preparation of this note and make therefore it may contain inadvertent transcription errors. If the patient is a COVID 19 isolation patient, the above physical exam reflects that of the examining physician for the day. Chalo Chase MD,  M.ZAY., F.C.C.P.     [facility] [address] [facility], [address] [facility], [address] [place] [address]

## 2021-10-03 LAB
ALBUMIN SERPL-MCNC: 2.9 G/DL (ref 3.5–5.2)
ALP BLD-CCNC: 193 U/L (ref 40–129)
ALT SERPL-CCNC: 101 U/L (ref 0–40)
ANION GAP SERPL CALCULATED.3IONS-SCNC: 7 MMOL/L (ref 7–16)
AST SERPL-CCNC: 42 U/L (ref 0–39)
BILIRUB SERPL-MCNC: 0.5 MG/DL (ref 0–1.2)
BUN BLDV-MCNC: 23 MG/DL (ref 6–23)
C-REACTIVE PROTEIN: 2 MG/DL (ref 0–0.4)
CALCIUM SERPL-MCNC: 8.1 MG/DL (ref 8.6–10.2)
CHLORIDE BLD-SCNC: 98 MMOL/L (ref 98–107)
CO2: 26 MMOL/L (ref 22–29)
CREAT SERPL-MCNC: 0.6 MG/DL (ref 0.7–1.2)
D DIMER: 2758 NG/ML DDU
FERRITIN: 1293 NG/ML
GFR AFRICAN AMERICAN: >60
GFR NON-AFRICAN AMERICAN: >60 ML/MIN/1.73
GLUCOSE BLD-MCNC: 215 MG/DL (ref 74–99)
HCT VFR BLD CALC: 34.9 % (ref 37–54)
HEMOGLOBIN: 11.2 G/DL (ref 12.5–16.5)
MCH RBC QN AUTO: 27.2 PG (ref 26–35)
MCHC RBC AUTO-ENTMCNC: 32.1 % (ref 32–34.5)
MCV RBC AUTO: 84.7 FL (ref 80–99.9)
METER GLUCOSE: 142 MG/DL (ref 74–99)
METER GLUCOSE: 159 MG/DL (ref 74–99)
METER GLUCOSE: 173 MG/DL (ref 74–99)
METER GLUCOSE: 184 MG/DL (ref 74–99)
PDW BLD-RTO: 13.9 FL (ref 11.5–15)
PLATELET # BLD: 336 E9/L (ref 130–450)
PMV BLD AUTO: 10.9 FL (ref 7–12)
POTASSIUM SERPL-SCNC: 4.7 MMOL/L (ref 3.5–5)
RBC # BLD: 4.12 E12/L (ref 3.8–5.8)
SODIUM BLD-SCNC: 131 MMOL/L (ref 132–146)
TOTAL PROTEIN: 5.9 G/DL (ref 6.4–8.3)
WBC # BLD: 12 E9/L (ref 4.5–11.5)

## 2021-10-03 PROCEDURE — 6370000000 HC RX 637 (ALT 250 FOR IP): Performed by: INTERNAL MEDICINE

## 2021-10-03 PROCEDURE — 82728 ASSAY OF FERRITIN: CPT

## 2021-10-03 PROCEDURE — 1200000000 HC SEMI PRIVATE

## 2021-10-03 PROCEDURE — 94660 CPAP INITIATION&MGMT: CPT

## 2021-10-03 PROCEDURE — 99233 SBSQ HOSP IP/OBS HIGH 50: CPT | Performed by: INTERNAL MEDICINE

## 2021-10-03 PROCEDURE — 6360000002 HC RX W HCPCS: Performed by: INTERNAL MEDICINE

## 2021-10-03 PROCEDURE — 85027 COMPLETE CBC AUTOMATED: CPT

## 2021-10-03 PROCEDURE — 85378 FIBRIN DEGRADE SEMIQUANT: CPT

## 2021-10-03 PROCEDURE — 2580000003 HC RX 258: Performed by: INTERNAL MEDICINE

## 2021-10-03 PROCEDURE — 6370000000 HC RX 637 (ALT 250 FOR IP): Performed by: FAMILY MEDICINE

## 2021-10-03 PROCEDURE — 36415 COLL VENOUS BLD VENIPUNCTURE: CPT

## 2021-10-03 PROCEDURE — 82962 GLUCOSE BLOOD TEST: CPT

## 2021-10-03 PROCEDURE — 86140 C-REACTIVE PROTEIN: CPT

## 2021-10-03 PROCEDURE — 2700000000 HC OXYGEN THERAPY PER DAY

## 2021-10-03 PROCEDURE — 94761 N-INVAS EAR/PLS OXIMETRY MLT: CPT

## 2021-10-03 PROCEDURE — 80053 COMPREHEN METABOLIC PANEL: CPT

## 2021-10-03 RX ORDER — ALPRAZOLAM 0.25 MG/1
0.25 TABLET ORAL
Status: DISCONTINUED | OUTPATIENT
Start: 2021-10-03 | End: 2021-11-01

## 2021-10-03 RX ADMIN — GUAIFENESIN AND CODEINE PHOSPHATE 10 ML: 10; 100 LIQUID ORAL at 10:00

## 2021-10-03 RX ADMIN — INSULIN LISPRO 1 UNITS: 100 INJECTION, SOLUTION INTRAVENOUS; SUBCUTANEOUS at 07:05

## 2021-10-03 RX ADMIN — ALBUTEROL SULFATE 2 PUFF: 90 AEROSOL, METERED RESPIRATORY (INHALATION) at 17:12

## 2021-10-03 RX ADMIN — INSULIN LISPRO 1 UNITS: 100 INJECTION, SOLUTION INTRAVENOUS; SUBCUTANEOUS at 12:22

## 2021-10-03 RX ADMIN — DEXAMETHASONE SODIUM PHOSPHATE 10 MG: 4 INJECTION, SOLUTION INTRA-ARTICULAR; INTRALESIONAL; INTRAMUSCULAR; INTRAVENOUS; SOFT TISSUE at 10:03

## 2021-10-03 RX ADMIN — ALBUTEROL SULFATE 2 PUFF: 90 AEROSOL, METERED RESPIRATORY (INHALATION) at 12:05

## 2021-10-03 RX ADMIN — DEXAMETHASONE SODIUM PHOSPHATE 10 MG: 4 INJECTION, SOLUTION INTRA-ARTICULAR; INTRALESIONAL; INTRAMUSCULAR; INTRAVENOUS; SOFT TISSUE at 22:33

## 2021-10-03 RX ADMIN — INSULIN LISPRO 1 UNITS: 100 INJECTION, SOLUTION INTRAVENOUS; SUBCUTANEOUS at 16:59

## 2021-10-03 RX ADMIN — PAROXETINE 30 MG: 10 TABLET, FILM COATED ORAL at 22:48

## 2021-10-03 RX ADMIN — ALBUTEROL SULFATE 2 PUFF: 90 AEROSOL, METERED RESPIRATORY (INHALATION) at 10:03

## 2021-10-03 RX ADMIN — ENOXAPARIN SODIUM 80 MG: 100 INJECTION SUBCUTANEOUS at 10:01

## 2021-10-03 RX ADMIN — AMLODIPINE BESYLATE 5 MG: 5 TABLET ORAL at 10:01

## 2021-10-03 RX ADMIN — Medication 4.5 MG: at 22:34

## 2021-10-03 RX ADMIN — ENOXAPARIN SODIUM 80 MG: 100 INJECTION SUBCUTANEOUS at 22:32

## 2021-10-03 RX ADMIN — ALPRAZOLAM 0.25 MG: 0.25 TABLET ORAL at 22:46

## 2021-10-03 RX ADMIN — BARICITINIB 4 MG: 1 TABLET, FILM COATED ORAL at 10:01

## 2021-10-03 RX ADMIN — ALBUTEROL SULFATE 2 PUFF: 90 AEROSOL, METERED RESPIRATORY (INHALATION) at 22:37

## 2021-10-03 RX ADMIN — GUAIFENESIN AND CODEINE PHOSPHATE 10 ML: 10; 100 LIQUID ORAL at 22:33

## 2021-10-03 RX ADMIN — GUAIFENESIN AND CODEINE PHOSPHATE 10 ML: 10; 100 LIQUID ORAL at 06:59

## 2021-10-03 RX ADMIN — Medication 10 ML: at 22:48

## 2021-10-03 RX ADMIN — ALPRAZOLAM 0.25 MG: 0.25 TABLET ORAL at 17:11

## 2021-10-03 RX ADMIN — GUAIFENESIN AND CODEINE PHOSPHATE 10 ML: 10; 100 LIQUID ORAL at 00:57

## 2021-10-03 RX ADMIN — GUAIFENESIN AND CODEINE PHOSPHATE 10 ML: 10; 100 LIQUID ORAL at 17:12

## 2021-10-03 RX ADMIN — PAROXETINE 30 MG: 10 TABLET, FILM COATED ORAL at 10:01

## 2021-10-03 RX ADMIN — Medication 10 ML: at 10:03

## 2021-10-03 RX ADMIN — ALPRAZOLAM 0.25 MG: 0.25 TABLET ORAL at 12:04

## 2021-10-03 ASSESSMENT — PAIN SCALES - GENERAL
PAINLEVEL_OUTOF10: 0

## 2021-10-03 ASSESSMENT — PAIN DESCRIPTION - LOCATION: LOCATION: HEAD

## 2021-10-03 ASSESSMENT — PAIN DESCRIPTION - PAIN TYPE
TYPE: ACUTE PAIN
TYPE: ACUTE PAIN

## 2021-10-03 NOTE — PROGRESS NOTES
Date: 10/3/2021    Time: 3:52 PM    Patient Placed On BIPAP/CPAP/ Non-Invasive Ventilation? No, patient already placed on bipap    If no must comment. Facial area red/color change? No           If YES are Blister/Lesion present? No   If yes must notify nursing staff  BIPAP/CPAP skin barrier?   No    Skin barrier type: no, patient refused       Comments:        Dangelo Rg RCP

## 2021-10-03 NOTE — PLAN OF CARE
Problem: Airway Clearance - Ineffective  Goal: Achieve or maintain patent airway  Outcome: Met This Shift     Problem: Gas Exchange - Impaired  Goal: Promote optimal lung function  10/3/2021 1801 by Juan Miguel Barron RN  Outcome: Met This Shift  10/3/2021 1801 by Juan Miguel Barron RN  Outcome: Met This Shift

## 2021-10-03 NOTE — PROGRESS NOTES
Pulmonary Progress Note    Admit Date: 2021  Hospital day                               PCP: No primary care provider on file. Chief Complaint (s):  Patient Active Problem List   Diagnosis    COVID-19    Acute respiratory failure due to COVID-19 [facility])    Acute respiratory failure with hypoxia ([address]    Pneumonia due to COVID-19 virus    Elevated LFTs    Hypokalemia    Essential hypertension       Subjective:  · Oxygenation continues to improve but gradually. FiO2 is weaning. Patient is resting comfortably this p.m. Vitals:  VITALS:  BP (!) 155/75   Pulse 61   Temp 97.9 °F (36.6 °C) (Oral)   Resp 28   Ht 5' 7\" (1.702 m)   Wt 184 lb 11.9 oz (83.8 kg)   SpO2 91%   BMI 28.94 kg/m²     24HR INTAKE/OUTPUT:      Intake/Output Summary (Last 24 hours) at 10/3/2021 1753  Last data filed at 10/3/2021 1541  Gross per 24 hour   Intake --   Output 350 ml   Net -350 ml       24HR PULSE OXIMETRY RANGE:    SpO2  Av.4 %  Min: 91 %  Max: 92 %    Medications:  IV:   sodium chloride      dextrose         Scheduled Meds:   ALPRAZolam  0.25 mg Oral Q4H While awake    guaiFENesin-codeine  10 mL Oral Q4H While awake    dexamethasone  10 mg IntraVENous Q12H    baricitinib  4 mg Oral Daily    enoxaparin  1 mg/kg SubCUTAneous BID    albuterol sulfate HFA  2 puff Inhalation 4x daily    sodium chloride flush  5-40 mL IntraVENous 2 times per day    PARoxetine  30 mg Oral BID    amLODIPine  5 mg Oral Daily    insulin lispro  0-6 Units SubCUTAneous TID WC    insulin lispro  0-3 Units SubCUTAneous Nightly       Diet:   ADULT DIET; Regular; 5 carb choices (75 gm/meal)  Adult Oral Nutrition Supplement; Diabetic Oral Supplement     EXAM:  General: No distress. Alert. Eyes: PERRL. No sclera icterus. No conjunctival injection. ENT: No discharge. Pharynx clear. Neck: Trachea midline. Normal thyroid. Resp: No accessory muscle use. No rales. No wheezing. No rhonchi. CV: Regular rate.  Regular rhythm. No murmur or rub. Abd: Non-tender. Non-distended. No masses. No organomegaly. Normal bowel sounds. Skin: Warm and dry. No nodule on exposed extremities. No rash on exposed extremities. Ext: No cyanosis, clubbing, edema  Lymph: No cervical LAD. No supraclavicular LAD. M/S: No cyanosis. No joint deformity. No clubbing. Neuro: Awake. Follows commands. Positive pupils/gag/corneals. Normal pain response. Results:  CBC:   Recent Labs     10/01/21  0332 10/02/21  0502 10/03/21  0310   WBC 11.6* 10.1 12.0*   HGB 11.3* 11.1* 11.2*   HCT 35.8* 35.4* 34.9*   MCV 85.2 85.1 84.7    361 336     BMP:   Recent Labs     10/01/21  0332 10/02/21  0502 10/03/21  0310    136 131*   K 4.5 4.8 4.7    100 98   CO2 26 27 26   BUN 24* 24* 23   CREATININE 0.5* 0.6* 0.6*     LIVER PROFILE:   Recent Labs     10/01/21  0332 10/02/21  0502 10/03/21  0310   AST 44* 48* 42*   ALT 76* 95* 101*   BILITOT 0.5 0.6 0.5   ALKPHOS 146* 176* 193*     PT/INR: No results for input(s): PROTIME, INR in the last 72 hours. APTT: No results for input(s): APTT in the last 72 hours. Pathology:  1. N/A      Microbiology:  1. None    Recent ABG:   No results for input(s): PH, PO2, PCO2, HCO3, BE, O2SAT, METHB, O2HB, COHB, O2CON, HHB, THB in the last 72 hours. FiO2 : 70 %       Recent Films:  XR CHEST PORTABLE   Final Result   No interval change         XR CHEST PORTABLE   Final Result   1. Significant worsening of the multifocal bilateral pneumonia when compared   with the prior CT scan of 09/22/2021         CTA PULMONARY W CONTRAST   Final Result   Within described exam limitations, no evidence of pulmonary embolism. Scattered multifocal low density infiltrates throughout both lungs, most   suggestive of COVID pneumonia in context of current pandemic. At least moderate diffuse hepatic steatosis. Cholecystectomy. RECOMMENDATIONS:   Multiple pulmonary nodules.  Most severe: 4 mm left solid pulmonary nodule   within the upper lobe. A non-contrast Chest CT at 12 months is optional. If   performed and the nodule is stable at 12 months, no further follow-up is   recommended. These guidelines do not apply to immunocompromised patients and patients with   cancer. Follow up in patients with significant comorbidities as clinically   warranted. For lung cancer screening, adhere to Lung-RADS guidelines. Reference: Radiology. 2017; 284(1):228-43. XR CHEST PORTABLE   Final Result   There are bilateral multifocal ground-glass areas of consolidation with the   lungs concerning for in infectious or inflammatory process and developing   ARDS or viral pneumonia could give this appearance. XR CHEST PORTABLE    (Results Pending)                Assessment:  1. COVID-19 pneumonia with significant worsening despite steroids and baricitinib. Inflammatory markers and chest radiograph are reviewed.        Plan:  1. Continue full dose anticoagulation  2. Wean FiO2 as tolerated    Time at the bedside, reviewing labs and radiographs, reviewing updated notes and consultations, discussing with staff and family was more than 35 minutes. Please note that voice recognition technology was used in the preparation of this note and make therefore it may contain inadvertent transcription errors. If the patient is a COVID 19 isolation patient, the above physical exam reflects that of the examining physician for the day. All Gorman MD,  M.D., F.C.C.P.     [facility], [address] [facility], [address] [place] [address]

## 2021-10-03 NOTE — PLAN OF CARE
Problem: Gas Exchange - Impaired  Goal: Promote optimal lung function  Outcome: Met This Shift     Problem:  Body Temperature -  Risk of, Imbalanced  Goal: Ability to maintain a body temperature within defined limits  Outcome: Met This Shift

## 2021-10-03 NOTE — PROGRESS NOTES
Date: 10/2/2021    Time: 9:17 PM    Patient Placed On BIPAP/CPAP/ Non-Invasive Ventilation? NO    If no must comment.         Comments: Patient on bipap prior to check        Lubna Pisano RCP

## 2021-10-03 NOTE — PROGRESS NOTES
[facility]) Hospitalist Progress Note    Admission Date  9/22/2021  9:32 PM  Chief Complaint Nausea, fatigue  Admit Dx   Hypokalemia [E87.6]  Acute respiratory failure with hypoxia (HCC) [J96.01]  COVID-19 [U07.1]  Acute respiratory failure due to COVID-19 (HCC) [U07.1, J96.00]    Subjective  History of Present Illness  Seen at bedside, BiPAP in place currently  Still alternating w Optiflow, BiPAP FiO2 remains at 70%  Forward progress is extremely slow  Pt anxious otherwise no new issues  No family present  Discussed w charge nurse    Review of Systems - 12-point review of systems has been reviewed and is otherwise negative except as listed in the HPI    Objective  Physical Exam  Vitals: BP (!) 155/75   Pulse 61   Temp 97.9 °F (36.6 °C) (Oral)   Resp 24   Ht 5' 7\" (1.702 m)   Wt 184 lb 11.9 oz (83.8 kg)   SpO2 91%   BMI 28.94 kg/m²   General: well-developed, well-nourished, no acute distress, cooperative  Skin: warm, dry, intact, normal color without cyanosis  HEENT: normocephalic, atraumatic, mucous membranes normal; +BiPAP  Respiratory: diminished to auscultation bilaterally without respiratory distress  Cardiovascular: regular rate and rhythm without murmur / rub / gallop  Abdominal: soft, nontender, nondistended, normoactive bowel sounds  Extremities: no mottling, pulses intact, no edema, many tattoos  Neurologic: awake, alert, no focal deficits  Psychiatric: normal affect, cooperative    Assessment / Plan  Acute hypoxic resp failure d/t COVID pneumonia   COVID+ as of 9/22   Imaging 3d ago shows significant worsening of multifocal bl infiltrates - recheck CXR 9/30 was unchanged, check again in AM   No fevers past 24h   O2 requirement currently BiPAP FiO2 70% alternating w Optiflow 60L @ 90% FiO2 unchanged from yesterday -- very slow forward progress, if any   Inflammatory markers - CRP and ferritin decreasing, d-dimer increasing  o CRP about the same at 1.8, ferritin about the same at 1200, dimer increasing at 1700 today - already on full dose therapeutic Lovenox   Decadron 10 mg IV bid   Baricitinib day 10   Pulmonology input reviewed and appreciated    Symptom mgmt for cough, fevers, nausea, diarrhea, body aches if present   o Cough syrup - codeine q4h while awake    Anxiety - feel this is contributing to pt's SOB; started low-dose Xanax 9/30 but today moving from prn to q4h while awake in hopes of controlling anxiety and thereby improving resp status    NIDDM - hold orals / diabetic diet / ISS + BG checks ACHS / hypoglycemic protocol / target -180 with  this AM; continue current regimen    Code status  Full    DVT prophylaxis Lovenox full therapeutic dosing  Disposition  To be determined; case mgmt note reviewed and Ned Babcock following but cannot take until at least 10/5; have also discussed w liaison for [facility].   Given the extremely slow progress being made, feel pt would benefit from [facility]    Electronically signed by Riddhi Dominguez DO on 10/3/2021 at 3:12 PM

## 2021-10-04 ENCOUNTER — APPOINTMENT (OUTPATIENT)
Dept: GENERAL RADIOLOGY | Age: 66
DRG: 871 | End: 2021-10-04
Payer: OTHER GOVERNMENT

## 2021-10-04 LAB
ALBUMIN SERPL-MCNC: 2.8 G/DL (ref 3.5–5.2)
ALP BLD-CCNC: 202 U/L (ref 40–129)
ALT SERPL-CCNC: 94 U/L (ref 0–40)
ANION GAP SERPL CALCULATED.3IONS-SCNC: 9 MMOL/L (ref 7–16)
AST SERPL-CCNC: 42 U/L (ref 0–39)
BILIRUB SERPL-MCNC: 0.4 MG/DL (ref 0–1.2)
BUN BLDV-MCNC: 24 MG/DL (ref 6–23)
C-REACTIVE PROTEIN: 2.4 MG/DL (ref 0–0.4)
CALCIUM SERPL-MCNC: 8.2 MG/DL (ref 8.6–10.2)
CHLORIDE BLD-SCNC: 98 MMOL/L (ref 98–107)
CO2: 25 MMOL/L (ref 22–29)
CREAT SERPL-MCNC: 0.5 MG/DL (ref 0.7–1.2)
D DIMER: 2187 NG/ML DDU
FERRITIN: 1305 NG/ML
GFR AFRICAN AMERICAN: >60
GFR NON-AFRICAN AMERICAN: >60 ML/MIN/1.73
GLUCOSE BLD-MCNC: 188 MG/DL (ref 74–99)
HCT VFR BLD CALC: 33.6 % (ref 37–54)
HEMOGLOBIN: 10.7 G/DL (ref 12.5–16.5)
MCH RBC QN AUTO: 26.7 PG (ref 26–35)
MCHC RBC AUTO-ENTMCNC: 31.8 % (ref 32–34.5)
MCV RBC AUTO: 83.8 FL (ref 80–99.9)
METER GLUCOSE: 167 MG/DL (ref 74–99)
METER GLUCOSE: 179 MG/DL (ref 74–99)
METER GLUCOSE: 191 MG/DL (ref 74–99)
METER GLUCOSE: 213 MG/DL (ref 74–99)
PDW BLD-RTO: 14 FL (ref 11.5–15)
PLATELET # BLD: 273 E9/L (ref 130–450)
PMV BLD AUTO: 10.6 FL (ref 7–12)
POTASSIUM SERPL-SCNC: 4.7 MMOL/L (ref 3.5–5)
RBC # BLD: 4.01 E12/L (ref 3.8–5.8)
SODIUM BLD-SCNC: 132 MMOL/L (ref 132–146)
TOTAL PROTEIN: 5.7 G/DL (ref 6.4–8.3)
WBC # BLD: 8.7 E9/L (ref 4.5–11.5)

## 2021-10-04 PROCEDURE — 6370000000 HC RX 637 (ALT 250 FOR IP): Performed by: INTERNAL MEDICINE

## 2021-10-04 PROCEDURE — 94660 CPAP INITIATION&MGMT: CPT

## 2021-10-04 PROCEDURE — 2700000000 HC OXYGEN THERAPY PER DAY

## 2021-10-04 PROCEDURE — 36415 COLL VENOUS BLD VENIPUNCTURE: CPT

## 2021-10-04 PROCEDURE — 80053 COMPREHEN METABOLIC PANEL: CPT

## 2021-10-04 PROCEDURE — 2580000003 HC RX 258: Performed by: INTERNAL MEDICINE

## 2021-10-04 PROCEDURE — 94761 N-INVAS EAR/PLS OXIMETRY MLT: CPT

## 2021-10-04 PROCEDURE — 6370000000 HC RX 637 (ALT 250 FOR IP): Performed by: FAMILY MEDICINE

## 2021-10-04 PROCEDURE — 99233 SBSQ HOSP IP/OBS HIGH 50: CPT | Performed by: INTERNAL MEDICINE

## 2021-10-04 PROCEDURE — 71045 X-RAY EXAM CHEST 1 VIEW: CPT

## 2021-10-04 PROCEDURE — 85027 COMPLETE CBC AUTOMATED: CPT

## 2021-10-04 PROCEDURE — 82962 GLUCOSE BLOOD TEST: CPT

## 2021-10-04 PROCEDURE — 82728 ASSAY OF FERRITIN: CPT

## 2021-10-04 PROCEDURE — 6360000002 HC RX W HCPCS: Performed by: INTERNAL MEDICINE

## 2021-10-04 PROCEDURE — 85378 FIBRIN DEGRADE SEMIQUANT: CPT

## 2021-10-04 PROCEDURE — 1200000000 HC SEMI PRIVATE

## 2021-10-04 PROCEDURE — 86140 C-REACTIVE PROTEIN: CPT

## 2021-10-04 RX ADMIN — ALPRAZOLAM 0.25 MG: 0.25 TABLET ORAL at 06:43

## 2021-10-04 RX ADMIN — ALPRAZOLAM 0.25 MG: 0.25 TABLET ORAL at 14:09

## 2021-10-04 RX ADMIN — BARICITINIB 4 MG: 1 TABLET, FILM COATED ORAL at 07:55

## 2021-10-04 RX ADMIN — INSULIN LISPRO 1 UNITS: 100 INJECTION, SOLUTION INTRAVENOUS; SUBCUTANEOUS at 12:00

## 2021-10-04 RX ADMIN — Medication 4.5 MG: at 21:56

## 2021-10-04 RX ADMIN — ALPRAZOLAM 0.25 MG: 0.25 TABLET ORAL at 10:22

## 2021-10-04 RX ADMIN — GUAIFENESIN AND CODEINE PHOSPHATE 10 ML: 10; 100 LIQUID ORAL at 14:09

## 2021-10-04 RX ADMIN — ENOXAPARIN SODIUM 80 MG: 100 INJECTION SUBCUTANEOUS at 21:56

## 2021-10-04 RX ADMIN — ALPRAZOLAM 0.25 MG: 0.25 TABLET ORAL at 21:56

## 2021-10-04 RX ADMIN — ALBUTEROL SULFATE 2 PUFF: 90 AEROSOL, METERED RESPIRATORY (INHALATION) at 17:00

## 2021-10-04 RX ADMIN — GUAIFENESIN AND CODEINE PHOSPHATE 10 ML: 10; 100 LIQUID ORAL at 06:43

## 2021-10-04 RX ADMIN — DEXAMETHASONE SODIUM PHOSPHATE 10 MG: 4 INJECTION, SOLUTION INTRA-ARTICULAR; INTRALESIONAL; INTRAMUSCULAR; INTRAVENOUS; SOFT TISSUE at 07:55

## 2021-10-04 RX ADMIN — INSULIN LISPRO 1 UNITS: 100 INJECTION, SOLUTION INTRAVENOUS; SUBCUTANEOUS at 23:18

## 2021-10-04 RX ADMIN — Medication 10 ML: at 07:56

## 2021-10-04 RX ADMIN — PAROXETINE 30 MG: 10 TABLET, FILM COATED ORAL at 21:55

## 2021-10-04 RX ADMIN — Medication 5 ML: at 23:21

## 2021-10-04 RX ADMIN — GUAIFENESIN AND CODEINE PHOSPHATE 10 ML: 10; 100 LIQUID ORAL at 10:22

## 2021-10-04 RX ADMIN — PAROXETINE 30 MG: 10 TABLET, FILM COATED ORAL at 07:55

## 2021-10-04 RX ADMIN — INSULIN LISPRO 1 UNITS: 100 INJECTION, SOLUTION INTRAVENOUS; SUBCUTANEOUS at 09:17

## 2021-10-04 RX ADMIN — ENOXAPARIN SODIUM 80 MG: 100 INJECTION SUBCUTANEOUS at 07:55

## 2021-10-04 RX ADMIN — INSULIN LISPRO 1 UNITS: 100 INJECTION, SOLUTION INTRAVENOUS; SUBCUTANEOUS at 01:03

## 2021-10-04 RX ADMIN — ALPRAZOLAM 0.25 MG: 0.25 TABLET ORAL at 18:16

## 2021-10-04 RX ADMIN — GUAIFENESIN AND CODEINE PHOSPHATE 10 ML: 10; 100 LIQUID ORAL at 18:16

## 2021-10-04 RX ADMIN — INSULIN LISPRO 2 UNITS: 100 INJECTION, SOLUTION INTRAVENOUS; SUBCUTANEOUS at 18:29

## 2021-10-04 RX ADMIN — AMLODIPINE BESYLATE 5 MG: 5 TABLET ORAL at 07:55

## 2021-10-04 RX ADMIN — GUAIFENESIN AND CODEINE PHOSPHATE 10 ML: 10; 100 LIQUID ORAL at 21:55

## 2021-10-04 RX ADMIN — DEXAMETHASONE SODIUM PHOSPHATE 10 MG: 4 INJECTION, SOLUTION INTRA-ARTICULAR; INTRALESIONAL; INTRAMUSCULAR; INTRAVENOUS; SOFT TISSUE at 21:56

## 2021-10-04 ASSESSMENT — PAIN SCALES - GENERAL
PAINLEVEL_OUTOF10: 0
PAINLEVEL_OUTOF10: 0

## 2021-10-04 NOTE — PROGRESS NOTES
10/04/21 1245   Oxygen Therapy/Pulse Ox   O2 Therapy Oxygen humidified   O2 Device Heated high flow cannula   O2 Flow Rate (L/min) 60 L/min   FiO2  100 %   Resp 27   SpO2 90 %   Equipment Changed Humidification   Humidification Source Heated wire   Humidification Temp 31   Humidification Temp Measured 31   Pulse Oximeter Device Mode Continuous   Pulse Oximeter Device Location Finger

## 2021-10-04 NOTE — PROGRESS NOTES
Date: 10/3/2021    Time: 8:04 PM    Patient Placed On BIPAP/CPAP/ Non-Invasive Ventilation? Yes    If no must comment. Facial area red/color change? No           If YES are Blister/Lesion present? No   If yes must notify nursing staff  BIPAP/CPAP skin barrier?   Yes    Skin barrier type:mepilexlite       Comments:        Yareli Jones RCP

## 2021-10-04 NOTE — PROGRESS NOTES
[facility]) Hospitalist Progress Note    Admission Date  9/22/2021  9:32 PM  Chief Complaint Nausea, fatigue  Admit Dx   Hypokalemia [E87.6]  Acute respiratory failure with hypoxia (HCC) [J96.01]  COVID-19 [U07.1]  Acute respiratory failure due to COVID-19 (HCC) [U07.1, J96.00]    Subjective  History of Present Illness  Seen at bedside, sleeping soundly  Less anxious, says finally was able to sleep following Xanax  However O2 requirement back up to 60 L at 100%  Pt sleepy, just wants to rest and sleep  No family present  No new issues today  Discussed w charge nurse    Review of Systems - 12-point review of systems has been reviewed and is otherwise negative except as listed in the HPI    Objective  Physical Exam  Vitals: BP (!) 120/55   Pulse 51   Temp 97.9 °F (36.6 °C) (Axillary)   Resp 27   Ht 5' 7\" (1.702 m)   Wt 184 lb 11.9 oz (83.8 kg)   SpO2 90%   BMI 28.94 kg/m²   General: well-developed, well-nourished, no acute distress, cooperative  Skin: warm, dry, intact, normal color without cyanosis  HEENT: normocephalic, atraumatic, mucous membranes normal; +BiPAP  Respiratory: diminished to auscultation bilaterally without respiratory distress  Cardiovascular: regular rate and rhythm without murmur / rub / gallop  Abdominal: soft, nontender, nondistended, normoactive bowel sounds  Extremities: no mottling, pulses intact, no edema, many tattoos  Neurologic: awake, alert, no focal deficits  Psychiatric: normal affect, cooperative    Assessment / Plan  Acute hypoxic resp failure d/t COVID pneumonia   COVID+ as of 9/22   Imaging 3d ago shows significant worsening of multifocal bl infiltrates - recheck CXR 9/30 was unchanged, repeat this AM shows no change since 9/30   No fevers past 24h   O2 requirement currently a bit up today at [address] @ 100% FiO2 -- very slow forward progress, if any  o Inflammatory markers - CRP and dimer increased a bit today   Decadron 10 mg IV bid   Baricitinib day 6   Pulmonology input reviewed and appreciated    Symptom mgmt for cough, fevers, nausea, diarrhea, body aches if present   o Cough syrup - codeine q4h while awake    Anxiety - feel this is contributing to pt's SOB; started low-dose Xanax 9/30 but moved from prn to q4h while awake w much improvement in anxiety / pt finally able to sleep    NIDDM - hold orals / diabetic diet / ISS + BG checks ACHS / hypoglycemic protocol / target -180 with  this AM; continue current regimen    Code status  Full    DVT prophylaxis Lovenox full therapeutic dosing  Disposition  Given the extremely slow progress being made, feel pt would benefit from LTAC.   Case mgmt note from today reviewed, notes Courtney Luu can take once stable and once Courtney Luu can accept, then [facility] precert will be initiated    Electronically signed by Inga Restrepo DO on 10/4/2021 at 3:18 PM

## 2021-10-04 NOTE — CARE COORDINATION
Social wOrk / Discharge Planning : Patient currently on heated high flow 60 at FI02 100%. [facility] is following patient and once stable and Duglas Mendez can accept, then [facility] will be imitated. SW to follow.  Electronically signed by ALIZA Sebastian on 10/4/21 at 3:16 PM EDT

## 2021-10-04 NOTE — PLAN OF CARE
Problem: Gas Exchange - Impaired  Goal: Absence of hypoxia  Outcome: Ongoing  Goal: Promote optimal lung function  60/4/6024 1412 by Shireen Damian RN  Outcome: Ongoing  10/3/2021 1801 by Catina Huber RN  Outcome: Met This Shift  10/3/2021 1801 by Catina Huber RN  Outcome: Met This Shift     Problem: Breathing Pattern - Ineffective  Goal: Ability to achieve and maintain a regular respiratory rate  Outcome: Ongoing

## 2021-10-05 LAB
ALBUMIN SERPL-MCNC: 2.8 G/DL (ref 3.5–5.2)
ALP BLD-CCNC: 215 U/L (ref 40–129)
ALT SERPL-CCNC: 85 U/L (ref 0–40)
ANION GAP SERPL CALCULATED.3IONS-SCNC: 7 MMOL/L (ref 7–16)
AST SERPL-CCNC: 37 U/L (ref 0–39)
BILIRUB SERPL-MCNC: 0.5 MG/DL (ref 0–1.2)
BUN BLDV-MCNC: 26 MG/DL (ref 6–23)
C-REACTIVE PROTEIN: 1.9 MG/DL (ref 0–0.4)
CALCIUM SERPL-MCNC: 8.3 MG/DL (ref 8.6–10.2)
CHLORIDE BLD-SCNC: 100 MMOL/L (ref 98–107)
CO2: 27 MMOL/L (ref 22–29)
CREAT SERPL-MCNC: 0.6 MG/DL (ref 0.7–1.2)
D DIMER: 2116 NG/ML DDU
FERRITIN: 1369 NG/ML
GFR AFRICAN AMERICAN: >60
GFR NON-AFRICAN AMERICAN: >60 ML/MIN/1.73
GLUCOSE BLD-MCNC: 222 MG/DL (ref 74–99)
HCT VFR BLD CALC: 34.1 % (ref 37–54)
HEMOGLOBIN: 10.7 G/DL (ref 12.5–16.5)
MCH RBC QN AUTO: 26.6 PG (ref 26–35)
MCHC RBC AUTO-ENTMCNC: 31.4 % (ref 32–34.5)
MCV RBC AUTO: 84.6 FL (ref 80–99.9)
METER GLUCOSE: 134 MG/DL (ref 74–99)
METER GLUCOSE: 179 MG/DL (ref 74–99)
METER GLUCOSE: 200 MG/DL (ref 74–99)
METER GLUCOSE: 245 MG/DL (ref 74–99)
PDW BLD-RTO: 14 FL (ref 11.5–15)
PLATELET # BLD: 253 E9/L (ref 130–450)
PMV BLD AUTO: 10.9 FL (ref 7–12)
POTASSIUM SERPL-SCNC: 4.9 MMOL/L (ref 3.5–5)
RBC # BLD: 4.03 E12/L (ref 3.8–5.8)
SODIUM BLD-SCNC: 134 MMOL/L (ref 132–146)
TOTAL PROTEIN: 5.7 G/DL (ref 6.4–8.3)
WBC # BLD: 12.4 E9/L (ref 4.5–11.5)

## 2021-10-05 PROCEDURE — 85027 COMPLETE CBC AUTOMATED: CPT

## 2021-10-05 PROCEDURE — 2700000000 HC OXYGEN THERAPY PER DAY

## 2021-10-05 PROCEDURE — 6370000000 HC RX 637 (ALT 250 FOR IP): Performed by: INTERNAL MEDICINE

## 2021-10-05 PROCEDURE — 1200000000 HC SEMI PRIVATE

## 2021-10-05 PROCEDURE — 82728 ASSAY OF FERRITIN: CPT

## 2021-10-05 PROCEDURE — 85378 FIBRIN DEGRADE SEMIQUANT: CPT

## 2021-10-05 PROCEDURE — 82962 GLUCOSE BLOOD TEST: CPT

## 2021-10-05 PROCEDURE — 86140 C-REACTIVE PROTEIN: CPT

## 2021-10-05 PROCEDURE — 2580000003 HC RX 258: Performed by: INTERNAL MEDICINE

## 2021-10-05 PROCEDURE — 6370000000 HC RX 637 (ALT 250 FOR IP): Performed by: FAMILY MEDICINE

## 2021-10-05 PROCEDURE — 99233 SBSQ HOSP IP/OBS HIGH 50: CPT | Performed by: INTERNAL MEDICINE

## 2021-10-05 PROCEDURE — 94660 CPAP INITIATION&MGMT: CPT

## 2021-10-05 PROCEDURE — 36415 COLL VENOUS BLD VENIPUNCTURE: CPT

## 2021-10-05 PROCEDURE — 6360000002 HC RX W HCPCS: Performed by: INTERNAL MEDICINE

## 2021-10-05 PROCEDURE — 80053 COMPREHEN METABOLIC PANEL: CPT

## 2021-10-05 RX ADMIN — ENOXAPARIN SODIUM 80 MG: 100 INJECTION SUBCUTANEOUS at 08:52

## 2021-10-05 RX ADMIN — ALBUTEROL SULFATE 2 PUFF: 90 AEROSOL, METERED RESPIRATORY (INHALATION) at 22:26

## 2021-10-05 RX ADMIN — PAROXETINE 30 MG: 10 TABLET, FILM COATED ORAL at 22:24

## 2021-10-05 RX ADMIN — DEXAMETHASONE SODIUM PHOSPHATE 10 MG: 4 INJECTION, SOLUTION INTRA-ARTICULAR; INTRALESIONAL; INTRAMUSCULAR; INTRAVENOUS; SOFT TISSUE at 08:52

## 2021-10-05 RX ADMIN — BARICITINIB 4 MG: 1 TABLET, FILM COATED ORAL at 08:52

## 2021-10-05 RX ADMIN — ALPRAZOLAM 0.25 MG: 0.25 TABLET ORAL at 22:25

## 2021-10-05 RX ADMIN — Medication 4.5 MG: at 22:25

## 2021-10-05 RX ADMIN — INSULIN LISPRO 2 UNITS: 100 INJECTION, SOLUTION INTRAVENOUS; SUBCUTANEOUS at 17:07

## 2021-10-05 RX ADMIN — DEXAMETHASONE SODIUM PHOSPHATE 10 MG: 4 INJECTION, SOLUTION INTRA-ARTICULAR; INTRALESIONAL; INTRAMUSCULAR; INTRAVENOUS; SOFT TISSUE at 22:24

## 2021-10-05 RX ADMIN — PAROXETINE 30 MG: 10 TABLET, FILM COATED ORAL at 08:52

## 2021-10-05 RX ADMIN — GUAIFENESIN AND CODEINE PHOSPHATE 10 ML: 10; 100 LIQUID ORAL at 22:25

## 2021-10-05 RX ADMIN — ALPRAZOLAM 0.25 MG: 0.25 TABLET ORAL at 17:02

## 2021-10-05 RX ADMIN — INSULIN LISPRO 2 UNITS: 100 INJECTION, SOLUTION INTRAVENOUS; SUBCUTANEOUS at 06:50

## 2021-10-05 RX ADMIN — GUAIFENESIN AND CODEINE PHOSPHATE 10 ML: 10; 100 LIQUID ORAL at 16:59

## 2021-10-05 RX ADMIN — GUAIFENESIN AND CODEINE PHOSPHATE 10 ML: 10; 100 LIQUID ORAL at 05:51

## 2021-10-05 RX ADMIN — GUAIFENESIN AND CODEINE PHOSPHATE 10 ML: 10; 100 LIQUID ORAL at 11:03

## 2021-10-05 RX ADMIN — ALBUTEROL SULFATE 2 PUFF: 90 AEROSOL, METERED RESPIRATORY (INHALATION) at 17:16

## 2021-10-05 RX ADMIN — ALBUTEROL SULFATE 2 PUFF: 90 AEROSOL, METERED RESPIRATORY (INHALATION) at 08:00

## 2021-10-05 RX ADMIN — ENOXAPARIN SODIUM 80 MG: 100 INJECTION SUBCUTANEOUS at 22:25

## 2021-10-05 RX ADMIN — Medication 10 ML: at 08:53

## 2021-10-05 RX ADMIN — ALPRAZOLAM 0.25 MG: 0.25 TABLET ORAL at 05:51

## 2021-10-05 RX ADMIN — AMLODIPINE BESYLATE 5 MG: 5 TABLET ORAL at 08:52

## 2021-10-05 RX ADMIN — INSULIN LISPRO 1 UNITS: 100 INJECTION, SOLUTION INTRAVENOUS; SUBCUTANEOUS at 12:00

## 2021-10-05 RX ADMIN — ALPRAZOLAM 0.25 MG: 0.25 TABLET ORAL at 11:03

## 2021-10-05 RX ADMIN — ALBUTEROL SULFATE 2 PUFF: 90 AEROSOL, METERED RESPIRATORY (INHALATION) at 11:07

## 2021-10-05 RX ADMIN — Medication 10 ML: at 22:25

## 2021-10-05 ASSESSMENT — PAIN SCALES - GENERAL: PAINLEVEL_OUTOF10: 0

## 2021-10-05 NOTE — PLAN OF CARE
Problem: Gas Exchange - Impaired  Goal: Absence of hypoxia  Outcome: Ongoing  Goal: Promote optimal lung function  Outcome: Ongoing

## 2021-10-05 NOTE — PROGRESS NOTES
Pulmonary Progress Note    Admit Date: 2021  Hospital day                               PCP: No primary care provider on file. Chief Complaint (s):  Patient Active Problem List   Diagnosis    COVID-19    Acute respiratory failure due to COVID-19 [facility])    Acute respiratory failure with hypoxia ([address]    Pneumonia due to COVID-19 virus    Elevated LFTs    Hypokalemia    Essential hypertension       Subjective:  · Sleeping this p.m., the patient's nurse notes that he has been doing quite well. Oxygenation has gradually trended upward with a decreasing FiO2. Vitals:  VITALS:  BP (!) 143/77   Pulse 58   Temp 97.5 °F (36.4 °C) (Axillary)   Resp 28   Ht 5' 7\" (1.702 m)   Wt 184 lb 11.9 oz (83.8 kg)   SpO2 (!) 88%   BMI 28.94 kg/m²     24HR INTAKE/OUTPUT:      Intake/Output Summary (Last 24 hours) at 10/5/2021 1751  Last data filed at 10/5/2021 1659  Gross per 24 hour   Intake --   Output 500 ml   Net -500 ml       24HR PULSE OXIMETRY RANGE:    SpO2  Av.8 %  Min: 88 %  Max: 95 %    Medications:  IV:   sodium chloride      dextrose         Scheduled Meds:   ALPRAZolam  0.25 mg Oral Q4H While awake    guaiFENesin-codeine  10 mL Oral Q4H While awake    dexamethasone  10 mg IntraVENous Q12H    baricitinib  4 mg Oral Daily    enoxaparin  1 mg/kg SubCUTAneous BID    albuterol sulfate HFA  2 puff Inhalation 4x daily    sodium chloride flush  5-40 mL IntraVENous 2 times per day    PARoxetine  30 mg Oral BID    amLODIPine  5 mg Oral Daily    insulin lispro  0-6 Units SubCUTAneous TID     insulin lispro  0-3 Units SubCUTAneous Nightly       Diet:   ADULT DIET; Regular; 5 carb choices (75 gm/meal)  Adult Oral Nutrition Supplement; Diabetic Oral Supplement     EXAM:  General: No distress. Alert. Eyes: PERRL. No sclera icterus. No conjunctival injection. ENT: No discharge. Pharynx clear. Neck: Trachea midline. Normal thyroid. Resp: No accessory muscle use. No rales.  No wheezing. No rhonchi. CV: Regular rate. Regular rhythm. No murmur or rub. Abd: Non-tender. Non-distended. No masses. No organomegaly. Normal bowel sounds. Skin: Warm and dry. No nodule on exposed extremities. No rash on exposed extremities. Ext: No cyanosis, clubbing, edema  Lymph: No cervical LAD. No supraclavicular LAD. M/S: No cyanosis. No joint deformity. No clubbing. Neuro: Awake. Follows commands. Positive pupils/gag/corneals. Normal pain response. Results:  CBC:   Recent Labs     10/03/21  0310 10/04/21  1147 10/05/21  0447   WBC 12.0* 8.7 12.4*   HGB 11.2* 10.7* 10.7*   HCT 34.9* 33.6* 34.1*   MCV 84.7 83.8 84.6    273 253     BMP:   Recent Labs     10/03/21  0310 10/04/21  1147 10/05/21  0447   * 132 134   K 4.7 4.7 4.9   CL 98 98 100   CO2 26 25 27   BUN 23 24* 26*   CREATININE 0.6* 0.5* 0.6*     LIVER PROFILE:   Recent Labs     10/03/21  0310 10/04/21  1147 10/05/21  0447   AST 42* 42* 37   * 94* 85*   BILITOT 0.5 0.4 0.5   ALKPHOS 193* 202* 215*     PT/INR: No results for input(s): PROTIME, INR in the last 72 hours. APTT: No results for input(s): APTT in the last 72 hours. Pathology:  1. N/A      Microbiology:  1. None    Recent ABG:   No results for input(s): PH, PO2, PCO2, HCO3, BE, O2SAT, METHB, O2HB, COHB, O2CON, HHB, THB in the last 72 hours. FiO2 : 100 %       Recent Films:  XR CHEST PORTABLE   Final Result   1. No interval change in the extensive multifocal bilateral airspace disease. XR CHEST PORTABLE   Final Result   No interval change         XR CHEST PORTABLE   Final Result   1. Significant worsening of the multifocal bilateral pneumonia when compared   with the prior CT scan of 09/22/2021         CTA PULMONARY W CONTRAST   Final Result   Within described exam limitations, no evidence of pulmonary embolism.       Scattered multifocal low density infiltrates throughout both lungs, most   suggestive of COVID pneumonia in context of current pandemic. At least moderate diffuse hepatic steatosis. Cholecystectomy. RECOMMENDATIONS:   Multiple pulmonary nodules. Most severe: 4 mm left solid pulmonary nodule   within the upper lobe. A non-contrast Chest CT at 12 months is optional. If   performed and the nodule is stable at 12 months, no further follow-up is   recommended. These guidelines do not apply to immunocompromised patients and patients with   cancer. Follow up in patients with significant comorbidities as clinically   warranted. For lung cancer screening, adhere to Lung-RADS guidelines. Reference: Radiology. 2017; 284(1):228-43. XR CHEST PORTABLE   Final Result   There are bilateral multifocal ground-glass areas of consolidation with the   lungs concerning for in infectious or inflammatory process and developing   ARDS or viral pneumonia could give this appearance.                      Assessment:  1. COVID-19 pneumonia with significant worsening despite steroids and baricitinib. Inflammatory markers and chest radiograph are reviewed. More recently, there has been slow improvement.        Plan:  1. Continue full dose anticoagulation  2. Wean FiO2 as tolerated    Time at the bedside, reviewing labs and radiographs, reviewing updated notes and consultations, discussing with staff and family was more than 35 minutes. Please note that voice recognition technology was used in the preparation of this note and make therefore it may contain inadvertent transcription errors. If the patient is a COVID 19 isolation patient, the above physical exam reflects that of the examining physician for the day. Geraldo Rodrigues MD,  M.D., F.C.C.P.     [facility], [address], [address], [facility]

## 2021-10-05 NOTE — PROGRESS NOTES
Date: 10/4/2021    Time: 8:44 PM  Patient Placed On BIPAP/CPAP/ Non-Invasive Ventilation? Yes    If no must comment. Facial area red/color change? No           If YES are Blister/Lesion present? No   If yes must notify nursing staff  BIPAP/CPAP skin barrier?   Yes    Skin barrier type:mepilexlite       Comments:        Kelsey Stanford RCP

## 2021-10-05 NOTE — PROGRESS NOTES
[facility]) Hospitalist Progress Note    Admission Date  9/22/2021  9:32 PM  Chief Complaint Nausea, fatigue  Admit Dx   Hypokalemia [E87.6]  Acute respiratory failure with hypoxia (HCC) [J96.01]  COVID-19 [U07.1]  Acute respiratory failure due to COVID-19 (HCC) [U07.1, J96.00]    Subjective  History of Present Illness  Seen at bedside  Appears a bit disheveled but in no distress  On Optiflow + NRB on top  Pt wants to know about his \"lab oxygen level\"   He says it started at about 1500 then 500 then 700   Says it needs to be down to 5 so he can be done with COVID and take off oxygen   He says \"the doctor\" told him about this number   I have no idea what he's talking about  No new issues today  No family present  Discussed w pt's nurse and charge nurse    Review of Systems - 12-point review of systems has been reviewed and is otherwise negative except as listed in the HPI    Objective  Physical Exam  Vitals: BP (!) 143/77   Pulse 58   Temp 97.5 °F (36.4 °C) (Axillary)   Resp 28   Ht 5' 7\" (1.702 m)   Wt 184 lb 11.9 oz (83.8 kg)   SpO2 93%   BMI 28.94 kg/m²   General: well-developed, well-nourished, no acute distress, cooperative  Skin: warm, dry, intact, normal color without cyanosis  HEENT: normocephalic, atraumatic, mucous membranes normal; +BiPAP  Respiratory: diminished to auscultation bilaterally without respiratory distress  Cardiovascular: regular rate and rhythm without murmur / rub / gallop  Abdominal: soft, nontender, nondistended, normoactive bowel sounds  Extremities: no mottling, pulses intact, no edema, many tattoos  Neurologic: awake, alert, no focal deficits  Psychiatric: normal affect, cooperative    Assessment / Plan  Acute hypoxic resp failure d/t COVID pneumonia   COVID+ as of 9/22   Imaging 3d ago shows significant worsening of multifocal bl infiltrates - recheck CXR 9/30 was unchanged, repeat 10/4 showed no change since 9/30   No fevers past 24h   O2 requirement currently Optiflow 60L @ 100% FiO2 + NRB during the day and BiPAP overnight   o Inflammatory markers - very minimally improved today   Decadron 10 mg IV bid   Baricitinib day 12   Pulmonology input reviewed and appreciated    Does not appear to be making any progress   Symptom mgmt for cough, fevers, nausea, diarrhea, body aches if present   o Cough syrup - codeine q4h while awake    Anxiety - feel this is contributing to pt's SOB; started low-dose Xanax 9/30 but moved from prn to q4h while awake w much improvement in anxiety / pt finally able to sleep    NIDDM - hold orals / diabetic diet / ISS + BG checks ACHS / hypoglycemic protocol / target -180 with  this AM; continue current regimen    Code status  Full    DVT prophylaxis Lovenox full therapeutic dosing  Disposition  Given the extremely slow progress being made, feel pt would benefit from LTAC.   Case mgmt note from today reviewed, notes [facility]oya can take once stable and once Ramandeep Montoya can accept, then [place] precert will be initiated    Electronically signed by Chip Malone DO on 10/5/2021 at 11:10 AM

## 2021-10-05 NOTE — CARE COORDINATION
CASE MANAGEMENT. ... COVID + 9/22. Patient requiring 60liters 100% airvo plus NRB. Alternating with Bipap. Continues on iv decadron bid and olumiant. Plan is [facility] at discharge. [facility]. Will cont to follow and assist with needs accordingly.

## 2021-10-06 LAB
ALBUMIN SERPL-MCNC: 2.8 G/DL (ref 3.5–5.2)
ALP BLD-CCNC: 232 U/L (ref 40–129)
ALT SERPL-CCNC: 75 U/L (ref 0–40)
ANION GAP SERPL CALCULATED.3IONS-SCNC: 10 MMOL/L (ref 7–16)
AST SERPL-CCNC: 33 U/L (ref 0–39)
BILIRUB SERPL-MCNC: 0.6 MG/DL (ref 0–1.2)
BUN BLDV-MCNC: 22 MG/DL (ref 6–23)
C-REACTIVE PROTEIN: 1.7 MG/DL (ref 0–0.4)
CALCIUM SERPL-MCNC: 7.9 MG/DL (ref 8.6–10.2)
CHLORIDE BLD-SCNC: 97 MMOL/L (ref 98–107)
CO2: 25 MMOL/L (ref 22–29)
CREAT SERPL-MCNC: 0.6 MG/DL (ref 0.7–1.2)
D DIMER: 2078 NG/ML DDU
FERRITIN: 1453 NG/ML
GFR AFRICAN AMERICAN: >60
GFR NON-AFRICAN AMERICAN: >60 ML/MIN/1.73
GLUCOSE BLD-MCNC: 216 MG/DL (ref 74–99)
HCT VFR BLD CALC: 32.7 % (ref 37–54)
HEMOGLOBIN: 10.7 G/DL (ref 12.5–16.5)
MCH RBC QN AUTO: 27.2 PG (ref 26–35)
MCHC RBC AUTO-ENTMCNC: 32.7 % (ref 32–34.5)
MCV RBC AUTO: 83 FL (ref 80–99.9)
METER GLUCOSE: 156 MG/DL (ref 74–99)
METER GLUCOSE: 175 MG/DL (ref 74–99)
METER GLUCOSE: 191 MG/DL (ref 74–99)
METER GLUCOSE: 208 MG/DL (ref 74–99)
PDW BLD-RTO: 13.9 FL (ref 11.5–15)
PLATELET # BLD: 222 E9/L (ref 130–450)
PMV BLD AUTO: 10.8 FL (ref 7–12)
POTASSIUM SERPL-SCNC: 4.7 MMOL/L (ref 3.5–5)
RBC # BLD: 3.94 E12/L (ref 3.8–5.8)
SODIUM BLD-SCNC: 132 MMOL/L (ref 132–146)
TOTAL PROTEIN: 5.6 G/DL (ref 6.4–8.3)
WBC # BLD: 13.9 E9/L (ref 4.5–11.5)

## 2021-10-06 PROCEDURE — 6360000002 HC RX W HCPCS: Performed by: INTERNAL MEDICINE

## 2021-10-06 PROCEDURE — 82728 ASSAY OF FERRITIN: CPT

## 2021-10-06 PROCEDURE — 1200000000 HC SEMI PRIVATE

## 2021-10-06 PROCEDURE — 80053 COMPREHEN METABOLIC PANEL: CPT

## 2021-10-06 PROCEDURE — 82962 GLUCOSE BLOOD TEST: CPT

## 2021-10-06 PROCEDURE — 99233 SBSQ HOSP IP/OBS HIGH 50: CPT | Performed by: INTERNAL MEDICINE

## 2021-10-06 PROCEDURE — 36415 COLL VENOUS BLD VENIPUNCTURE: CPT

## 2021-10-06 PROCEDURE — 85378 FIBRIN DEGRADE SEMIQUANT: CPT

## 2021-10-06 PROCEDURE — 6370000000 HC RX 637 (ALT 250 FOR IP): Performed by: FAMILY MEDICINE

## 2021-10-06 PROCEDURE — 85027 COMPLETE CBC AUTOMATED: CPT

## 2021-10-06 PROCEDURE — 86140 C-REACTIVE PROTEIN: CPT

## 2021-10-06 PROCEDURE — 6370000000 HC RX 637 (ALT 250 FOR IP): Performed by: INTERNAL MEDICINE

## 2021-10-06 PROCEDURE — 2700000000 HC OXYGEN THERAPY PER DAY

## 2021-10-06 PROCEDURE — 94660 CPAP INITIATION&MGMT: CPT

## 2021-10-06 PROCEDURE — 2580000003 HC RX 258: Performed by: INTERNAL MEDICINE

## 2021-10-06 RX ADMIN — ALPRAZOLAM 0.25 MG: 0.25 TABLET ORAL at 06:08

## 2021-10-06 RX ADMIN — ALBUTEROL SULFATE 2 PUFF: 90 AEROSOL, METERED RESPIRATORY (INHALATION) at 11:16

## 2021-10-06 RX ADMIN — ENOXAPARIN SODIUM 80 MG: 100 INJECTION SUBCUTANEOUS at 11:16

## 2021-10-06 RX ADMIN — ALBUTEROL SULFATE 2 PUFF: 90 AEROSOL, METERED RESPIRATORY (INHALATION) at 20:31

## 2021-10-06 RX ADMIN — GUAIFENESIN AND CODEINE PHOSPHATE 10 ML: 10; 100 LIQUID ORAL at 20:31

## 2021-10-06 RX ADMIN — ENOXAPARIN SODIUM 80 MG: 100 INJECTION SUBCUTANEOUS at 20:31

## 2021-10-06 RX ADMIN — INSULIN LISPRO 1 UNITS: 100 INJECTION, SOLUTION INTRAVENOUS; SUBCUTANEOUS at 17:34

## 2021-10-06 RX ADMIN — ALBUTEROL SULFATE 2 PUFF: 90 AEROSOL, METERED RESPIRATORY (INHALATION) at 17:25

## 2021-10-06 RX ADMIN — DEXAMETHASONE SODIUM PHOSPHATE 10 MG: 4 INJECTION, SOLUTION INTRA-ARTICULAR; INTRALESIONAL; INTRAMUSCULAR; INTRAVENOUS; SOFT TISSUE at 20:31

## 2021-10-06 RX ADMIN — INSULIN LISPRO 1 UNITS: 100 INJECTION, SOLUTION INTRAVENOUS; SUBCUTANEOUS at 07:40

## 2021-10-06 RX ADMIN — AMLODIPINE BESYLATE 5 MG: 5 TABLET ORAL at 11:21

## 2021-10-06 RX ADMIN — ALPRAZOLAM 0.25 MG: 0.25 TABLET ORAL at 20:34

## 2021-10-06 RX ADMIN — INSULIN LISPRO 1 UNITS: 100 INJECTION, SOLUTION INTRAVENOUS; SUBCUTANEOUS at 12:20

## 2021-10-06 RX ADMIN — GUAIFENESIN AND CODEINE PHOSPHATE 10 ML: 10; 100 LIQUID ORAL at 11:17

## 2021-10-06 RX ADMIN — BARICITINIB 4 MG: 1 TABLET, FILM COATED ORAL at 11:17

## 2021-10-06 RX ADMIN — GUAIFENESIN AND CODEINE PHOSPHATE 10 ML: 10; 100 LIQUID ORAL at 06:08

## 2021-10-06 RX ADMIN — PAROXETINE 30 MG: 10 TABLET, FILM COATED ORAL at 20:47

## 2021-10-06 RX ADMIN — PAROXETINE 30 MG: 10 TABLET, FILM COATED ORAL at 11:18

## 2021-10-06 RX ADMIN — Medication 4.5 MG: at 20:31

## 2021-10-06 RX ADMIN — INSULIN LISPRO 1 UNITS: 100 INJECTION, SOLUTION INTRAVENOUS; SUBCUTANEOUS at 22:25

## 2021-10-06 RX ADMIN — ALPRAZOLAM 0.25 MG: 0.25 TABLET ORAL at 11:18

## 2021-10-06 RX ADMIN — DEXAMETHASONE SODIUM PHOSPHATE 10 MG: 4 INJECTION, SOLUTION INTRA-ARTICULAR; INTRALESIONAL; INTRAMUSCULAR; INTRAVENOUS; SOFT TISSUE at 11:16

## 2021-10-06 RX ADMIN — Medication 10 ML: at 11:21

## 2021-10-06 ASSESSMENT — PAIN SCALES - GENERAL
PAINLEVEL_OUTOF10: 0

## 2021-10-06 NOTE — PROGRESS NOTES
[facility]) Hospitalist Progress Note    Admission Date  9/22/2021  9:32 PM  Chief Complaint Nausea, fatigue  Admit Dx   Hypokalemia [E87.6]  Acute respiratory failure with hypoxia (HCC) [J96.01]  COVID-19 [U07.1]  Acute respiratory failure due to COVID-19 (HCC) [U07.1, J96.00]    Subjective  History of Present Illness  Seen at bedside  On BiPAP, FiO2 was at 85% at the time of my visit  Some slow minor forward progress being made  No new issues  No family present  Discussed w charge nurse    Review of Systems - 12-point review of systems has been reviewed and is otherwise negative except as listed in the HPI    Objective  Physical Exam  Vitals: /63   Pulse 63   Temp 97.4 °F (36.3 °C) (Axillary)   Resp 26   Ht 5' 7\" (1.702 m)   Wt 184 lb 11.9 oz (83.8 kg)   SpO2 93%   BMI 28.94 kg/m²   General: well-developed, well-nourished, no acute distress, cooperative  Skin: warm, dry, intact, normal color without cyanosis  HEENT: normocephalic, atraumatic, mucous membranes normal; +BiPAP  Respiratory: diminished to auscultation bilaterally without respiratory distress  Cardiovascular: regular rate and rhythm without murmur / rub / gallop  Abdominal: soft, nontender, nondistended, normoactive bowel sounds  Extremities: no mottling, pulses intact, no edema, many tattoos  Neurologic: awake, alert, no focal deficits  Psychiatric: normal affect, cooperative    Assessment / Plan  Acute hypoxic resp failure d/t COVID pneumonia   COVID+ as of 9/22   Imaging 3d ago shows significant worsening of multifocal bl infiltrates - recheck CXR 9/30 was unchanged, repeat 10/4 showed no change since 9/30   No fevers past 24h   O2 requirement currently BiPAP, FiO2 85%  o Inflammatory markers - v slow improvement   Decadron 10 mg IV bid   Baricitinib day 12   Pulmonology input reviewed and appreciated   Symptom mgmt for cough, fevers, nausea, diarrhea, body aches if present   o Cough syrup - codeine q4h while awake    Anxiety - feel this is contributing to pt's SOB; started low-dose Xanax 9/30 but moved from prn to q4h while awake w much improvement in anxiety / pt finally able to sleep    NIDDM - hold orals / diabetic diet / ISS + BG checks ACHS / hypoglycemic protocol / target -180 with  this AM; continue current regimen    Code status  Full    DVT prophylaxis Lovenox full therapeutic dosing  Disposition  Given the extremely slow progress being made, feel pt would benefit from LTAC.   Case mgmt note from today reviewed, notes [facility] can take once stable and once [facility] can accept, then [place] precert will be initiated    Electronically signed by Yonny Malik DO on 10/6/2021 at 2:44 PM

## 2021-10-06 NOTE — PROGRESS NOTES
Physician Progress Note      PATIENTSeveriano Broach  CSN #:                  [id number]  :                       1955  ADMIT DATE:       2021 9:32 PM  DISCH DATE:  RESPONDING  PROVIDER #:        Caro Harada LOCKSO DO          QUERY TEXT:    Dr. Joseph Knox,    Patient admitted with COVID pneumonia and noted to have a fever, elevated crp,   and elevated procalcitonin. If possible, please document in the progress   notes and discharge summary if you are evaluating and /or treating any of the   following: The medical record reflects the following:  Risk Factors: COVID pneumonia  Clinical Indicators: Max temp 102.1, crp up to 11.5, procalcitonin  0.20  Treatment: Supplemental O2, baricitinib, decadron    Thank you,  Rajan Cruz RN  Clinical Documentation Improvement  [phone]  Options provided:  -- Sepsis, present on admission  -- COVID pneumonia without Sepsis  -- Sepsis was ruled out  -- Other - I will add my own diagnosis  -- Disagree - Not applicable / Not valid  -- Disagree - Clinically unable to determine / Unknown  -- Refer to Clinical Documentation Reviewer    PROVIDER RESPONSE TEXT:    This patient has sepsis which was present on admission.     Query created by: River Hennessy on 10/6/2021 12:17 PM      Electronically signed by:  Alberto Corona DO 10/6/2021 4:36 PM

## 2021-10-06 NOTE — PROGRESS NOTES
10/06/21 0022   Oxygen Therapy/Pulse Ox   O2 Therapy Oxygen  (airvo running standby at 60/100 ,1/2 full)   $Oxygen $Daily Charge   FiO2  85 %   Resp 28   SpO2 92 %   Pulse Oximeter Device Mode Continuous

## 2021-10-06 NOTE — PROGRESS NOTES
Date: 10/5/2021    Time: 10:18 PM    Patient Placed On BIPAP/CPAP/ Non-Invasive Ventilation? Yes    If no must comment. Facial area red/color change? No           If YES are Blister/Lesion present? No   If yes must notify nursing staff  BIPAP/CPAP skin barrier?   Yes    Skin barrier type:mepilexlite       Comments:        Cezar Saez RCP

## 2021-10-06 NOTE — PROGRESS NOTES
Pulmonary Progress Note    Admit Date: 2021  Hospital day                               PCP: No primary care provider on file. Chief Complaint (s):  Patient Active Problem List   Diagnosis    COVID-19    Acute respiratory failure due to COVID-19 [facility])    Acute respiratory failure with hypoxia ([address]    Pneumonia due to COVID-19 virus    Elevated LFTs    Hypokalemia    Essential hypertension       Subjective:  · , Alert and conversant. Lots of questions regarding his oxygen needs which unfortunately really have not changed in the past 48 hours. Vitals:  VITALS:  /63   Pulse 63   Temp 97.4 °F (36.3 °C) (Axillary)   Resp 30   Ht 5' 7\" (1.702 m)   Wt 184 lb 11.9 oz (83.8 kg)   SpO2 90%   BMI 28.94 kg/m²     24HR INTAKE/OUTPUT:      Intake/Output Summary (Last 24 hours) at 10/6/2021 1800  Last data filed at 10/6/2021 0556  Gross per 24 hour   Intake --   Output 600 ml   Net -600 ml       24HR PULSE OXIMETRY RANGE:    SpO2  Av %  Min: 90 %  Max: 95 %    Medications:  IV:   sodium chloride      dextrose         Scheduled Meds:   ALPRAZolam  0.25 mg Oral Q4H While awake    guaiFENesin-codeine  10 mL Oral Q4H While awake    dexamethasone  10 mg IntraVENous Q12H    baricitinib  4 mg Oral Daily    enoxaparin  1 mg/kg SubCUTAneous BID    albuterol sulfate HFA  2 puff Inhalation 4x daily    sodium chloride flush  5-40 mL IntraVENous 2 times per day    PARoxetine  30 mg Oral BID    amLODIPine  5 mg Oral Daily    insulin lispro  0-6 Units SubCUTAneous TID     insulin lispro  0-3 Units SubCUTAneous Nightly       Diet:   ADULT DIET; Regular; 5 carb choices (75 gm/meal)  Adult Oral Nutrition Supplement; Diabetic Oral Supplement     EXAM:  General: No distress. Alert. Eyes: PERRL. No sclera icterus. No conjunctival injection. ENT: No discharge. Pharynx clear. Neck: Trachea midline. Normal thyroid. Resp: No accessory muscle use. No rales. No wheezing. No rhonchi. CV: Regular rate. Regular rhythm. No murmur or rub. Abd: Non-tender. Non-distended. No masses. No organomegaly. Normal bowel sounds. Skin: Warm and dry. No nodule on exposed extremities. No rash on exposed extremities. Ext: No cyanosis, clubbing, edema  Lymph: No cervical LAD. No supraclavicular LAD. M/S: No cyanosis. No joint deformity. No clubbing. Neuro: Awake. Follows commands. Positive pupils/gag/corneals. Normal pain response. Results:  CBC:   Recent Labs     10/04/21  1147 10/05/21  0447 10/06/21  0438   WBC 8.7 12.4* 13.9*   HGB 10.7* 10.7* 10.7*   HCT 33.6* 34.1* 32.7*   MCV 83.8 84.6 83.0    253 222     BMP:   Recent Labs     10/04/21  1147 10/05/21  0447 10/06/21  0438    134 132   K 4.7 4.9 4.7   CL 98 100 97*   CO2 25 27 25   BUN 24* 26* 22   CREATININE 0.5* 0.6* 0.6*     LIVER PROFILE:   Recent Labs     10/04/21  1147 10/05/21  0447 10/06/21  0438   AST 42* 37 33   ALT 94* 85* 75*   BILITOT 0.4 0.5 0.6   ALKPHOS 202* 215* 232*     PT/INR: No results for input(s): PROTIME, INR in the last 72 hours. APTT: No results for input(s): APTT in the last 72 hours. Pathology:  1. N/A      Microbiology:  1. None    Recent ABG:   No results for input(s): PH, PO2, PCO2, HCO3, BE, O2SAT, METHB, O2HB, COHB, O2CON, HHB, THB in the last 72 hours. FiO2 : 100 %       Recent Films:  XR CHEST PORTABLE   Final Result   1. No interval change in the extensive multifocal bilateral airspace disease. XR CHEST PORTABLE   Final Result   No interval change         XR CHEST PORTABLE   Final Result   1. Significant worsening of the multifocal bilateral pneumonia when compared   with the prior CT scan of 09/22/2021         CTA PULMONARY W CONTRAST   Final Result   Within described exam limitations, no evidence of pulmonary embolism. Scattered multifocal low density infiltrates throughout both lungs, most   suggestive of COVID pneumonia in context of current pandemic.       At least moderate diffuse hepatic steatosis. Cholecystectomy. RECOMMENDATIONS:   Multiple pulmonary nodules. Most severe: 4 mm left solid pulmonary nodule   within the upper lobe. A non-contrast Chest CT at 12 months is optional. If   performed and the nodule is stable at 12 months, no further follow-up is   recommended. These guidelines do not apply to immunocompromised patients and patients with   cancer. Follow up in patients with significant comorbidities as clinically   warranted. For lung cancer screening, adhere to Lung-RADS guidelines. Reference: Radiology. 2017; 284(1):228-43. XR CHEST PORTABLE   Final Result   There are bilateral multifocal ground-glass areas of consolidation with the   lungs concerning for in infectious or inflammatory process and developing   ARDS or viral pneumonia could give this appearance.                      Assessment:  1. COVID-19 pneumonia with significant worsening despite steroids and baricitinib. Inflammatory markers and chest radiograph are reviewed. More recently, there has been slow improvement.        Plan:  1. Continue full dose anticoagulation  2. Wean FiO2 as tolerated  3. Okay to [place] when they can accept him. Time at the bedside, reviewing labs and radiographs, reviewing updated notes and consultations, discussing with staff and family was more than 35 minutes. Please note that voice recognition technology was used in the preparation of this note and make therefore it may contain inadvertent transcription errors. If the patient is a COVID 19 isolation patient, the above physical exam reflects that of the examining physician for the day. Benita Abbott MD,  M.D., F.C.C.P.     [facility], [address] [facility], [address] [place] [address]

## 2021-10-07 LAB
ALBUMIN SERPL-MCNC: 2.8 G/DL (ref 3.5–5.2)
ALP BLD-CCNC: 229 U/L (ref 40–129)
ALT SERPL-CCNC: 70 U/L (ref 0–40)
ANION GAP SERPL CALCULATED.3IONS-SCNC: 7 MMOL/L (ref 7–16)
AST SERPL-CCNC: 32 U/L (ref 0–39)
BILIRUB SERPL-MCNC: 0.5 MG/DL (ref 0–1.2)
BUN BLDV-MCNC: 21 MG/DL (ref 6–23)
CALCIUM SERPL-MCNC: 8.5 MG/DL (ref 8.6–10.2)
CHLORIDE BLD-SCNC: 95 MMOL/L (ref 98–107)
CO2: 27 MMOL/L (ref 22–29)
CREAT SERPL-MCNC: 0.5 MG/DL (ref 0.7–1.2)
D DIMER: 1382 NG/ML DDU
FERRITIN: 1683 NG/ML
GFR AFRICAN AMERICAN: >60
GFR NON-AFRICAN AMERICAN: >60 ML/MIN/1.73
GLUCOSE BLD-MCNC: 181 MG/DL (ref 74–99)
HCT VFR BLD CALC: 35.5 % (ref 37–54)
HEMOGLOBIN: 11.3 G/DL (ref 12.5–16.5)
MCH RBC QN AUTO: 27 PG (ref 26–35)
MCHC RBC AUTO-ENTMCNC: 31.8 % (ref 32–34.5)
MCV RBC AUTO: 84.7 FL (ref 80–99.9)
METER GLUCOSE: 157 MG/DL (ref 74–99)
METER GLUCOSE: 160 MG/DL (ref 74–99)
METER GLUCOSE: 164 MG/DL (ref 74–99)
METER GLUCOSE: 186 MG/DL (ref 74–99)
PDW BLD-RTO: 14 FL (ref 11.5–15)
PLATELET # BLD: 233 E9/L (ref 130–450)
PMV BLD AUTO: 11.4 FL (ref 7–12)
POTASSIUM SERPL-SCNC: 4.7 MMOL/L (ref 3.5–5)
RBC # BLD: 4.19 E12/L (ref 3.8–5.8)
SODIUM BLD-SCNC: 129 MMOL/L (ref 132–146)
TOTAL PROTEIN: 6 G/DL (ref 6.4–8.3)
WBC # BLD: 12.5 E9/L (ref 4.5–11.5)

## 2021-10-07 PROCEDURE — 36415 COLL VENOUS BLD VENIPUNCTURE: CPT

## 2021-10-07 PROCEDURE — 82728 ASSAY OF FERRITIN: CPT

## 2021-10-07 PROCEDURE — 6370000000 HC RX 637 (ALT 250 FOR IP): Performed by: INTERNAL MEDICINE

## 2021-10-07 PROCEDURE — 94660 CPAP INITIATION&MGMT: CPT

## 2021-10-07 PROCEDURE — 6360000002 HC RX W HCPCS: Performed by: INTERNAL MEDICINE

## 2021-10-07 PROCEDURE — 6370000000 HC RX 637 (ALT 250 FOR IP)

## 2021-10-07 PROCEDURE — 99222 1ST HOSP IP/OBS MODERATE 55: CPT | Performed by: NURSE PRACTITIONER

## 2021-10-07 PROCEDURE — 80053 COMPREHEN METABOLIC PANEL: CPT

## 2021-10-07 PROCEDURE — 1200000000 HC SEMI PRIVATE

## 2021-10-07 PROCEDURE — 82962 GLUCOSE BLOOD TEST: CPT

## 2021-10-07 PROCEDURE — 2580000003 HC RX 258: Performed by: INTERNAL MEDICINE

## 2021-10-07 PROCEDURE — 86140 C-REACTIVE PROTEIN: CPT

## 2021-10-07 PROCEDURE — 85027 COMPLETE CBC AUTOMATED: CPT

## 2021-10-07 PROCEDURE — 94761 N-INVAS EAR/PLS OXIMETRY MLT: CPT

## 2021-10-07 PROCEDURE — 85378 FIBRIN DEGRADE SEMIQUANT: CPT

## 2021-10-07 PROCEDURE — 2700000000 HC OXYGEN THERAPY PER DAY

## 2021-10-07 RX ORDER — CODEINE PHOSPHATE AND GUAIFENESIN 10; 100 MG/5ML; MG/5ML
SOLUTION ORAL
Status: COMPLETED
Start: 2021-10-07 | End: 2021-10-07

## 2021-10-07 RX ADMIN — ALBUTEROL SULFATE 2 PUFF: 90 AEROSOL, METERED RESPIRATORY (INHALATION) at 16:53

## 2021-10-07 RX ADMIN — GUAIFENESIN AND CODEINE PHOSPHATE 10 ML: 10; 100 LIQUID ORAL at 21:13

## 2021-10-07 RX ADMIN — BARICITINIB 4 MG: 1 TABLET, FILM COATED ORAL at 07:54

## 2021-10-07 RX ADMIN — ALBUTEROL SULFATE 2 PUFF: 90 AEROSOL, METERED RESPIRATORY (INHALATION) at 07:53

## 2021-10-07 RX ADMIN — GUAIFENESIN AND CODEINE PHOSPHATE 5 ML: 10; 100 LIQUID ORAL at 06:20

## 2021-10-07 RX ADMIN — INSULIN LISPRO 1 UNITS: 100 INJECTION, SOLUTION INTRAVENOUS; SUBCUTANEOUS at 17:05

## 2021-10-07 RX ADMIN — PAROXETINE 30 MG: 10 TABLET, FILM COATED ORAL at 07:55

## 2021-10-07 RX ADMIN — DEXAMETHASONE SODIUM PHOSPHATE 10 MG: 4 INJECTION, SOLUTION INTRA-ARTICULAR; INTRALESIONAL; INTRAMUSCULAR; INTRAVENOUS; SOFT TISSUE at 21:12

## 2021-10-07 RX ADMIN — ENOXAPARIN SODIUM 80 MG: 100 INJECTION SUBCUTANEOUS at 21:12

## 2021-10-07 RX ADMIN — ENOXAPARIN SODIUM 80 MG: 100 INJECTION SUBCUTANEOUS at 07:54

## 2021-10-07 RX ADMIN — ALBUTEROL SULFATE 2 PUFF: 90 AEROSOL, METERED RESPIRATORY (INHALATION) at 21:12

## 2021-10-07 RX ADMIN — ALPRAZOLAM 0.25 MG: 0.25 TABLET ORAL at 16:53

## 2021-10-07 RX ADMIN — INSULIN LISPRO 1 UNITS: 100 INJECTION, SOLUTION INTRAVENOUS; SUBCUTANEOUS at 11:51

## 2021-10-07 RX ADMIN — AMLODIPINE BESYLATE 5 MG: 5 TABLET ORAL at 07:55

## 2021-10-07 RX ADMIN — GUAIFENESIN AND CODEINE PHOSPHATE 10 ML: 10; 100 LIQUID ORAL at 11:35

## 2021-10-07 RX ADMIN — ALBUTEROL SULFATE 2 PUFF: 90 AEROSOL, METERED RESPIRATORY (INHALATION) at 11:35

## 2021-10-07 RX ADMIN — GUAIFENESIN AND CODEINE PHOSPHATE 10 ML: 10; 100 LIQUID ORAL at 16:53

## 2021-10-07 RX ADMIN — ALPRAZOLAM 0.25 MG: 0.25 TABLET ORAL at 06:22

## 2021-10-07 RX ADMIN — INSULIN LISPRO 1 UNITS: 100 INJECTION, SOLUTION INTRAVENOUS; SUBCUTANEOUS at 21:21

## 2021-10-07 RX ADMIN — ALPRAZOLAM 0.25 MG: 0.25 TABLET ORAL at 11:36

## 2021-10-07 RX ADMIN — INSULIN LISPRO 1 UNITS: 100 INJECTION, SOLUTION INTRAVENOUS; SUBCUTANEOUS at 06:34

## 2021-10-07 RX ADMIN — ALPRAZOLAM 0.25 MG: 0.25 TABLET ORAL at 21:13

## 2021-10-07 RX ADMIN — Medication 10 ML: at 07:56

## 2021-10-07 RX ADMIN — DEXAMETHASONE SODIUM PHOSPHATE 10 MG: 4 INJECTION, SOLUTION INTRA-ARTICULAR; INTRALESIONAL; INTRAMUSCULAR; INTRAVENOUS; SOFT TISSUE at 07:55

## 2021-10-07 ASSESSMENT — PAIN SCALES - GENERAL
PAINLEVEL_OUTOF10: 0
PAINLEVEL_OUTOF10: 0

## 2021-10-07 NOTE — PROGRESS NOTES
[address] [facility] Hospitalist   Progress Note    Admitting Date and Time: 9/22/2021  9:32 PM  Admit Dx: Hypokalemia [E87.6]  Acute respiratory failure with hypoxia ([address] [J96.01]  COVID-19 [U07.1]  Acute respiratory failure due to COVID-19 (HCC) [U07.1, J96.00]    Subjective:    Pt feels \"ok\" today. Remains on Airvo 44B721%. Awaiting [facility]ra placement. No new concerns noted. Per RN: Remains on 60L 100%. ROS: denies fever, chills, cp, sob, n/v, HA unless stated above.      ALPRAZolam  0.25 mg Oral Q4H While awake    guaiFENesin-codeine  10 mL Oral Q4H While awake    dexamethasone  10 mg IntraVENous Q12H    enoxaparin  1 mg/kg SubCUTAneous BID    albuterol sulfate HFA  2 puff Inhalation 4x daily    sodium chloride flush  5-40 mL IntraVENous 2 times per day    PARoxetine  30 mg Oral BID    amLODIPine  5 mg Oral Daily    insulin lispro  0-6 Units SubCUTAneous TID WC    insulin lispro  0-3 Units SubCUTAneous Nightly     aluminum & magnesium hydroxide-simethicone, 15 mL, Q6H PRN  melatonin, 4.5 mg, Nightly PRN  sodium chloride flush, 5-40 mL, PRN  sodium chloride, 25 mL, PRN  ondansetron, 4 mg, Q8H PRN   Or  ondansetron, 4 mg, Q6H PRN  polyethylene glycol, 17 g, Daily PRN  acetaminophen, 650 mg, Q6H PRN   Or  acetaminophen, 650 mg, Q6H PRN  glucose, 15 g, PRN  dextrose, 12.5 g, PRN  glucagon (rDNA), 1 mg, PRN  dextrose, 100 mL/hr, PRN         Objective:    /69   Pulse 52   Temp 97.5 °F (36.4 °C) (Axillary)   Resp 24   Ht 5' 7\" (1.702 m)   Wt 184 lb 11.9 oz (83.8 kg)   SpO2 94%   BMI 28.94 kg/m²   General Appearance: alert and oriented to person, place and time and in no acute distress  Skin: warm and dry  Head: normocephalic and atraumatic  Eyes: pupils equal, round, and reactive to light, extraocular eye movements intact, conjunctivae normal  Neck: neck supple and non tender without mass   Pulmonary/Chest:Diminished to auscultation bilaterally- no wheezes, rales or rhonchi, normal air movement, no respiratory distress  Cardiovascular: normal rate, normal S1 and S2 and no RGM  Abdomen: soft, non-tender, non-distended, normal bowel sounds  Extremities: no cyanosis, no clubbing and no edema  Neurologic: no cranial nerve deficit and speech normal      Recent Labs     10/05/21  0447 10/06/21  0438 10/07/21  0318    132 129*   K 4.9 4.7 4.7    97* 95*   CO2 27 25 27   BUN 26* 22 21   CREATININE 0.6* 0.6* 0.5*   GLUCOSE 222* 216* 181*   CALCIUM 8.3* 7.9* 8.5*       Recent Labs     10/05/21  0447 10/06/21  0438 10/07/21  0318   ALKPHOS 215* 232* 229*   PROT 5.7* 5.6* 6.0*   LABALBU 2.8* 2.8* 2.8*   BILITOT 0.5 0.6 0.5   AST 37 33 32   ALT 85* 75* 70*       Recent Labs     10/05/21  0447 10/06/21  0438 10/07/21  0318   WBC 12.4* 13.9* 12.5*   RBC 4.03 3.94 4.19   HGB 10.7* 10.7* 11.3*   HCT 34.1* 32.7* 35.5*   MCV 84.6 83.0 84.7   MCH 26.6 27.2 27.0   MCHC 31.4* 32.7 31.8*   RDW 14.0 13.9 14.0    222 233   MPV 10.9 10.8 11.4           Radiology:   XR CHEST PORTABLE   Final Result   1. No interval change in the extensive multifocal bilateral airspace disease. XR CHEST PORTABLE   Final Result   No interval change         XR CHEST PORTABLE   Final Result   1. Significant worsening of the multifocal bilateral pneumonia when compared   with the prior CT scan of 09/22/2021         CTA PULMONARY W CONTRAST   Final Result   Within described exam limitations, no evidence of pulmonary embolism. Scattered multifocal low density infiltrates throughout both lungs, most   suggestive of COVID pneumonia in context of current pandemic. At least moderate diffuse hepatic steatosis. Cholecystectomy. RECOMMENDATIONS:   Multiple pulmonary nodules. Most severe: 4 mm left solid pulmonary nodule   within the upper lobe. A non-contrast Chest CT at 12 months is optional. If   performed and the nodule is stable at 12 months, no further follow-up is   recommended.    These guidelines do not apply to immunocompromised patients and patients with   cancer. Follow up in patients with significant comorbidities as clinically   warranted. For lung cancer screening, adhere to Lung-RADS guidelines. Reference: Radiology. 2017; 284(1):228-43. XR CHEST PORTABLE   Final Result   There are bilateral multifocal ground-glass areas of consolidation with the   lungs concerning for in infectious or inflammatory process and developing   ARDS or viral pneumonia could give this appearance. Assessment:  Active Problems:    COVID-19    Acute respiratory failure due to COVID-19 [facility])    Acute respiratory failure with hypoxia (HCC)    Pneumonia due to COVID-19 virus    Elevated LFTs    Hypokalemia    Essential hypertension  Resolved Problems:    * No resolved hospital problems. *      Plan:  1. Acute Respiratory Failure with Hypoxia- 2/2 COVID-19 CXR significant worsening multifocal infiltrates 10/4 CXR no change in imaging. Continue dexamethasone/Albuterol/. Continue to wean as tolerated. Maintain SPO2>92%. Pulmonology input appreciated. 2. COVID-19 Viral Pneumonia- COVID-19+The current medical regimen is effective;  continue present plan and medications. Baricitinib/Dexamethasone/Vitamin Supplementation. Follow inflammatory markers. Lovenox 1mg/kg. Encourage IS/Pronating. Isolation per protocol Supportive care. 3. Anxiety- Continue Xanax Q4 W/A. 4. DM- Continue SSI/hypoglycemic protocol/Carb Control diet. Follow adjust as needed. 5. HTN- Continue Amlodipine. 6. Disposition- Vibra following. Referral made to [place]. casemanagement following. NOTE: This report was transcribed using voice recognition software. Every effort was made to ensure accuracy; however, inadvertent computerized transcription errors may be present. Electronically signed by Alpesh Zeng [facility] - CNP on 10/7/2021 at 10:52 AM

## 2021-10-07 NOTE — PROGRESS NOTES
Removed NRB from pts face spo2 94%. Instructed patient to keep NRB mask off so we can start weaning his oxygen down.

## 2021-10-07 NOTE — PROGRESS NOTES
Pulmonary Progress Note    Admit Date: 2021  Hospital day                               PCP: No primary care provider on file. Chief Complaint (s):  Patient Active Problem List   Diagnosis    COVID-19    Acute respiratory failure due to COVID-19 [facility])    Acute respiratory failure with hypoxia ([address]    Pneumonia due to COVID-19 virus    Elevated LFTs    Hypokalemia    Essential hypertension       Subjective:  · Sleeping soundly when seen this p.m. The patient is currently still on the air Vo system with a saturation of 93%. Vitals:  VITALS:  /69   Pulse 52   Temp 97.5 °F (36.4 °C) (Axillary)   Resp 20   Ht 5' 7\" (1.702 m)   Wt 184 lb 11.9 oz (83.8 kg)   SpO2 90%   BMI 28.94 kg/m²     24HR INTAKE/OUTPUT:      Intake/Output Summary (Last 24 hours) at 10/7/2021 1725  Last data filed at 10/7/2021 1657  Gross per 24 hour   Intake --   Output 750 ml   Net -750 ml       24HR PULSE OXIMETRY RANGE:    SpO2  Av.4 %  Min: 90 %  Max: 94 %    Medications:  IV:   sodium chloride      dextrose         Scheduled Meds:   ALPRAZolam  0.25 mg Oral Q4H While awake    guaiFENesin-codeine  10 mL Oral Q4H While awake    dexamethasone  10 mg IntraVENous Q12H    enoxaparin  1 mg/kg SubCUTAneous BID    albuterol sulfate HFA  2 puff Inhalation 4x daily    sodium chloride flush  5-40 mL IntraVENous 2 times per day    PARoxetine  30 mg Oral BID    amLODIPine  5 mg Oral Daily    insulin lispro  0-6 Units SubCUTAneous TID     insulin lispro  0-3 Units SubCUTAneous Nightly       Diet:   ADULT DIET; Regular; 5 carb choices (75 gm/meal)  Adult Oral Nutrition Supplement; Diabetic Oral Supplement     EXAM:  General: No distress. Alert. Eyes: PERRL. No sclera icterus. No conjunctival injection. ENT: No discharge. Pharynx clear. Neck: Trachea midline. Normal thyroid. Resp: No accessory muscle use. No rales. No wheezing. No rhonchi. CV: Regular rate. Regular rhythm.  No murmur or Cholecystectomy. RECOMMENDATIONS:   Multiple pulmonary nodules. Most severe: 4 mm left solid pulmonary nodule   within the upper lobe. A non-contrast Chest CT at 12 months is optional. If   performed and the nodule is stable at 12 months, no further follow-up is   recommended. These guidelines do not apply to immunocompromised patients and patients with   cancer. Follow up in patients with significant comorbidities as clinically   warranted. For lung cancer screening, adhere to Lung-RADS guidelines. Reference: Radiology. 2017; 284(1):228-43. XR CHEST PORTABLE   Final Result   There are bilateral multifocal ground-glass areas of consolidation with the   lungs concerning for in infectious or inflammatory process and developing   ARDS or viral pneumonia could give this appearance.                      Assessment:  1. COVID-19 pneumonia with significant worsening despite steroids and baricitinib. Inflammatory markers and chest radiograph are reviewed. More recently, there has been slow improvement.        Plan:  1. Continue full dose anticoagulation  2. Wean FiO2 as tolerated  3. Okay to [address] [place] [address] when they can accept him. Time at the bedside, reviewing labs and radiographs, reviewing updated notes and consultations, discussing with staff and family was more than 35 minutes. Please note that voice recognition technology was used in the preparation of this note and make therefore it may contain inadvertent transcription errors. If the patient is a COVID 19 isolation patient, the above physical exam reflects that of the examining physician for the day. Jennifer Galaviz MD,  M.D., F.C.C.P.     [facility], [address] [facility], [address] [place] [address]

## 2021-10-08 LAB
ALBUMIN SERPL-MCNC: 3.1 G/DL (ref 3.5–5.2)
ALP BLD-CCNC: 237 U/L (ref 40–129)
ALT SERPL-CCNC: 73 U/L (ref 0–40)
ANION GAP SERPL CALCULATED.3IONS-SCNC: 10 MMOL/L (ref 7–16)
AST SERPL-CCNC: 32 U/L (ref 0–39)
BILIRUB SERPL-MCNC: 0.5 MG/DL (ref 0–1.2)
BUN BLDV-MCNC: 20 MG/DL (ref 6–23)
C-REACTIVE PROTEIN: 6 MG/DL (ref 0–0.4)
CALCIUM SERPL-MCNC: 8.4 MG/DL (ref 8.6–10.2)
CHLORIDE BLD-SCNC: 95 MMOL/L (ref 98–107)
CO2: 27 MMOL/L (ref 22–29)
CREAT SERPL-MCNC: 0.5 MG/DL (ref 0.7–1.2)
D DIMER: 1570 NG/ML DDU
FERRITIN: 1713 NG/ML
GFR AFRICAN AMERICAN: >60
GFR NON-AFRICAN AMERICAN: >60 ML/MIN/1.73
GLUCOSE BLD-MCNC: 118 MG/DL (ref 74–99)
HCT VFR BLD CALC: 37.1 % (ref 37–54)
HEMOGLOBIN: 11.6 G/DL (ref 12.5–16.5)
MCH RBC QN AUTO: 26.4 PG (ref 26–35)
MCHC RBC AUTO-ENTMCNC: 31.3 % (ref 32–34.5)
MCV RBC AUTO: 84.3 FL (ref 80–99.9)
METER GLUCOSE: 156 MG/DL (ref 74–99)
METER GLUCOSE: 178 MG/DL (ref 74–99)
METER GLUCOSE: 179 MG/DL (ref 74–99)
METER GLUCOSE: 215 MG/DL (ref 74–99)
PDW BLD-RTO: 14.2 FL (ref 11.5–15)
PLATELET # BLD: 241 E9/L (ref 130–450)
PMV BLD AUTO: 11.2 FL (ref 7–12)
POTASSIUM SERPL-SCNC: 4.1 MMOL/L (ref 3.5–5)
RBC # BLD: 4.4 E12/L (ref 3.8–5.8)
SODIUM BLD-SCNC: 132 MMOL/L (ref 132–146)
TOTAL PROTEIN: 6.5 G/DL (ref 6.4–8.3)
WBC # BLD: 12.3 E9/L (ref 4.5–11.5)

## 2021-10-08 PROCEDURE — 6370000000 HC RX 637 (ALT 250 FOR IP): Performed by: INTERNAL MEDICINE

## 2021-10-08 PROCEDURE — 82962 GLUCOSE BLOOD TEST: CPT

## 2021-10-08 PROCEDURE — 86140 C-REACTIVE PROTEIN: CPT

## 2021-10-08 PROCEDURE — 85027 COMPLETE CBC AUTOMATED: CPT

## 2021-10-08 PROCEDURE — 2580000003 HC RX 258: Performed by: INTERNAL MEDICINE

## 2021-10-08 PROCEDURE — 6370000000 HC RX 637 (ALT 250 FOR IP)

## 2021-10-08 PROCEDURE — 99233 SBSQ HOSP IP/OBS HIGH 50: CPT | Performed by: NURSE PRACTITIONER

## 2021-10-08 PROCEDURE — 82728 ASSAY OF FERRITIN: CPT

## 2021-10-08 PROCEDURE — 1200000000 HC SEMI PRIVATE

## 2021-10-08 PROCEDURE — 6360000002 HC RX W HCPCS: Performed by: INTERNAL MEDICINE

## 2021-10-08 PROCEDURE — 2700000000 HC OXYGEN THERAPY PER DAY

## 2021-10-08 PROCEDURE — 94660 CPAP INITIATION&MGMT: CPT

## 2021-10-08 PROCEDURE — 80053 COMPREHEN METABOLIC PANEL: CPT

## 2021-10-08 PROCEDURE — 36415 COLL VENOUS BLD VENIPUNCTURE: CPT

## 2021-10-08 PROCEDURE — 85378 FIBRIN DEGRADE SEMIQUANT: CPT

## 2021-10-08 RX ORDER — CODEINE PHOSPHATE AND GUAIFENESIN 10; 100 MG/5ML; MG/5ML
SOLUTION ORAL
Status: COMPLETED
Start: 2021-10-08 | End: 2021-10-08

## 2021-10-08 RX ADMIN — INSULIN LISPRO 1 UNITS: 100 INJECTION, SOLUTION INTRAVENOUS; SUBCUTANEOUS at 12:02

## 2021-10-08 RX ADMIN — ALPRAZOLAM 0.25 MG: 0.25 TABLET ORAL at 06:09

## 2021-10-08 RX ADMIN — ALBUTEROL SULFATE 2 PUFF: 90 AEROSOL, METERED RESPIRATORY (INHALATION) at 20:05

## 2021-10-08 RX ADMIN — ALPRAZOLAM 0.25 MG: 0.25 TABLET ORAL at 21:16

## 2021-10-08 RX ADMIN — DEXAMETHASONE SODIUM PHOSPHATE 10 MG: 4 INJECTION, SOLUTION INTRA-ARTICULAR; INTRALESIONAL; INTRAMUSCULAR; INTRAVENOUS; SOFT TISSUE at 08:22

## 2021-10-08 RX ADMIN — AMLODIPINE BESYLATE 5 MG: 5 TABLET ORAL at 08:22

## 2021-10-08 RX ADMIN — INSULIN LISPRO 1 UNITS: 100 INJECTION, SOLUTION INTRAVENOUS; SUBCUTANEOUS at 21:00

## 2021-10-08 RX ADMIN — ALBUTEROL SULFATE 2 PUFF: 90 AEROSOL, METERED RESPIRATORY (INHALATION) at 17:11

## 2021-10-08 RX ADMIN — Medication 10 ML: at 08:23

## 2021-10-08 RX ADMIN — ALBUTEROL SULFATE 2 PUFF: 90 AEROSOL, METERED RESPIRATORY (INHALATION) at 11:50

## 2021-10-08 RX ADMIN — DEXAMETHASONE SODIUM PHOSPHATE 10 MG: 4 INJECTION, SOLUTION INTRA-ARTICULAR; INTRALESIONAL; INTRAMUSCULAR; INTRAVENOUS; SOFT TISSUE at 19:57

## 2021-10-08 RX ADMIN — PAROXETINE 30 MG: 10 TABLET, FILM COATED ORAL at 20:00

## 2021-10-08 RX ADMIN — ALPRAZOLAM 0.25 MG: 0.25 TABLET ORAL at 17:11

## 2021-10-08 RX ADMIN — GUAIFENESIN AND CODEINE PHOSPHATE 5 ML: 100; 10 SOLUTION ORAL at 19:58

## 2021-10-08 RX ADMIN — GUAIFENESIN AND CODEINE PHOSPHATE 5 ML: 10; 100 LIQUID ORAL at 19:58

## 2021-10-08 RX ADMIN — PAROXETINE 30 MG: 10 TABLET, FILM COATED ORAL at 08:22

## 2021-10-08 RX ADMIN — GUAIFENESIN AND CODEINE PHOSPHATE 10 ML: 10; 100 LIQUID ORAL at 11:50

## 2021-10-08 RX ADMIN — ALBUTEROL SULFATE 2 PUFF: 90 AEROSOL, METERED RESPIRATORY (INHALATION) at 08:21

## 2021-10-08 RX ADMIN — ENOXAPARIN SODIUM 80 MG: 100 INJECTION SUBCUTANEOUS at 21:16

## 2021-10-08 RX ADMIN — ENOXAPARIN SODIUM 80 MG: 100 INJECTION SUBCUTANEOUS at 08:21

## 2021-10-08 RX ADMIN — ALPRAZOLAM 0.25 MG: 0.25 TABLET ORAL at 11:50

## 2021-10-08 RX ADMIN — INSULIN LISPRO 1 UNITS: 100 INJECTION, SOLUTION INTRAVENOUS; SUBCUTANEOUS at 06:17

## 2021-10-08 RX ADMIN — GUAIFENESIN AND CODEINE PHOSPHATE 10 ML: 10; 100 LIQUID ORAL at 17:11

## 2021-10-08 RX ADMIN — GUAIFENESIN AND CODEINE PHOSPHATE 10 ML: 10; 100 LIQUID ORAL at 06:09

## 2021-10-08 RX ADMIN — INSULIN LISPRO 1 UNITS: 100 INJECTION, SOLUTION INTRAVENOUS; SUBCUTANEOUS at 17:59

## 2021-10-08 ASSESSMENT — PAIN SCALES - GENERAL
PAINLEVEL_OUTOF10: 0
PAINLEVEL_OUTOF10: 0

## 2021-10-08 NOTE — PROGRESS NOTES
[address] [place] [address] Hospitalist   Progress Note    Admitting Date and Time: 9/22/2021  9:32 PM  Admit Dx: Hypokalemia [E87.6]  Acute respiratory failure with hypoxia ([address] [J96.01]  COVID-19 [U07.1]  Acute respiratory failure due to COVID-19 (HCC) [U07.1, J96.00]    Subjective:    Pt sitting up in bed in no acute distress  Using NRB with 60L 90% today  Oxygen saturation declines with eating and exertion  Noted conversational dyspnea      Per RN: having more difficulty with breathing today. ROS: denies fever, chills, cp, n/v, HA unless stated above.  ALPRAZolam  0.25 mg Oral Q4H While awake    guaiFENesin-codeine  10 mL Oral Q4H While awake    dexamethasone  10 mg IntraVENous Q12H    enoxaparin  1 mg/kg SubCUTAneous BID    albuterol sulfate HFA  2 puff Inhalation 4x daily    sodium chloride flush  5-40 mL IntraVENous 2 times per day    PARoxetine  30 mg Oral BID    amLODIPine  5 mg Oral Daily    insulin lispro  0-6 Units SubCUTAneous TID WC    insulin lispro  0-3 Units SubCUTAneous Nightly     aluminum & magnesium hydroxide-simethicone, 15 mL, Q6H PRN  melatonin, 4.5 mg, Nightly PRN  sodium chloride flush, 5-40 mL, PRN  sodium chloride, 25 mL, PRN  ondansetron, 4 mg, Q8H PRN   Or  ondansetron, 4 mg, Q6H PRN  polyethylene glycol, 17 g, Daily PRN  acetaminophen, 650 mg, Q6H PRN   Or  acetaminophen, 650 mg, Q6H PRN  glucose, 15 g, PRN  dextrose, 12.5 g, PRN  glucagon (rDNA), 1 mg, PRN  dextrose, 100 mL/hr, PRN         Objective:    BP (!) 157/78   Pulse 52   Temp 97.6 °F (36.4 °C) (Axillary)   Resp 26   Ht 5' 7\" (1.702 m)   Wt 184 lb 11.9 oz (83.8 kg)   SpO2 90%   BMI 28.94 kg/m²   General Appearance: alert and oriented to person, place and time and in no acute distress  Pulmonary/Chest: even unlabored  Cardiovascular:SR on the monitor.  HR 62  Neurologic: no cranial nerve deficit and speech normal/Conversational Dypnea      Recent Labs     10/06/21  0438 10/07/21  0318 10/08/21  0902   NA 132 129* 132   K 4.7 4.7 4.1   CL 97* 95* 95*   CO2 25 27 27   BUN 22 21 20   CREATININE 0.6* 0.5* 0.5*   GLUCOSE 216* 181* 118*   CALCIUM 7.9* 8.5* 8.4*       Recent Labs     10/06/21  0438 10/07/21  0318 10/08/21  0902   ALKPHOS 232* 229* 237*   PROT 5.6* 6.0* 6.5   LABALBU 2.8* 2.8* 3.1*   BILITOT 0.6 0.5 0.5   AST 33 32 32   ALT 75* 70* 73*       Recent Labs     10/06/21  0438 10/07/21  0318 10/08/21  0902   WBC 13.9* 12.5* 12.3*   RBC 3.94 4.19 4.40   HGB 10.7* 11.3* 11.6*   HCT 32.7* 35.5* 37.1   MCV 83.0 84.7 84.3   MCH 27.2 27.0 26.4   MCHC 32.7 31.8* 31.3*   RDW 13.9 14.0 14.2    233 241   MPV 10.8 11.4 11.2           Radiology:   XR CHEST PORTABLE   Final Result   1. No interval change in the extensive multifocal bilateral airspace disease. XR CHEST PORTABLE   Final Result   No interval change         XR CHEST PORTABLE   Final Result   1. Significant worsening of the multifocal bilateral pneumonia when compared   with the prior CT scan of 09/22/2021         CTA PULMONARY W CONTRAST   Final Result   Within described exam limitations, no evidence of pulmonary embolism. Scattered multifocal low density infiltrates throughout both lungs, most   suggestive of COVID pneumonia in context of current pandemic. At least moderate diffuse hepatic steatosis. Cholecystectomy. RECOMMENDATIONS:   Multiple pulmonary nodules. Most severe: 4 mm left solid pulmonary nodule   within the upper lobe. A non-contrast Chest CT at 12 months is optional. If   performed and the nodule is stable at 12 months, no further follow-up is   recommended. These guidelines do not apply to immunocompromised patients and patients with   cancer. Follow up in patients with significant comorbidities as clinically   warranted. For lung cancer screening, adhere to Lung-RADS guidelines. Reference: Radiology. 2017; 284(1):228-43.          XR CHEST PORTABLE   Final Result   There are bilateral multifocal ground-glass signed by Nicola Koroma CNP on 10/8/2021 at 10:57 AM

## 2021-10-08 NOTE — PROGRESS NOTES
Pulmonary Progress Note    Admit Date: 2021  Hospital day                               PCP: No primary care provider on file. Chief Complaint (s):  Patient Active Problem List   Diagnosis    COVID-19    Acute respiratory failure due to COVID-19 [facility])    Acute respiratory failure with hypoxia ([address]    Pneumonia due to COVID-19 virus    Elevated LFTs    Hypokalemia    Essential hypertension       Subjective:  · Seen this afternoon on a mask, saturation is 93%. The patient is resting comfortably. Vitals:  VITALS:  BP (!) 157/78   Pulse 52   Temp 97.6 °F (36.4 °C) (Axillary)   Resp 26   Ht 5' 7\" (1.702 m)   Wt 184 lb 11.9 oz (83.8 kg)   SpO2 90%   BMI 28.94 kg/m²     24HR INTAKE/OUTPUT:      Intake/Output Summary (Last 24 hours) at 10/8/2021 1506  Last data filed at 10/8/2021 1155  Gross per 24 hour   Intake --   Output 1550 ml   Net -1550 ml       24HR PULSE OXIMETRY RANGE:    SpO2  Av.8 %  Min: 90 %  Max: 93 %    Medications:  IV:   sodium chloride      dextrose         Scheduled Meds:   ALPRAZolam  0.25 mg Oral Q4H While awake    guaiFENesin-codeine  10 mL Oral Q4H While awake    dexamethasone  10 mg IntraVENous Q12H    enoxaparin  1 mg/kg SubCUTAneous BID    albuterol sulfate HFA  2 puff Inhalation 4x daily    sodium chloride flush  5-40 mL IntraVENous 2 times per day    PARoxetine  30 mg Oral BID    amLODIPine  5 mg Oral Daily    insulin lispro  0-6 Units SubCUTAneous TID WC    insulin lispro  0-3 Units SubCUTAneous Nightly       Diet:   ADULT DIET; Regular; 5 carb choices (75 gm/meal)  Adult Oral Nutrition Supplement; Diabetic Oral Supplement     EXAM:  General: No distress. Sleeping  Eyes: PERRL. No sclera icterus. No conjunctival injection. ENT: No discharge. Pharynx clear. Neck: Trachea midline. Normal thyroid. Resp: No accessory muscle use. No rales. No wheezing. No rhonchi. CV: Regular rate. Regular rhythm. No murmur or rub. Abd: Non-tender. Non-distended. No masses. No organomegaly. Normal bowel sounds. Skin: Warm and dry. No nodule on exposed extremities. No rash on exposed extremities. Ext: No cyanosis, clubbing, edema  Lymph: No cervical LAD. No supraclavicular LAD. M/S: No cyanosis. No joint deformity. No clubbing. Neuro: Positive pupils/gag/corneals. Normal pain response. Results:  CBC:   Recent Labs     10/06/21  0438 10/07/21  0318 10/08/21  0902   WBC 13.9* 12.5* 12.3*   HGB 10.7* 11.3* 11.6*   HCT 32.7* 35.5* 37.1   MCV 83.0 84.7 84.3    233 241     BMP:   Recent Labs     10/06/21  0438 10/07/21  0318 10/08/21  0902    129* 132   K 4.7 4.7 4.1   CL 97* 95* 95*   CO2 25 27 27   BUN 22 21 20   CREATININE 0.6* 0.5* 0.5*     LIVER PROFILE:   Recent Labs     10/06/21  0438 10/07/21  0318 10/08/21  0902   AST 33 32 32   ALT 75* 70* 73*   BILITOT 0.6 0.5 0.5   ALKPHOS 232* 229* 237*     PT/INR: No results for input(s): PROTIME, INR in the last 72 hours. APTT: No results for input(s): APTT in the last 72 hours. Pathology:  1. N/A      Microbiology:  1. None    Recent ABG:   No results for input(s): PH, PO2, PCO2, HCO3, BE, O2SAT, METHB, O2HB, COHB, O2CON, HHB, THB in the last 72 hours. FiO2 : 100 %       Recent Films:  XR CHEST PORTABLE   Final Result   1. No interval change in the extensive multifocal bilateral airspace disease. XR CHEST PORTABLE   Final Result   No interval change         XR CHEST PORTABLE   Final Result   1. Significant worsening of the multifocal bilateral pneumonia when compared   with the prior CT scan of 09/22/2021         CTA PULMONARY W CONTRAST   Final Result   Within described exam limitations, no evidence of pulmonary embolism. Scattered multifocal low density infiltrates throughout both lungs, most   suggestive of COVID pneumonia in context of current pandemic. At least moderate diffuse hepatic steatosis. Cholecystectomy.       RECOMMENDATIONS:   Multiple pulmonary nodules. Most severe: 4 mm left solid pulmonary nodule   within the upper lobe. A non-contrast Chest CT at 12 months is optional. If   performed and the nodule is stable at 12 months, no further follow-up is   recommended. These guidelines do not apply to immunocompromised patients and patients with   cancer. Follow up in patients with significant comorbidities as clinically   warranted. For lung cancer screening, adhere to Lung-RADS guidelines. Reference: Radiology. 2017; 284(1):228-43. XR CHEST PORTABLE   Final Result   There are bilateral multifocal ground-glass areas of consolidation with the   lungs concerning for in infectious or inflammatory process and developing   ARDS or viral pneumonia could give this appearance.                      Assessment:  1. COVID-19 pneumonia with significant worsening despite steroids and baricitinib. Inflammatory markers and chest radiograph are reviewed. More recently, there has been slow improvement.        Plan:  1. Continue full dose anticoagulation  2. Wean FiO2 as tolerated  3. [facility] to [facility] when [facility] can [facility] him. Time at the bedside, reviewing labs and radiographs, reviewing updated notes and consultations, discussing with staff and family was more than 35 minutes. Please note that voice recognition technology was used in the preparation of this note and make therefore it may contain inadvertent transcription errors. If the patient is a COVID 19 isolation patient, the above physical exam reflects that of the examining physician for the day. Allison Hendricks MD,  M.D., F.C.C.P.     [facility] [address], [address] [place] [address]

## 2021-10-08 NOTE — CARE COORDINATION
Social Work / Discharge Planning : Patient at 80 FI02 at 60 flow rate. Can follow up with Yariel Singh from [facility] Monday and if they can accept then [facility]. CM updated. SW to follow.  Electronically signed by ALIZA Rosario on 10/8/21 at 1:17 PM EDT

## 2021-10-08 NOTE — PROGRESS NOTES
Date: 10/7/2021    Time: 8:05 PM    Patient Placed On BIPAP/CPAP/ Non-Invasive Ventilation? Yes    If no must comment. Facial area red/color change? No           If YES are Blister/Lesion present? No   If yes must notify nursing staff  BIPAP/CPAP skin barrier?   Yes    Skin barrier type:mepilexlite       Comments: alarms on and audible, machine plugged into red outlet        Caryle Lean, RCP

## 2021-10-09 ENCOUNTER — APPOINTMENT (OUTPATIENT)
Dept: GENERAL RADIOLOGY | Age: 66
DRG: 871 | End: 2021-10-09
Payer: OTHER GOVERNMENT

## 2021-10-09 LAB
AADO2: 589 MMHG
ALBUMIN SERPL-MCNC: 2.8 G/DL (ref 3.5–5.2)
ALP BLD-CCNC: 211 U/L (ref 40–129)
ALT SERPL-CCNC: 57 U/L (ref 0–40)
ANION GAP SERPL CALCULATED.3IONS-SCNC: 9 MMOL/L (ref 7–16)
AST SERPL-CCNC: 24 U/L (ref 0–39)
B.E.: 0 MMOL/L (ref -3–3)
BILIRUB SERPL-MCNC: 0.5 MG/DL (ref 0–1.2)
BUN BLDV-MCNC: 19 MG/DL (ref 6–23)
C-REACTIVE PROTEIN: 4.1 MG/DL (ref 0–0.4)
C-REACTIVE PROTEIN: 4.2 MG/DL (ref 0–0.4)
CALCIUM SERPL-MCNC: 8 MG/DL (ref 8.6–10.2)
CHLORIDE BLD-SCNC: 97 MMOL/L (ref 98–107)
CO2: 25 MMOL/L (ref 22–29)
COHB: 1.1 % (ref 0–1.5)
CREAT SERPL-MCNC: 0.5 MG/DL (ref 0.7–1.2)
CRITICAL: ABNORMAL
D DIMER: 985 NG/ML DDU
DATE ANALYZED: ABNORMAL
DATE OF COLLECTION: ABNORMAL
FERRITIN: 1362 NG/ML
FIO2: 100 %
GFR AFRICAN AMERICAN: >60
GFR NON-AFRICAN AMERICAN: >60 ML/MIN/1.73
GLUCOSE BLD-MCNC: 188 MG/DL (ref 74–99)
HCO3: 23.2 MMOL/L (ref 22–26)
HCT VFR BLD CALC: 32.5 % (ref 37–54)
HEMOGLOBIN: 10.7 G/DL (ref 12.5–16.5)
HHB: 7.5 % (ref 0–5)
LAB: ABNORMAL
Lab: ABNORMAL
MCH RBC QN AUTO: 27.4 PG (ref 26–35)
MCHC RBC AUTO-ENTMCNC: 32.9 % (ref 32–34.5)
MCV RBC AUTO: 83.1 FL (ref 80–99.9)
METER GLUCOSE: 119 MG/DL (ref 74–99)
METER GLUCOSE: 122 MG/DL (ref 74–99)
METER GLUCOSE: 138 MG/DL (ref 74–99)
METER GLUCOSE: 171 MG/DL (ref 74–99)
METHB: 0.3 % (ref 0–1.5)
MODE: ABNORMAL
O2 CONTENT: 16 ML/DL
O2 SATURATION: 92.4 % (ref 92–98.5)
O2HB: 91.1 % (ref 94–97)
OPERATOR ID: 8218
PATIENT TEMP: 37 C
PCO2: 33.5 MMHG (ref 35–45)
PDW BLD-RTO: 14.3 FL (ref 11.5–15)
PFO2: 0.65 MMHG/%
PH BLOOD GAS: 7.46 (ref 7.35–7.45)
PLATELET # BLD: 211 E9/L (ref 130–450)
PMV BLD AUTO: 11.2 FL (ref 7–12)
PO2: 65.5 MMHG (ref 75–100)
POTASSIUM SERPL-SCNC: 4.6 MMOL/L (ref 3.5–5)
PRO-BNP: 223 PG/ML (ref 0–125)
PROCALCITONIN: 0.15 NG/ML (ref 0–0.08)
RBC # BLD: 3.91 E12/L (ref 3.8–5.8)
RI(T): 899 %
SODIUM BLD-SCNC: 131 MMOL/L (ref 132–146)
SOURCE, BLOOD GAS: ABNORMAL
THB: 12.5 G/DL (ref 11.5–16.5)
TIME ANALYZED: 1209
TOTAL PROTEIN: 5.2 G/DL (ref 6.4–8.3)
WBC # BLD: 11.2 E9/L (ref 4.5–11.5)

## 2021-10-09 PROCEDURE — 6370000000 HC RX 637 (ALT 250 FOR IP): Performed by: INTERNAL MEDICINE

## 2021-10-09 PROCEDURE — 85378 FIBRIN DEGRADE SEMIQUANT: CPT

## 2021-10-09 PROCEDURE — 36600 WITHDRAWAL OF ARTERIAL BLOOD: CPT

## 2021-10-09 PROCEDURE — 99233 SBSQ HOSP IP/OBS HIGH 50: CPT | Performed by: NURSE PRACTITIONER

## 2021-10-09 PROCEDURE — 83880 ASSAY OF NATRIURETIC PEPTIDE: CPT

## 2021-10-09 PROCEDURE — 84145 PROCALCITONIN (PCT): CPT

## 2021-10-09 PROCEDURE — 82962 GLUCOSE BLOOD TEST: CPT

## 2021-10-09 PROCEDURE — 2580000003 HC RX 258: Performed by: INTERNAL MEDICINE

## 2021-10-09 PROCEDURE — 6360000002 HC RX W HCPCS: Performed by: INTERNAL MEDICINE

## 2021-10-09 PROCEDURE — 94660 CPAP INITIATION&MGMT: CPT

## 2021-10-09 PROCEDURE — 71045 X-RAY EXAM CHEST 1 VIEW: CPT

## 2021-10-09 PROCEDURE — 80053 COMPREHEN METABOLIC PANEL: CPT

## 2021-10-09 PROCEDURE — 82805 BLOOD GASES W/O2 SATURATION: CPT

## 2021-10-09 PROCEDURE — 36415 COLL VENOUS BLD VENIPUNCTURE: CPT

## 2021-10-09 PROCEDURE — 2700000000 HC OXYGEN THERAPY PER DAY

## 2021-10-09 PROCEDURE — 82728 ASSAY OF FERRITIN: CPT

## 2021-10-09 PROCEDURE — 1200000000 HC SEMI PRIVATE

## 2021-10-09 PROCEDURE — 85027 COMPLETE CBC AUTOMATED: CPT

## 2021-10-09 PROCEDURE — 86140 C-REACTIVE PROTEIN: CPT

## 2021-10-09 RX ADMIN — Medication 10 ML: at 21:00

## 2021-10-09 RX ADMIN — ALPRAZOLAM 0.25 MG: 0.25 TABLET ORAL at 09:03

## 2021-10-09 RX ADMIN — DEXAMETHASONE SODIUM PHOSPHATE 10 MG: 4 INJECTION, SOLUTION INTRA-ARTICULAR; INTRALESIONAL; INTRAMUSCULAR; INTRAVENOUS; SOFT TISSUE at 22:05

## 2021-10-09 RX ADMIN — ALPRAZOLAM 0.25 MG: 0.25 TABLET ORAL at 17:22

## 2021-10-09 RX ADMIN — DEXAMETHASONE SODIUM PHOSPHATE 10 MG: 4 INJECTION, SOLUTION INTRA-ARTICULAR; INTRALESIONAL; INTRAMUSCULAR; INTRAVENOUS; SOFT TISSUE at 09:03

## 2021-10-09 RX ADMIN — INSULIN LISPRO 1 UNITS: 100 INJECTION, SOLUTION INTRAVENOUS; SUBCUTANEOUS at 07:38

## 2021-10-09 RX ADMIN — ALPRAZOLAM 0.25 MG: 0.25 TABLET ORAL at 13:16

## 2021-10-09 RX ADMIN — AMLODIPINE BESYLATE 5 MG: 5 TABLET ORAL at 09:03

## 2021-10-09 RX ADMIN — PAROXETINE 30 MG: 10 TABLET, FILM COATED ORAL at 22:09

## 2021-10-09 RX ADMIN — ALPRAZOLAM 0.25 MG: 0.25 TABLET ORAL at 22:03

## 2021-10-09 RX ADMIN — GUAIFENESIN AND CODEINE PHOSPHATE 10 ML: 10; 100 LIQUID ORAL at 17:21

## 2021-10-09 RX ADMIN — ENOXAPARIN SODIUM 80 MG: 100 INJECTION SUBCUTANEOUS at 22:07

## 2021-10-09 RX ADMIN — ENOXAPARIN SODIUM 80 MG: 100 INJECTION SUBCUTANEOUS at 09:04

## 2021-10-09 RX ADMIN — ALPRAZOLAM 0.25 MG: 0.25 TABLET ORAL at 05:59

## 2021-10-09 RX ADMIN — GUAIFENESIN AND CODEINE PHOSPHATE 10 ML: 10; 100 LIQUID ORAL at 22:07

## 2021-10-09 RX ADMIN — ALBUTEROL SULFATE 2 PUFF: 90 AEROSOL, METERED RESPIRATORY (INHALATION) at 09:01

## 2021-10-09 RX ADMIN — GUAIFENESIN AND CODEINE PHOSPHATE 10 ML: 10; 100 LIQUID ORAL at 05:59

## 2021-10-09 RX ADMIN — GUAIFENESIN AND CODEINE PHOSPHATE 10 ML: 10; 100 LIQUID ORAL at 13:17

## 2021-10-09 RX ADMIN — ALBUTEROL SULFATE 2 PUFF: 90 AEROSOL, METERED RESPIRATORY (INHALATION) at 12:53

## 2021-10-09 RX ADMIN — ALBUTEROL SULFATE 2 PUFF: 90 AEROSOL, METERED RESPIRATORY (INHALATION) at 17:21

## 2021-10-09 RX ADMIN — PAROXETINE 30 MG: 10 TABLET, FILM COATED ORAL at 09:02

## 2021-10-09 ASSESSMENT — PAIN SCALES - GENERAL: PAINLEVEL_OUTOF10: 0

## 2021-10-09 NOTE — PROGRESS NOTES
[facility] [address] [facility]  [address] [facility], [address], [address] [facility]      Pulmonary Progress Note      SUBJECTIVE:  Currently on NIPPV 20/10 95% since last night, had been on Airvo 60 li 100% most of the day yesterday, claims stable with respiratory function with minimal cough/sputum production.     OBJECTIVE    Medications    Continuous Infusions:   sodium chloride      dextrose         Scheduled Meds:   ALPRAZolam  0.25 mg Oral Q4H While awake    guaiFENesin-codeine  10 mL Oral Q4H While awake    dexamethasone  10 mg IntraVENous Q12H    enoxaparin  1 mg/kg SubCUTAneous BID    albuterol sulfate HFA  2 puff Inhalation 4x daily    sodium chloride flush  5-40 mL IntraVENous 2 times per day    PARoxetine  30 mg Oral BID    amLODIPine  5 mg Oral Daily    insulin lispro  0-6 Units SubCUTAneous TID WC    insulin lispro  0-3 Units SubCUTAneous Nightly       PRN Meds:aluminum & magnesium hydroxide-simethicone, melatonin, sodium chloride flush, sodium chloride, ondansetron **OR** ondansetron, polyethylene glycol, acetaminophen **OR** acetaminophen, glucose, dextrose, glucagon (rDNA), dextrose    Physical    VITALS:  BP (!) 162/74   Pulse 55   Temp 98.5 °F (36.9 °C) (Oral)   Resp 27   Ht 5' 7\" (1.702 m)   Wt 184 lb 11.9 oz (83.8 kg)   SpO2 95%   BMI 28.94 kg/m²     24HR INTAKE/OUTPUT:      Intake/Output Summary (Last 24 hours) at 10/9/2021 1144  Last data filed at 10/8/2021 1957  Gross per 24 hour   Intake --   Output 920 ml   Net -920 ml       24HR PULSE OXIMETRY RANGE:    SpO2  Av.8 %  Min: 90 %  Max: 95 %    General appearance: alert, appears stated age and cooperative  Lungs: rhonchi bilaterally  Heart: regular rate and rhythm, S1, S2 normal, no murmur, click, rub or gallop  Abdomen: soft, non-tender; bowel sounds normal; no masses,  no organomegaly  Extremities: extremities normal, atraumatic, no cyanosis or edema  Neurologic: Mental status: Alert, oriented, thought content appropriate    Data    CBC:   Recent Labs     10/07/21  0318 10/08/21  0902 10/09/21  0527   WBC 12.5* 12.3* 11.2   HGB 11.3* 11.6* 10.7*   HCT 35.5* 37.1 32.5*   MCV 84.7 84.3 83.1    241 211       BMP:  Recent Labs     10/07/21  0318 10/08/21  0902 10/09/21  0527   * 132 131*   K 4.7 4.1 4.6   CL 95* 95* 97*   CO2 27 27 25   BUN 21 20 19   CREATININE 0.5* 0.5* 0.5*    ALB:3,BILIDIR:3,BILITOT:3,ALKPHOS:3)@    PT/INR: No results for input(s): PROTIME, INR in the last 72 hours. ABG:   No results for input(s): PH, PO2, PCO2, HCO3, BE, O2SAT, METHB, O2HB, COHB, O2CON, HHB, THB in the last 72 hours. FiO2 : (S) 95 % (SpO2 was 86%. went from 80 to 95% FiO2)       Radiology/Other tests reviewed: none    Assessment:     Active Problems:    COVID-19    Acute respiratory failure due to COVID-19 [facility])    Acute respiratory failure with hypoxia (HCC)    Pneumonia due to COVID-19 virus    Elevated LFTs    Hypokalemia    Essential hypertension  Resolved Problems:    * No resolved hospital problems. *      Plan:       1. Cont with NIPPV, Airvo if tolerates, taper as tolerated  2. Cont with steroids, taper as tolerated  3. Cont with albuterol mdi, observe respiratory function  4. OOB to chair if tolerates  5. Encourage incentive spirometer use  6. To LTAC when bed available      Time at the bedside, reviewing labs and radiographs, reviewing notes and consultations, discussing with staff and family was more than 35 minutes. Thanks for letting us see this patient in consultation. Please contact us with any questions. Office [phone] or after hours through [employer], x [phone].

## 2021-10-09 NOTE — PROGRESS NOTES
Date: 10/8/2021    Time: 9:44 PM    Patient Placed On BIPAP/CPAP/ Non-Invasive Ventilation? Yes    If no must comment. Facial area red/color change? No           If YES are Blister/Lesion present? No   If yes must notify nursing staff  BIPAP/CPAP skin barrier?   Yes    Skin barrier type:mepilexlite       Comments:        Stephan Deng RCP

## 2021-10-09 NOTE — PROGRESS NOTES
[address] [facility] Hospitalist   Progress Note    Admitting Date and Time: 9/22/2021  9:32 PM  Admit Dx: Hypokalemia [E87.6]  Acute respiratory failure with hypoxia ([address] [J96.01]  COVID-19 [U07.1]  Acute respiratory failure due to COVID-19 (HCC) [U07.1, J96.00]    Subjective:    Received call this afternoon regarding pt SPO2 60s. Pt attempting to eat. Was on airvo and NRB at the time. ABGs and CXR ordered. Pt states he thinks he came off of the bipap too early today. Explained to the pt we would continue to use BiPap, but if BiPap no longer helping intubation would be the next consideration. Pt verbalizes understanding. Await CXR, ABGs. Nursing to update Pulmonology. ROS: denies fever, chills, cp, n/v, HA unless stated above.      ALPRAZolam  0.25 mg Oral Q4H While awake    guaiFENesin-codeine  10 mL Oral Q4H While awake    dexamethasone  10 mg IntraVENous Q12H    enoxaparin  1 mg/kg SubCUTAneous BID    albuterol sulfate HFA  2 puff Inhalation 4x daily    sodium chloride flush  5-40 mL IntraVENous 2 times per day    PARoxetine  30 mg Oral BID    amLODIPine  5 mg Oral Daily    insulin lispro  0-6 Units SubCUTAneous TID WC    insulin lispro  0-3 Units SubCUTAneous Nightly     aluminum & magnesium hydroxide-simethicone, 15 mL, Q6H PRN  melatonin, 4.5 mg, Nightly PRN  sodium chloride flush, 5-40 mL, PRN  sodium chloride, 25 mL, PRN  ondansetron, 4 mg, Q8H PRN   Or  ondansetron, 4 mg, Q6H PRN  polyethylene glycol, 17 g, Daily PRN  acetaminophen, 650 mg, Q6H PRN   Or  acetaminophen, 650 mg, Q6H PRN  glucose, 15 g, PRN  dextrose, 12.5 g, PRN  glucagon (rDNA), 1 mg, PRN  dextrose, 100 mL/hr, PRN         Objective:    BP (!) 162/74   Pulse 55   Temp 98.5 °F (36.9 °C) (Oral)   Resp 27   Ht 5' 7\" (1.702 m)   Wt 184 lb 11.9 oz (83.8 kg)   SpO2 95%   BMI 28.94 kg/m²   General Appearance: alert and oriented to person, place and time and in respiratory  distress  Skin: warm and dry  Head: normocephalic and atraumatic  Eyes: pupils equal, round, and reactive to light, extraocular eye movements intact, conjunctivae normal  Neck: neck supple and non tender without mass   Pulmonary/Chest:Diminished/rhonchi to auscultation bilaterally- no wheezes, rales  normal air movement, acute respiratory distress  Cardiovascular: normal rate, normal S1 and S2 and no RGM  Abdomen: soft, non-tender, non-distended, normal bowel sounds  Extremities: no cyanosis, no clubbing and no edema  Neurologic: no cranial nerve deficit and speech normal      Recent Labs     10/07/21  0318 10/08/21  0902 10/09/21  0527   * 132 131*   K 4.7 4.1 4.6   CL 95* 95* 97*   CO2 27 27 25   BUN 21 20 19   CREATININE 0.5* 0.5* 0.5*   GLUCOSE 181* 118* 188*   CALCIUM 8.5* 8.4* 8.0*       Recent Labs     10/07/21  0318 10/08/21  0902 10/09/21  0527   ALKPHOS 229* 237* 211*   PROT 6.0* 6.5 5.2*   LABALBU 2.8* 3.1* 2.8*   BILITOT 0.5 0.5 0.5   AST 32 32 24   ALT 70* 73* 57*       Recent Labs     10/07/21  0318 10/08/21  0902 10/09/21  0527   WBC 12.5* 12.3* 11.2   RBC 4.19 4.40 3.91   HGB 11.3* 11.6* 10.7*   HCT 35.5* 37.1 32.5*   MCV 84.7 84.3 83.1   MCH 27.0 26.4 27.4   MCHC 31.8* 31.3* 32.9   RDW 14.0 14.2 14.3    241 211   MPV 11.4 11.2 11.2           Radiology:   XR CHEST PORTABLE   Final Result   1. No interval change in the extensive multifocal bilateral airspace disease. XR CHEST PORTABLE   Final Result   No interval change         XR CHEST PORTABLE   Final Result   1. Significant worsening of the multifocal bilateral pneumonia when compared   with the prior CT scan of 09/22/2021         CTA PULMONARY W CONTRAST   Final Result   Within described exam limitations, no evidence of pulmonary embolism. Scattered multifocal low density infiltrates throughout both lungs, most   suggestive of COVID pneumonia in context of current pandemic. At least moderate diffuse hepatic steatosis. Cholecystectomy. RECOMMENDATIONS:   Multiple pulmonary nodules. Most severe: 4 mm left solid pulmonary nodule   within the upper lobe. A non-contrast Chest CT at 12 months is optional. If   performed and the nodule is stable at 12 months, no further follow-up is   recommended. These guidelines do not apply to immunocompromised patients and patients with   cancer. Follow up in patients with significant comorbidities as clinically   warranted. For lung cancer screening, adhere to Lung-RADS guidelines. Reference: Radiology. 2017; 284(1):228-43. XR CHEST PORTABLE   Final Result   There are bilateral multifocal ground-glass areas of consolidation with the   lungs concerning for in infectious or inflammatory process and developing   ARDS or viral pneumonia could give this appearance. XR CHEST PORTABLE    (Results Pending)       Assessment:  Active Problems:    COVID-19    Acute respiratory failure due to COVID-19 [facility])    Acute respiratory failure with hypoxia (HCC)    Pneumonia due to COVID-19 virus    Elevated LFTs    Hypokalemia    Essential hypertension  Resolved Problems:    * No resolved hospital problems. *      Plan:  1. Acute Respiratory Failure with Hypoxia- 2/2 COVID-19 CXR significant worsening multifocal infiltrates 10/4 CXR no change in imaging. Continue dexamethasone/Albuterol/. Continue to wean as tolerated. Maintain SPO2>92%. Currently requiring BiPap. Repeat CXR BNP/Procal/ABGs . DDimer [id number] Pulmonology input appreciated. 2. COVID-19 Viral Pneumonia- COVID-19+The current medical regimen is effective;  continue present plan and medications. Baricitinib/Dexamethasone/Vitamin Supplementation. Follow inflammatory markers. Lovenox 1mg/kg. Encourage IS/Pronating. DDImer [id number]  Isolation per protocol Supportive care. 3. Anxiety- Continue Xanax Q4 W/A. 4. DM- Continue SSI/hypoglycemic protocol/Carb Control diet. Follow adjust as needed. 5. HTN- Continue Amlodipine.   6. Poor intake- 2//2 Respiratory distress. Poor intake noted. Place on Calorie count. Dietician for supplementation. May require NGT. Follow closely. 7. Disposition- [facility]ra following. Referral made to [facility], but will likely need [facility] at [facility]. .casemanagement following. NOTE: This report was transcribed using voice recognition software. Every effort was made to ensure accuracy; however, inadvertent computerized transcription errors may be present. Electronically signed by Deana Koroma CNP on 10/9/2021 at 12:19 PM

## 2021-10-09 NOTE — PROGRESS NOTES
Comprehensive Nutrition Assessment    Type and Reason for Visit:  Reassess    Nutrition Recommendations/Plan: Continue Diet. Will Continue Current ONS. Nutrition Assessment:  Pt slowly improving from a nutritional stand-point AEB pt w/ ~50-75% of most meals however remains at risk d/t sporadic decreased kizzy/intake w/ SOB and desats when off NRB 2/2 COVID19+ PNA. Will continue ONS and monitor. Malnutrition Assessment:  Malnutrition Status: At risk for malnutrition (Comment)    Context:  Acute Illness     Findings of the 6 clinical characteristics of malnutrition:  Energy Intake:  1 - 75% or less of estimated energy requirements for 7 or more days  Weight Loss:  Unable to assess (2/2 poor EMR wt hx pta)     Body Fat Loss:  Unable to assess (2/2 COVID Iso)     Muscle Mass Loss:  Unable to assess    Fluid Accumulation:  No significant fluid accumulation     Strength:  Not Performed    Estimated Daily Nutrient Needs:  Energy (kcal):  8152-6895 (MSJx1. 2SF); Weight Used for Energy Requirements:  Current     Protein (g):   (1.3-1.5); Weight Used for Protein Requirements:  Ideal        Fluid (ml/day):  4652-9761; Method Used for Fluid Requirements:  1 ml/kcal      Nutrition Related Findings:  A&O, dentition WNL, Abd/BS WDL, No Edema, -I/O's      Wounds:  None       Current Nutrition Therapies:    ADULT DIET; Regular; 5 carb choices (75 gm/meal)  Adult Oral Nutrition Supplement; Diabetic Oral Supplement    Anthropometric Measures:  · Height: 5' 7\" (170.2 cm)  · Current Body Weight: 184 lb (83.5 kg) (actual 9/23)   · Admission Body Weight: 184 lb (83.5 kg) (actual 9/23)    · Usual Body Weight:  (UTO UBW 2/2 poor EMR wt hx pta)     · Ideal Body Weight: 148 lbs; % Ideal Body Weight     · BMI: 28.8  · Adjusted Body Weight:  ; No Adjustment   · Adjusted BMI:      · BMI Categories: Overweight (BMI 25.0-29. 9)       Nutrition Diagnosis:   · Inadequate oral intake related to impaired respiratory function (2/2 COVID19+ PNA) as evidenced by intake 51-75%, poor intake prior to admission, nausea, diarrhea      Nutrition Interventions:   Food and/or Nutrient Delivery:  Continue Current Diet, Continue Oral Nutrition Supplement (Continue Diet. Will Continue Current ONS.)  Nutrition Education/Counseling:  Education not indicated   Coordination of Nutrition Care:  Continue to monitor while inpatient    Goals:  PO intake >75% of meals/ONS. Nutrition Monitoring and Evaluation:   Behavioral-Environmental Outcomes:  None Identified   Food/Nutrient Intake Outcomes:  Food and Nutrient Intake, Supplement Intake, Diet Advancement/Tolerance  Physical Signs/Symptoms Outcomes:  Biochemical Data, Diarrhea, GI Status, Nausea or Vomiting, Fluid Status or Edema, Nutrition Focused Physical Findings, Skin, Weight     Discharge Planning:     Too soon to determine     Electronically signed by Luis Layton RD, LD on 10/9/21 at 10:35 AM EDT    Contact: ext [phone]

## 2021-10-09 NOTE — PLAN OF CARE
Problem: Body Temperature -  Risk of, Imbalanced  Goal: Ability to maintain a body temperature within defined limits  Outcome: Met This Shift     Problem:  Body Temperature -  Risk of, Imbalanced  Goal: Will regain or maintain usual level of consciousness  Outcome: Met This Shift

## 2021-10-10 LAB
ALBUMIN SERPL-MCNC: 2.7 G/DL (ref 3.5–5.2)
ALP BLD-CCNC: 186 U/L (ref 40–129)
ALT SERPL-CCNC: 57 U/L (ref 0–40)
ANION GAP SERPL CALCULATED.3IONS-SCNC: 10 MMOL/L (ref 7–16)
AST SERPL-CCNC: 22 U/L (ref 0–39)
BILIRUB SERPL-MCNC: 0.5 MG/DL (ref 0–1.2)
BUN BLDV-MCNC: 22 MG/DL (ref 6–23)
C-REACTIVE PROTEIN: 6.4 MG/DL (ref 0–0.4)
CALCIUM SERPL-MCNC: 8.1 MG/DL (ref 8.6–10.2)
CHLORIDE BLD-SCNC: 96 MMOL/L (ref 98–107)
CO2: 24 MMOL/L (ref 22–29)
CREAT SERPL-MCNC: 0.4 MG/DL (ref 0.7–1.2)
D DIMER: 896 NG/ML DDU
FERRITIN: 1320 NG/ML
GFR AFRICAN AMERICAN: >60
GFR NON-AFRICAN AMERICAN: >60 ML/MIN/1.73
GLUCOSE BLD-MCNC: 205 MG/DL (ref 74–99)
HCT VFR BLD CALC: 33.1 % (ref 37–54)
HEMOGLOBIN: 11 G/DL (ref 12.5–16.5)
MCH RBC QN AUTO: 27.4 PG (ref 26–35)
MCHC RBC AUTO-ENTMCNC: 33.2 % (ref 32–34.5)
MCV RBC AUTO: 82.5 FL (ref 80–99.9)
METER GLUCOSE: 123 MG/DL (ref 74–99)
METER GLUCOSE: 135 MG/DL (ref 74–99)
METER GLUCOSE: 151 MG/DL (ref 74–99)
METER GLUCOSE: 199 MG/DL (ref 74–99)
PDW BLD-RTO: 14.3 FL (ref 11.5–15)
PLATELET # BLD: 241 E9/L (ref 130–450)
PMV BLD AUTO: 11.2 FL (ref 7–12)
POTASSIUM SERPL-SCNC: 4.3 MMOL/L (ref 3.5–5)
RBC # BLD: 4.01 E12/L (ref 3.8–5.8)
SODIUM BLD-SCNC: 130 MMOL/L (ref 132–146)
TOTAL PROTEIN: 5.9 G/DL (ref 6.4–8.3)
WBC # BLD: 10.3 E9/L (ref 4.5–11.5)

## 2021-10-10 PROCEDURE — 2580000003 HC RX 258: Performed by: INTERNAL MEDICINE

## 2021-10-10 PROCEDURE — 99233 SBSQ HOSP IP/OBS HIGH 50: CPT | Performed by: NURSE PRACTITIONER

## 2021-10-10 PROCEDURE — 1200000000 HC SEMI PRIVATE

## 2021-10-10 PROCEDURE — 6360000002 HC RX W HCPCS: Performed by: INTERNAL MEDICINE

## 2021-10-10 PROCEDURE — 86140 C-REACTIVE PROTEIN: CPT

## 2021-10-10 PROCEDURE — 2700000000 HC OXYGEN THERAPY PER DAY

## 2021-10-10 PROCEDURE — 85027 COMPLETE CBC AUTOMATED: CPT

## 2021-10-10 PROCEDURE — 82962 GLUCOSE BLOOD TEST: CPT

## 2021-10-10 PROCEDURE — 6370000000 HC RX 637 (ALT 250 FOR IP): Performed by: INTERNAL MEDICINE

## 2021-10-10 PROCEDURE — 36415 COLL VENOUS BLD VENIPUNCTURE: CPT

## 2021-10-10 PROCEDURE — 85378 FIBRIN DEGRADE SEMIQUANT: CPT

## 2021-10-10 PROCEDURE — 82728 ASSAY OF FERRITIN: CPT

## 2021-10-10 PROCEDURE — 80053 COMPREHEN METABOLIC PANEL: CPT

## 2021-10-10 PROCEDURE — 94660 CPAP INITIATION&MGMT: CPT

## 2021-10-10 RX ADMIN — Medication 10 ML: at 10:00

## 2021-10-10 RX ADMIN — ALPRAZOLAM 0.25 MG: 0.25 TABLET ORAL at 07:02

## 2021-10-10 RX ADMIN — GUAIFENESIN AND CODEINE PHOSPHATE 10 ML: 10; 100 LIQUID ORAL at 10:00

## 2021-10-10 RX ADMIN — ALPRAZOLAM 0.25 MG: 0.25 TABLET ORAL at 13:59

## 2021-10-10 RX ADMIN — AMLODIPINE BESYLATE 5 MG: 5 TABLET ORAL at 09:59

## 2021-10-10 RX ADMIN — ALBUTEROL SULFATE 2 PUFF: 90 AEROSOL, METERED RESPIRATORY (INHALATION) at 12:11

## 2021-10-10 RX ADMIN — ALBUTEROL SULFATE 2 PUFF: 90 AEROSOL, METERED RESPIRATORY (INHALATION) at 17:49

## 2021-10-10 RX ADMIN — GUAIFENESIN AND CODEINE PHOSPHATE 10 ML: 10; 100 LIQUID ORAL at 07:02

## 2021-10-10 RX ADMIN — PAROXETINE 30 MG: 10 TABLET, FILM COATED ORAL at 22:19

## 2021-10-10 RX ADMIN — ALPRAZOLAM 0.25 MG: 0.25 TABLET ORAL at 09:59

## 2021-10-10 RX ADMIN — ALBUTEROL SULFATE 2 PUFF: 90 AEROSOL, METERED RESPIRATORY (INHALATION) at 22:17

## 2021-10-10 RX ADMIN — ALPRAZOLAM 0.25 MG: 0.25 TABLET ORAL at 22:20

## 2021-10-10 RX ADMIN — ALPRAZOLAM 0.25 MG: 0.25 TABLET ORAL at 17:50

## 2021-10-10 RX ADMIN — INSULIN LISPRO 1 UNITS: 100 INJECTION, SOLUTION INTRAVENOUS; SUBCUTANEOUS at 07:01

## 2021-10-10 RX ADMIN — PAROXETINE 30 MG: 10 TABLET, FILM COATED ORAL at 09:59

## 2021-10-10 RX ADMIN — ENOXAPARIN SODIUM 80 MG: 100 INJECTION SUBCUTANEOUS at 22:21

## 2021-10-10 RX ADMIN — Medication 10 ML: at 22:21

## 2021-10-10 RX ADMIN — DEXAMETHASONE SODIUM PHOSPHATE 10 MG: 4 INJECTION, SOLUTION INTRA-ARTICULAR; INTRALESIONAL; INTRAMUSCULAR; INTRAVENOUS; SOFT TISSUE at 09:58

## 2021-10-10 RX ADMIN — ALBUTEROL SULFATE 2 PUFF: 90 AEROSOL, METERED RESPIRATORY (INHALATION) at 09:59

## 2021-10-10 RX ADMIN — ENOXAPARIN SODIUM 80 MG: 100 INJECTION SUBCUTANEOUS at 09:59

## 2021-10-10 RX ADMIN — DEXAMETHASONE SODIUM PHOSPHATE 10 MG: 4 INJECTION, SOLUTION INTRA-ARTICULAR; INTRALESIONAL; INTRAMUSCULAR; INTRAVENOUS; SOFT TISSUE at 22:20

## 2021-10-10 ASSESSMENT — PAIN SCALES - GENERAL
PAINLEVEL_OUTOF10: 0
PAINLEVEL_OUTOF10: 0

## 2021-10-10 NOTE — PROGRESS NOTES
[facility]  [address] [facility], [address], [address] [facility]      Pulmonary Progress Note      SUBJECTIVE:  Still on NIPPV 20/10 100% since last night and most of yesterday, claims feeling similar with respiratory function with minimal cough/sputum production. Still desaturating significantly when off NIPPV but saturating about 92-94% currently.     OBJECTIVE    Medications    Continuous Infusions:   sodium chloride      dextrose         Scheduled Meds:   ALPRAZolam  0.25 mg Oral Q4H While awake    guaiFENesin-codeine  10 mL Oral Q4H While awake    dexamethasone  10 mg IntraVENous Q12H    enoxaparin  1 mg/kg SubCUTAneous BID    albuterol sulfate HFA  2 puff Inhalation 4x daily    sodium chloride flush  5-40 mL IntraVENous 2 times per day    PARoxetine  30 mg Oral BID    amLODIPine  5 mg Oral Daily    insulin lispro  0-6 Units SubCUTAneous TID WC    insulin lispro  0-3 Units SubCUTAneous Nightly       PRN Meds:aluminum & magnesium hydroxide-simethicone, melatonin, sodium chloride flush, sodium chloride, ondansetron **OR** ondansetron, polyethylene glycol, acetaminophen **OR** acetaminophen, glucose, dextrose, glucagon (rDNA), dextrose    Physical    VITALS:  /74   Pulse 53   Temp 97.1 °F (36.2 °C) (Axillary)   Resp 24   Ht 5' 7\" (1.702 m)   Wt 184 lb 11.9 oz (83.8 kg)   SpO2 96%   BMI 28.94 kg/m²     24HR INTAKE/OUTPUT:    No intake or output data in the 24 hours ending 10/10/21 1038    24HR PULSE OXIMETRY RANGE:    SpO2  Av.8 %  Min: 89 %  Max: 96 %    General appearance: alert, appears stated age and cooperative  Lungs: rhonchi bilaterally  Heart: regular rate and rhythm, S1, S2 normal, no murmur, click, rub or gallop  Abdomen: soft, non-tender; bowel sounds normal; no masses,  no organomegaly  Extremities: extremities normal, atraumatic, no cyanosis or edema  Neurologic: Mental status: Alert, oriented, thought content appropriate    Data    CBC:   Recent Labs     10/08/21  0902 10/09/21  0527 10/10/21  0730   WBC 12.3* 11.2 10.3   HGB 11.6* 10.7* 11.0*   HCT 37.1 32.5* 33.1*   MCV 84.3 83.1 82.5    211 241       BMP:  Recent Labs     10/08/21  0902 10/09/21  0527 10/10/21  0730    131* 130*   K 4.1 4.6 4.3   CL 95* 97* 96*   CO2 27 25 24   BUN 20 19 22   CREATININE 0.5* 0.5* 0.4*    ALB:3,BILIDIR:3,BILITOT:3,ALKPHOS:3)@    PT/INR: No results for input(s): PROTIME, INR in the last 72 hours. ABG:   Recent Labs     10/09/21  1209   PH 7.459*   PO2 65.5*   PCO2 33.5*   HCO3 23.2   BE 0.0   O2SAT 92.4   METHB 0.3   O2HB 91.1*   COHB 1.1   O2CON 16.0   HHB 7.5*   THB 12.5     FiO2 : 100 %       Radiology/Other tests reviewed: none    Assessment:     Active Problems:    COVID-19    Acute respiratory failure due to COVID-19 (HCC)    Acute respiratory failure with hypoxia (HCC)    Pneumonia due to COVID-19 virus    Elevated LFTs    Hypokalemia    Essential hypertension  Resolved Problems:    * No resolved hospital problems. *      Plan:       1. Cont with NIPPV, taper Fio2 as tolerated as has some room to come down on this  2. Cont with steroids, taper as tolerated  3. Cont with albuterol mdi, observe respiratory function  4. OOB to chair if tolerates  5. Encourage incentive spirometer use  6. To LTAC when bed available      Time at the bedside, reviewing labs and radiographs, reviewing notes and consultations, discussing with staff and family was more than 35 minutes. Thanks for letting us see this patient in consultation. Please contact us with any questions. Office [phone] or after hours through Bundle, x [phone].

## 2021-10-10 NOTE — PROGRESS NOTES
[address] [facility] Hospitalist   Progress Note    Admitting Date and Time: 9/22/2021  9:32 PM  Admit Dx: Hypokalemia [E87.6]  Acute respiratory failure with hypoxia ([address] [J96.01]  COVID-19 [U07.1]  Acute respiratory failure due to COVID-19 (HCC) [U07.1, J96.00]    Subjective:    Pt sitting up n bed in no acute distress. Continues to require NIPPV throughout much of the day. Was removed for medication this morning, SPO2 dropped to 81%  Pt unable to tolerate removal of NIPPV for very long. Therapeutic communication provided all questions answered     RN: Continues to require BP. Attempting to place back on Airvo for meals and intake      ROS: denies fever, chills, cp, n/v, HA unless stated above.      ALPRAZolam  0.25 mg Oral Q4H While awake    guaiFENesin-codeine  10 mL Oral Q4H While awake    dexamethasone  10 mg IntraVENous Q12H    enoxaparin  1 mg/kg SubCUTAneous BID    albuterol sulfate HFA  2 puff Inhalation 4x daily    sodium chloride flush  5-40 mL IntraVENous 2 times per day    PARoxetine  30 mg Oral BID    amLODIPine  5 mg Oral Daily    insulin lispro  0-6 Units SubCUTAneous TID WC    insulin lispro  0-3 Units SubCUTAneous Nightly     aluminum & magnesium hydroxide-simethicone, 15 mL, Q6H PRN  melatonin, 4.5 mg, Nightly PRN  sodium chloride flush, 5-40 mL, PRN  sodium chloride, 25 mL, PRN  ondansetron, 4 mg, Q8H PRN   Or  ondansetron, 4 mg, Q6H PRN  polyethylene glycol, 17 g, Daily PRN  acetaminophen, 650 mg, Q6H PRN   Or  acetaminophen, 650 mg, Q6H PRN  glucose, 15 g, PRN  dextrose, 12.5 g, PRN  glucagon (rDNA), 1 mg, PRN  dextrose, 100 mL/hr, PRN         Objective:    BP (!) 148/70   Pulse 62   Temp 98.6 °F (37 °C) (Oral)   Resp 27   Ht 5' 7\" (1.702 m)   Wt 184 lb 11.9 oz (83.8 kg)   SpO2 94%   BMI 28.94 kg/m²   General Appearance: alert and oriented to person, place and time and in respiratory  distress  Skin: warm and dry  Head: normocephalic and atraumatic  Eyes: pupils equal, round, and reactive to light, extraocular eye movements intact, conjunctivae normal  Neck: neck supple and non tender without mass   Pulmonary/Chest:Diminished/rhonchi to auscultation bilaterally- no wheezes, rales  normal air movement, acute respiratory distress  Cardiovascular: normal rate, normal S1 and S2 and no RGM  Abdomen: soft, non-tender, non-distended, normal bowel sounds  Extremities: no cyanosis, no clubbing and no edema  Neurologic: no cranial nerve deficit and speech normal      Recent Labs     10/08/21  0902 10/09/21  0527    131*   K 4.1 4.6   CL 95* 97*   CO2 27 25   BUN 20 19   CREATININE 0.5* 0.5*   GLUCOSE 118* 188*   CALCIUM 8.4* 8.0*       Recent Labs     10/08/21  0902 10/09/21  0527   ALKPHOS 237* 211*   PROT 6.5 5.2*   LABALBU 3.1* 2.8*   BILITOT 0.5 0.5   AST 32 24   ALT 73* 57*       Recent Labs     10/08/21  0902 10/09/21  0527   WBC 12.3* 11.2   RBC 4.40 3.91   HGB 11.6* 10.7*   HCT 37.1 32.5*   MCV 84.3 83.1   MCH 26.4 27.4   MCHC 31.3* 32.9   RDW 14.2 14.3    211   MPV 11.2 11.2           Radiology:   XR CHEST PORTABLE   Final Result   1. There is no interval change in extensive multifocal bilateral airspace   disease. XR CHEST PORTABLE   Final Result   1. No interval change in the extensive multifocal bilateral airspace disease. XR CHEST PORTABLE   Final Result   No interval change         XR CHEST PORTABLE   Final Result   1. Significant worsening of the multifocal bilateral pneumonia when compared   with the prior CT scan of 09/22/2021         CTA PULMONARY W CONTRAST   Final Result   Within described exam limitations, no evidence of pulmonary embolism. Scattered multifocal low density infiltrates throughout both lungs, most   suggestive of COVID pneumonia in context of current pandemic. At least moderate diffuse hepatic steatosis. Cholecystectomy. RECOMMENDATIONS:   Multiple pulmonary nodules.  Most severe: 4 mm left solid pulmonary nodule   within the upper lobe. A non-contrast Chest CT at 12 months is optional. If   performed and the nodule is stable at 12 months, no further follow-up is   recommended. These guidelines do not apply to immunocompromised patients and patients with   cancer. Follow up in patients with significant comorbidities as clinically   warranted. For lung cancer screening, adhere to Lung-RADS guidelines. Reference: Radiology. 2017; 284(1):228-43. XR CHEST PORTABLE   Final Result   There are bilateral multifocal ground-glass areas of consolidation with the   lungs concerning for in infectious or inflammatory process and developing   ARDS or viral pneumonia could give this appearance. Assessment:  Active Problems:    COVID-19    Acute respiratory failure due to COVID-19 [facility])    Acute respiratory failure with hypoxia (HCC)    Pneumonia due to COVID-19 virus    Elevated LFTs    Hypokalemia    Essential hypertension  Resolved Problems:    * No resolved hospital problems. *      Plan:  1. Acute Respiratory Failure with Hypoxia- 2/2 COVID-19 CXR significant worsening multifocal infiltrates 10/4 CXR no change in imaging. Continue dexamethasone/Albuterol/. Requiring NIPPV 20/10 100% continuous. Continue to wean as tolerated. Maintain SPO2>92%. Currently requiring BiPap. DDimer [id number] Pulmonology input appreciated. 2. COVID-19 Viral Pneumonia- COVID-19+The current medical regimen is effective;  continue present plan and medications. Baricitinib day 7/Dexamethasone day /Vitamin Supplementation. Follow inflammatory markers. Lovenox 1mg/kg. Encourage IS/Pronating. DDImer [id number]  Isolation per protocol Supportive care. Pulmonology following. 3. Anxiety- Continue Xanax Q4 W/A. 4. DM- Continue SSI/hypoglycemic protocol/Carb Control diet. Follow adjust as needed. 5. HTN- Continue Amlodipine. 6. Poor intake- 2//2 Respiratory distress. Poor intake noted. Place on Calorie count.  Dietician for supplementation. May require NGT. Follow closely. 7. Disposition- [facility]ra following. Referral made to [facility], but will likely need [facility] at [facility]. .casemanagement following. NOTE: This report was transcribed using voice recognition software. Every effort was made to ensure accuracy; however, inadvertent computerized transcription errors may be present. Electronically signed by Chyna Koroma CNP on 10/10/2021 at 7:24 AM

## 2021-10-10 NOTE — PROGRESS NOTES
Attempted to come off of BiPAP to 60L 100% Airvo + 15 L NRB. Patient could not maintain SpO2 above 86%.  Placed back on BiPAP

## 2021-10-10 NOTE — PROGRESS NOTES
Attempted to wean off bipap with optivo 65 liters and 15L NRB mask, patient tolerated poorly with saturation 84-87%, trailed for 20 minutes with RT present. Bipap reapplied at 100% FI02, saturation 90%.

## 2021-10-11 LAB
ALBUMIN SERPL-MCNC: 2.7 G/DL (ref 3.5–5.2)
ALP BLD-CCNC: 164 U/L (ref 40–129)
ALT SERPL-CCNC: 56 U/L (ref 0–40)
ANION GAP SERPL CALCULATED.3IONS-SCNC: 10 MMOL/L (ref 7–16)
AST SERPL-CCNC: 22 U/L (ref 0–39)
BILIRUB SERPL-MCNC: 0.4 MG/DL (ref 0–1.2)
BUN BLDV-MCNC: 25 MG/DL (ref 6–23)
C-REACTIVE PROTEIN: 5.6 MG/DL (ref 0–0.4)
CALCIUM SERPL-MCNC: 8.1 MG/DL (ref 8.6–10.2)
CHLORIDE BLD-SCNC: 98 MMOL/L (ref 98–107)
CO2: 24 MMOL/L (ref 22–29)
CREAT SERPL-MCNC: 0.4 MG/DL (ref 0.7–1.2)
D DIMER: 634 NG/ML DDU
FERRITIN: 1429 NG/ML
GFR AFRICAN AMERICAN: >60
GFR NON-AFRICAN AMERICAN: >60 ML/MIN/1.73
GLUCOSE BLD-MCNC: 222 MG/DL (ref 74–99)
HCT VFR BLD CALC: 33.6 % (ref 37–54)
HEMOGLOBIN: 10.8 G/DL (ref 12.5–16.5)
MCH RBC QN AUTO: 26.9 PG (ref 26–35)
MCHC RBC AUTO-ENTMCNC: 32.1 % (ref 32–34.5)
MCV RBC AUTO: 83.8 FL (ref 80–99.9)
METER GLUCOSE: 167 MG/DL (ref 74–99)
METER GLUCOSE: 168 MG/DL (ref 74–99)
METER GLUCOSE: 170 MG/DL (ref 74–99)
METER GLUCOSE: 195 MG/DL (ref 74–99)
PDW BLD-RTO: 14.4 FL (ref 11.5–15)
PLATELET # BLD: 241 E9/L (ref 130–450)
PMV BLD AUTO: 11.2 FL (ref 7–12)
POTASSIUM SERPL-SCNC: 4.6 MMOL/L (ref 3.5–5)
RBC # BLD: 4.01 E12/L (ref 3.8–5.8)
SODIUM BLD-SCNC: 132 MMOL/L (ref 132–146)
TOTAL PROTEIN: 6 G/DL (ref 6.4–8.3)
WBC # BLD: 10.3 E9/L (ref 4.5–11.5)

## 2021-10-11 PROCEDURE — 82962 GLUCOSE BLOOD TEST: CPT

## 2021-10-11 PROCEDURE — 1200000000 HC SEMI PRIVATE

## 2021-10-11 PROCEDURE — 6370000000 HC RX 637 (ALT 250 FOR IP): Performed by: INTERNAL MEDICINE

## 2021-10-11 PROCEDURE — 36415 COLL VENOUS BLD VENIPUNCTURE: CPT

## 2021-10-11 PROCEDURE — 6370000000 HC RX 637 (ALT 250 FOR IP): Performed by: FAMILY MEDICINE

## 2021-10-11 PROCEDURE — 2700000000 HC OXYGEN THERAPY PER DAY

## 2021-10-11 PROCEDURE — 2580000003 HC RX 258: Performed by: INTERNAL MEDICINE

## 2021-10-11 PROCEDURE — 6360000002 HC RX W HCPCS: Performed by: INTERNAL MEDICINE

## 2021-10-11 PROCEDURE — 80053 COMPREHEN METABOLIC PANEL: CPT

## 2021-10-11 PROCEDURE — 82728 ASSAY OF FERRITIN: CPT

## 2021-10-11 PROCEDURE — 85027 COMPLETE CBC AUTOMATED: CPT

## 2021-10-11 PROCEDURE — 86140 C-REACTIVE PROTEIN: CPT

## 2021-10-11 PROCEDURE — 85378 FIBRIN DEGRADE SEMIQUANT: CPT

## 2021-10-11 PROCEDURE — 94660 CPAP INITIATION&MGMT: CPT

## 2021-10-11 RX ORDER — CHOLECALCIFEROL (VITAMIN D3) 50 MCG
2000 TABLET ORAL DAILY
Status: DISCONTINUED | OUTPATIENT
Start: 2021-10-11 | End: 2021-11-02 | Stop reason: HOSPADM

## 2021-10-11 RX ORDER — ASCORBIC ACID 500 MG
500 TABLET ORAL DAILY
Status: DISCONTINUED | OUTPATIENT
Start: 2021-10-11 | End: 2021-11-02 | Stop reason: HOSPADM

## 2021-10-11 RX ORDER — ZINC SULFATE 50(220)MG
50 CAPSULE ORAL DAILY
Status: DISCONTINUED | OUTPATIENT
Start: 2021-10-11 | End: 2021-11-02 | Stop reason: HOSPADM

## 2021-10-11 RX ORDER — BUDESONIDE AND FORMOTEROL FUMARATE DIHYDRATE 160; 4.5 UG/1; UG/1
2 AEROSOL RESPIRATORY (INHALATION) 2 TIMES DAILY
Status: DISCONTINUED | OUTPATIENT
Start: 2021-10-11 | End: 2021-11-02 | Stop reason: HOSPADM

## 2021-10-11 RX ADMIN — ALPRAZOLAM 0.25 MG: 0.25 TABLET ORAL at 18:20

## 2021-10-11 RX ADMIN — PAROXETINE 30 MG: 10 TABLET, FILM COATED ORAL at 09:36

## 2021-10-11 RX ADMIN — GUAIFENESIN AND CODEINE PHOSPHATE 10 ML: 10; 100 LIQUID ORAL at 13:47

## 2021-10-11 RX ADMIN — ZINC SULFATE 220 MG (50 MG) CAPSULE 50 MG: CAPSULE at 13:48

## 2021-10-11 RX ADMIN — ENOXAPARIN SODIUM 80 MG: 100 INJECTION SUBCUTANEOUS at 21:41

## 2021-10-11 RX ADMIN — Medication 10 ML: at 09:37

## 2021-10-11 RX ADMIN — AMLODIPINE BESYLATE 5 MG: 5 TABLET ORAL at 09:36

## 2021-10-11 RX ADMIN — ALPRAZOLAM 0.25 MG: 0.25 TABLET ORAL at 05:49

## 2021-10-11 RX ADMIN — BUDESONIDE AND FORMOTEROL FUMARATE DIHYDRATE 2 PUFF: 160; 4.5 AEROSOL RESPIRATORY (INHALATION) at 16:53

## 2021-10-11 RX ADMIN — PAROXETINE 30 MG: 10 TABLET, FILM COATED ORAL at 21:42

## 2021-10-11 RX ADMIN — ALBUTEROL SULFATE 2 PUFF: 90 AEROSOL, METERED RESPIRATORY (INHALATION) at 11:00

## 2021-10-11 RX ADMIN — ALPRAZOLAM 0.25 MG: 0.25 TABLET ORAL at 13:48

## 2021-10-11 RX ADMIN — ALPRAZOLAM 0.25 MG: 0.25 TABLET ORAL at 09:36

## 2021-10-11 RX ADMIN — GUAIFENESIN AND CODEINE PHOSPHATE 10 ML: 10; 100 LIQUID ORAL at 09:35

## 2021-10-11 RX ADMIN — INSULIN LISPRO 1 UNITS: 100 INJECTION, SOLUTION INTRAVENOUS; SUBCUTANEOUS at 17:09

## 2021-10-11 RX ADMIN — GUAIFENESIN AND CODEINE PHOSPHATE 10 ML: 10; 100 LIQUID ORAL at 21:40

## 2021-10-11 RX ADMIN — Medication 4.5 MG: at 21:40

## 2021-10-11 RX ADMIN — DEXAMETHASONE SODIUM PHOSPHATE 10 MG: 4 INJECTION, SOLUTION INTRA-ARTICULAR; INTRALESIONAL; INTRAMUSCULAR; INTRAVENOUS; SOFT TISSUE at 21:40

## 2021-10-11 RX ADMIN — INSULIN LISPRO 1 UNITS: 100 INJECTION, SOLUTION INTRAVENOUS; SUBCUTANEOUS at 07:01

## 2021-10-11 RX ADMIN — ALBUTEROL SULFATE 2 PUFF: 90 AEROSOL, METERED RESPIRATORY (INHALATION) at 16:53

## 2021-10-11 RX ADMIN — Medication 10 ML: at 21:43

## 2021-10-11 RX ADMIN — INSULIN LISPRO 1 UNITS: 100 INJECTION, SOLUTION INTRAVENOUS; SUBCUTANEOUS at 11:00

## 2021-10-11 RX ADMIN — BUDESONIDE AND FORMOTEROL FUMARATE DIHYDRATE 2 PUFF: 160; 4.5 AEROSOL RESPIRATORY (INHALATION) at 21:43

## 2021-10-11 RX ADMIN — ALBUTEROL SULFATE 2 PUFF: 90 AEROSOL, METERED RESPIRATORY (INHALATION) at 21:40

## 2021-10-11 RX ADMIN — OXYCODONE HYDROCHLORIDE AND ACETAMINOPHEN 500 MG: 500 TABLET ORAL at 13:48

## 2021-10-11 RX ADMIN — TIOTROPIUM BROMIDE INHALATION SPRAY 2 PUFF: 3.12 SPRAY, METERED RESPIRATORY (INHALATION) at 16:53

## 2021-10-11 RX ADMIN — GUAIFENESIN AND CODEINE PHOSPHATE 10 ML: 10; 100 LIQUID ORAL at 05:49

## 2021-10-11 RX ADMIN — ALBUTEROL SULFATE 2 PUFF: 90 AEROSOL, METERED RESPIRATORY (INHALATION) at 09:35

## 2021-10-11 RX ADMIN — ENOXAPARIN SODIUM 80 MG: 100 INJECTION SUBCUTANEOUS at 09:35

## 2021-10-11 RX ADMIN — ALPRAZOLAM 0.25 MG: 0.25 TABLET ORAL at 21:41

## 2021-10-11 RX ADMIN — GUAIFENESIN AND CODEINE PHOSPHATE 10 ML: 10; 100 LIQUID ORAL at 18:20

## 2021-10-11 RX ADMIN — Medication 2000 UNITS: at 13:49

## 2021-10-11 RX ADMIN — DEXAMETHASONE SODIUM PHOSPHATE 10 MG: 4 INJECTION, SOLUTION INTRA-ARTICULAR; INTRALESIONAL; INTRAMUSCULAR; INTRAVENOUS; SOFT TISSUE at 09:35

## 2021-10-11 ASSESSMENT — PAIN SCALES - GENERAL: PAINLEVEL_OUTOF10: 0

## 2021-10-11 NOTE — PROGRESS NOTES
Patient did not want to go back on bipap machine at this time, he stated he would like to eat before going on.

## 2021-10-11 NOTE — PLAN OF CARE
Problem: Pain:  Description: Pain management should include both nonpharmacologic and pharmacologic interventions. Goal: Pain level will decrease  Description: Pain level will decrease  10/11/2021 0828 by Susan Street RN  Outcome: Met This Shift  10/10/2021 2355 by Nayan Quinones RN  Outcome: Ongoing     Problem: Airway Clearance - Ineffective  Goal: Achieve or maintain patent airway  10/10/2021 2355 by Nayan Quinones RN  Outcome: Ongoing     Problem: Gas Exchange - Impaired  Goal: Absence of hypoxia  10/11/2021 0828 by Susan Street RN  Outcome: Ongoing  10/10/2021 2355 by Nayan Quinones RN  Outcome: Ongoing  Goal: Promote optimal lung function  10/11/2021 0828 by Susan Street RN  Outcome: Ongoing  10/10/2021 2355 by Nayan Quinones RN  Outcome: Ongoing     Problem: Breathing Pattern - Ineffective  Goal: Ability to achieve and maintain a regular respiratory rate  10/11/2021 0828 by Susan Street RN  Outcome: Ongoing  10/10/2021 2355 by Nayan Quinones RN  Outcome: Ongoing     Problem:  Body Temperature -  Risk of, Imbalanced  Goal: Ability to maintain a body temperature within defined limits  10/10/2021 2355 by Nayan Quinones RN  Outcome: Ongoing  Goal: Will regain or maintain usual level of consciousness  10/10/2021 2355 by Nayan Quinones RN  Outcome: Ongoing  Goal: Complications related to the disease process, condition or treatment will be avoided or minimized  10/10/2021 2355 by Nayan Quinones RN  Outcome: Ongoing     Problem: Isolation Precautions - Risk of Spread of Infection  Goal: Prevent transmission of infection  10/10/2021 2355 by Nayan Quinones RN  Outcome: Ongoing     Problem: Nutrition Deficits  Goal: Optimize nutritional status  10/11/2021 0828 by Susan Street RN  Outcome: Ongoing  10/10/2021 2355 by Nayan Quinones RN  Outcome: Ongoing     Problem: Risk for Fluid Volume Deficit  Goal: Maintain normal heart rhythm  10/10/2021 2355 by Nayan Quinones RN  Outcome: Ongoing  Goal: Maintain absence of muscle cramping  10/10/2021 2355 by Guillermo Nation RN  Outcome: Ongoing  Goal: Maintain normal serum potassium, sodium, calcium, phosphorus, and pH  10/10/2021 2355 by Guillermo Nation RN  Outcome: Ongoing     Problem: Loneliness or Risk for Loneliness  Goal: Demonstrate positive use of time alone when socialization is not possible  10/10/2021 2355 by Guillermo Nation RN  Outcome: Ongoing     Problem: Fatigue  Goal: Verbalize increase energy and improved vitality  10/10/2021 2355 by Guillermo Nation RN  Outcome: Ongoing     Problem: Patient Education: Go to Patient Education Activity  Goal: Patient/Family Education  10/10/2021 2355 by Guillermo Nation RN  Outcome: Ongoing     Problem: Pain:  Description: Pain management should include both nonpharmacologic and pharmacologic interventions.   Goal: Control of acute pain  Description: Control of acute pain  10/10/2021 2355 by Guillermo Nation RN  Outcome: Ongoing  Goal: Control of chronic pain  Description: Control of chronic pain  10/10/2021 2355 by Guillermo Nation RN  Outcome: Ongoing

## 2021-10-11 NOTE — CARE COORDINATION
COVID + 9/22. Requiring 100% optiflow w/ 60 liters O2 w/ 15 liters non rebreather. Neeru Amador following- O2 demands are still too high for LTAC to accept. Attempted to call [facility]- [phone]- closed for the national holiday. Will need to follow up tomorrow to find out which  @ [facility] is following and request an LTAC order/auth.  Will follow Nicky Kelley RN case manager

## 2021-10-11 NOTE — PROGRESS NOTES
Pulmonary Progress Note    Admit Date: 2021  Hospital day                               PCP: No primary care provider on file. Chief Complaint (s):  Patient Active Problem List   Diagnosis    COVID-19    Acute respiratory failure due to COVID-19 [facility])    Acute respiratory failure with hypoxia ([address]    Pneumonia due to COVID-19 virus    Elevated LFTs    Hypokalemia    Essential hypertension       Subjective:  · This afternoon, the patient is on an air Vo system saturating in the mid to high 80s while he is ordering dinner. Vitals:  VITALS:  /74   Pulse 71   Temp 98.6 °F (37 °C) (Oral)   Resp 24   Ht 5' 7\" (1.702 m)   Wt 184 lb 11.9 oz (83.8 kg)   SpO2 (!) 88%   BMI 28.94 kg/m²     24HR INTAKE/OUTPUT:    No intake or output data in the 24 hours ending 10/11/21 1632    24HR PULSE OXIMETRY RANGE:    SpO2  Av.3 %  Min: 88 %  Max: 92 %    Medications:  IV:   sodium chloride      dextrose         Scheduled Meds:   ascorbic acid  500 mg Oral Daily    zinc sulfate  50 mg Oral Daily    vitamin D  2,000 Units Oral Daily    tiotropium  2 puff Inhalation Daily    budesonide-formoterol  2 puff Inhalation BID    ALPRAZolam  0.25 mg Oral Q4H While awake    guaiFENesin-codeine  10 mL Oral Q4H While awake    dexamethasone  10 mg IntraVENous Q12H    enoxaparin  1 mg/kg SubCUTAneous BID    albuterol sulfate HFA  2 puff Inhalation 4x daily    sodium chloride flush  5-40 mL IntraVENous 2 times per day    PARoxetine  30 mg Oral BID    amLODIPine  5 mg Oral Daily    insulin lispro  0-6 Units SubCUTAneous TID     insulin lispro  0-3 Units SubCUTAneous Nightly       Diet:   ADULT DIET; Regular; 5 carb choices (75 gm/meal)  Adult Oral Nutrition Supplement; Diabetic Oral Supplement     EXAM:  General: No distress. Sleeping  Eyes: PERRL. No sclera icterus. No conjunctival injection. ENT: No discharge. Pharynx clear. Neck: Trachea midline. Normal thyroid.   Resp: No accessory muscle use. No rales. No wheezing. No rhonchi. CV: Regular rate. Regular rhythm. No murmur or rub. Abd: Non-tender. Non-distended. No masses. No organomegaly. Normal bowel sounds. Skin: Warm and dry. No nodule on exposed extremities. No rash on exposed extremities. Ext: No cyanosis, clubbing, edema  Lymph: No cervical LAD. No supraclavicular LAD. M/S: No cyanosis. No joint deformity. No clubbing. Neuro: Positive pupils/gag/corneals. Normal pain response. Results:  CBC:   Recent Labs     10/09/21  0527 10/10/21  0730 10/11/21  0639   WBC 11.2 10.3 10.3   HGB 10.7* 11.0* 10.8*   HCT 32.5* 33.1* 33.6*   MCV 83.1 82.5 83.8    241 241     BMP:   Recent Labs     10/09/21  0527 10/10/21  0730 10/11/21  0639   * 130* 132   K 4.6 4.3 4.6   CL 97* 96* 98   CO2 25 24 24   BUN 19 22 25*   CREATININE 0.5* 0.4* 0.4*     LIVER PROFILE:   Recent Labs     10/09/21  0527 10/10/21  0730 10/11/21  0639   AST 24 22 22   ALT 57* 57* 56*   BILITOT 0.5 0.5 0.4   ALKPHOS 211* 186* 164*     PT/INR: No results for input(s): PROTIME, INR in the last 72 hours. APTT: No results for input(s): APTT in the last 72 hours. Pathology:  1. N/A      Microbiology:  1. None    Recent ABG:   Recent Labs     10/09/21  1209   PH 7.459*   PO2 65.5*   PCO2 33.5*   HCO3 23.2   BE 0.0   O2SAT 92.4   METHB 0.3   O2HB 91.1*   COHB 1.1   O2CON 16.0   HHB 7.5*   THB 12.5     FiO2 : 100 %       Recent Films:  XR CHEST PORTABLE   Final Result   1. There is no interval change in extensive multifocal bilateral airspace   disease. XR CHEST PORTABLE   Final Result   1. No interval change in the extensive multifocal bilateral airspace disease. XR CHEST PORTABLE   Final Result   No interval change         XR CHEST PORTABLE   Final Result   1.  Significant worsening of the multifocal bilateral pneumonia when compared   with the prior CT scan of 09/22/2021         CTA PULMONARY W CONTRAST   Final Result   Within described exam limitations, no evidence of pulmonary embolism. Scattered multifocal low density infiltrates throughout both lungs, most   suggestive of COVID pneumonia in context of current pandemic. At least moderate diffuse hepatic steatosis. Cholecystectomy. RECOMMENDATIONS:   Multiple pulmonary nodules. Most severe: 4 mm left solid pulmonary nodule   within the upper lobe. A non-contrast Chest CT at 12 months is optional. If   performed and the nodule is stable at 12 months, no further follow-up is   recommended. These guidelines do not apply to immunocompromised patients and patients with   cancer. Follow up in patients with significant comorbidities as clinically   warranted. For lung cancer screening, adhere to Lung-RADS guidelines. Reference: Radiology. 2017; 284(1):228-43. XR CHEST PORTABLE   Final Result   There are bilateral multifocal ground-glass areas of consolidation with the   lungs concerning for in infectious or inflammatory process and developing   ARDS or viral pneumonia could give this appearance.                      Assessment:  1. COVID-19 pneumonia with significant worsening despite steroids and baricitinib. Inflammatory markers and chest radiograph are reviewed. More recently, there has been no improvement. D-dimer is noted.        Plan:  1. Continue full dose anticoagulation  2. Wean FiO2 as tolerated  3. Okay to [place] when they can accept him. Time at the bedside, reviewing labs and radiographs, reviewing updated notes and consultations, discussing with staff and family was more than 35 minutes. Please note that voice recognition technology was used in the preparation of this note and make therefore it may contain inadvertent transcription errors. If the patient is a COVID 19 isolation patient, the above physical exam reflects that of the examining physician for the day. Clesa Power MD,  M.D., F.C.C.P.     [facility]    [facility], [address] [facility], [address], [facility]

## 2021-10-11 NOTE — PROGRESS NOTES
[facility] Hospitalist   Progress Note    Admitting Date and Time: 9/22/2021  9:32 PM  Admit Dx: Hypokalemia [E87.6]  Acute respiratory failure with hypoxia ([address] [J96.01]  COVID-19 [U07.1]  Acute respiratory failure due to COVID-19 (HCC) [U07.1, J96.00]    Subjective:    Pt lying in bed in no acute distress. Remains on NIPPV  Attempt to wean yesterday tolerated poorly. SPO2 92% on NIPPV      RN: Pt will require 2 more inpatient days to qualify for [facility]    ROS: denies fever, chills, cp, n/v, HA unless stated above.      ALPRAZolam  0.25 mg Oral Q4H While awake    guaiFENesin-codeine  10 mL Oral Q4H While awake    dexamethasone  10 mg IntraVENous Q12H    enoxaparin  1 mg/kg SubCUTAneous BID    albuterol sulfate HFA  2 puff Inhalation 4x daily    sodium chloride flush  5-40 mL IntraVENous 2 times per day    PARoxetine  30 mg Oral BID    amLODIPine  5 mg Oral Daily    insulin lispro  0-6 Units SubCUTAneous TID WC    insulin lispro  0-3 Units SubCUTAneous Nightly     aluminum & magnesium hydroxide-simethicone, 15 mL, Q6H PRN  melatonin, 4.5 mg, Nightly PRN  sodium chloride flush, 5-40 mL, PRN  sodium chloride, 25 mL, PRN  ondansetron, 4 mg, Q8H PRN   Or  ondansetron, 4 mg, Q6H PRN  polyethylene glycol, 17 g, Daily PRN  acetaminophen, 650 mg, Q6H PRN   Or  acetaminophen, 650 mg, Q6H PRN  glucose, 15 g, PRN  dextrose, 12.5 g, PRN  glucagon (rDNA), 1 mg, PRN  dextrose, 100 mL/hr, PRN         Objective:    /74   Pulse 71   Temp 98.6 °F (37 °C) (Oral)   Resp 28   Ht 5' 7\" (1.702 m)   Wt 184 lb 11.9 oz (83.8 kg)   SpO2 92%   BMI 28.94 kg/m²   General Appearance: alert and oriented to person, place and time and in no acute respiratory  distress  Pulmonary/Chest: Even unlabored on NIPPV  Cardiovascular: NSR HR 72 on monitor  Neurologic: no cranial nerve deficit and speech normal      Recent Labs     10/09/21  0527 10/10/21  0730 10/11/21  0639   * 130* 132   K 4.6 4.3 4.6   CL 97* 96* 98   CO2 25 24 24   BUN 19 22 25*   CREATININE 0.5* 0.4* 0.4*   GLUCOSE 188* 205* 222*   CALCIUM 8.0* 8.1* 8.1*       Recent Labs     10/09/21  0527 10/10/21  0730 10/11/21  0639   ALKPHOS 211* 186* 164*   PROT 5.2* 5.9* 6.0*   LABALBU 2.8* 2.7* 2.7*   BILITOT 0.5 0.5 0.4   AST 24 22 22   ALT 57* 57* 56*       Recent Labs     10/09/21  0527 10/10/21  0730 10/11/21  0639   WBC 11.2 10.3 10.3   RBC 3.91 4.01 4.01   HGB 10.7* 11.0* 10.8*   HCT 32.5* 33.1* 33.6*   MCV 83.1 82.5 83.8   MCH 27.4 27.4 26.9   MCHC 32.9 33.2 32.1   RDW 14.3 14.3 14.4    241 241   MPV 11.2 11.2 11.2           Radiology:   XR CHEST PORTABLE   Final Result   1. There is no interval change in extensive multifocal bilateral airspace   disease. XR CHEST PORTABLE   Final Result   1. No interval change in the extensive multifocal bilateral airspace disease. XR CHEST PORTABLE   Final Result   No interval change         XR CHEST PORTABLE   Final Result   1. Significant worsening of the multifocal bilateral pneumonia when compared   with the prior CT scan of 09/22/2021         CTA PULMONARY W CONTRAST   Final Result   Within described exam limitations, no evidence of pulmonary embolism. Scattered multifocal low density infiltrates throughout both lungs, most   suggestive of COVID pneumonia in context of current pandemic. At least moderate diffuse hepatic steatosis. Cholecystectomy. RECOMMENDATIONS:   Multiple pulmonary nodules. Most severe: 4 mm left solid pulmonary nodule   within the upper lobe. A non-contrast Chest CT at 12 months is optional. If   performed and the nodule is stable at 12 months, no further follow-up is   recommended. These guidelines do not apply to immunocompromised patients and patients with   cancer. Follow up in patients with significant comorbidities as clinically   warranted. For lung cancer screening, adhere to Lung-RADS guidelines. Reference: Radiology. 2017; 284(1):228-43. XR CHEST PORTABLE   Final Result   There are bilateral multifocal ground-glass areas of consolidation with the   lungs concerning for in infectious or inflammatory process and developing   ARDS or viral pneumonia could give this appearance. Assessment:  Active Problems:    COVID-19    Acute respiratory failure due to COVID-19 [facility])    Acute respiratory failure with hypoxia (HCC)    Pneumonia due to COVID-19 virus    Elevated LFTs    Hypokalemia    Essential hypertension  Resolved Problems:    * No resolved hospital problems. *      Plan:  1. Acute Respiratory Failure with Hypoxia- 2/2 COVID-19 CXR significant worsening multifocal infiltrates 10/4 CXR no change in imaging. Continue dexamethasone/Albuterol/. Requiring NIPPV 20/10 100% continuous. Continue to wean as tolerated. Maintain SPO2>92%. Currently requiring BiPap. DDimer [id number] Pulmonology input appreciated. 2. COVID-19 Viral Pneumonia- COVID-19+The current medical regimen is effective;  continue present plan and medications. Baricitinib day 7/Dexamethasone day /Vitamin Supplementation. Follow inflammatory markers. Lovenox 1mg/kg. Encourage IS/Pronating. DDImer [id number]  Isolation per protocol Supportive care. Pulmonology following. 3. Anxiety- Continue Xanax Q4 W/A. 4. DM- Continue SSI/hypoglycemic protocol/Carb Control diet. Follow adjust as needed. 5. HTN- Continue Amlodipine. 6. Poor intake- 2//2 Respiratory distress. Poor intake noted. Place on Calorie count. Dietician for supplementation. May require NGT. Follow closely. 7. Disposition- [facility] following. Will need 2 more inpatient days before [facility] referral can be made. [facility], but will likely need [facility] at [facility]. .casemanagement following. Due to the current efforts to prevent transmission of COVID-19 and also the need to preserve PPE for other caregivers, a face-to-face encounter with the patient was not performed.  That being said, all relevant records and diagnostic tests were reviewed, including laboratory results and imaging. Please reference any relevant documentation elsewhere. NOTE: This report was transcribed using voice recognition software. Every effort was made to ensure accuracy; however, inadvertent computerized transcription errors may be present. Electronically signed by Drew Koroma CNP on 10/11/2021 at 12:03 PM

## 2021-10-11 NOTE — ACP (ADVANCE CARE PLANNING)
Advance Care Planning     Advance Care Planning Activator (Inpatient)  Conversation Note      Date of ACP Conversation: 10/11/2021     Conversation Conducted with:     Patient is unable to answer questions at this time and family does not know patient's wishes    ACP Activator: Ty Whipple RN      Health Care Decision Maker:     Current Designated Health Care Decision Maker:     Primary Decision Maker: Olive - Child - [phone]    Secondary Decision Maker: Trae Fiore - Child - [phone]    Secondary Decision Maker: Kayli Huy - Brother/Sister - [phone]  Click here to complete Healthcare Decision Makers including section of the Healthcare Decision Maker Relationship (ie \"Primary\")  Care Preferences    Ventilation: \"If you were in your present state of health and suddenly became very ill and were unable to breathe on your own, what would your preference be about the use of a ventilator (breathing machine) if it were available to you? \"      Would the patient desire the use of ventilator (breathing machine)?:    \"If your health worsens and it becomes clear that your chance of recovery is unlikely, what would your preference be about the use of a ventilator (breathing machine) if it were available to you? \"     Would the patient desire the use of ventilator (breathing machine)?:       Resuscitation  \"CPR works best to restart the heart when there is a sudden event, like a heart attack, in someone who is otherwise healthy. Unfortunately, CPR does not typically restart the heart for people who have serious health conditions or who are very sick. \"    \"In the event your heart stopped as a result of an underlying serious health condition, would you want attempts to be made to restart your heart (answer \"yes\" for attempt to resuscitate) or would you prefer a natural death (answer \"no\" for do not attempt to resuscitate)? \"       [] Yes   [x] No   Educated Patient / Decision Maker regarding differences between Advance Directives and portable DNR orders.     Length of ACP Conversation in minutes:      Conversation Outcomes:  [] ACP discussion completed  [] Existing advance directive reviewed with patient; no changes to patient's previously recorded wishes  [] New Advance Directive completed  [] Portable Do Not Rescitate prepared for Provider review and signature  [] POLST/POST/MOLST/MOST prepared for Provider review and signature      Follow-up plan:    [] Schedule follow-up conversation to continue planning  [] Referred individual to Provider for additional questions/concerns   [] Advised patient/agent/surrogate to review completed ACP document and update if needed with changes in condition, patient preferences or care setting    [] This note routed to one or more involved healthcare providers     Unable to complete ACP at this time

## 2021-10-12 LAB
ALBUMIN SERPL-MCNC: 2.9 G/DL (ref 3.5–5.2)
ALP BLD-CCNC: 152 U/L (ref 40–129)
ALT SERPL-CCNC: 56 U/L (ref 0–40)
ANION GAP SERPL CALCULATED.3IONS-SCNC: 11 MMOL/L (ref 7–16)
AST SERPL-CCNC: 21 U/L (ref 0–39)
BILIRUB SERPL-MCNC: 0.4 MG/DL (ref 0–1.2)
BUN BLDV-MCNC: 27 MG/DL (ref 6–23)
C-REACTIVE PROTEIN: 3.8 MG/DL (ref 0–0.4)
CALCIUM SERPL-MCNC: 8.2 MG/DL (ref 8.6–10.2)
CHLORIDE BLD-SCNC: 98 MMOL/L (ref 98–107)
CO2: 25 MMOL/L (ref 22–29)
CREAT SERPL-MCNC: 0.5 MG/DL (ref 0.7–1.2)
D DIMER: 514 NG/ML DDU
FERRITIN: 1355 NG/ML
GFR AFRICAN AMERICAN: >60
GFR NON-AFRICAN AMERICAN: >60 ML/MIN/1.73
GLUCOSE BLD-MCNC: 222 MG/DL (ref 74–99)
HCT VFR BLD CALC: 33.6 % (ref 37–54)
HEMOGLOBIN: 10.6 G/DL (ref 12.5–16.5)
MCH RBC QN AUTO: 26.6 PG (ref 26–35)
MCHC RBC AUTO-ENTMCNC: 31.5 % (ref 32–34.5)
MCV RBC AUTO: 84.2 FL (ref 80–99.9)
METER GLUCOSE: 149 MG/DL (ref 74–99)
METER GLUCOSE: 191 MG/DL (ref 74–99)
METER GLUCOSE: 197 MG/DL (ref 74–99)
METER GLUCOSE: 207 MG/DL (ref 74–99)
PDW BLD-RTO: 14.3 FL (ref 11.5–15)
PLATELET # BLD: 253 E9/L (ref 130–450)
PMV BLD AUTO: 11.1 FL (ref 7–12)
POTASSIUM SERPL-SCNC: 4.8 MMOL/L (ref 3.5–5)
RBC # BLD: 3.99 E12/L (ref 3.8–5.8)
SODIUM BLD-SCNC: 134 MMOL/L (ref 132–146)
TOTAL PROTEIN: 5.4 G/DL (ref 6.4–8.3)
WBC # BLD: 11 E9/L (ref 4.5–11.5)

## 2021-10-12 PROCEDURE — 6360000002 HC RX W HCPCS: Performed by: INTERNAL MEDICINE

## 2021-10-12 PROCEDURE — 2700000000 HC OXYGEN THERAPY PER DAY

## 2021-10-12 PROCEDURE — 85378 FIBRIN DEGRADE SEMIQUANT: CPT

## 2021-10-12 PROCEDURE — 1200000000 HC SEMI PRIVATE

## 2021-10-12 PROCEDURE — 6370000000 HC RX 637 (ALT 250 FOR IP): Performed by: FAMILY MEDICINE

## 2021-10-12 PROCEDURE — 85027 COMPLETE CBC AUTOMATED: CPT

## 2021-10-12 PROCEDURE — APPSS30 APP SPLIT SHARED TIME 16-30 MINUTES: Performed by: NURSE PRACTITIONER

## 2021-10-12 PROCEDURE — 94660 CPAP INITIATION&MGMT: CPT

## 2021-10-12 PROCEDURE — 6370000000 HC RX 637 (ALT 250 FOR IP): Performed by: INTERNAL MEDICINE

## 2021-10-12 PROCEDURE — 2580000003 HC RX 258: Performed by: INTERNAL MEDICINE

## 2021-10-12 PROCEDURE — 86140 C-REACTIVE PROTEIN: CPT

## 2021-10-12 PROCEDURE — 80053 COMPREHEN METABOLIC PANEL: CPT

## 2021-10-12 PROCEDURE — 36415 COLL VENOUS BLD VENIPUNCTURE: CPT

## 2021-10-12 PROCEDURE — 82728 ASSAY OF FERRITIN: CPT

## 2021-10-12 PROCEDURE — 94640 AIRWAY INHALATION TREATMENT: CPT

## 2021-10-12 PROCEDURE — 82962 GLUCOSE BLOOD TEST: CPT

## 2021-10-12 PROCEDURE — 99233 SBSQ HOSP IP/OBS HIGH 50: CPT | Performed by: INTERNAL MEDICINE

## 2021-10-12 RX ADMIN — GUAIFENESIN AND CODEINE PHOSPHATE 10 ML: 10; 100 LIQUID ORAL at 06:13

## 2021-10-12 RX ADMIN — DEXAMETHASONE SODIUM PHOSPHATE 10 MG: 4 INJECTION, SOLUTION INTRA-ARTICULAR; INTRALESIONAL; INTRAMUSCULAR; INTRAVENOUS; SOFT TISSUE at 22:31

## 2021-10-12 RX ADMIN — DEXAMETHASONE SODIUM PHOSPHATE 10 MG: 4 INJECTION, SOLUTION INTRA-ARTICULAR; INTRALESIONAL; INTRAMUSCULAR; INTRAVENOUS; SOFT TISSUE at 09:20

## 2021-10-12 RX ADMIN — PAROXETINE 30 MG: 10 TABLET, FILM COATED ORAL at 22:38

## 2021-10-12 RX ADMIN — ENOXAPARIN SODIUM 80 MG: 100 INJECTION SUBCUTANEOUS at 09:21

## 2021-10-12 RX ADMIN — ZINC SULFATE 220 MG (50 MG) CAPSULE 50 MG: CAPSULE at 09:20

## 2021-10-12 RX ADMIN — ALBUTEROL SULFATE 2 PUFF: 90 AEROSOL, METERED RESPIRATORY (INHALATION) at 08:33

## 2021-10-12 RX ADMIN — ALBUTEROL SULFATE 2 PUFF: 90 AEROSOL, METERED RESPIRATORY (INHALATION) at 16:54

## 2021-10-12 RX ADMIN — INSULIN LISPRO 1 UNITS: 100 INJECTION, SOLUTION INTRAVENOUS; SUBCUTANEOUS at 16:59

## 2021-10-12 RX ADMIN — ALPRAZOLAM 0.25 MG: 0.25 TABLET ORAL at 06:14

## 2021-10-12 RX ADMIN — Medication 2000 UNITS: at 09:21

## 2021-10-12 RX ADMIN — ENOXAPARIN SODIUM 80 MG: 100 INJECTION SUBCUTANEOUS at 22:32

## 2021-10-12 RX ADMIN — ALPRAZOLAM 0.25 MG: 0.25 TABLET ORAL at 14:44

## 2021-10-12 RX ADMIN — INSULIN LISPRO 1 UNITS: 100 INJECTION, SOLUTION INTRAVENOUS; SUBCUTANEOUS at 12:24

## 2021-10-12 RX ADMIN — INSULIN LISPRO 2 UNITS: 100 INJECTION, SOLUTION INTRAVENOUS; SUBCUTANEOUS at 06:28

## 2021-10-12 RX ADMIN — AMLODIPINE BESYLATE 5 MG: 5 TABLET ORAL at 09:20

## 2021-10-12 RX ADMIN — GUAIFENESIN AND CODEINE PHOSPHATE 10 ML: 10; 100 LIQUID ORAL at 16:55

## 2021-10-12 RX ADMIN — Medication 10 ML: at 22:38

## 2021-10-12 RX ADMIN — ACETAMINOPHEN 650 MG: 325 TABLET ORAL at 22:31

## 2021-10-12 RX ADMIN — OXYCODONE HYDROCHLORIDE AND ACETAMINOPHEN 500 MG: 500 TABLET ORAL at 09:20

## 2021-10-12 RX ADMIN — BUDESONIDE AND FORMOTEROL FUMARATE DIHYDRATE 2 PUFF: 160; 4.5 AEROSOL RESPIRATORY (INHALATION) at 22:32

## 2021-10-12 RX ADMIN — Medication 10 ML: at 09:20

## 2021-10-12 RX ADMIN — ALBUTEROL SULFATE 2 PUFF: 90 AEROSOL, METERED RESPIRATORY (INHALATION) at 12:24

## 2021-10-12 RX ADMIN — ALBUTEROL SULFATE 2 PUFF: 90 AEROSOL, METERED RESPIRATORY (INHALATION) at 22:32

## 2021-10-12 RX ADMIN — ALPRAZOLAM 0.25 MG: 0.25 TABLET ORAL at 22:31

## 2021-10-12 RX ADMIN — TIOTROPIUM BROMIDE INHALATION SPRAY 2 PUFF: 3.12 SPRAY, METERED RESPIRATORY (INHALATION) at 08:33

## 2021-10-12 RX ADMIN — ALPRAZOLAM 0.25 MG: 0.25 TABLET ORAL at 09:20

## 2021-10-12 RX ADMIN — BUDESONIDE AND FORMOTEROL FUMARATE DIHYDRATE 2 PUFF: 160; 4.5 AEROSOL RESPIRATORY (INHALATION) at 08:34

## 2021-10-12 RX ADMIN — GUAIFENESIN AND CODEINE PHOSPHATE 10 ML: 10; 100 LIQUID ORAL at 14:44

## 2021-10-12 RX ADMIN — PAROXETINE 30 MG: 10 TABLET, FILM COATED ORAL at 09:20

## 2021-10-12 RX ADMIN — ALPRAZOLAM 0.25 MG: 0.25 TABLET ORAL at 16:54

## 2021-10-12 RX ADMIN — Medication 4.5 MG: at 22:31

## 2021-10-12 RX ADMIN — INSULIN LISPRO 1 UNITS: 100 INJECTION, SOLUTION INTRAVENOUS; SUBCUTANEOUS at 23:19

## 2021-10-12 RX ADMIN — GUAIFENESIN AND CODEINE PHOSPHATE 10 ML: 10; 100 LIQUID ORAL at 22:32

## 2021-10-12 ASSESSMENT — PAIN DESCRIPTION - ORIENTATION: ORIENTATION: RIGHT;LEFT

## 2021-10-12 ASSESSMENT — PAIN DESCRIPTION - ONSET: ONSET: ON-GOING

## 2021-10-12 ASSESSMENT — PAIN DESCRIPTION - FREQUENCY: FREQUENCY: INTERMITTENT

## 2021-10-12 ASSESSMENT — PAIN DESCRIPTION - DESCRIPTORS: DESCRIPTORS: HEADACHE

## 2021-10-12 ASSESSMENT — PAIN DESCRIPTION - LOCATION: LOCATION: HEAD

## 2021-10-12 ASSESSMENT — PAIN DESCRIPTION - PAIN TYPE: TYPE: ACUTE PAIN

## 2021-10-12 ASSESSMENT — PAIN SCALES - GENERAL
PAINLEVEL_OUTOF10: 0
PAINLEVEL_OUTOF10: 4

## 2021-10-12 ASSESSMENT — PAIN DESCRIPTION - PROGRESSION: CLINICAL_PROGRESSION: NOT CHANGED

## 2021-10-12 ASSESSMENT — PAIN - FUNCTIONAL ASSESSMENT: PAIN_FUNCTIONAL_ASSESSMENT: PREVENTS OR INTERFERES SOME ACTIVE ACTIVITIES AND ADLS

## 2021-10-12 NOTE — PROGRESS NOTES
Patient was educated on the importance of wearing the Bipap at night and times of rest, patient still refuses to wear bipap.  Will continue to monitor, RN aware

## 2021-10-12 NOTE — CARE COORDINATION
COVID + 9/22. Requiring 100% optiflow w/ 60 liters O2. Ocie Forget following- O2 demands are still too high for LTAC to accept. Will need to call [place] [phone] when close to discharge to [place] [id number]( last 4 of [facility])- need to find out which [place]  is following and request an LTAC order/auth. Attempted to call patient regarding what his Social Security # is- no answer( family does not know pt's social).  Will follow  Aguila Lr RN case manager

## 2021-10-12 NOTE — PROGRESS NOTES
[address] [facility] Hospitalist   Progress Note    Admitting Date and Time: 9/22/2021  9:32 PM  Admit Dx: Hypokalemia [E87.6]  Acute respiratory failure with hypoxia (HCC) [J96.01]  COVID-19 [U07.1]  Acute respiratory failure due to COVID-19 (HCC) [U07.1, J96.00]    Subjective:    Pt sitting up in bed in no acute distress  Currently tolerating Airvo  Has tolerated Airvo throughout the day. Has increased his intake  States he is feeling better today  Very thankful for care given    RN: Pt has tolerated Airvo today. Eating better. ROS: denies fever, chills, cp, n/v, HA unless stated above.      ascorbic acid  500 mg Oral Daily    zinc sulfate  50 mg Oral Daily    vitamin D  2,000 Units Oral Daily    tiotropium  2 puff Inhalation Daily    budesonide-formoterol  2 puff Inhalation BID    ALPRAZolam  0.25 mg Oral Q4H While awake    guaiFENesin-codeine  10 mL Oral Q4H While awake    dexamethasone  10 mg IntraVENous Q12H    enoxaparin  1 mg/kg SubCUTAneous BID    albuterol sulfate HFA  2 puff Inhalation 4x daily    sodium chloride flush  5-40 mL IntraVENous 2 times per day    PARoxetine  30 mg Oral BID    amLODIPine  5 mg Oral Daily    insulin lispro  0-6 Units SubCUTAneous TID WC    insulin lispro  0-3 Units SubCUTAneous Nightly     aluminum & magnesium hydroxide-simethicone, 15 mL, Q6H PRN  melatonin, 4.5 mg, Nightly PRN  sodium chloride flush, 5-40 mL, PRN  sodium chloride, 25 mL, PRN  ondansetron, 4 mg, Q8H PRN   Or  ondansetron, 4 mg, Q6H PRN  polyethylene glycol, 17 g, Daily PRN  acetaminophen, 650 mg, Q6H PRN   Or  acetaminophen, 650 mg, Q6H PRN  glucose, 15 g, PRN  dextrose, 12.5 g, PRN  glucagon (rDNA), 1 mg, PRN  dextrose, 100 mL/hr, PRN         Objective:    /63   Pulse 68   Temp 97.4 °F (36.3 °C) (Oral)   Resp 22   Ht 5' 7\" (1.702 m)   Wt 184 lb 11.9 oz (83.8 kg)   SpO2 91%   BMI 28.94 kg/m²   General Appearance: alert and oriented to person, place and time and in respiratory  distress  Skin: warm and dry  Head: normocephalic and atraumatic  Eyes: pupils equal, round, and reactive to light, extraocular eye movements intact, conjunctivae normal  Neck: neck supple and non tender without mass   Pulmonary/Chest:Diminished/rhonchi to auscultation bilaterally- no wheezes, rales  normal air movement, acute respiratory distress  Cardiovascular: normal rate, normal S1 and S2 and no RGM  Abdomen: soft, non-tender, non-distended, normal bowel sounds  Extremities: no cyanosis, no clubbing and no edema  Neurologic: no cranial nerve deficit and speech normal    Recent Labs     10/10/21  0730 10/11/21  0639 10/12/21  0625   * 132 134   K 4.3 4.6 4.8   CL 96* 98 98   CO2 24 24 25   BUN 22 25* 27*   CREATININE 0.4* 0.4* 0.5*   GLUCOSE 205* 222* 222*   CALCIUM 8.1* 8.1* 8.2*       Recent Labs     10/10/21  0730 10/11/21  0639 10/12/21  0625   ALKPHOS 186* 164* 152*   PROT 5.9* 6.0* 5.4*   LABALBU 2.7* 2.7* 2.9*   BILITOT 0.5 0.4 0.4   AST 22 22 21   ALT 57* 56* 56*       Recent Labs     10/10/21  0730 10/11/21  0639 10/12/21  0625   WBC 10.3 10.3 11.0   RBC 4.01 4.01 3.99   HGB 11.0* 10.8* 10.6*   HCT 33.1* 33.6* 33.6*   MCV 82.5 83.8 84.2   MCH 27.4 26.9 26.6   MCHC 33.2 32.1 31.5*   RDW 14.3 14.4 14.3    241 253   MPV 11.2 11.2 11.1           Radiology:   XR CHEST PORTABLE   Final Result   1. There is no interval change in extensive multifocal bilateral airspace   disease. XR CHEST PORTABLE   Final Result   1. No interval change in the extensive multifocal bilateral airspace disease. XR CHEST PORTABLE   Final Result   No interval change         XR CHEST PORTABLE   Final Result   1. Significant worsening of the multifocal bilateral pneumonia when compared   with the prior CT scan of 09/22/2021         CTA PULMONARY W CONTRAST   Final Result   Within described exam limitations, no evidence of pulmonary embolism.       Scattered multifocal low density infiltrates throughout both lungs, most   suggestive of COVID pneumonia in context of current pandemic. At least moderate diffuse hepatic steatosis. Cholecystectomy. RECOMMENDATIONS:   Multiple pulmonary nodules. Most severe: 4 mm left solid pulmonary nodule   within the upper lobe. A non-contrast Chest CT at 12 months is optional. If   performed and the nodule is stable at 12 months, no further follow-up is   recommended. These guidelines do not apply to immunocompromised patients and patients with   cancer. Follow up in patients with significant comorbidities as clinically   warranted. For lung cancer screening, adhere to Lung-RADS guidelines. Reference: Radiology. 2017; 284(1):228-43. XR CHEST PORTABLE   Final Result   There are bilateral multifocal ground-glass areas of consolidation with the   lungs concerning for in infectious or inflammatory process and developing   ARDS or viral pneumonia could give this appearance. Assessment:  Active Problems:    COVID-19    Acute respiratory failure due to COVID-19 [facility])    Acute respiratory failure with hypoxia (HCC)    Pneumonia due to COVID-19 virus    Elevated LFTs    Hypokalemia    Essential hypertension  Resolved Problems:    * No resolved hospital problems. *      Plan:  1. Acute Respiratory Failure with Hypoxia- 2/2 COVID-19 CXR significant worsening multifocal infiltrates 10/4 CXR no change in imaging. Continue dexamethasone/Albuterol/. Requiring NIPPV 20/10 100% continuous. Continue to wean as tolerated. Maintain SPO2>92%. Currently requiring BiPap. DDimer [phone] Pulmonology input appreciated. 2. COVID-19 Viral Pneumonia- COVID-19+The current medical regimen is effective;  continue present plan and medications. Baricitinib day 7/Dexamethasone day /Vitamin Supplementation. Follow inflammatory markers. Lovenox 1mg/kg. Encourage IS/Pronating. DDImer 513  Isolation per protocol Supportive care. Pulmonology following.     3. Anxiety- Continue Xanax Q4 W/A. 4. DM- Continue SSI/hypoglycemic protocol/Carb Control diet. Follow adjust as needed. 5. HTN- Continue Amlodipine. 6. Poor intake- 2//2 Respiratory distress. Poor intake noted. Place on Calorie count. Dietician for supplementation. May require NGT. Follow closely. 7. Disposition- [facility] following. Will need 1 more inpatient days before [facility] referral can be made. Referral made to [facility], but will likely need [facility] at [facility]. .casemanagement following. NOTE: This report was transcribed using voice recognition software. Every effort was made to ensure accuracy; however, inadvertent computerized transcription errors may be present. Electronically signed by Nicola Koroma CNP on 10/12/2021 at 10:14 AM   Addendum: I have personally participated in the history, exam, medical decision making with Rik Gradyer on the date of service and I agree with all of the pertinent clinical information unless otherwise noted. I have also reviewed and agree with the past medical, family, and social history unless otherwise noted. Patient was admitted with covid 19 pneumonia    PHYSICAL EXAM:  Vitals:  /63   Pulse 68   Temp 97.4 °F (36.3 °C) (Oral)   Resp 22   Ht 5' 7\" (1.702 m)   Wt 184 lb 11.9 oz (83.8 kg)   SpO2 91%   BMI 28.94 kg/m²   Gen: awake, alert, NAD  Lungs: clear to auscultation bilaterally no crackles no wheezing. Heart: RRR, no murmur   Abdomen: soft nontender nondistended positive bowel sounds. Extremities: full range of motion no peripheral edema. Impression:  Active Problems:    COVID-19    Acute respiratory failure due to COVID-19 [facility])    Acute respiratory failure with hypoxia (HCC)    Pneumonia due to COVID-19 virus    Elevated LFTs    Hypokalemia    Essential hypertension  Resolved Problems:    * No resolved hospital problems. *      My findings/plan include:    Continue Decadron as well as breathing treatments. Continue vitamin cocktail.   Follow-up with pulmonology recommendations. Discharge to LTAC. NOTE: This report was transcribed using voice recognition software. Every effort was made to ensure accuracy; however, inadvertent computerized transcription errors may be present.   Electronically signed by Ivanna Deng DO on 10/12/2021 at 4:28 PM

## 2021-10-12 NOTE — PLAN OF CARE
Problem:  Body Temperature -  Risk of, Imbalanced  Goal: Ability to maintain a body temperature within defined limits  Outcome: Met This Shift

## 2021-10-12 NOTE — PROGRESS NOTES
Pulmonary Progress Note    Admit Date: 2021  Hospital day                               PCP: No primary care provider on file. Chief Complaint (s):  Patient Active Problem List   Diagnosis    COVID-19    Acute respiratory failure due to COVID-19 [facility])    Acute respiratory failure with hypoxia ([address]    Pneumonia due to COVID-19 virus    Elevated LFTs    Hypokalemia    Essential hypertension       Subjective:  · In the air Vo system when seen this p.m. His saturation is up to 93%. Awaiting LTAC transfer once oxygen demands decreased. Vitals:  VITALS:  /63   Pulse 68   Temp 97.4 °F (36.3 °C) (Oral)   Resp 22   Ht 5' 7\" (1.702 m)   Wt 184 lb 11.9 oz (83.8 kg)   SpO2 91%   BMI 28.94 kg/m²     24HR INTAKE/OUTPUT:      Intake/Output Summary (Last 24 hours) at 10/12/2021 1734  Last data filed at 10/11/2021 2300  Gross per 24 hour   Intake 360 ml   Output 500 ml   Net -140 ml       24HR PULSE OXIMETRY RANGE:    SpO2  Av.3 %  Min: 91 %  Max: 92 %    Medications:  IV:   sodium chloride      dextrose         Scheduled Meds:   ascorbic acid  500 mg Oral Daily    zinc sulfate  50 mg Oral Daily    vitamin D  2,000 Units Oral Daily    tiotropium  2 puff Inhalation Daily    budesonide-formoterol  2 puff Inhalation BID    ALPRAZolam  0.25 mg Oral Q4H While awake    guaiFENesin-codeine  10 mL Oral Q4H While awake    dexamethasone  10 mg IntraVENous Q12H    enoxaparin  1 mg/kg SubCUTAneous BID    albuterol sulfate HFA  2 puff Inhalation 4x daily    sodium chloride flush  5-40 mL IntraVENous 2 times per day    PARoxetine  30 mg Oral BID    amLODIPine  5 mg Oral Daily    insulin lispro  0-6 Units SubCUTAneous TID WC    insulin lispro  0-3 Units SubCUTAneous Nightly       Diet:   ADULT DIET; Regular; 5 carb choices (75 gm/meal)  Adult Oral Nutrition Supplement; Diabetic Oral Supplement     EXAM:  General: No distress. Sleeping  Eyes: PERRL. No sclera icterus.  No conjunctival injection. ENT: No discharge. Pharynx clear. Neck: Trachea midline. Normal thyroid. Resp: No accessory muscle use. No rales. No wheezing. No rhonchi. CV: Regular rate. Regular rhythm. No murmur or rub. Abd: Non-tender. Non-distended. No masses. No organomegaly. Normal bowel sounds. Skin: Warm and dry. No nodule on exposed extremities. No rash on exposed extremities. Ext: No cyanosis, clubbing, edema  Lymph: No cervical LAD. No supraclavicular LAD. M/S: No cyanosis. No joint deformity. No clubbing. Neuro: Positive pupils/gag/corneals. Normal pain response. Results:  CBC:   Recent Labs     10/10/21  0730 10/11/21  0639 10/12/21  0625   WBC 10.3 10.3 11.0   HGB 11.0* 10.8* 10.6*   HCT 33.1* 33.6* 33.6*   MCV 82.5 83.8 84.2    241 253     BMP:   Recent Labs     10/10/21  0730 10/11/21  0639 10/12/21  0625   * 132 134   K 4.3 4.6 4.8   CL 96* 98 98   CO2 24 24 25   BUN 22 25* 27*   CREATININE 0.4* 0.4* 0.5*     LIVER PROFILE:   Recent Labs     10/10/21  0730 10/11/21  0639 10/12/21  0625   AST 22 22 21   ALT 57* 56* 56*   BILITOT 0.5 0.4 0.4   ALKPHOS 186* 164* 152*     PT/INR: No results for input(s): PROTIME, INR in the last 72 hours. APTT: No results for input(s): APTT in the last 72 hours. Pathology:  1. N/A      Microbiology:  1. None    Recent ABG:   No results for input(s): PH, PO2, PCO2, HCO3, BE, O2SAT, METHB, O2HB, COHB, O2CON, HHB, THB in the last 72 hours. FiO2 : 100 %       Recent Films:  XR CHEST PORTABLE   Final Result   1. There is no interval change in extensive multifocal bilateral airspace   disease. XR CHEST PORTABLE   Final Result   1. No interval change in the extensive multifocal bilateral airspace disease. XR CHEST PORTABLE   Final Result   No interval change         XR CHEST PORTABLE   Final Result   1.  Significant worsening of the multifocal bilateral pneumonia when compared   with the prior CT scan of 09/22/2021         CTA PULMONARY W CONTRAST   Final Result   Within described exam limitations, no evidence of pulmonary embolism. Scattered multifocal low density infiltrates throughout both lungs, most   suggestive of COVID pneumonia in context of current pandemic. At least moderate diffuse hepatic steatosis. Cholecystectomy. RECOMMENDATIONS:   Multiple pulmonary nodules. Most severe: 4 mm left solid pulmonary nodule   within the upper lobe. A non-contrast Chest CT at 12 months is optional. If   performed and the nodule is stable at 12 months, no further follow-up is   recommended. These guidelines do not apply to immunocompromised patients and patients with   cancer. Follow up in patients with significant comorbidities as clinically   warranted. For lung cancer screening, adhere to Lung-RADS guidelines. Reference: Radiology. 2017; 284(1):228-43. XR CHEST PORTABLE   Final Result   There are bilateral multifocal ground-glass areas of consolidation with the   lungs concerning for in infectious or inflammatory process and developing   ARDS or viral pneumonia could give this appearance.                      Assessment:  1. COVID-19 pneumonia with significant worsening despite steroids and baricitinib. Inflammatory markers and chest radiograph are reviewed. More recently, there has been no improvement. D-dimer is noted. Still requiring 60 L of oxygen/100%.       Plan:  1. Continue full dose anticoagulation  2. Wean FiO2 as tolerated  3. Okay to [facility] when they can accept him. Time at the bedside, reviewing labs and radiographs, reviewing updated notes and consultations, discussing with staff and family was more than 35 minutes. Please note that voice recognition technology was used in the preparation of this note and make therefore it may contain inadvertent transcription errors.   If the patient is a COVID 19 isolation patient, the above physical exam reflects that of the examining physician for the day.        Jesus Cash MD,  M.D., F.C.C.P.     [facility], [facility], [address] [place] [address]

## 2021-10-13 LAB
ALBUMIN SERPL-MCNC: 2.6 G/DL (ref 3.5–5.2)
ALP BLD-CCNC: 132 U/L (ref 40–129)
ALT SERPL-CCNC: 61 U/L (ref 0–40)
ANION GAP SERPL CALCULATED.3IONS-SCNC: 8 MMOL/L (ref 7–16)
AST SERPL-CCNC: 21 U/L (ref 0–39)
BILIRUB SERPL-MCNC: 0.2 MG/DL (ref 0–1.2)
BUN BLDV-MCNC: 26 MG/DL (ref 6–23)
C-REACTIVE PROTEIN: 2.2 MG/DL (ref 0–0.4)
CALCIUM SERPL-MCNC: 8.3 MG/DL (ref 8.6–10.2)
CHLORIDE BLD-SCNC: 98 MMOL/L (ref 98–107)
CO2: 27 MMOL/L (ref 22–29)
CREAT SERPL-MCNC: 0.5 MG/DL (ref 0.7–1.2)
D DIMER: 472 NG/ML DDU
FERRITIN: 1176 NG/ML
GFR AFRICAN AMERICAN: >60
GFR NON-AFRICAN AMERICAN: >60 ML/MIN/1.73
GLUCOSE BLD-MCNC: 182 MG/DL (ref 74–99)
HCT VFR BLD CALC: 31.7 % (ref 37–54)
HEMOGLOBIN: 10.3 G/DL (ref 12.5–16.5)
MCH RBC QN AUTO: 27.1 PG (ref 26–35)
MCHC RBC AUTO-ENTMCNC: 32.5 % (ref 32–34.5)
MCV RBC AUTO: 83.4 FL (ref 80–99.9)
METER GLUCOSE: 148 MG/DL (ref 74–99)
METER GLUCOSE: 170 MG/DL (ref 74–99)
METER GLUCOSE: 209 MG/DL (ref 74–99)
METER GLUCOSE: 218 MG/DL (ref 74–99)
PDW BLD-RTO: 14.3 FL (ref 11.5–15)
PLATELET # BLD: 223 E9/L (ref 130–450)
PMV BLD AUTO: 10.6 FL (ref 7–12)
POTASSIUM SERPL-SCNC: 4.6 MMOL/L (ref 3.5–5)
RBC # BLD: 3.8 E12/L (ref 3.8–5.8)
SODIUM BLD-SCNC: 133 MMOL/L (ref 132–146)
TOTAL PROTEIN: 5.6 G/DL (ref 6.4–8.3)
WBC # BLD: 8.9 E9/L (ref 4.5–11.5)

## 2021-10-13 PROCEDURE — 36415 COLL VENOUS BLD VENIPUNCTURE: CPT

## 2021-10-13 PROCEDURE — 82962 GLUCOSE BLOOD TEST: CPT

## 2021-10-13 PROCEDURE — 99233 SBSQ HOSP IP/OBS HIGH 50: CPT | Performed by: INTERNAL MEDICINE

## 2021-10-13 PROCEDURE — 6370000000 HC RX 637 (ALT 250 FOR IP): Performed by: NURSE PRACTITIONER

## 2021-10-13 PROCEDURE — 80053 COMPREHEN METABOLIC PANEL: CPT

## 2021-10-13 PROCEDURE — 2700000000 HC OXYGEN THERAPY PER DAY

## 2021-10-13 PROCEDURE — 85027 COMPLETE CBC AUTOMATED: CPT

## 2021-10-13 PROCEDURE — 6360000002 HC RX W HCPCS: Performed by: INTERNAL MEDICINE

## 2021-10-13 PROCEDURE — 2580000003 HC RX 258: Performed by: INTERNAL MEDICINE

## 2021-10-13 PROCEDURE — 1200000000 HC SEMI PRIVATE

## 2021-10-13 PROCEDURE — 94660 CPAP INITIATION&MGMT: CPT

## 2021-10-13 PROCEDURE — 82728 ASSAY OF FERRITIN: CPT

## 2021-10-13 PROCEDURE — 85378 FIBRIN DEGRADE SEMIQUANT: CPT

## 2021-10-13 PROCEDURE — APPSS30 APP SPLIT SHARED TIME 16-30 MINUTES: Performed by: NURSE PRACTITIONER

## 2021-10-13 PROCEDURE — 6370000000 HC RX 637 (ALT 250 FOR IP): Performed by: INTERNAL MEDICINE

## 2021-10-13 PROCEDURE — 86140 C-REACTIVE PROTEIN: CPT

## 2021-10-13 RX ORDER — INSULIN GLARGINE 100 [IU]/ML
5 INJECTION, SOLUTION SUBCUTANEOUS NIGHTLY
Status: DISCONTINUED | OUTPATIENT
Start: 2021-10-13 | End: 2021-11-02 | Stop reason: HOSPADM

## 2021-10-13 RX ADMIN — ALPRAZOLAM 0.25 MG: 0.25 TABLET ORAL at 16:40

## 2021-10-13 RX ADMIN — INSULIN LISPRO 1 UNITS: 100 INJECTION, SOLUTION INTRAVENOUS; SUBCUTANEOUS at 17:26

## 2021-10-13 RX ADMIN — Medication 10 ML: at 21:50

## 2021-10-13 RX ADMIN — ZINC SULFATE 220 MG (50 MG) CAPSULE 50 MG: CAPSULE at 08:51

## 2021-10-13 RX ADMIN — ALPRAZOLAM 0.25 MG: 0.25 TABLET ORAL at 07:01

## 2021-10-13 RX ADMIN — ALPRAZOLAM 0.25 MG: 0.25 TABLET ORAL at 21:55

## 2021-10-13 RX ADMIN — ALPRAZOLAM 0.25 MG: 0.25 TABLET ORAL at 10:37

## 2021-10-13 RX ADMIN — INSULIN LISPRO 2 UNITS: 100 INJECTION, SOLUTION INTRAVENOUS; SUBCUTANEOUS at 11:00

## 2021-10-13 RX ADMIN — PAROXETINE 30 MG: 10 TABLET, FILM COATED ORAL at 21:50

## 2021-10-13 RX ADMIN — OXYCODONE HYDROCHLORIDE AND ACETAMINOPHEN 500 MG: 500 TABLET ORAL at 08:51

## 2021-10-13 RX ADMIN — TIOTROPIUM BROMIDE INHALATION SPRAY 2 PUFF: 3.12 SPRAY, METERED RESPIRATORY (INHALATION) at 08:52

## 2021-10-13 RX ADMIN — Medication 10 ML: at 08:52

## 2021-10-13 RX ADMIN — ALBUTEROL SULFATE 2 PUFF: 90 AEROSOL, METERED RESPIRATORY (INHALATION) at 08:51

## 2021-10-13 RX ADMIN — INSULIN GLARGINE 5 UNITS: 100 INJECTION, SOLUTION SUBCUTANEOUS at 21:01

## 2021-10-13 RX ADMIN — GUAIFENESIN AND CODEINE PHOSPHATE 10 ML: 10; 100 LIQUID ORAL at 21:55

## 2021-10-13 RX ADMIN — BUDESONIDE AND FORMOTEROL FUMARATE DIHYDRATE 2 PUFF: 160; 4.5 AEROSOL RESPIRATORY (INHALATION) at 08:52

## 2021-10-13 RX ADMIN — Medication 2000 UNITS: at 08:51

## 2021-10-13 RX ADMIN — ALBUTEROL SULFATE 2 PUFF: 90 AEROSOL, METERED RESPIRATORY (INHALATION) at 11:00

## 2021-10-13 RX ADMIN — GUAIFENESIN AND CODEINE PHOSPHATE 10 ML: 10; 100 LIQUID ORAL at 07:01

## 2021-10-13 RX ADMIN — BUDESONIDE AND FORMOTEROL FUMARATE DIHYDRATE 2 PUFF: 160; 4.5 AEROSOL RESPIRATORY (INHALATION) at 21:49

## 2021-10-13 RX ADMIN — DEXAMETHASONE SODIUM PHOSPHATE 10 MG: 4 INJECTION, SOLUTION INTRA-ARTICULAR; INTRALESIONAL; INTRAMUSCULAR; INTRAVENOUS; SOFT TISSUE at 21:50

## 2021-10-13 RX ADMIN — INSULIN LISPRO 2 UNITS: 100 INJECTION, SOLUTION INTRAVENOUS; SUBCUTANEOUS at 07:04

## 2021-10-13 RX ADMIN — ENOXAPARIN SODIUM 80 MG: 100 INJECTION SUBCUTANEOUS at 21:50

## 2021-10-13 RX ADMIN — GUAIFENESIN AND CODEINE PHOSPHATE 10 ML: 10; 100 LIQUID ORAL at 16:40

## 2021-10-13 RX ADMIN — ENOXAPARIN SODIUM 80 MG: 100 INJECTION SUBCUTANEOUS at 08:51

## 2021-10-13 RX ADMIN — DEXAMETHASONE SODIUM PHOSPHATE 10 MG: 4 INJECTION, SOLUTION INTRA-ARTICULAR; INTRALESIONAL; INTRAMUSCULAR; INTRAVENOUS; SOFT TISSUE at 08:51

## 2021-10-13 RX ADMIN — ALBUTEROL SULFATE 2 PUFF: 90 AEROSOL, METERED RESPIRATORY (INHALATION) at 21:49

## 2021-10-13 RX ADMIN — GUAIFENESIN AND CODEINE PHOSPHATE 10 ML: 10; 100 LIQUID ORAL at 10:36

## 2021-10-13 RX ADMIN — ALBUTEROL SULFATE 2 PUFF: 90 AEROSOL, METERED RESPIRATORY (INHALATION) at 16:41

## 2021-10-13 RX ADMIN — INSULIN LISPRO 1 UNITS: 100 INJECTION, SOLUTION INTRAVENOUS; SUBCUTANEOUS at 22:43

## 2021-10-13 RX ADMIN — PAROXETINE 30 MG: 10 TABLET, FILM COATED ORAL at 08:50

## 2021-10-13 RX ADMIN — AMLODIPINE BESYLATE 5 MG: 5 TABLET ORAL at 08:51

## 2021-10-13 ASSESSMENT — PAIN DESCRIPTION - FREQUENCY: FREQUENCY: INTERMITTENT

## 2021-10-13 ASSESSMENT — PAIN SCALES - GENERAL
PAINLEVEL_OUTOF10: 0
PAINLEVEL_OUTOF10: 0

## 2021-10-13 ASSESSMENT — PAIN DESCRIPTION - LOCATION: LOCATION: HEAD

## 2021-10-13 ASSESSMENT — PAIN DESCRIPTION - PROGRESSION: CLINICAL_PROGRESSION: NOT CHANGED

## 2021-10-13 ASSESSMENT — PAIN DESCRIPTION - DESCRIPTORS: DESCRIPTORS: HEADACHE

## 2021-10-13 ASSESSMENT — PAIN DESCRIPTION - PAIN TYPE: TYPE: ACUTE PAIN

## 2021-10-13 NOTE — PROGRESS NOTES
[id number] [facility] Hospitalist   Progress Note    Admitting Date and Time: 9/22/2021  9:32 PM  Admit Dx: Hypokalemia [E87.6]  Acute respiratory failure with hypoxia ([address] [J96.01]  COVID-19 [U07.1]  Acute respiratory failure due to COVID-19 (HCC) [U07.1, J96.00]    Subjective:    Pt sitting up in bed in no acute distress  Feeling better  Continues to require significant amount of oxygen supplementation  Currently on 60L 90%% +NRB    RN: No new concerns noted    ROS: denies fever, chills, cp, n/v, HA unless stated above.  ascorbic acid  500 mg Oral Daily    zinc sulfate  50 mg Oral Daily    vitamin D  2,000 Units Oral Daily    tiotropium  2 puff Inhalation Daily    budesonide-formoterol  2 puff Inhalation BID    ALPRAZolam  0.25 mg Oral Q4H While awake    guaiFENesin-codeine  10 mL Oral Q4H While awake    dexamethasone  10 mg IntraVENous Q12H    enoxaparin  1 mg/kg SubCUTAneous BID    albuterol sulfate HFA  2 puff Inhalation 4x daily    sodium chloride flush  5-40 mL IntraVENous 2 times per day    PARoxetine  30 mg Oral BID    amLODIPine  5 mg Oral Daily    insulin lispro  0-6 Units SubCUTAneous TID WC    insulin lispro  0-3 Units SubCUTAneous Nightly     aluminum & magnesium hydroxide-simethicone, 15 mL, Q6H PRN  melatonin, 4.5 mg, Nightly PRN  sodium chloride flush, 5-40 mL, PRN  sodium chloride, 25 mL, PRN  ondansetron, 4 mg, Q8H PRN   Or  ondansetron, 4 mg, Q6H PRN  polyethylene glycol, 17 g, Daily PRN  acetaminophen, 650 mg, Q6H PRN   Or  acetaminophen, 650 mg, Q6H PRN  glucose, 15 g, PRN  dextrose, 12.5 g, PRN  glucagon (rDNA), 1 mg, PRN  dextrose, 100 mL/hr, PRN         Objective:    BP (!) 134/92   Pulse 60   Temp 98.5 °F (36.9 °C) (Axillary)   Resp 22   Ht 5' 7\" (1.702 m)   Wt 184 lb 11.9 oz (83.8 kg)   SpO2 90%   BMI 28.94 kg/m²   General Appearance: alert and oriented to person, place and time and in respiratory  distress  Pulmonary: Even unlabored.  +Dyspnea +cough  Cardiovascular: normal rate HR 60 om monitor  Neurologic:  speech normal    Recent Labs     10/11/21  0639 10/12/21  0625    134   K 4.6 4.8   CL 98 98   CO2 24 25   BUN 25* 27*   CREATININE 0.4* 0.5*   GLUCOSE 222* 222*   CALCIUM 8.1* 8.2*       Recent Labs     10/11/21  0639 10/12/21  0625   ALKPHOS 164* 152*   PROT 6.0* 5.4*   LABALBU 2.7* 2.9*   BILITOT 0.4 0.4   AST 22 21   ALT 56* 56*       Recent Labs     10/11/21  0639 10/12/21  0625   WBC 10.3 11.0   RBC 4.01 3.99   HGB 10.8* 10.6*   HCT 33.6* 33.6*   MCV 83.8 84.2   MCH 26.9 26.6   MCHC 32.1 31.5*   RDW 14.4 14.3    253   MPV 11.2 11.1           Radiology:   XR CHEST PORTABLE   Final Result   1. There is no interval change in extensive multifocal bilateral airspace   disease. XR CHEST PORTABLE   Final Result   1. No interval change in the extensive multifocal bilateral airspace disease. XR CHEST PORTABLE   Final Result   No interval change         XR CHEST PORTABLE   Final Result   1. Significant worsening of the multifocal bilateral pneumonia when compared   with the prior CT scan of 09/22/2021         CTA PULMONARY W CONTRAST   Final Result   Within described exam limitations, no evidence of pulmonary embolism. Scattered multifocal low density infiltrates throughout both lungs, most   suggestive of COVID pneumonia in context of current pandemic. At least moderate diffuse hepatic steatosis. Cholecystectomy. RECOMMENDATIONS:   Multiple pulmonary nodules. Most severe: 4 mm left solid pulmonary nodule   within the upper lobe. A non-contrast Chest CT at 12 months is optional. If   performed and the nodule is stable at 12 months, no further follow-up is   recommended. These guidelines do not apply to immunocompromised patients and patients with   cancer. Follow up in patients with significant comorbidities as clinically   warranted. For lung cancer screening, adhere to Lung-RADS guidelines. Reference: Radiology. 2017; 284(1):228-43. XR CHEST PORTABLE   Final Result   There are bilateral multifocal ground-glass areas of consolidation with the   lungs concerning for in infectious or inflammatory process and developing   ARDS or viral pneumonia could give this appearance. Assessment:  Active Problems:    COVID-19    Acute respiratory failure due to COVID-19 [facility])    Acute respiratory failure with hypoxia (HCC)    Pneumonia due to COVID-19 virus    Elevated LFTs    Hypokalemia    Essential hypertension  Resolved Problems:    * No resolved hospital problems. *      Plan:  1. Acute Respiratory Failure with Hypoxia- 2/2 COVID-19 CXR significant worsening multifocal infiltrates 10/4 CXR no change in imaging. Continue dexamethasone/Albuterol/. Requiring NIPPV 20/10 100% continuous. Continue to wean as tolerated. Maintain SPO2>92%. Currently AirVo 50L 90% + NRB. DDimer 514 Pulmonology input appreciated. 2. COVID-19 Viral Pneumonia- COVID-19+The current medical regimen is effective;  continue present plan and medications. Baricitinib day 7/Dexamethasone day /Vitamin Supplementation. Follow inflammatory markers. Lovenox 1mg/kg. Encourage IS/Pronating. DDImer 514  Isolation per protocol Supportive care. Pulmonology following. 3. Anxiety- Continue Xanax Q4 W/A. 4. DM- Elevated today with better intake. Add lantus. SSI/hypoglycemic protocol/Carb Control diet. Follow adjust as needed. 5. HTN- Continue Amlodipine. 6. Poor intake- 2//2 Respiratory distress. Poor intake noted. Intake improving with improvement of respiratory function. Follow  7. Disposition- [facility] following. Will require titration of SPO2 <65% prior to referral.  [facility] referral can be made. [facility], but will likely need [facility] at [facility]. .casemanagement following.      Due to the current efforts to prevent transmission of COVID-19 and also the need to preserve PPE for other caregivers, a face-to-face encounter with the patient was not performed. That being said, all relevant records and diagnostic tests were reviewed, including laboratory results nursing assessments, physician assessments and imaging. NOTE: This report was transcribed using voice recognition software. Every effort was made to ensure accuracy; however, inadvertent computerized transcription errors may be present. Addendum: I have personally participated in the history, exam, medical decision making with Mikael Galarza on the date of service and I agree with all of the pertinent clinical information unless otherwise noted. I have also reviewed and agree with the past medical, family, and social history unless otherwise noted. Patient was admitted with shortness of breath    PHYSICAL EXAM:  Vitals:  BP (!) 134/92   Pulse 60   Temp 98.5 °F (36.9 °C) (Axillary)   Resp 22   Ht 5' 7\" (1.702 m)   Wt 184 lb 11.9 oz (83.8 kg)   SpO2 90%   BMI 28.94 kg/m²   Gen: awake, alert, NAD  Lungs: clear to auscultation bilaterally no crackles no wheezing. Heart: RRR, no murmur   Abdomen: soft nontender nondistended positive bowel sounds. Extremities: full range of motion no peripheral edema. Impression:  Active Problems:    COVID-19    Acute respiratory failure due to COVID-19 [facility])    Acute respiratory failure with hypoxia (HCC)    Pneumonia due to COVID-19 virus    Elevated LFTs    Hypokalemia    Essential hypertension  Resolved Problems:    * No resolved hospital problems. *      My findings/plan include:    Continue 60 L high flow with 100% FiO2 for covid 19 pneumonia. Continue decadron and baricitinib. Can go to LTAC once Fi02 is less than 65%    NOTE: This report was transcribed using voice recognition software. Every effort was made to ensure accuracy; however, inadvertent computerized transcription errors may be present.     Electronically signed by Negar Lam DO on 10/13/2021 at 12:45 PM

## 2021-10-13 NOTE — PROGRESS NOTES
Pulmonary Progress Note    Admit Date: 2021  Hospital day                               PCP: No primary care provider on file. Chief Complaint (s):  Patient Active Problem List   Diagnosis    COVID-19    Acute respiratory failure due to COVID-19 [facility])    Acute respiratory failure with hypoxia ([address]    Pneumonia due to COVID-19 virus    Elevated LFTs    Hypokalemia    Essential hypertension       Subjective:  · Little overall change. Vitals:  VITALS:  BP (!) 134/92   Pulse 60   Temp 98.5 °F (36.9 °C) (Axillary)   Resp 22   Ht 5' 7\" (1.702 m)   Wt 184 lb 11.9 oz (83.8 kg)   SpO2 (!) 89%   BMI 28.94 kg/m²     24HR INTAKE/OUTPUT:      Intake/Output Summary (Last 24 hours) at 10/13/2021 1917  Last data filed at 10/13/2021 1717  Gross per 24 hour   Intake 360 ml   Output 950 ml   Net -590 ml       24HR PULSE OXIMETRY RANGE:    SpO2  Av %  Min: 89 %  Max: 91 %    Medications:  IV:   sodium chloride      dextrose         Scheduled Meds:   insulin glargine  5 Units SubCUTAneous Nightly    ascorbic acid  500 mg Oral Daily    zinc sulfate  50 mg Oral Daily    vitamin D  2,000 Units Oral Daily    tiotropium  2 puff Inhalation Daily    budesonide-formoterol  2 puff Inhalation BID    ALPRAZolam  0.25 mg Oral Q4H While awake    guaiFENesin-codeine  10 mL Oral Q4H While awake    dexamethasone  10 mg IntraVENous Q12H    enoxaparin  1 mg/kg SubCUTAneous BID    albuterol sulfate HFA  2 puff Inhalation 4x daily    sodium chloride flush  5-40 mL IntraVENous 2 times per day    PARoxetine  30 mg Oral BID    amLODIPine  5 mg Oral Daily    insulin lispro  0-6 Units SubCUTAneous TID WC    insulin lispro  0-3 Units SubCUTAneous Nightly       Diet:   ADULT DIET; Regular; 5 carb choices (75 gm/meal)  Adult Oral Nutrition Supplement; Diabetic Oral Supplement     EXAM:  General: No distress. Sleeping  Eyes: PERRL. No sclera icterus. No conjunctival injection. ENT: No discharge. Pharynx clear. Neck: Trachea midline. Normal thyroid. Resp: No accessory muscle use. No rales. No wheezing. No rhonchi. CV: Regular rate. Regular rhythm. No murmur or rub. Abd: Non-tender. Non-distended. No masses. No organomegaly. Normal bowel sounds. Skin: Warm and dry. No nodule on exposed extremities. No rash on exposed extremities. Ext: No cyanosis, clubbing, edema  Lymph: No cervical LAD. No supraclavicular LAD. M/S: No cyanosis. No joint deformity. No clubbing. Neuro: Positive pupils/gag/corneals. Normal pain response. Results:  CBC:   Recent Labs     10/11/21  0639 10/12/21  0625 10/13/21  1212   WBC 10.3 11.0 8.9   HGB 10.8* 10.6* 10.3*   HCT 33.6* 33.6* 31.7*   MCV 83.8 84.2 83.4    253 223     BMP:   Recent Labs     10/11/21  0639 10/12/21  0625 10/13/21  1212    134 133   K 4.6 4.8 4.6   CL 98 98 98   CO2 24 25 27   BUN 25* 27* 26*   CREATININE 0.4* 0.5* 0.5*     LIVER PROFILE:   Recent Labs     10/11/21  0639 10/12/21  0625 10/13/21  1212   AST 22 21 21   ALT 56* 56* 61*   BILITOT 0.4 0.4 0.2   ALKPHOS 164* 152* 132*     PT/INR: No results for input(s): PROTIME, INR in the last 72 hours. APTT: No results for input(s): APTT in the last 72 hours. Pathology:  1. N/A      Microbiology:  1. None    Recent ABG:   No results for input(s): PH, PO2, PCO2, HCO3, BE, O2SAT, METHB, O2HB, COHB, O2CON, HHB, THB in the last 72 hours. FiO2 : 100 %       Recent Films:  XR CHEST PORTABLE   Final Result   1. There is no interval change in extensive multifocal bilateral airspace   disease. XR CHEST PORTABLE   Final Result   1. No interval change in the extensive multifocal bilateral airspace disease. XR CHEST PORTABLE   Final Result   No interval change         XR CHEST PORTABLE   Final Result   1.  Significant worsening of the multifocal bilateral pneumonia when compared   with the prior CT scan of 09/22/2021         CTA PULMONARY W CONTRAST   Final Result   Within described exam limitations, no evidence of pulmonary embolism. Scattered multifocal low density infiltrates throughout both lungs, most   suggestive of COVID pneumonia in context of current pandemic. At least moderate diffuse hepatic steatosis. Cholecystectomy. RECOMMENDATIONS:   Multiple pulmonary nodules. Most severe: 4 mm left solid pulmonary nodule   within the upper lobe. A non-contrast Chest CT at 12 months is optional. If   performed and the nodule is stable at 12 months, no further follow-up is   recommended. These guidelines do not apply to immunocompromised patients and patients with   cancer. Follow up in patients with significant comorbidities as clinically   warranted. For lung cancer screening, adhere to Lung-RADS guidelines. Reference: Radiology. 2017; 284(1):228-43. XR CHEST PORTABLE   Final Result   There are bilateral multifocal ground-glass areas of consolidation with the   lungs concerning for in infectious or inflammatory process and developing   ARDS or viral pneumonia could give this appearance.                      Assessment:  1. COVID-19 pneumonia with significant worsening despite steroids and baricitinib. Inflammatory markers and chest radiograph are reviewed. More recently, there has been no improvement. D-dimer is noted. Still requiring 60 L of oxygen/100%. CRP is impressively low.        Plan:  1. Continue full dose anticoagulation  2. Wean FiO2 as tolerated  3. Okay to [place] when they can accept him. Time at the bedside, reviewing labs and radiographs, reviewing updated notes and consultations, discussing with staff and family was more than 35 minutes. Please note that voice recognition technology was used in the preparation of this note and make therefore it may contain inadvertent transcription errors.   If the patient is a COVID 19 isolation patient, the above physical exam reflects that of the examining physician for the alvaro.        Jonatan Mei MD,  M.D., F.C.C.P.     [facility], [address] [facility] [address] [place], [address] [place], [facility]

## 2021-10-14 LAB
ALBUMIN SERPL-MCNC: 2.6 G/DL (ref 3.5–5.2)
ALP BLD-CCNC: 125 U/L (ref 40–129)
ALT SERPL-CCNC: 66 U/L (ref 0–40)
ANION GAP SERPL CALCULATED.3IONS-SCNC: 10 MMOL/L (ref 7–16)
AST SERPL-CCNC: 24 U/L (ref 0–39)
BILIRUB SERPL-MCNC: 0.3 MG/DL (ref 0–1.2)
BUN BLDV-MCNC: 24 MG/DL (ref 6–23)
C-REACTIVE PROTEIN: 1.9 MG/DL (ref 0–0.4)
CALCIUM SERPL-MCNC: 8.1 MG/DL (ref 8.6–10.2)
CHLORIDE BLD-SCNC: 97 MMOL/L (ref 98–107)
CO2: 27 MMOL/L (ref 22–29)
CREAT SERPL-MCNC: 0.4 MG/DL (ref 0.7–1.2)
D DIMER: 390 NG/ML DDU
FERRITIN: 1149 NG/ML
GFR AFRICAN AMERICAN: >60
GFR NON-AFRICAN AMERICAN: >60 ML/MIN/1.73
GLUCOSE BLD-MCNC: 199 MG/DL (ref 74–99)
HCT VFR BLD CALC: 32.8 % (ref 37–54)
HEMOGLOBIN: 10.4 G/DL (ref 12.5–16.5)
MCH RBC QN AUTO: 27.1 PG (ref 26–35)
MCHC RBC AUTO-ENTMCNC: 31.7 % (ref 32–34.5)
MCV RBC AUTO: 85.4 FL (ref 80–99.9)
METER GLUCOSE: 179 MG/DL (ref 74–99)
METER GLUCOSE: 186 MG/DL (ref 74–99)
METER GLUCOSE: 193 MG/DL (ref 74–99)
METER GLUCOSE: 194 MG/DL (ref 74–99)
PDW BLD-RTO: 14.4 FL (ref 11.5–15)
PLATELET # BLD: 217 E9/L (ref 130–450)
PMV BLD AUTO: 11.2 FL (ref 7–12)
POTASSIUM SERPL-SCNC: 4.3 MMOL/L (ref 3.5–5)
RBC # BLD: 3.84 E12/L (ref 3.8–5.8)
SODIUM BLD-SCNC: 134 MMOL/L (ref 132–146)
TOTAL PROTEIN: 5.8 G/DL (ref 6.4–8.3)
WBC # BLD: 10.1 E9/L (ref 4.5–11.5)

## 2021-10-14 PROCEDURE — 1200000000 HC SEMI PRIVATE

## 2021-10-14 PROCEDURE — 6370000000 HC RX 637 (ALT 250 FOR IP): Performed by: FAMILY MEDICINE

## 2021-10-14 PROCEDURE — 2580000003 HC RX 258: Performed by: INTERNAL MEDICINE

## 2021-10-14 PROCEDURE — 82728 ASSAY OF FERRITIN: CPT

## 2021-10-14 PROCEDURE — 6370000000 HC RX 637 (ALT 250 FOR IP): Performed by: INTERNAL MEDICINE

## 2021-10-14 PROCEDURE — 36415 COLL VENOUS BLD VENIPUNCTURE: CPT

## 2021-10-14 PROCEDURE — 80053 COMPREHEN METABOLIC PANEL: CPT

## 2021-10-14 PROCEDURE — 2700000000 HC OXYGEN THERAPY PER DAY

## 2021-10-14 PROCEDURE — APPSS30 APP SPLIT SHARED TIME 16-30 MINUTES: Performed by: NURSE PRACTITIONER

## 2021-10-14 PROCEDURE — 94640 AIRWAY INHALATION TREATMENT: CPT

## 2021-10-14 PROCEDURE — 6360000002 HC RX W HCPCS: Performed by: INTERNAL MEDICINE

## 2021-10-14 PROCEDURE — 86140 C-REACTIVE PROTEIN: CPT

## 2021-10-14 PROCEDURE — 82962 GLUCOSE BLOOD TEST: CPT

## 2021-10-14 PROCEDURE — 99233 SBSQ HOSP IP/OBS HIGH 50: CPT | Performed by: INTERNAL MEDICINE

## 2021-10-14 PROCEDURE — 6370000000 HC RX 637 (ALT 250 FOR IP): Performed by: NURSE PRACTITIONER

## 2021-10-14 PROCEDURE — 94660 CPAP INITIATION&MGMT: CPT

## 2021-10-14 PROCEDURE — 85027 COMPLETE CBC AUTOMATED: CPT

## 2021-10-14 PROCEDURE — 85378 FIBRIN DEGRADE SEMIQUANT: CPT

## 2021-10-14 RX ADMIN — Medication 10 ML: at 09:02

## 2021-10-14 RX ADMIN — ALPRAZOLAM 0.25 MG: 0.25 TABLET ORAL at 05:45

## 2021-10-14 RX ADMIN — Medication 2000 UNITS: at 09:02

## 2021-10-14 RX ADMIN — INSULIN LISPRO 1 UNITS: 100 INJECTION, SOLUTION INTRAVENOUS; SUBCUTANEOUS at 16:17

## 2021-10-14 RX ADMIN — ALPRAZOLAM 0.25 MG: 0.25 TABLET ORAL at 22:35

## 2021-10-14 RX ADMIN — INSULIN GLARGINE 5 UNITS: 100 INJECTION, SOLUTION SUBCUTANEOUS at 22:00

## 2021-10-14 RX ADMIN — PAROXETINE 30 MG: 10 TABLET, FILM COATED ORAL at 22:37

## 2021-10-14 RX ADMIN — TIOTROPIUM BROMIDE INHALATION SPRAY 2 PUFF: 3.12 SPRAY, METERED RESPIRATORY (INHALATION) at 08:29

## 2021-10-14 RX ADMIN — ALPRAZOLAM 0.25 MG: 0.25 TABLET ORAL at 09:08

## 2021-10-14 RX ADMIN — PAROXETINE 30 MG: 10 TABLET, FILM COATED ORAL at 09:02

## 2021-10-14 RX ADMIN — ALBUTEROL SULFATE 2 PUFF: 90 AEROSOL, METERED RESPIRATORY (INHALATION) at 08:29

## 2021-10-14 RX ADMIN — ENOXAPARIN SODIUM 80 MG: 100 INJECTION SUBCUTANEOUS at 22:38

## 2021-10-14 RX ADMIN — AMLODIPINE BESYLATE 5 MG: 5 TABLET ORAL at 09:02

## 2021-10-14 RX ADMIN — DEXAMETHASONE SODIUM PHOSPHATE 10 MG: 4 INJECTION, SOLUTION INTRA-ARTICULAR; INTRALESIONAL; INTRAMUSCULAR; INTRAVENOUS; SOFT TISSUE at 22:37

## 2021-10-14 RX ADMIN — OXYCODONE HYDROCHLORIDE AND ACETAMINOPHEN 500 MG: 500 TABLET ORAL at 09:02

## 2021-10-14 RX ADMIN — GUAIFENESIN AND CODEINE PHOSPHATE 10 ML: 10; 100 LIQUID ORAL at 14:20

## 2021-10-14 RX ADMIN — ENOXAPARIN SODIUM 80 MG: 100 INJECTION SUBCUTANEOUS at 09:02

## 2021-10-14 RX ADMIN — DEXAMETHASONE SODIUM PHOSPHATE 10 MG: 4 INJECTION, SOLUTION INTRA-ARTICULAR; INTRALESIONAL; INTRAMUSCULAR; INTRAVENOUS; SOFT TISSUE at 09:01

## 2021-10-14 RX ADMIN — ALBUTEROL SULFATE 2 PUFF: 90 AEROSOL, METERED RESPIRATORY (INHALATION) at 16:13

## 2021-10-14 RX ADMIN — INSULIN LISPRO 1 UNITS: 100 INJECTION, SOLUTION INTRAVENOUS; SUBCUTANEOUS at 22:00

## 2021-10-14 RX ADMIN — GUAIFENESIN AND CODEINE PHOSPHATE 10 ML: 10; 100 LIQUID ORAL at 09:01

## 2021-10-14 RX ADMIN — BUDESONIDE AND FORMOTEROL FUMARATE DIHYDRATE 2 PUFF: 160; 4.5 AEROSOL RESPIRATORY (INHALATION) at 08:29

## 2021-10-14 RX ADMIN — ZINC SULFATE 220 MG (50 MG) CAPSULE 50 MG: CAPSULE at 09:01

## 2021-10-14 RX ADMIN — GUAIFENESIN AND CODEINE PHOSPHATE 10 ML: 10; 100 LIQUID ORAL at 22:37

## 2021-10-14 RX ADMIN — INSULIN LISPRO 1 UNITS: 100 INJECTION, SOLUTION INTRAVENOUS; SUBCUTANEOUS at 11:28

## 2021-10-14 RX ADMIN — ALPRAZOLAM 0.25 MG: 0.25 TABLET ORAL at 17:11

## 2021-10-14 RX ADMIN — Medication 4.5 MG: at 22:37

## 2021-10-14 RX ADMIN — GUAIFENESIN AND CODEINE PHOSPHATE 10 ML: 10; 100 LIQUID ORAL at 17:11

## 2021-10-14 RX ADMIN — INSULIN LISPRO 1 UNITS: 100 INJECTION, SOLUTION INTRAVENOUS; SUBCUTANEOUS at 06:04

## 2021-10-14 RX ADMIN — ALBUTEROL SULFATE 2 PUFF: 90 AEROSOL, METERED RESPIRATORY (INHALATION) at 12:31

## 2021-10-14 RX ADMIN — Medication 10 ML: at 22:37

## 2021-10-14 RX ADMIN — ALBUTEROL SULFATE 2 PUFF: 90 AEROSOL, METERED RESPIRATORY (INHALATION) at 22:33

## 2021-10-14 RX ADMIN — BUDESONIDE AND FORMOTEROL FUMARATE DIHYDRATE 2 PUFF: 160; 4.5 AEROSOL RESPIRATORY (INHALATION) at 22:34

## 2021-10-14 RX ADMIN — GUAIFENESIN AND CODEINE PHOSPHATE 10 ML: 10; 100 LIQUID ORAL at 05:45

## 2021-10-14 RX ADMIN — ALPRAZOLAM 0.25 MG: 0.25 TABLET ORAL at 14:20

## 2021-10-14 ASSESSMENT — PAIN SCALES - GENERAL
PAINLEVEL_OUTOF10: 0
PAINLEVEL_OUTOF10: 0

## 2021-10-14 NOTE — PROGRESS NOTES
Addendum  I have personally participated in the history, exam, medical decision making with Daisha Cardoza NP on the date of service, I have personally reviewed imaging and lab data as well as EKG and I agree with all of the pertinent clinical information unless otherwise noted. I have also reviewed and agree with the past medical, family, and social history unless otherwise noted. Physical Exam  /69   Pulse 60   Temp 98.5 °F (36.9 °C) (Oral)   Resp 22   Ht 5' 7\" (1.702 m)   Wt 184 lb 11.9 oz (83.8 kg)   SpO2 100%   BMI 28.94 kg/m²   Lungs are clear to auscultation bilaterally no crackles no wheezing. Heart normal S1-S2. Abdomen soft nontender nondistended positive bowel sounds. Extremities no peripheral edema. Airvo in place, NRB within reach, using both    Assessment / Plan  Acute hypoxic resp failure d/t COVID pneumonia  · COVID+ as of 9/22  · Imaging 10/9 no change compared to CXR of 10/4 which itself unchanged vs 9/30  · No fevers past 24h  · O2 requirement currently Airvo, 60L / FiO2 100%  · Decadron 10 mg IV bid  · Baricitinib course completed  · Pulmonology input reviewed and appreciated  · Symptom mgmt for cough, fevers, nausea, diarrhea, body aches if present   ? Cough syrup - codeine q4h while awake  ?  Xanax for anxiety seems to be helping     NIDDM - hold orals / diabetic diet / ISS + BG checks ACHS / hypoglycemic protocol / target -180    Electronically signed by Roger Keyes DO on 10/14/2021 at 5:18 PM

## 2021-10-14 NOTE — PROGRESS NOTES
Pulmonary Progress Note    Admit Date: 2021  Hospital day                               PCP: No primary care provider on file. Chief Complaint (s):  Patient Active Problem List   Diagnosis    COVID-19    Acute respiratory failure due to COVID-19 [facility])    Acute respiratory failure with hypoxia ([facility] [address]    Pneumonia due to COVID-19 virus    Elevated LFTs    Hypokalemia    Essential hypertension    Non-insulin dependent type 2 diabetes mellitus ([facility] [address]       Subjective:  · In this p.m., awake and alert. Nursing going in the room. Little change as far as oxygenation goes. Vitals:  VITALS:  /69   Pulse 60   Temp 98.5 °F (36.9 °C) (Oral)   Resp 22   Ht 5' 7\" (1.702 m)   Wt 184 lb 11.9 oz (83.8 kg)   SpO2 92%   BMI 28.94 kg/m²     24HR INTAKE/OUTPUT:      Intake/Output Summary (Last 24 hours) at 10/14/2021 1818  Last data filed at 10/14/2021 1406  Gross per 24 hour   Intake --   Output 600 ml   Net -600 ml       24HR PULSE OXIMETRY RANGE:    SpO2  Av %  Min: 91 %  Max: 100 %    Medications:  IV:   sodium chloride      dextrose         Scheduled Meds:   insulin glargine  5 Units SubCUTAneous Nightly    ascorbic acid  500 mg Oral Daily    zinc sulfate  50 mg Oral Daily    vitamin D  2,000 Units Oral Daily    tiotropium  2 puff Inhalation Daily    budesonide-formoterol  2 puff Inhalation BID    ALPRAZolam  0.25 mg Oral Q4H While awake    guaiFENesin-codeine  10 mL Oral Q4H While awake    dexamethasone  10 mg IntraVENous Q12H    enoxaparin  1 mg/kg SubCUTAneous BID    albuterol sulfate HFA  2 puff Inhalation 4x daily    sodium chloride flush  5-40 mL IntraVENous 2 times per day    PARoxetine  30 mg Oral BID    amLODIPine  5 mg Oral Daily    insulin lispro  0-6 Units SubCUTAneous TID     insulin lispro  0-3 Units SubCUTAneous Nightly       Diet:   ADULT DIET;  Regular; 5 carb choices (75 gm/meal)  Adult Oral Nutrition Supplement; Diabetic Oral Supplement EXAM:  General: No distress. Sleeping  Eyes: PERRL. No sclera icterus. No conjunctival injection. ENT: No discharge. Pharynx clear. Neck: Trachea midline. Normal thyroid. Resp: No accessory muscle use. No rales. No wheezing. No rhonchi. CV: Regular rate. Regular rhythm. No murmur or rub. Abd: Non-tender. Non-distended. No masses. No organomegaly. Normal bowel sounds. Skin: Warm and dry. No nodule on exposed extremities. No rash on exposed extremities. Ext: No cyanosis, clubbing, edema  Lymph: No cervical LAD. No supraclavicular LAD. M/S: No cyanosis. No joint deformity. No clubbing. Neuro: Positive pupils/gag/corneals. Normal pain response. Results:  CBC:   Recent Labs     10/12/21  0625 10/13/21  1212 10/14/21  0605   WBC 11.0 8.9 10.1   HGB 10.6* 10.3* 10.4*   HCT 33.6* 31.7* 32.8*   MCV 84.2 83.4 85.4    223 217     BMP:   Recent Labs     10/12/21  0625 10/13/21  1212 10/14/21  0605    133 134   K 4.8 4.6 4.3   CL 98 98 97*   CO2 25 27 27   BUN 27* 26* 24*   CREATININE 0.5* 0.5* 0.4*     LIVER PROFILE:   Recent Labs     10/12/21  0625 10/13/21  1212 10/14/21  0605   AST 21 21 24   ALT 56* 61* 66*   BILITOT 0.4 0.2 0.3   ALKPHOS 152* 132* 125     PT/INR: No results for input(s): PROTIME, INR in the last 72 hours. APTT: No results for input(s): APTT in the last 72 hours. Pathology:  1. N/A      Microbiology:  1. None    Recent ABG:   No results for input(s): PH, PO2, PCO2, HCO3, BE, O2SAT, METHB, O2HB, COHB, O2CON, HHB, THB in the last 72 hours. FiO2 : 100 %       Recent Films:  XR CHEST PORTABLE   Final Result   1. There is no interval change in extensive multifocal bilateral airspace   disease. XR CHEST PORTABLE   Final Result   1. No interval change in the extensive multifocal bilateral airspace disease. XR CHEST PORTABLE   Final Result   No interval change         XR CHEST PORTABLE   Final Result   1.  Significant worsening of the multifocal bilateral pneumonia when compared   with the prior CT scan of 09/22/2021         CTA PULMONARY W CONTRAST   Final Result   Within described exam limitations, no evidence of pulmonary embolism. Scattered multifocal low density infiltrates throughout both lungs, most   suggestive of COVID pneumonia in context of current pandemic. At least moderate diffuse hepatic steatosis. Cholecystectomy. RECOMMENDATIONS:   Multiple pulmonary nodules. Most severe: 4 mm left solid pulmonary nodule   within the upper lobe. A non-contrast Chest CT at 12 months is optional. If   performed and the nodule is stable at 12 months, no further follow-up is   recommended. These guidelines do not apply to immunocompromised patients and patients with   cancer. Follow up in patients with significant comorbidities as clinically   warranted. For lung cancer screening, adhere to Lung-RADS guidelines. Reference: Radiology. 2017; 284(1):228-43. XR CHEST PORTABLE   Final Result   There are bilateral multifocal ground-glass areas of consolidation with the   lungs concerning for in infectious or inflammatory process and developing   ARDS or viral pneumonia could give this appearance.                      Assessment:  1. COVID-19 pneumonia with significant worsening despite steroids and baricitinib. Inflammatory markers and chest radiograph are reviewed. More recently, there has been no improvement. D-dimer is noted. Still requiring 60 L of oxygen/100%. CRP is impressively low. Unfortunately, he appears to be a long-hauler        Plan:  1. Continue full dose anticoagulation  2. Wean FiO2 as tolerated  3. Okay to [place] when they can accept him. Time at the bedside, reviewing labs and radiographs, reviewing updated notes and consultations, discussing with staff and family was more than 35 minutes.     Please note that voice recognition technology was used in the preparation of this note and make therefore it may contain inadvertent transcription errors. If the patient is a COVID 19 isolation patient, the above physical exam reflects that of the examining physician for the day. Clara Shirley MD,  MFRANCIS., F.C.C.P.     [facility], [address] [place] [address], [address] [place] [address], [facility]

## 2021-10-14 NOTE — PROGRESS NOTES
[facility] Progress Note    Admitting Date and Time: 9/22/2021  9:32 PM  Admit Dx: Hypokalemia [E87.6]  Acute respiratory failure with hypoxia (HCC) [J96.01]  COVID-19 [U07.1]  Acute respiratory failure due to COVID-19 (HCC) [U07.1, J96.00]    Subjective:  Patient is being followed for Hypokalemia [E87.6]  Acute respiratory failure with hypoxia ([address] [J96.01]  COVID-19 [U07.1]  Acute respiratory failure due to COVID-19 (HCC) [U07.1, J96.00]     No acute events overnight, remains on HHFNC at 60 LPM, 100% FiO2 - using NRB over Airvo and documented SaO2s >95%. When NRB removed he is 88%  SOB at rest and with conversation. ROS: denies fever, chills, cp, n/v, HA unless stated above.       insulin glargine  5 Units SubCUTAneous Nightly    ascorbic acid  500 mg Oral Daily    zinc sulfate  50 mg Oral Daily    vitamin D  2,000 Units Oral Daily    tiotropium  2 puff Inhalation Daily    budesonide-formoterol  2 puff Inhalation BID    ALPRAZolam  0.25 mg Oral Q4H While awake    guaiFENesin-codeine  10 mL Oral Q4H While awake    dexamethasone  10 mg IntraVENous Q12H    enoxaparin  1 mg/kg SubCUTAneous BID    albuterol sulfate HFA  2 puff Inhalation 4x daily    sodium chloride flush  5-40 mL IntraVENous 2 times per day    PARoxetine  30 mg Oral BID    amLODIPine  5 mg Oral Daily    insulin lispro  0-6 Units SubCUTAneous TID WC    insulin lispro  0-3 Units SubCUTAneous Nightly     aluminum & magnesium hydroxide-simethicone, 15 mL, Q6H PRN  melatonin, 4.5 mg, Nightly PRN  sodium chloride flush, 5-40 mL, PRN  sodium chloride, 25 mL, PRN  ondansetron, 4 mg, Q8H PRN   Or  ondansetron, 4 mg, Q6H PRN  polyethylene glycol, 17 g, Daily PRN  acetaminophen, 650 mg, Q6H PRN   Or  acetaminophen, 650 mg, Q6H PRN  glucose, 15 g, PRN  dextrose, 12.5 g, PRN  glucagon (rDNA), 1 mg, PRN  dextrose, 100 mL/hr, PRN         Objective:    /69   Pulse 60   Temp 98.5 °F (36.9 °C) (Oral)   Resp 22   Ht 5' 7\" (1.702 m)   Wt 184 lb 11.9 oz (83.8 kg)   SpO2 100%   BMI 28.94 kg/m²     General Appearance: alert and oriented to person, place and time. RR > 20 but does not appear to be in distress  Skin: warm and dry  Head: normocephalic and atraumatic  Eyes: pupils equal, round, and reactive to light, extraocular eye movements intact, conjunctivae normal  Neck: neck supple and non tender without mass   Pulmonary/Chest: clear to auscultation bilaterally- no wheezes, rales or rhonchi, normal air movement, no respiratory distress  Cardiovascular: normal rate, normal S1 and S2 and no carotid bruits  Abdomen: soft, non-tender, non-distended, normal bowel sounds, no masses or organomegaly  Extremities: no cyanosis, no clubbing and no edema  Neurologic: no cranial nerve deficit and speech normal        Recent Labs     10/12/21  0625 10/13/21  1212 10/14/21  0605    133 134   K 4.8 4.6 4.3   CL 98 98 97*   CO2 25 27 27   BUN 27* 26* 24*   CREATININE 0.5* 0.5* 0.4*   GLUCOSE 222* 182* 199*   CALCIUM 8.2* 8.3* 8.1*       Recent Labs     10/12/21  0625 10/13/21  1212 10/14/21  0605   WBC 11.0 8.9 10.1   RBC 3.99 3.80 3.84   HGB 10.6* 10.3* 10.4*   HCT 33.6* 31.7* 32.8*   MCV 84.2 83.4 85.4   MCH 26.6 27.1 27.1   MCHC 31.5* 32.5 31.7*   RDW 14.3 14.3 14.4    223 217   MPV 11.1 10.6 11.2       Radiology:     Assessment:    Active Problems:    COVID-19    Acute respiratory failure due to COVID-19 (HCC)    Acute respiratory failure with hypoxia (HCC)    Pneumonia due to COVID-19 virus    Elevated LFTs    Hypokalemia    Essential hypertension  Resolved Problems:    * No resolved hospital problems. *      Plan:  1.  Acute Respiratory Failure with Hypoxia  - 2/2 COVID-19 CXR significant worsening multifocal infiltrates 10/4 CXR no change in imaging.   - Continue dexamethasone/Albuterol/.  - Has been off BiPAP for several days but requiring Airvo at 100% FiO2 60 LPM  -  Continue to wean O2  as tolerated, will need FiO2 < 60% to qualify for discharge to LTAC. 2. COVID-19 Viral Pneumonia  - Completed baricitinib  - Remains on Dexamethasone 10 BID  - Vitamin Supplementation. Follow inflammatory markers. Lovenox 1mg/kg. - Encourage IS/Pronating. DDImer 390  Isolation per protocol Supportive care. Pulmonology following. 3. Anxiety  - Continue Xanax Q4 W/A. 4. DM  - Continue lantus. SSI/hypoglycemic protocol/Carb Control diet. Follow adjust as needed. 5. HTN  - Continue Amlodipine. 6. Poor intake  - 2//2 Respiratory distress. Poor intake noted. Intake improving with improvement of respiratory function. Follow    7. Disposition- [facility] following. Will require titration of SPO2 <65% prior to referral.  Diony Valdez referral can be made. Referral made to [facility], but will likely need [facility] at [facility]. .casemanagement following. NOTE: This report was transcribed using voice recognition software. Every effort was made to ensure accuracy; however, inadvertent computerized transcription errors may be present.   Electronically signed by AUBREE Urrutia CNP on 10/14/2021 at 2:38 PM

## 2021-10-14 NOTE — PROGRESS NOTES
10/13/21 2011   NIV Type   Mode Bilevel  (REFUSES AT THIS TIME)   Settings/Measurements   O2 Flow Rate (L/min) 60 L/min   FiO2  100 %   Oxygen Therapy/Pulse Ox   O2 Therapy Oxygen humidified   O2 Device Heated high flow cannula  (and NRB)   SpO2 91 %   Pulse Oximeter Device Mode Continuous

## 2021-10-15 LAB
ALBUMIN SERPL-MCNC: 2.5 G/DL (ref 3.5–5.2)
ALP BLD-CCNC: 116 U/L (ref 40–129)
ALT SERPL-CCNC: 75 U/L (ref 0–40)
ANION GAP SERPL CALCULATED.3IONS-SCNC: 7 MMOL/L (ref 7–16)
AST SERPL-CCNC: 25 U/L (ref 0–39)
BILIRUB SERPL-MCNC: 0.3 MG/DL (ref 0–1.2)
BUN BLDV-MCNC: 20 MG/DL (ref 6–23)
C-REACTIVE PROTEIN: 1.9 MG/DL (ref 0–0.4)
CALCIUM SERPL-MCNC: 7.7 MG/DL (ref 8.6–10.2)
CHLORIDE BLD-SCNC: 96 MMOL/L (ref 98–107)
CO2: 27 MMOL/L (ref 22–29)
CREAT SERPL-MCNC: 0.4 MG/DL (ref 0.7–1.2)
D DIMER: 434 NG/ML DDU
FERRITIN: 1056 NG/ML
GFR AFRICAN AMERICAN: >60
GFR NON-AFRICAN AMERICAN: >60 ML/MIN/1.73
GLUCOSE BLD-MCNC: 227 MG/DL (ref 74–99)
HCT VFR BLD CALC: 30.9 % (ref 37–54)
HEMOGLOBIN: 10 G/DL (ref 12.5–16.5)
MCH RBC QN AUTO: 27.4 PG (ref 26–35)
MCHC RBC AUTO-ENTMCNC: 32.4 % (ref 32–34.5)
MCV RBC AUTO: 84.7 FL (ref 80–99.9)
METER GLUCOSE: 131 MG/DL (ref 74–99)
METER GLUCOSE: 196 MG/DL (ref 74–99)
METER GLUCOSE: 204 MG/DL (ref 74–99)
METER GLUCOSE: 228 MG/DL (ref 74–99)
PDW BLD-RTO: 14.2 FL (ref 11.5–15)
PLATELET # BLD: 199 E9/L (ref 130–450)
PMV BLD AUTO: 10.7 FL (ref 7–12)
POTASSIUM SERPL-SCNC: 4.5 MMOL/L (ref 3.5–5)
RBC # BLD: 3.65 E12/L (ref 3.8–5.8)
SODIUM BLD-SCNC: 130 MMOL/L (ref 132–146)
TOTAL PROTEIN: 5.4 G/DL (ref 6.4–8.3)
WBC # BLD: 10.3 E9/L (ref 4.5–11.5)

## 2021-10-15 PROCEDURE — 94660 CPAP INITIATION&MGMT: CPT

## 2021-10-15 PROCEDURE — 6370000000 HC RX 637 (ALT 250 FOR IP): Performed by: INTERNAL MEDICINE

## 2021-10-15 PROCEDURE — 82728 ASSAY OF FERRITIN: CPT

## 2021-10-15 PROCEDURE — 94640 AIRWAY INHALATION TREATMENT: CPT

## 2021-10-15 PROCEDURE — 99233 SBSQ HOSP IP/OBS HIGH 50: CPT | Performed by: INTERNAL MEDICINE

## 2021-10-15 PROCEDURE — 80053 COMPREHEN METABOLIC PANEL: CPT

## 2021-10-15 PROCEDURE — 2700000000 HC OXYGEN THERAPY PER DAY

## 2021-10-15 PROCEDURE — 6360000002 HC RX W HCPCS: Performed by: INTERNAL MEDICINE

## 2021-10-15 PROCEDURE — 6370000000 HC RX 637 (ALT 250 FOR IP): Performed by: NURSE PRACTITIONER

## 2021-10-15 PROCEDURE — APPSS30 APP SPLIT SHARED TIME 16-30 MINUTES: Performed by: NURSE PRACTITIONER

## 2021-10-15 PROCEDURE — 2580000003 HC RX 258: Performed by: INTERNAL MEDICINE

## 2021-10-15 PROCEDURE — 2580000003 HC RX 258: Performed by: NURSE PRACTITIONER

## 2021-10-15 PROCEDURE — 36415 COLL VENOUS BLD VENIPUNCTURE: CPT

## 2021-10-15 PROCEDURE — 85027 COMPLETE CBC AUTOMATED: CPT

## 2021-10-15 PROCEDURE — 86140 C-REACTIVE PROTEIN: CPT

## 2021-10-15 PROCEDURE — 85378 FIBRIN DEGRADE SEMIQUANT: CPT

## 2021-10-15 PROCEDURE — 82962 GLUCOSE BLOOD TEST: CPT

## 2021-10-15 PROCEDURE — 1200000000 HC SEMI PRIVATE

## 2021-10-15 RX ORDER — SODIUM CHLORIDE 9 MG/ML
INJECTION, SOLUTION INTRAVENOUS ONCE
Status: COMPLETED | OUTPATIENT
Start: 2021-10-15 | End: 2021-10-15

## 2021-10-15 RX ADMIN — ALPRAZOLAM 0.25 MG: 0.25 TABLET ORAL at 06:05

## 2021-10-15 RX ADMIN — ALBUTEROL SULFATE 2 PUFF: 90 AEROSOL, METERED RESPIRATORY (INHALATION) at 09:16

## 2021-10-15 RX ADMIN — INSULIN LISPRO 2 UNITS: 100 INJECTION, SOLUTION INTRAVENOUS; SUBCUTANEOUS at 06:47

## 2021-10-15 RX ADMIN — Medication 10 ML: at 09:16

## 2021-10-15 RX ADMIN — ALPRAZOLAM 0.25 MG: 0.25 TABLET ORAL at 22:11

## 2021-10-15 RX ADMIN — ENOXAPARIN SODIUM 80 MG: 100 INJECTION SUBCUTANEOUS at 22:21

## 2021-10-15 RX ADMIN — ALPRAZOLAM 0.25 MG: 0.25 TABLET ORAL at 12:15

## 2021-10-15 RX ADMIN — GUAIFENESIN AND CODEINE PHOSPHATE 10 ML: 10; 100 LIQUID ORAL at 22:12

## 2021-10-15 RX ADMIN — ALBUTEROL SULFATE 2 PUFF: 90 AEROSOL, METERED RESPIRATORY (INHALATION) at 22:09

## 2021-10-15 RX ADMIN — OXYCODONE HYDROCHLORIDE AND ACETAMINOPHEN 500 MG: 500 TABLET ORAL at 09:15

## 2021-10-15 RX ADMIN — PAROXETINE 30 MG: 10 TABLET, FILM COATED ORAL at 09:15

## 2021-10-15 RX ADMIN — Medication 2000 UNITS: at 09:16

## 2021-10-15 RX ADMIN — ENOXAPARIN SODIUM 80 MG: 100 INJECTION SUBCUTANEOUS at 09:16

## 2021-10-15 RX ADMIN — INSULIN LISPRO 1 UNITS: 100 INJECTION, SOLUTION INTRAVENOUS; SUBCUTANEOUS at 12:54

## 2021-10-15 RX ADMIN — GUAIFENESIN AND CODEINE PHOSPHATE 10 ML: 10; 100 LIQUID ORAL at 12:15

## 2021-10-15 RX ADMIN — ALBUTEROL SULFATE 2 PUFF: 90 AEROSOL, METERED RESPIRATORY (INHALATION) at 16:57

## 2021-10-15 RX ADMIN — SODIUM CHLORIDE: 9 INJECTION, SOLUTION INTRAVENOUS at 14:17

## 2021-10-15 RX ADMIN — PAROXETINE 30 MG: 10 TABLET, FILM COATED ORAL at 22:10

## 2021-10-15 RX ADMIN — INSULIN GLARGINE 5 UNITS: 100 INJECTION, SOLUTION SUBCUTANEOUS at 22:17

## 2021-10-15 RX ADMIN — ALPRAZOLAM 0.25 MG: 0.25 TABLET ORAL at 16:58

## 2021-10-15 RX ADMIN — DEXAMETHASONE SODIUM PHOSPHATE 10 MG: 4 INJECTION, SOLUTION INTRA-ARTICULAR; INTRALESIONAL; INTRAMUSCULAR; INTRAVENOUS; SOFT TISSUE at 22:11

## 2021-10-15 RX ADMIN — Medication 5 ML: at 22:57

## 2021-10-15 RX ADMIN — GUAIFENESIN AND CODEINE PHOSPHATE 10 ML: 10; 100 LIQUID ORAL at 06:05

## 2021-10-15 RX ADMIN — ZINC SULFATE 220 MG (50 MG) CAPSULE 50 MG: CAPSULE at 09:15

## 2021-10-15 RX ADMIN — BUDESONIDE AND FORMOTEROL FUMARATE DIHYDRATE 2 PUFF: 160; 4.5 AEROSOL RESPIRATORY (INHALATION) at 09:16

## 2021-10-15 RX ADMIN — AMLODIPINE BESYLATE 5 MG: 5 TABLET ORAL at 09:15

## 2021-10-15 RX ADMIN — ALBUTEROL SULFATE 2 PUFF: 90 AEROSOL, METERED RESPIRATORY (INHALATION) at 12:22

## 2021-10-15 RX ADMIN — DEXAMETHASONE SODIUM PHOSPHATE 10 MG: 4 INJECTION, SOLUTION INTRA-ARTICULAR; INTRALESIONAL; INTRAMUSCULAR; INTRAVENOUS; SOFT TISSUE at 09:15

## 2021-10-15 RX ADMIN — INSULIN LISPRO 2 UNITS: 100 INJECTION, SOLUTION INTRAVENOUS; SUBCUTANEOUS at 17:54

## 2021-10-15 RX ADMIN — TIOTROPIUM BROMIDE INHALATION SPRAY 2 PUFF: 3.12 SPRAY, METERED RESPIRATORY (INHALATION) at 09:16

## 2021-10-15 RX ADMIN — BUDESONIDE AND FORMOTEROL FUMARATE DIHYDRATE 2 PUFF: 160; 4.5 AEROSOL RESPIRATORY (INHALATION) at 22:08

## 2021-10-15 NOTE — PROGRESS NOTES
Addendum  I have personally participated in the history, exam, medical decision making with Don Scott NP on the date of service, I have personally reviewed imaging and lab data as well as EKG and I agree with all of the pertinent clinical information unless otherwise noted. I have also reviewed and agree with the past medical, family, and social history unless otherwise noted. Physical Exam  BP (!) 110/57   Pulse 51   Temp 97.9 °F (36.6 °C) (Oral)   Resp 20   Ht 5' 7\" (1.702 m)   Wt 184 lb 11.9 oz (83.8 kg)   SpO2 98%   BMI 28.94 kg/m²   Lungs are clear to auscultation bilaterally no crackles no wheezing. Heart normal S1-S2. Abdomen soft nontender nondistended positive bowel sounds. Extremities no peripheral edema. Airvo in place, NRB within reach, using both    Assessment / Plan  Acute hypoxic resp failure d/t COVID pneumonia  · COVID+ as of 9/22  · Imaging 10/9 no change compared to CXR of 10/4 which itself unchanged vs 9/30  · No fevers past 24h  · O2 requirement currently Airvo, 60L / FiO2 100% - no change today  · Decadron 10 mg IV bid  · Baricitinib course completed  · Pulmonology input reviewed and appreciated  · Symptom mgmt for cough, fevers, nausea, diarrhea, body aches if present   ? Cough syrup - codeine q4h while awake  ?  Continue Xanax for anxiety     NIDDM - hold orals / diabetic diet / ISS + BG checks ACHS / hypoglycemic protocol / target -180    Electronically signed by Patricia Magallanes DO on 10/15/2021 at 2:22 PM

## 2021-10-15 NOTE — PROGRESS NOTES
Pulmonary Progress Note    Admit Date: 2021  Hospital day                               PCP: No primary care provider on file. Chief Complaint (s):  Patient Active Problem List   Diagnosis    COVID-19    Acute respiratory failure due to COVID-19 [facility])    [facility] failure [facility] hypoxia ([facility] [address]    Pneumonia due to COVID-19 virus    Elevated LFTs    Hypokalemia    Essential hypertension    Non-insulin dependent type 2 diabetes mellitus ([facility] [address]       Subjective:  · Saturation is high as is 98% earlier today. When on the phone, he was at 83%. He is strong concerns about his ability to survive this illness. It was pointed out that he is been here for quite some time and while not making progress, he has not deteriorated either.   Vitals:  VITALS:  BP (!) 110/57   Pulse 51   Temp 97.9 °F (36.6 °C) (Oral)   Resp 20   Ht 5' 7\" (1.702 m)   Wt 184 lb 11.9 oz (83.8 kg)   SpO2 98%   BMI 28.94 kg/m²     24HR INTAKE/OUTPUT:      Intake/Output Summary (Last 24 hours) at 10/15/2021 1639  Last data filed at 10/14/2021 1928  Gross per 24 hour   Intake --   Output 300 ml   Net -300 ml       24HR PULSE OXIMETRY RANGE:    SpO2  Av.4 %  Min: 91 %  Max: 98 %    Medications:  IV:   sodium chloride      dextrose         Scheduled Meds:   insulin glargine  5 Units SubCUTAneous Nightly    ascorbic acid  500 mg Oral Daily    zinc sulfate  50 mg Oral Daily    vitamin D  2,000 Units Oral Daily    tiotropium  2 puff Inhalation Daily    budesonide-formoterol  2 puff Inhalation BID    ALPRAZolam  0.25 mg Oral Q4H While awake    guaiFENesin-codeine  10 mL Oral Q4H While awake    dexamethasone  10 mg IntraVENous Q12H    enoxaparin  1 mg/kg SubCUTAneous BID    albuterol sulfate HFA  2 puff Inhalation 4x daily    sodium chloride flush  5-40 mL IntraVENous 2 times per day    PARoxetine  30 mg Oral BID    amLODIPine  5 mg Oral Daily    insulin lispro  0-6 Units SubCUTAneous TID WC    insulin lispro  0-3 Units SubCUTAneous Nightly       Diet:   ADULT DIET; Regular; 5 carb choices (75 gm/meal)  Adult Oral Nutrition Supplement; Diabetic Oral Supplement     EXAM:  General: No distress. Sleeping  Eyes: PERRL. No sclera icterus. No conjunctival injection. ENT: No discharge. Pharynx clear. Neck: Trachea midline. Normal thyroid. Resp: No accessory muscle use. No rales. No wheezing. No rhonchi. CV: Regular rate. Regular rhythm. No murmur or rub. Abd: Non-tender. Non-distended. No masses. No organomegaly. Normal bowel sounds. Skin: Warm and dry. No nodule on exposed extremities. No rash on exposed extremities. Ext: No cyanosis, clubbing, edema  Lymph: No cervical LAD. No supraclavicular LAD. M/S: No cyanosis. No joint deformity. No clubbing. Neuro: Positive pupils/gag/corneals. Normal pain response. Results:  CBC:   Recent Labs     10/13/21  1212 10/14/21  0605 10/15/21  0503   WBC 8.9 10.1 10.3   HGB 10.3* 10.4* 10.0*   HCT 31.7* 32.8* 30.9*   MCV 83.4 85.4 84.7    217 199     BMP:   Recent Labs     10/13/21  1212 10/14/21  0605 10/15/21  0503    134 130*   K 4.6 4.3 4.5   CL 98 97* 96*   CO2 27 27 27   BUN 26* 24* 20   CREATININE 0.5* 0.4* 0.4*     LIVER PROFILE:   Recent Labs     10/13/21  1212 10/14/21  0605 10/15/21  0503   AST 21 24 25   ALT 61* 66* 75*   BILITOT 0.2 0.3 0.3   ALKPHOS 132* 125 116     PT/INR: No results for input(s): PROTIME, INR in the last 72 hours. APTT: No results for input(s): APTT in the last 72 hours. Pathology:  1. N/A      Microbiology:  1. None    Recent ABG:   No results for input(s): PH, PO2, PCO2, HCO3, BE, O2SAT, METHB, O2HB, COHB, O2CON, HHB, THB in the last 72 hours. FiO2 : 100 %       Recent Films:  XR CHEST PORTABLE   Final Result   1. There is no interval change in extensive multifocal bilateral airspace   disease. XR CHEST PORTABLE   Final Result   1.  No interval change in the extensive multifocal bilateral airspace disease. XR CHEST PORTABLE   Final Result   No interval change         XR CHEST PORTABLE   Final Result   1. Significant worsening of the multifocal bilateral pneumonia when compared   with the prior CT scan of 09/22/2021         CTA PULMONARY W CONTRAST   Final Result   Within described exam limitations, no evidence of pulmonary embolism. Scattered multifocal low density infiltrates throughout both lungs, most   suggestive of COVID pneumonia in context of current pandemic. At least moderate diffuse hepatic steatosis. Cholecystectomy. RECOMMENDATIONS:   Multiple pulmonary nodules. Most severe: 4 mm left solid pulmonary nodule   within the upper lobe. A non-contrast Chest CT at 12 months is optional. If   performed and the nodule is stable at 12 months, no further follow-up is   recommended. These guidelines do not apply to immunocompromised patients and patients with   cancer. Follow up in patients with significant comorbidities as clinically   warranted. For lung cancer screening, adhere to Lung-RADS guidelines. Reference: Radiology. 2017; 284(1):228-43. XR CHEST PORTABLE   Final Result   There are bilateral multifocal ground-glass areas of consolidation with the   lungs concerning for in infectious or inflammatory process and developing   ARDS or viral pneumonia could give this appearance. XR CHEST PORTABLE    (Results Pending)                Assessment:  1. COVID-19 pneumonia with significant worsening despite steroids and baricitinib. Inflammatory markers and chest radiograph are reviewed. More recently, there has been no improvement. D-dimer is noted. Still requiring 60 L of oxygen/100%. CRP is impressively low. Unfortunately, he appears to be a long-hauler        Plan:  1. Continue full dose anticoagulation  2. Wean FiO2 as tolerated  3. Okay to [facility] [place] when they can accept him.     Time at the bedside, reviewing labs and radiographs, reviewing updated notes and consultations, discussing with staff and family was more than 35 minutes. Please note that voice recognition technology was used in the preparation of this note and make therefore it may contain inadvertent transcription errors. If the patient is a COVID 19 isolation patient, the above physical exam reflects that of the examining physician for the day. Chalo Marie MD,  MFRANCIS., F.C.C.P.     [facility] and 4 [facility], [address] [place] [address], [address], [facility]

## 2021-10-15 NOTE — PROGRESS NOTES
[facility] Progress Note    Admitting Date and Time: 9/22/2021  9:32 PM  Admit Dx: Hypokalemia [E87.6]  Acute respiratory failure with hypoxia (HCC) [J96.01]  COVID-19 [U07.1]  Acute respiratory failure due to COVID-19 (HCC) [U07.1, J96.00]    Subjective:  Patient is being followed for Hypokalemia [E87.6]  Acute respiratory failure with hypoxia ([address] [J96.01]  COVID-19 [U07.1]  Acute respiratory failure due to COVID-19 ([address] [U07.1, J96.00]     States he did not sleep well overnight. More tired today  Denies chest pain. Oxygen requirements unchanged - 60 LPM, 100% FiO2 via Airvo    ROS: denies fever, chills, cp, n/v, HA unless stated above.       insulin glargine  5 Units SubCUTAneous Nightly    ascorbic acid  500 mg Oral Daily    zinc sulfate  50 mg Oral Daily    vitamin D  2,000 Units Oral Daily    tiotropium  2 puff Inhalation Daily    budesonide-formoterol  2 puff Inhalation BID    ALPRAZolam  0.25 mg Oral Q4H While awake    guaiFENesin-codeine  10 mL Oral Q4H While awake    dexamethasone  10 mg IntraVENous Q12H    enoxaparin  1 mg/kg SubCUTAneous BID    albuterol sulfate HFA  2 puff Inhalation 4x daily    sodium chloride flush  5-40 mL IntraVENous 2 times per day    PARoxetine  30 mg Oral BID    amLODIPine  5 mg Oral Daily    insulin lispro  0-6 Units SubCUTAneous TID     insulin lispro  0-3 Units SubCUTAneous Nightly     aluminum & magnesium hydroxide-simethicone, 15 mL, Q6H PRN  melatonin, 4.5 mg, Nightly PRN  sodium chloride flush, 5-40 mL, PRN  sodium chloride, 25 mL, PRN  ondansetron, 4 mg, Q8H PRN   Or  ondansetron, 4 mg, Q6H PRN  polyethylene glycol, 17 g, Daily PRN  acetaminophen, 650 mg, Q6H PRN   Or  acetaminophen, 650 mg, Q6H PRN  glucose, 15 g, PRN  dextrose, 12.5 g, PRN  glucagon (rDNA), 1 mg, PRN  dextrose, 100 mL/hr, PRN         Objective:    BP (!) 110/57   Pulse 51   Temp 97.9 °F (36.6 °C) (Oral)   Resp 20   Ht 5' 7\" (1.702 m)   Wt 184 lb 11.9 oz (83.8 kg)   SpO2 98%   BMI 28.94 kg/m²     General Appearance: alert and oriented to person, place and time. RR > 20 but does not appear to be in distress  Skin: warm and dry  Head: normocephalic and atraumatic  Eyes: pupils equal, round, and reactive to light, extraocular eye movements intact, conjunctivae normal  Neck: neck supple and non tender without mass   Pulmonary/Chest: clear to auscultation bilaterally- no wheezes, rales or rhonchi, normal air movement, no respiratory distress  Cardiovascular: normal rate, normal S1 and S2 and no carotid bruits  Abdomen: soft, non-tender, non-distended, normal bowel sounds, no masses or organomegaly  Extremities: no cyanosis, no clubbing and no edema  Neurologic: no cranial nerve deficit and speech normal        Recent Labs     10/13/21  1212 10/14/21  0605 10/15/21  0503    134 130*   K 4.6 4.3 4.5   CL 98 97* 96*   CO2 27 27 27   BUN 26* 24* 20   CREATININE 0.5* 0.4* 0.4*   GLUCOSE 182* 199* 227*   CALCIUM 8.3* 8.1* 7.7*       Recent Labs     10/13/21  1212 10/14/21  0605 10/15/21  0503   WBC 8.9 10.1 10.3   RBC 3.80 3.84 3.65*   HGB 10.3* 10.4* 10.0*   HCT 31.7* 32.8* 30.9*   MCV 83.4 85.4 84.7   MCH 27.1 27.1 27.4   MCHC 32.5 31.7* 32.4   RDW 14.3 14.4 14.2    217 199   MPV 10.6 11.2 10.7           Assessment:    Active Problems:    COVID-19    Acute respiratory failure due to COVID-19 (HCC)    Acute respiratory failure with hypoxia (HCC)    Pneumonia due to COVID-19 virus    Elevated LFTs    Hypokalemia    Essential hypertension    Non-insulin dependent type 2 diabetes mellitus ([facility]  Resolved Problems:    * No resolved hospital problems. *      Plan:  1.  Acute Respiratory Failure with Hypoxia  - 2/2 COVID-19 CXR significant worsening multifocal infiltrates 10/4 CXR no change in imaging.   - Continue dexamethasone/Albuterol/.  - Has been off BiPAP for several days but requiring Airvo at 100% FiO2 60 LPM  -  Continue to wean O2  as tolerated, will need FiO2 < 60% to qualify for discharge to LTAC. - use BiPAP at bedtime     2. COVID-19 Viral Pneumonia  - Completed baricitinib  - Remains on Dexamethasone 10 BID  - Vitamin Supplementation. Follow inflammatory markers. Lovenox 1mg/kg. - Encourage IS/Pronating. DDImer [id number]  Isolation per protocol Supportive care. Pulmonology following.      3. Anxiety  - Continue Xanax Q4 W/A.      4. DM  - Continue lantus. SSI/hypoglycemic protocol/Carb Control diet. Follow adjust as needed. 5. HTN  - Continue Amlodipine.     6. Poor intake  - 2//2 Respiratory distress. Poor intake noted. Intake improving with improvement of respiratory function. Follow    Hyponatremia  - serum Na of 130. Hypochloremic hyponatremia. Is negative 6 liters since admission, ?increased sensible losses from tacypnea and poor oral intake. O2 is humidified. - Will give 500 ml NSS, follow up chemistry in AM     7. Disposition- [facility] following. Will require titration of SPO2 <65% prior to referral.  [facility] referral can be made.  Referral made to [place], but will likely need [facility] at [facility]". .casemanagement following. NOTE: This report was transcribed using voice recognition software. Every effort was made to ensure accuracy; however, inadvertent computerized transcription errors may be present.   Electronically signed by AUBREE Castro CNP on 10/15/2021 at 1:07 PM

## 2021-10-16 ENCOUNTER — APPOINTMENT (OUTPATIENT)
Dept: GENERAL RADIOLOGY | Age: 66
DRG: 871 | End: 2021-10-16
Payer: OTHER GOVERNMENT

## 2021-10-16 LAB
ALBUMIN SERPL-MCNC: 2.5 G/DL (ref 3.5–5.2)
ALP BLD-CCNC: 107 U/L (ref 40–129)
ALT SERPL-CCNC: 88 U/L (ref 0–40)
ANION GAP SERPL CALCULATED.3IONS-SCNC: 7 MMOL/L (ref 7–16)
AST SERPL-CCNC: 28 U/L (ref 0–39)
BILIRUB SERPL-MCNC: 0.4 MG/DL (ref 0–1.2)
BUN BLDV-MCNC: 17 MG/DL (ref 6–23)
CALCIUM SERPL-MCNC: 7.7 MG/DL (ref 8.6–10.2)
CHLORIDE BLD-SCNC: 96 MMOL/L (ref 98–107)
CO2: 26 MMOL/L (ref 22–29)
CREAT SERPL-MCNC: 0.4 MG/DL (ref 0.7–1.2)
FERRITIN: 1083 NG/ML
GFR AFRICAN AMERICAN: >60
GFR NON-AFRICAN AMERICAN: >60 ML/MIN/1.73
GLUCOSE BLD-MCNC: 206 MG/DL (ref 74–99)
HCT VFR BLD CALC: 31.1 % (ref 37–54)
HEMOGLOBIN: 10.3 G/DL (ref 12.5–16.5)
MCH RBC QN AUTO: 27.8 PG (ref 26–35)
MCHC RBC AUTO-ENTMCNC: 33.1 % (ref 32–34.5)
MCV RBC AUTO: 83.8 FL (ref 80–99.9)
METER GLUCOSE: 113 MG/DL (ref 74–99)
METER GLUCOSE: 166 MG/DL (ref 74–99)
METER GLUCOSE: 190 MG/DL (ref 74–99)
METER GLUCOSE: 190 MG/DL (ref 74–99)
PDW BLD-RTO: 14.1 FL (ref 11.5–15)
PLATELET # BLD: 175 E9/L (ref 130–450)
PMV BLD AUTO: 10 FL (ref 7–12)
POTASSIUM SERPL-SCNC: 4.4 MMOL/L (ref 3.5–5)
PRO-BNP: 275 PG/ML (ref 0–125)
RBC # BLD: 3.71 E12/L (ref 3.8–5.8)
SODIUM BLD-SCNC: 129 MMOL/L (ref 132–146)
TOTAL PROTEIN: 5.5 G/DL (ref 6.4–8.3)
WBC # BLD: 10 E9/L (ref 4.5–11.5)

## 2021-10-16 PROCEDURE — 83880 ASSAY OF NATRIURETIC PEPTIDE: CPT

## 2021-10-16 PROCEDURE — 6360000002 HC RX W HCPCS: Performed by: INTERNAL MEDICINE

## 2021-10-16 PROCEDURE — 94660 CPAP INITIATION&MGMT: CPT

## 2021-10-16 PROCEDURE — 82728 ASSAY OF FERRITIN: CPT

## 2021-10-16 PROCEDURE — 6370000000 HC RX 637 (ALT 250 FOR IP): Performed by: INTERNAL MEDICINE

## 2021-10-16 PROCEDURE — 1200000000 HC SEMI PRIVATE

## 2021-10-16 PROCEDURE — 85027 COMPLETE CBC AUTOMATED: CPT

## 2021-10-16 PROCEDURE — 6370000000 HC RX 637 (ALT 250 FOR IP): Performed by: NURSE PRACTITIONER

## 2021-10-16 PROCEDURE — 71045 X-RAY EXAM CHEST 1 VIEW: CPT

## 2021-10-16 PROCEDURE — 99233 SBSQ HOSP IP/OBS HIGH 50: CPT | Performed by: NURSE PRACTITIONER

## 2021-10-16 PROCEDURE — 2580000003 HC RX 258: Performed by: INTERNAL MEDICINE

## 2021-10-16 PROCEDURE — 82962 GLUCOSE BLOOD TEST: CPT

## 2021-10-16 PROCEDURE — 2700000000 HC OXYGEN THERAPY PER DAY

## 2021-10-16 PROCEDURE — 36415 COLL VENOUS BLD VENIPUNCTURE: CPT

## 2021-10-16 PROCEDURE — 80053 COMPREHEN METABOLIC PANEL: CPT

## 2021-10-16 RX ADMIN — Medication 10 ML: at 10:15

## 2021-10-16 RX ADMIN — PAROXETINE 30 MG: 10 TABLET, FILM COATED ORAL at 09:26

## 2021-10-16 RX ADMIN — BUDESONIDE AND FORMOTEROL FUMARATE DIHYDRATE 2 PUFF: 160; 4.5 AEROSOL RESPIRATORY (INHALATION) at 22:22

## 2021-10-16 RX ADMIN — Medication 10 ML: at 22:35

## 2021-10-16 RX ADMIN — ALPRAZOLAM 0.25 MG: 0.25 TABLET ORAL at 15:11

## 2021-10-16 RX ADMIN — ALBUTEROL SULFATE 2 PUFF: 90 AEROSOL, METERED RESPIRATORY (INHALATION) at 16:34

## 2021-10-16 RX ADMIN — AMLODIPINE BESYLATE 5 MG: 5 TABLET ORAL at 09:26

## 2021-10-16 RX ADMIN — TIOTROPIUM BROMIDE INHALATION SPRAY 2 PUFF: 3.12 SPRAY, METERED RESPIRATORY (INHALATION) at 09:30

## 2021-10-16 RX ADMIN — PAROXETINE 30 MG: 10 TABLET, FILM COATED ORAL at 22:29

## 2021-10-16 RX ADMIN — BUDESONIDE AND FORMOTEROL FUMARATE DIHYDRATE 2 PUFF: 160; 4.5 AEROSOL RESPIRATORY (INHALATION) at 09:25

## 2021-10-16 RX ADMIN — DEXAMETHASONE SODIUM PHOSPHATE 10 MG: 4 INJECTION, SOLUTION INTRA-ARTICULAR; INTRALESIONAL; INTRAMUSCULAR; INTRAVENOUS; SOFT TISSUE at 09:25

## 2021-10-16 RX ADMIN — OXYCODONE HYDROCHLORIDE AND ACETAMINOPHEN 500 MG: 500 TABLET ORAL at 09:26

## 2021-10-16 RX ADMIN — INSULIN GLARGINE 5 UNITS: 100 INJECTION, SOLUTION SUBCUTANEOUS at 22:50

## 2021-10-16 RX ADMIN — ENOXAPARIN SODIUM 80 MG: 100 INJECTION SUBCUTANEOUS at 09:25

## 2021-10-16 RX ADMIN — ZINC SULFATE 220 MG (50 MG) CAPSULE 50 MG: CAPSULE at 09:26

## 2021-10-16 RX ADMIN — INSULIN LISPRO 1 UNITS: 100 INJECTION, SOLUTION INTRAVENOUS; SUBCUTANEOUS at 16:46

## 2021-10-16 RX ADMIN — ALBUTEROL SULFATE 2 PUFF: 90 AEROSOL, METERED RESPIRATORY (INHALATION) at 22:21

## 2021-10-16 RX ADMIN — Medication 2000 UNITS: at 09:26

## 2021-10-16 RX ADMIN — ALBUTEROL SULFATE 2 PUFF: 90 AEROSOL, METERED RESPIRATORY (INHALATION) at 12:08

## 2021-10-16 RX ADMIN — INSULIN LISPRO 1 UNITS: 100 INJECTION, SOLUTION INTRAVENOUS; SUBCUTANEOUS at 12:32

## 2021-10-16 RX ADMIN — ALPRAZOLAM 0.25 MG: 0.25 TABLET ORAL at 06:42

## 2021-10-16 RX ADMIN — DEXAMETHASONE SODIUM PHOSPHATE 10 MG: 4 INJECTION, SOLUTION INTRA-ARTICULAR; INTRALESIONAL; INTRAMUSCULAR; INTRAVENOUS; SOFT TISSUE at 22:28

## 2021-10-16 RX ADMIN — ALPRAZOLAM 0.25 MG: 0.25 TABLET ORAL at 22:29

## 2021-10-16 RX ADMIN — INSULIN LISPRO 1 UNITS: 100 INJECTION, SOLUTION INTRAVENOUS; SUBCUTANEOUS at 06:52

## 2021-10-16 RX ADMIN — ALBUTEROL SULFATE 2 PUFF: 90 AEROSOL, METERED RESPIRATORY (INHALATION) at 09:24

## 2021-10-16 ASSESSMENT — PAIN SCALES - GENERAL
PAINLEVEL_OUTOF10: 0
PAINLEVEL_OUTOF10: 0

## 2021-10-16 NOTE — PROGRESS NOTES
Pulmonary Progress Note    Admit Date: 2021  Hospital day                               PCP: No primary care provider on file. Chief Complaint (s):  Patient Active Problem List   Diagnosis    COVID-19    Acute [facility] failure due to COVID-19 [facility])    [facility] failure with hypoxia [facility]    Pneumonia due to COVID-19 virus    Elevated LFTs    Hypokalemia    Essential hypertension    Non-insulin dependent type 2 diabetes mellitus ([facility]       Subjective:  · In the prone position, the patient saturation goes as high as 98%. This p.m. its 95%. Vitals:  VITALS:  BP (!) 142/77   Pulse 69   Temp 97.9 °F (36.6 °C) (Oral)   Resp 24   Ht 5' 7\" (1.702 m)   Wt 184 lb 11.9 oz (83.8 kg)   SpO2 97%   BMI 28.94 kg/m²     24HR INTAKE/OUTPUT:    No intake or output data in the 24 hours ending 10/16/21 1735    24HR PULSE OXIMETRY RANGE:    SpO2  Av.9 %  Min: 89 %  Max: 99 %    Medications:  IV:   sodium chloride      dextrose         Scheduled Meds:   insulin glargine  5 Units SubCUTAneous Nightly    ascorbic acid  500 mg Oral Daily    zinc sulfate  50 mg Oral Daily    vitamin D  2,000 Units Oral Daily    tiotropium  2 puff Inhalation Daily    budesonide-formoterol  2 puff Inhalation BID    ALPRAZolam  0.25 mg Oral Q4H While awake    guaiFENesin-codeine  10 mL Oral Q4H While awake    dexamethasone  10 mg IntraVENous Q12H    enoxaparin  1 mg/kg SubCUTAneous BID    albuterol sulfate HFA  2 puff Inhalation 4x daily    sodium chloride flush  5-40 mL IntraVENous 2 times per day    PARoxetine  30 mg Oral BID    amLODIPine  5 mg Oral Daily    insulin lispro  0-6 Units SubCUTAneous TID WC    insulin lispro  0-3 Units SubCUTAneous Nightly       Diet:   ADULT DIET; Regular; 5 carb choices (75 gm/meal)  Adult Oral Nutrition Supplement; Diabetic Oral Supplement     EXAM:  General: No distress. Sleeping  Eyes: PERRL. No sclera icterus. No conjunctival injection.   ENT: No discharge. Pharynx clear. Neck: Trachea midline. Normal thyroid. Resp: No accessory muscle use. No rales. No wheezing. No rhonchi. CV: Regular rate. Regular rhythm. No murmur or rub. Abd: Non-tender. Non-distended. No masses. No organomegaly. Normal bowel sounds. Skin: Warm and dry. No nodule on exposed extremities. No rash on exposed extremities. Ext: No cyanosis, clubbing, edema  Lymph: No cervical LAD. No supraclavicular LAD. M/S: No cyanosis. No joint deformity. No clubbing. Neuro: Positive pupils/gag/corneals. Normal pain response. Results:  CBC:   Recent Labs     10/14/21  0605 10/15/21  0503 10/16/21  0403   WBC 10.1 10.3 10.0   HGB 10.4* 10.0* 10.3*   HCT 32.8* 30.9* 31.1*   MCV 85.4 84.7 83.8    199 175     BMP:   Recent Labs     10/14/21  0605 10/15/21  0503 10/16/21  0403    130* 129*   K 4.3 4.5 4.4   CL 97* 96* 96*   CO2 27 27 26   BUN 24* 20 17   CREATININE 0.4* 0.4* 0.4*     LIVER PROFILE:   Recent Labs     10/14/21  0605 10/15/21  0503 10/16/21  0403   AST 24 25 28   ALT 66* 75* 88*   BILITOT 0.3 0.3 0.4   ALKPHOS 125 116 107     PT/INR: No results for input(s): PROTIME, INR in the last 72 hours. APTT: No results for input(s): APTT in the last 72 hours. Pathology:  1. N/A      Microbiology:  1. None    Recent ABG:   No results for input(s): PH, PO2, PCO2, HCO3, BE, O2SAT, METHB, O2HB, COHB, O2CON, HHB, THB in the last 72 hours. FiO2 : 100 %       Recent Films:  XR CHEST PORTABLE   Final Result   No significant change in the appearance of the extensive airspace opacities   throughout both lungs, in keeping with the known history of COVID pneumonia. XR CHEST PORTABLE   Final Result   1. There is no interval change in extensive multifocal bilateral airspace   disease. XR CHEST PORTABLE   Final Result   1. No interval change in the extensive multifocal bilateral airspace disease.          XR CHEST PORTABLE   Final Result   No interval change XR CHEST PORTABLE   Final Result   1. Significant worsening of the multifocal bilateral pneumonia when compared   with the prior CT scan of 09/22/2021         CTA PULMONARY W CONTRAST   Final Result   Within described exam limitations, no evidence of pulmonary embolism. Scattered multifocal low density infiltrates throughout both lungs, most   suggestive of COVID pneumonia in context of current pandemic. At least moderate diffuse hepatic steatosis. Cholecystectomy. RECOMMENDATIONS:   Multiple pulmonary nodules. Most severe: 4 mm left solid pulmonary nodule   within the upper lobe. A non-contrast Chest CT at 12 months is optional. If   performed and the nodule is stable at 12 months, no further follow-up is   recommended. These guidelines do not apply to immunocompromised patients and patients with   cancer. Follow up in patients with significant comorbidities as clinically   warranted. For lung cancer screening, adhere to Lung-RADS guidelines. Reference: Radiology. 2017; 284(1):228-43. XR CHEST PORTABLE   Final Result   There are bilateral multifocal ground-glass areas of consolidation with the   lungs concerning for in infectious or inflammatory process and developing   ARDS or viral pneumonia could give this appearance.                      Assessment:  1. COVID-19 pneumonia with significant worsening despite steroids and baricitinib. Inflammatory markers and chest radiograph are reviewed. More recently, there has been no improvement. D-dimer is noted. Still requiring 60 L of oxygen/100%. CRP is impressively low. Unfortunately, he appears to be a long-hauler        Plan:  1. Continue full dose anticoagulation  2. Wean FiO2 as tolerated  3. Okay to [place] when they can accept him. Time at the bedside, reviewing labs and radiographs, reviewing updated notes and consultations, discussing with staff and family was more than 35 minutes.     Please note that voice recognition technology was used in the preparation of this note and make therefore it may contain inadvertent transcription errors. If the patient is a COVID 19 isolation patient, the above physical exam reflects that of the examining physician for the day. Jose Alfredo Cortez MD, M.D., F.C.C.P.     [facility], [address] [place] [address], [address] [place] [address]

## 2021-10-16 NOTE — PROGRESS NOTES
[facility] Progress Note    Admitting Date and Time: 9/22/2021  9:32 PM  Admit Dx: Hypokalemia [E87.6]  Acute respiratory failure with hypoxia (HCC) [J96.01]  COVID-19 [U07.1]  Acute respiratory failure due to COVID-19 (HCC) [U07.1, J96.00]    Subjective:  Patient is being followed for Hypokalemia [E87.6]  Acute respiratory failure with hypoxia ([address] [J96.01]  COVID-19 [U07.1]  Acute respiratory failure due to COVID-19 ([address] [U07.1, J96.00]     He remains on 60 % FiO2 on Airvo  No better or worse. He desaturates with activity and conversation  No fever or chills overnight    ROS: denies fever, chills, cp, n/v, HA unless stated above.       insulin glargine  5 Units SubCUTAneous Nightly    ascorbic acid  500 mg Oral Daily    zinc sulfate  50 mg Oral Daily    vitamin D  2,000 Units Oral Daily    tiotropium  2 puff Inhalation Daily    budesonide-formoterol  2 puff Inhalation BID    ALPRAZolam  0.25 mg Oral Q4H While awake    guaiFENesin-codeine  10 mL Oral Q4H While awake    dexamethasone  10 mg IntraVENous Q12H    enoxaparin  1 mg/kg SubCUTAneous BID    albuterol sulfate HFA  2 puff Inhalation 4x daily    sodium chloride flush  5-40 mL IntraVENous 2 times per day    PARoxetine  30 mg Oral BID    amLODIPine  5 mg Oral Daily    insulin lispro  0-6 Units SubCUTAneous TID     insulin lispro  0-3 Units SubCUTAneous Nightly     aluminum & magnesium hydroxide-simethicone, 15 mL, Q6H PRN  melatonin, 4.5 mg, Nightly PRN  sodium chloride flush, 5-40 mL, PRN  sodium chloride, 25 mL, PRN  ondansetron, 4 mg, Q8H PRN   Or  ondansetron, 4 mg, Q6H PRN  polyethylene glycol, 17 g, Daily PRN  acetaminophen, 650 mg, Q6H PRN   Or  acetaminophen, 650 mg, Q6H PRN  glucose, 15 g, PRN  dextrose, 12.5 g, PRN  glucagon (rDNA), 1 mg, PRN  dextrose, 100 mL/hr, PRN         Objective:    BP (!) 142/77   Pulse 69   Temp 97.9 °F (36.6 °C) (Oral)   Resp 26   Ht 5' 7\" (1.702 m)   Wt 184 lb 11.9 oz (83.8 kg)   SpO2 99%   BMI 28.94 kg/m²        General Appearance: alert and oriented to person, place and time. RR > 20 but does not appear to be in distress  Skin: warm and dry  Head: normocephalic and atraumatic  Eyes: pupils equal, round, and reactive to light, extraocular eye movements intact, conjunctivae normal  Neck: neck supple and non tender without mass   Pulmonary/Chest: clear to auscultation bilaterally- no wheezes, rales or rhonchi, normal air movement, no respiratory distress  Cardiovascular: normal rate, normal S1 and S2 and no carotid bruits  Abdomen: soft, non-tender, non-distended, normal bowel sounds, no masses or organomegaly  Extremities: no cyanosis, no clubbing and no edema  Neurologic: no cranial nerve deficit and speech normal    Recent Labs     10/14/21  0605 10/15/21  0503 10/16/21  0403    130* 129*   K 4.3 4.5 4.4   CL 97* 96* 96*   CO2 27 27 26   BUN 24* 20 17   CREATININE 0.4* 0.4* 0.4*   GLUCOSE 199* 227* 206*   CALCIUM 8.1* 7.7* 7.7*       Recent Labs     10/14/21  0605 10/15/21  0503 10/16/21  0403   WBC 10.1 10.3 10.0   RBC 3.84 3.65* 3.71*   HGB 10.4* 10.0* 10.3*   HCT 32.8* 30.9* 31.1*   MCV 85.4 84.7 83.8   MCH 27.1 27.4 27.8   MCHC 31.7* 32.4 33.1   RDW 14.4 14.2 14.1    199 175   MPV 11.2 10.7 10.0       Radiology:     Assessment:    Active Problems:    COVID-19    Acute respiratory failure due to COVID-19 [facility])    Acute respiratory failure with hypoxia (HCC)    Pneumonia due to COVID-19 virus    Elevated LFTs    Hypokalemia    Essential hypertension    Non-insulin dependent type 2 diabetes mellitus ([facility]  Resolved Problems:    * No resolved hospital problems. *      Plan:  1.  Acute Respiratory Failure with Hypoxia  - 2/2 COVID-19 CXR significant worsening multifocal infiltrates 10/4 CXR no change in imaging.   - Continue dexamethasone/Albuterol/.  - Has been off BiPAP for several days but requiring Airvo at 100% FiO2 60 LPM  -  Continue to wean O2  as tolerated, will need FiO2 < 60% to qualify for discharge to LTAC. - use BiPAP at bedtime     2. COVID-19 Viral Pneumonia  - Completed baricitinib  - Remains on Dexamethasone 10 BID  - Vitamin Supplementation. Follow inflammatory markers. Lovenox 1mg/kg.   - Encourage IS/Pronating. [id number]  Isolation per protocol Supportive care. Pulmonology following.      3. Anxiety  - Continue Xanax Q4 W/A.      4. DM  - Continue lantus. SSI/hypoglycemic protocol/Carb Control diet. Follow adjust as needed.     5. HTN  - Continue Amlodipine.     6. Poor intake  - 2//2 Respiratory distress. Poor intake noted. Intake improving with improvement of respiratory function. Follow     Hyponatremia  - serum Na of 130. Hypochloremic hyponatremia. Is negative 6 liters since admission, ?increased sensible losses from tacypnea and poor oral intake. O2 is humidified.     7. Disposition- [facility] following. Will require titration of SPO2 <65% prior to referral.  [facility] referral can be made.  Referral made to [place], but will likely need [facility] at [facility]. .casemanagement following.       NOTE: This report was transcribed using voice recognition software. Every effort was made to ensure accuracy; however, inadvertent computerized transcription errors may be present.   Electronically signed by AUBREE Morton CNP on 10/16/2021 at 2:07 PM

## 2021-10-17 LAB
ALBUMIN SERPL-MCNC: 2.6 G/DL (ref 3.5–5.2)
ALP BLD-CCNC: 99 U/L (ref 40–129)
ALT SERPL-CCNC: 99 U/L (ref 0–40)
ANION GAP SERPL CALCULATED.3IONS-SCNC: 8 MMOL/L (ref 7–16)
AST SERPL-CCNC: 28 U/L (ref 0–39)
BILIRUB SERPL-MCNC: 0.3 MG/DL (ref 0–1.2)
BUN BLDV-MCNC: 21 MG/DL (ref 6–23)
CALCIUM SERPL-MCNC: 7.9 MG/DL (ref 8.6–10.2)
CHLORIDE BLD-SCNC: 96 MMOL/L (ref 98–107)
CO2: 27 MMOL/L (ref 22–29)
CREAT SERPL-MCNC: 0.4 MG/DL (ref 0.7–1.2)
FERRITIN: 1019 NG/ML
GFR AFRICAN AMERICAN: >60
GFR NON-AFRICAN AMERICAN: >60 ML/MIN/1.73
GLUCOSE BLD-MCNC: 200 MG/DL (ref 74–99)
HCT VFR BLD CALC: 31.6 % (ref 37–54)
HEMOGLOBIN: 10 G/DL (ref 12.5–16.5)
MCH RBC QN AUTO: 26.8 PG (ref 26–35)
MCHC RBC AUTO-ENTMCNC: 31.6 % (ref 32–34.5)
MCV RBC AUTO: 84.7 FL (ref 80–99.9)
METER GLUCOSE: 104 MG/DL (ref 74–99)
METER GLUCOSE: 129 MG/DL (ref 74–99)
METER GLUCOSE: 152 MG/DL (ref 74–99)
METER GLUCOSE: 180 MG/DL (ref 74–99)
PDW BLD-RTO: 14.4 FL (ref 11.5–15)
PLATELET # BLD: 200 E9/L (ref 130–450)
PMV BLD AUTO: 10.6 FL (ref 7–12)
POTASSIUM SERPL-SCNC: 4.4 MMOL/L (ref 3.5–5)
RBC # BLD: 3.73 E12/L (ref 3.8–5.8)
SODIUM BLD-SCNC: 131 MMOL/L (ref 132–146)
TOTAL PROTEIN: 5.4 G/DL (ref 6.4–8.3)
WBC # BLD: 9.2 E9/L (ref 4.5–11.5)

## 2021-10-17 PROCEDURE — 36415 COLL VENOUS BLD VENIPUNCTURE: CPT

## 2021-10-17 PROCEDURE — 85027 COMPLETE CBC AUTOMATED: CPT

## 2021-10-17 PROCEDURE — APPSS30 APP SPLIT SHARED TIME 16-30 MINUTES: Performed by: NURSE PRACTITIONER

## 2021-10-17 PROCEDURE — 80053 COMPREHEN METABOLIC PANEL: CPT

## 2021-10-17 PROCEDURE — 6360000002 HC RX W HCPCS: Performed by: INTERNAL MEDICINE

## 2021-10-17 PROCEDURE — 1200000000 HC SEMI PRIVATE

## 2021-10-17 PROCEDURE — 82728 ASSAY OF FERRITIN: CPT

## 2021-10-17 PROCEDURE — 94660 CPAP INITIATION&MGMT: CPT

## 2021-10-17 PROCEDURE — 6370000000 HC RX 637 (ALT 250 FOR IP): Performed by: INTERNAL MEDICINE

## 2021-10-17 PROCEDURE — 2700000000 HC OXYGEN THERAPY PER DAY

## 2021-10-17 PROCEDURE — 2580000003 HC RX 258: Performed by: INTERNAL MEDICINE

## 2021-10-17 PROCEDURE — 99233 SBSQ HOSP IP/OBS HIGH 50: CPT | Performed by: INTERNAL MEDICINE

## 2021-10-17 PROCEDURE — 82962 GLUCOSE BLOOD TEST: CPT

## 2021-10-17 PROCEDURE — 6370000000 HC RX 637 (ALT 250 FOR IP): Performed by: NURSE PRACTITIONER

## 2021-10-17 RX ADMIN — DEXAMETHASONE SODIUM PHOSPHATE 10 MG: 4 INJECTION, SOLUTION INTRA-ARTICULAR; INTRALESIONAL; INTRAMUSCULAR; INTRAVENOUS; SOFT TISSUE at 09:50

## 2021-10-17 RX ADMIN — ALBUTEROL SULFATE 2 PUFF: 90 AEROSOL, METERED RESPIRATORY (INHALATION) at 17:20

## 2021-10-17 RX ADMIN — ALPRAZOLAM 0.25 MG: 0.25 TABLET ORAL at 06:09

## 2021-10-17 RX ADMIN — ALPRAZOLAM 0.25 MG: 0.25 TABLET ORAL at 17:19

## 2021-10-17 RX ADMIN — INSULIN LISPRO 1 UNITS: 100 INJECTION, SOLUTION INTRAVENOUS; SUBCUTANEOUS at 06:17

## 2021-10-17 RX ADMIN — INSULIN LISPRO 1 UNITS: 100 INJECTION, SOLUTION INTRAVENOUS; SUBCUTANEOUS at 17:27

## 2021-10-17 RX ADMIN — Medication 10 ML: at 09:47

## 2021-10-17 RX ADMIN — ALPRAZOLAM 0.25 MG: 0.25 TABLET ORAL at 09:49

## 2021-10-17 RX ADMIN — ENOXAPARIN SODIUM 80 MG: 100 INJECTION SUBCUTANEOUS at 09:48

## 2021-10-17 RX ADMIN — PAROXETINE 30 MG: 10 TABLET, FILM COATED ORAL at 22:20

## 2021-10-17 RX ADMIN — ALPRAZOLAM 0.25 MG: 0.25 TABLET ORAL at 22:21

## 2021-10-17 RX ADMIN — DEXAMETHASONE SODIUM PHOSPHATE 10 MG: 4 INJECTION, SOLUTION INTRA-ARTICULAR; INTRALESIONAL; INTRAMUSCULAR; INTRAVENOUS; SOFT TISSUE at 22:21

## 2021-10-17 RX ADMIN — OXYCODONE HYDROCHLORIDE AND ACETAMINOPHEN 500 MG: 500 TABLET ORAL at 09:49

## 2021-10-17 RX ADMIN — Medication 10 ML: at 22:24

## 2021-10-17 RX ADMIN — AMLODIPINE BESYLATE 5 MG: 5 TABLET ORAL at 09:48

## 2021-10-17 RX ADMIN — TIOTROPIUM BROMIDE INHALATION SPRAY 2 PUFF: 3.12 SPRAY, METERED RESPIRATORY (INHALATION) at 08:23

## 2021-10-17 RX ADMIN — ACETAMINOPHEN 650 MG: 325 TABLET ORAL at 17:24

## 2021-10-17 RX ADMIN — ALBUTEROL SULFATE 2 PUFF: 90 AEROSOL, METERED RESPIRATORY (INHALATION) at 11:47

## 2021-10-17 RX ADMIN — PAROXETINE 30 MG: 10 TABLET, FILM COATED ORAL at 09:49

## 2021-10-17 RX ADMIN — BUDESONIDE AND FORMOTEROL FUMARATE DIHYDRATE 2 PUFF: 160; 4.5 AEROSOL RESPIRATORY (INHALATION) at 08:22

## 2021-10-17 RX ADMIN — ALBUTEROL SULFATE 2 PUFF: 90 AEROSOL, METERED RESPIRATORY (INHALATION) at 08:21

## 2021-10-17 RX ADMIN — BUDESONIDE AND FORMOTEROL FUMARATE DIHYDRATE 2 PUFF: 160; 4.5 AEROSOL RESPIRATORY (INHALATION) at 22:19

## 2021-10-17 RX ADMIN — ALBUTEROL SULFATE 2 PUFF: 90 AEROSOL, METERED RESPIRATORY (INHALATION) at 22:18

## 2021-10-17 RX ADMIN — Medication 2000 UNITS: at 09:57

## 2021-10-17 RX ADMIN — ZINC SULFATE 220 MG (50 MG) CAPSULE 50 MG: CAPSULE at 09:48

## 2021-10-17 RX ADMIN — INSULIN GLARGINE 5 UNITS: 100 INJECTION, SOLUTION SUBCUTANEOUS at 22:36

## 2021-10-17 ASSESSMENT — PAIN SCALES - GENERAL
PAINLEVEL_OUTOF10: 4
PAINLEVEL_OUTOF10: 0
PAINLEVEL_OUTOF10: 0

## 2021-10-17 NOTE — PROGRESS NOTES
Pulmonary Progress Note    Admit Date: 2021  Hospital day                               PCP: No primary care provider on file. Chief Complaint (s):  Patient Active Problem List   Diagnosis    COVID-19    Acute respiratory failure due to COVID-19 [facility])    [facility] with [facility]    Pneumonia due to COVID-19 virus    Elevated LFTs    Hypokalemia    Essential hypertension    Non-insulin dependent type 2 diabetes mellitus ([facility]       Subjective:  · Sleeping this afternoon, saturations have trended upward. Vitals:  VITALS:  /70   Pulse 57   Temp 98 °F (36.7 °C) (Oral)   Resp 24   Ht 5' 7\" (1.702 m)   Wt 184 lb 11.9 oz (83.8 kg)   SpO2 93%   BMI 28.94 kg/m²     24HR INTAKE/OUTPUT:      Intake/Output Summary (Last 24 hours) at 10/17/2021 1641  Last data filed at 10/17/2021 1430  Gross per 24 hour   Intake --   Output 475 ml   Net -475 ml       24HR PULSE OXIMETRY RANGE:    SpO2  Av.4 %  Min: 93 %  Max: 100 %    Medications:  IV:   sodium chloride      dextrose         Scheduled Meds:   insulin glargine  5 Units SubCUTAneous Nightly    ascorbic acid  500 mg Oral Daily    zinc sulfate  50 mg Oral Daily    vitamin D  2,000 Units Oral Daily    tiotropium  2 puff Inhalation Daily    budesonide-formoterol  2 puff Inhalation BID    ALPRAZolam  0.25 mg Oral Q4H While awake    guaiFENesin-codeine  10 mL Oral Q4H While awake    dexamethasone  10 mg IntraVENous Q12H    enoxaparin  1 mg/kg SubCUTAneous BID    albuterol sulfate HFA  2 puff Inhalation 4x daily    sodium chloride flush  5-40 mL IntraVENous 2 times per day    PARoxetine  30 mg Oral BID    amLODIPine  5 mg Oral Daily    insulin lispro  0-6 Units SubCUTAneous TID WC    insulin lispro  0-3 Units SubCUTAneous Nightly       Diet:   ADULT DIET; Regular; 5 carb choices (75 gm/meal)  Adult Oral Nutrition Supplement; Diabetic Oral Supplement     EXAM:  General: No distress. Sleeping  Eyes: PERRL. No sclera icterus. No conjunctival injection. ENT: No discharge. Pharynx clear. Neck: Trachea midline. Normal thyroid. Resp: No accessory muscle use. No rales. No wheezing. No rhonchi. CV: Regular rate. Regular rhythm. No murmur or rub. Abd: Non-tender. Non-distended. No masses. No organomegaly. Normal bowel sounds. Skin: Warm and dry. No nodule on exposed extremities. No rash on exposed extremities. Ext: No cyanosis, clubbing, edema  Lymph: No cervical LAD. No supraclavicular LAD. M/S: No cyanosis. No joint deformity. No clubbing. Neuro: Positive pupils/gag/corneals. Normal pain response. Results:  CBC:   Recent Labs     10/15/21  0503 10/16/21  0403 10/17/21  0534   WBC 10.3 10.0 9.2   HGB 10.0* 10.3* 10.0*   HCT 30.9* 31.1* 31.6*   MCV 84.7 83.8 84.7    175 200     BMP:   Recent Labs     10/15/21  0503 10/16/21  0403 10/17/21  0534   * 129* 131*   K 4.5 4.4 4.4   CL 96* 96* 96*   CO2 27 26 27   BUN 20 17 21   CREATININE 0.4* 0.4* 0.4*     LIVER PROFILE:   Recent Labs     10/15/21  0503 10/16/21  0403 10/17/21  0534   AST 25 28 28   ALT 75* 88* 99*   BILITOT 0.3 0.4 0.3   ALKPHOS 116 107 99     PT/INR: No results for input(s): PROTIME, INR in the last 72 hours. APTT: No results for input(s): APTT in the last 72 hours. Pathology:  1. N/A      Microbiology:  1. None    Recent ABG:   No results for input(s): PH, PO2, PCO2, HCO3, BE, O2SAT, METHB, O2HB, COHB, O2CON, HHB, THB in the last 72 hours. FiO2 : 90 %       Recent Films:  XR CHEST PORTABLE   Final Result   No significant change in the appearance of the extensive airspace opacities   throughout both lungs, in keeping with the known history of COVID pneumonia. XR CHEST PORTABLE   Final Result   1. There is no interval change in extensive multifocal bilateral airspace   disease. XR CHEST PORTABLE   Final Result   1.  No interval change in the extensive multifocal bilateral airspace disease. XR CHEST PORTABLE   Final Result   No interval change         XR CHEST PORTABLE   Final Result   1. Significant worsening of the multifocal bilateral pneumonia when compared   with the prior CT scan of 09/22/2021         CTA PULMONARY W CONTRAST   Final Result   Within described exam limitations, no evidence of pulmonary embolism. Scattered multifocal low density infiltrates throughout both lungs, most   suggestive of COVID pneumonia in context of current pandemic. At least moderate diffuse hepatic steatosis. Cholecystectomy. RECOMMENDATIONS:   Multiple pulmonary nodules. Most severe: 4 mm left solid pulmonary nodule   within the upper lobe. A non-contrast Chest CT at 12 months is optional. If   performed and the nodule is stable at 12 months, no further follow-up is   recommended. These guidelines do not apply to immunocompromised patients and patients with   cancer. Follow up in patients with significant comorbidities as clinically   warranted. For lung cancer screening, adhere to Lung-RADS guidelines. Reference: Radiology. 2017; 284(1):228-43. XR CHEST PORTABLE   Final Result   There are bilateral multifocal ground-glass areas of consolidation with the   lungs concerning for in infectious or inflammatory process and developing   ARDS or viral pneumonia could give this appearance.                      Assessment:  1. COVID-19 pneumonia with significant worsening despite steroids and baricitinib. Inflammatory markers and chest radiograph are reviewed. More recently, there has been no improvement. D-dimer is noted. Oxygen requirement slowly decreasing. CRP is impressively low. Unfortunately, he appears to be a long-hauler        Plan:  1. Continue full dose anticoagulation  2. Wean FiO2 as tolerated  3. Okay to [place] when they can accept him.     Time at the bedside, reviewing labs and radiographs, reviewing updated notes and consultations, discussing with staff and family was more than 35 minutes. Please note that voice recognition technology was used in the preparation of this note and make therefore it may contain inadvertent transcription errors. If the patient is a COVID 19 isolation patient, the above physical exam reflects that of the examining physician for the day. Sugey Rodriguez MD,  M.D., F.C.C.P.     [facility], [address] [place] [address], [address], [facility]

## 2021-10-17 NOTE — PLAN OF CARE
Problem: Gas Exchange - Impaired  Goal: Promote optimal lung function  10/17/2021 0543 by Andrew Mccann RN  Outcome: Met This Shift     Problem:  Body Temperature -  Risk of, Imbalanced  Goal: Ability to maintain a body temperature within defined limits  10/17/2021 0543 by Andrew Mccann RN  Outcome: Met This Shift  Goal: Will regain or maintain usual level of consciousness  10/17/2021 0543 by Andrew Mccann RN  Outcome: Met This Shift  Goal: Complications related to the disease process, condition or treatment will be avoided or minimized  10/17/2021 0543 by Andrew Mccann RN  Outcome: Met This Shift     Problem: Isolation Precautions - Risk of Spread of Infection  Goal: Prevent transmission of infection  10/17/2021 0543 by Andrew Mccann RN  Outcome: Met This Shift     Problem: Nutrition Deficits  Goal: Optimize nutritional status  10/17/2021 0543 by Andrew Mccann RN  Outcome: Met This Shift     Problem: Risk for Fluid Volume Deficit  Goal: Maintain normal heart rhythm  10/17/2021 0543 by Andrew Mccann RN  Outcome: Met This Shift  Goal: Maintain absence of muscle cramping  10/17/2021 0543 by Andrew Mccann RN  Outcome: Met This Shift  Goal: Maintain normal serum potassium, sodium, calcium, phosphorus, and pH  10/17/2021 0543 by Andrew Mccann RN  Outcome: Met This Shift     Problem: Loneliness or Risk for Loneliness  Goal: Demonstrate positive use of time alone when socialization is not possible  10/17/2021 0543 by Andrew Mccann RN  Outcome: Met This Shift     Problem: Fatigue  Goal: Verbalize increase energy and improved vitality  10/17/2021 0543 by Andrew Mccann RN  Outcome: Met This Shift     Problem: Patient Education: Go to Patient Education Activity  Goal: Patient/Family Education  10/17/2021 0543 by Andrew Mccann RN  Outcome: Met This Shift     Problem: Pain:  Goal: Pain level will decrease  Description: Pain level will decrease  10/17/2021 0543 by Marli Ceballos RN  Outcome: Met This Shift  Goal: Control of acute pain  Description: Control of acute pain  10/17/2021 0543 by Marli Ceballos RN  Outcome: Met This Shift  Goal: Control of chronic pain  Description: Control of chronic pain  10/17/2021 0543 by Marli Ceballos RN  Outcome: Met This Shift

## 2021-10-17 NOTE — PROGRESS NOTES
[facility] Progress Note    Admitting Date and Time: 9/22/2021  9:32 PM  Admit Dx: Hypokalemia [E87.6]  Acute respiratory failure with hypoxia ([address] [J96.01]  COVID-19 [U07.1]  Acute respiratory failure due to COVID-19 (HCC) [U07.1, J96.00]    Subjective:  Patient is being followed for Hypokalemia [E87.6]  Acute respiratory failure with hypoxia ([address] [J96.01]  COVID-19 [U07.1]  Acute respiratory failure due to COVID-19 (HCC) [U07.1, J96.00]     No overnight events reported. Has been afebrile  States he feels more SOB due to not being able to expectorate sputum. ROS: denies fever, chills, cp,  n/v, HA unless stated above.       insulin glargine  5 Units SubCUTAneous Nightly    ascorbic acid  500 mg Oral Daily    zinc sulfate  50 mg Oral Daily    vitamin D  2,000 Units Oral Daily    tiotropium  2 puff Inhalation Daily    budesonide-formoterol  2 puff Inhalation BID    ALPRAZolam  0.25 mg Oral Q4H While awake    guaiFENesin-codeine  10 mL Oral Q4H While awake    dexamethasone  10 mg IntraVENous Q12H    enoxaparin  1 mg/kg SubCUTAneous BID    albuterol sulfate HFA  2 puff Inhalation 4x daily    sodium chloride flush  5-40 mL IntraVENous 2 times per day    PARoxetine  30 mg Oral BID    amLODIPine  5 mg Oral Daily    insulin lispro  0-6 Units SubCUTAneous TID WC    insulin lispro  0-3 Units SubCUTAneous Nightly     aluminum & magnesium hydroxide-simethicone, 15 mL, Q6H PRN  melatonin, 4.5 mg, Nightly PRN  sodium chloride flush, 5-40 mL, PRN  sodium chloride, 25 mL, PRN  ondansetron, 4 mg, Q8H PRN   Or  ondansetron, 4 mg, Q6H PRN  polyethylene glycol, 17 g, Daily PRN  acetaminophen, 650 mg, Q6H PRN   Or  acetaminophen, 650 mg, Q6H PRN  glucose, 15 g, PRN  dextrose, 12.5 g, PRN  glucagon (rDNA), 1 mg, PRN  dextrose, 100 mL/hr, PRN         Objective:    /70   Pulse 57   Temp 98 °F (36.7 °C) (Oral)   Resp 26   Ht 5' 7\" (1.702 m)   Wt 184 lb 11.9 oz (83.8 kg)   SpO2 97% BMI 28.94 kg/m²     General Appearance: alert and oriented to person, place and time. RR > 20 but does not appear to be in distress. Conversational dyspnea  Skin: warm and dry  Head: normocephalic and atraumatic  Eyes: pupils equal, round, and reactive to light, extraocular eye movements intact, conjunctivae normal  Neck: neck supple and non tender without mass   Pulmonary/Chest: diminished, bibasilar crackles  Cardiovascular: ferny 50s at rest, normal S1 and S2 and no carotid bruits  Abdomen: soft, non-tender, non-distended, normal bowel sounds, no masses or organomegaly  Extremities: no cyanosis, no clubbing and no edema  Neurologic: no cranial nerve deficit and speech normal        Recent Labs     10/15/21  0503 10/16/21  0403 10/17/21  0534   * 129* 131*   K 4.5 4.4 4.4   CL 96* 96* 96*   CO2 27 26 27   BUN 20 17 21   CREATININE 0.4* 0.4* 0.4*   GLUCOSE 227* 206* 200*   CALCIUM 7.7* 7.7* 7.9*       Recent Labs     10/15/21  0503 10/16/21  0403 10/17/21  0534   WBC 10.3 10.0 9.2   RBC 3.65* 3.71* 3.73*   HGB 10.0* 10.3* 10.0*   HCT 30.9* 31.1* 31.6*   MCV 84.7 83.8 84.7   MCH 27.4 27.8 26.8   MCHC 32.4 33.1 31.6*   RDW 14.2 14.1 14.4    175 200   MPV 10.7 10.0 10.6       Radiology:     Assessment:    Active Problems:    COVID-19    Acute respiratory failure due to COVID-19 [facility])    Acute respiratory failure with hypoxia (HCC)    Pneumonia due to COVID-19 virus    Elevated LFTs    Hypokalemia    Essential hypertension    Non-insulin dependent type 2 diabetes mellitus ([facility]  Resolved Problems:    * No resolved hospital problems. *      Plan:  1. Acute Respiratory Failure with Hypoxia  - 2/2 COVID-19 CXR significant worsening multifocal infiltrates 10/4 CXR no change in imaging.   - Continue dexamethasone/Albuterol.  - Has been off BiPAP for several days but requiring Airvo at 100% FiO2 60 LPM. Unfortunately no further progress over several days.   -  will need FiO2 < 60% to qualify for discharge to LTAC.  - use BiPAP at bedtime, refuses.      2. COVID-19 Viral Pneumonia  - Completed baricitinib  - Remains on Dexamethasone 10 BID  - Vitamin Supplementation. Follow inflammatory markers. Lovenox 1mg/kg.   - Encourage IS/Pronating. OUMKZH 045  Isolation per protocol Supportive care. Pulmonology following.      3. Anxiety  - Continue Xanax Q4 W/A.      4. DM  - Continue lantus. SSI/hypoglycemic protocol/Carb Control diet. Follow adjust as needed.     5. HTN  - Continue Amlodipine.     6. Poor intake  - 2//2 Respiratory distress. Poor intake noted. Intake improving with improvement of respiratory function. Follow     Hyponatremia  - serum Na of 130. Hypochloremic hyponatremia. Is negative 6 liters since admission, ?increased sensible losses from tacypnea and poor oral intake. O2 is humidified.     7. Disposition- [facility] following. Will require titration of SPO2 <65% prior to referral.  [facility] referral can be made.  Referral made to [place], but will likely need [facility] at [facility]. .casemanagement following.          NOTE: This report was transcribed using voice recognition software. Every effort was made to ensure accuracy; however, inadvertent computerized transcription errors may be present.   Electronically signed by AUBREE Kay CNP on 10/17/2021 at 1:30 PM

## 2021-10-18 LAB
ALBUMIN SERPL-MCNC: 2.9 G/DL (ref 3.5–5.2)
ALP BLD-CCNC: 104 U/L (ref 40–129)
ALT SERPL-CCNC: 112 U/L (ref 0–40)
ANION GAP SERPL CALCULATED.3IONS-SCNC: 11 MMOL/L (ref 7–16)
AST SERPL-CCNC: 27 U/L (ref 0–39)
BILIRUB SERPL-MCNC: 0.3 MG/DL (ref 0–1.2)
BUN BLDV-MCNC: 23 MG/DL (ref 6–23)
CALCIUM SERPL-MCNC: 8.3 MG/DL (ref 8.6–10.2)
CHLORIDE BLD-SCNC: 93 MMOL/L (ref 98–107)
CO2: 25 MMOL/L (ref 22–29)
CREAT SERPL-MCNC: 0.5 MG/DL (ref 0.7–1.2)
FERRITIN: 1205 NG/ML
GFR AFRICAN AMERICAN: >60
GFR NON-AFRICAN AMERICAN: >60 ML/MIN/1.73
GLUCOSE BLD-MCNC: 187 MG/DL (ref 74–99)
HCT VFR BLD CALC: 34.5 % (ref 37–54)
HEMOGLOBIN: 11.4 G/DL (ref 12.5–16.5)
MCH RBC QN AUTO: 27.2 PG (ref 26–35)
MCHC RBC AUTO-ENTMCNC: 33 % (ref 32–34.5)
MCV RBC AUTO: 82.3 FL (ref 80–99.9)
METER GLUCOSE: 113 MG/DL (ref 74–99)
METER GLUCOSE: 160 MG/DL (ref 74–99)
METER GLUCOSE: 164 MG/DL (ref 74–99)
METER GLUCOSE: 201 MG/DL (ref 74–99)
PDW BLD-RTO: 14.5 FL (ref 11.5–15)
PLATELET # BLD: 235 E9/L (ref 130–450)
PMV BLD AUTO: 10.2 FL (ref 7–12)
POTASSIUM SERPL-SCNC: 4.1 MMOL/L (ref 3.5–5)
RBC # BLD: 4.19 E12/L (ref 3.8–5.8)
SODIUM BLD-SCNC: 129 MMOL/L (ref 132–146)
TOTAL PROTEIN: 5.9 G/DL (ref 6.4–8.3)
WBC # BLD: 10.7 E9/L (ref 4.5–11.5)

## 2021-10-18 PROCEDURE — 82728 ASSAY OF FERRITIN: CPT

## 2021-10-18 PROCEDURE — 82962 GLUCOSE BLOOD TEST: CPT

## 2021-10-18 PROCEDURE — 94660 CPAP INITIATION&MGMT: CPT

## 2021-10-18 PROCEDURE — APPSS30 APP SPLIT SHARED TIME 16-30 MINUTES: Performed by: NURSE PRACTITIONER

## 2021-10-18 PROCEDURE — 1200000000 HC SEMI PRIVATE

## 2021-10-18 PROCEDURE — 6370000000 HC RX 637 (ALT 250 FOR IP): Performed by: NURSE PRACTITIONER

## 2021-10-18 PROCEDURE — 36415 COLL VENOUS BLD VENIPUNCTURE: CPT

## 2021-10-18 PROCEDURE — 6370000000 HC RX 637 (ALT 250 FOR IP): Performed by: INTERNAL MEDICINE

## 2021-10-18 PROCEDURE — 2580000003 HC RX 258: Performed by: INTERNAL MEDICINE

## 2021-10-18 PROCEDURE — 85027 COMPLETE CBC AUTOMATED: CPT

## 2021-10-18 PROCEDURE — 99233 SBSQ HOSP IP/OBS HIGH 50: CPT | Performed by: INTERNAL MEDICINE

## 2021-10-18 PROCEDURE — 6360000002 HC RX W HCPCS: Performed by: INTERNAL MEDICINE

## 2021-10-18 PROCEDURE — 80053 COMPREHEN METABOLIC PANEL: CPT

## 2021-10-18 PROCEDURE — 2700000000 HC OXYGEN THERAPY PER DAY

## 2021-10-18 RX ADMIN — ALPRAZOLAM 0.25 MG: 0.25 TABLET ORAL at 06:53

## 2021-10-18 RX ADMIN — Medication 10 ML: at 08:45

## 2021-10-18 RX ADMIN — ALPRAZOLAM 0.25 MG: 0.25 TABLET ORAL at 14:45

## 2021-10-18 RX ADMIN — ENOXAPARIN SODIUM 80 MG: 100 INJECTION SUBCUTANEOUS at 08:40

## 2021-10-18 RX ADMIN — DEXAMETHASONE SODIUM PHOSPHATE 10 MG: 4 INJECTION, SOLUTION INTRA-ARTICULAR; INTRALESIONAL; INTRAMUSCULAR; INTRAVENOUS; SOFT TISSUE at 21:41

## 2021-10-18 RX ADMIN — Medication 2000 UNITS: at 08:41

## 2021-10-18 RX ADMIN — ZINC SULFATE 220 MG (50 MG) CAPSULE 50 MG: CAPSULE at 08:40

## 2021-10-18 RX ADMIN — PAROXETINE 30 MG: 10 TABLET, FILM COATED ORAL at 21:40

## 2021-10-18 RX ADMIN — ENOXAPARIN SODIUM 80 MG: 100 INJECTION SUBCUTANEOUS at 21:40

## 2021-10-18 RX ADMIN — AMLODIPINE BESYLATE 5 MG: 5 TABLET ORAL at 08:41

## 2021-10-18 RX ADMIN — ALBUTEROL SULFATE 2 PUFF: 90 AEROSOL, METERED RESPIRATORY (INHALATION) at 11:09

## 2021-10-18 RX ADMIN — BUDESONIDE AND FORMOTEROL FUMARATE DIHYDRATE 2 PUFF: 160; 4.5 AEROSOL RESPIRATORY (INHALATION) at 21:39

## 2021-10-18 RX ADMIN — DEXAMETHASONE SODIUM PHOSPHATE 10 MG: 4 INJECTION, SOLUTION INTRA-ARTICULAR; INTRALESIONAL; INTRAMUSCULAR; INTRAVENOUS; SOFT TISSUE at 08:41

## 2021-10-18 RX ADMIN — BUDESONIDE AND FORMOTEROL FUMARATE DIHYDRATE 2 PUFF: 160; 4.5 AEROSOL RESPIRATORY (INHALATION) at 08:43

## 2021-10-18 RX ADMIN — ALBUTEROL SULFATE 2 PUFF: 90 AEROSOL, METERED RESPIRATORY (INHALATION) at 08:44

## 2021-10-18 RX ADMIN — ALPRAZOLAM 0.25 MG: 0.25 TABLET ORAL at 17:37

## 2021-10-18 RX ADMIN — ALBUTEROL SULFATE 2 PUFF: 90 AEROSOL, METERED RESPIRATORY (INHALATION) at 21:39

## 2021-10-18 RX ADMIN — ALBUTEROL SULFATE 2 PUFF: 90 AEROSOL, METERED RESPIRATORY (INHALATION) at 16:51

## 2021-10-18 RX ADMIN — ALPRAZOLAM 0.25 MG: 0.25 TABLET ORAL at 21:40

## 2021-10-18 RX ADMIN — INSULIN LISPRO 1 UNITS: 100 INJECTION, SOLUTION INTRAVENOUS; SUBCUTANEOUS at 11:14

## 2021-10-18 RX ADMIN — OXYCODONE HYDROCHLORIDE AND ACETAMINOPHEN 500 MG: 500 TABLET ORAL at 08:42

## 2021-10-18 RX ADMIN — Medication 10 ML: at 21:42

## 2021-10-18 RX ADMIN — TIOTROPIUM BROMIDE INHALATION SPRAY 2 PUFF: 3.12 SPRAY, METERED RESPIRATORY (INHALATION) at 08:43

## 2021-10-18 RX ADMIN — INSULIN LISPRO 2 UNITS: 100 INJECTION, SOLUTION INTRAVENOUS; SUBCUTANEOUS at 17:24

## 2021-10-18 RX ADMIN — INSULIN LISPRO 1 UNITS: 100 INJECTION, SOLUTION INTRAVENOUS; SUBCUTANEOUS at 07:02

## 2021-10-18 RX ADMIN — ALPRAZOLAM 0.25 MG: 0.25 TABLET ORAL at 11:09

## 2021-10-18 RX ADMIN — PAROXETINE 30 MG: 10 TABLET, FILM COATED ORAL at 08:41

## 2021-10-18 RX ADMIN — INSULIN GLARGINE 5 UNITS: 100 INJECTION, SOLUTION SUBCUTANEOUS at 21:30

## 2021-10-18 ASSESSMENT — PAIN DESCRIPTION - ORIENTATION: ORIENTATION: RIGHT;LEFT

## 2021-10-18 ASSESSMENT — PAIN SCALES - GENERAL
PAINLEVEL_OUTOF10: 0
PAINLEVEL_OUTOF10: 0

## 2021-10-18 ASSESSMENT — PAIN DESCRIPTION - PAIN TYPE: TYPE: ACUTE PAIN

## 2021-10-18 ASSESSMENT — PAIN DESCRIPTION - LOCATION: LOCATION: HEAD

## 2021-10-18 NOTE — PROGRESS NOTES
Comprehensive Nutrition Assessment    Type and Reason for Visit:  Reassess    Nutrition Recommendations/Plan: Continue current diet and ONS to help optimize nutrient needs. Nutrition Assessment:  COVID-19 pneumonia with significant worsening despite treatments per EMR. Pt is tolerating diet and trying to consume ONS. However remains at risk d/t sporadic decreased kizzy/intake w/ SOB. Pt adm with COVID19+ PNA. Will continue ONS and monitor. Malnutrition Assessment:  Malnutrition Status: At risk for malnutrition (Comment)    Context:  Acute Illness     Findings of the 6 clinical characteristics of malnutrition:  Energy Intake:  1 - 75% or less of estimated energy requirements for 7 or more days  Weight Loss:  Unable to assess (2/2 poor EMR wt hx pta)     Body Fat Loss:  Unable to assess (2/2 COVID Iso)     Muscle Mass Loss:  Unable to assess    Fluid Accumulation:  No significant fluid accumulation     Strength:  Not Performed    Estimated Daily Nutrient Needs:  Energy (kcal):  5397-6327 (MSJx1. 3SF); Weight Used for Energy Requirements:  Current     Protein (g):  85-100g (1.3-1.5g/kg IBW); Weight Used for Protein Requirements:  Ideal        Fluid (ml/day):  9814-7283; Method Used for Fluid Requirements:  1 ml/kcal      Nutrition Related Findings:  A&Ox4, BS active, Abd WDL, no Edema, -I/Os      Wounds:  None       Current Nutrition Therapies:    ADULT DIET; Regular; 5 carb choices (75 gm/meal)  Adult Oral Nutrition Supplement; Diabetic Oral Supplement    Anthropometric Measures:  · Height: 5' 7\" (170.2 cm)  · Current Body Weight: 184 lb 11.9 oz (83.8 kg) (actual 9/23)   · Admission Body Weight: 184 lb (83.5 kg) (actual 9/23)    · Usual Body Weight:  (UTO per EMR)     · Ideal Body Weight: 148 lbs; % Ideal Body Weight 124.8 %   · BMI: 28.9  · Adjusted Body Weight:  ; No Adjustment       · BMI Categories: Overweight (BMI 25.0-29. 9)       Nutrition Diagnosis:   · Inadequate oral intake related to impaired respiratory function (COVID pneumonia) as evidenced by intake 51-75%, poor intake prior to admission, nausea, GI abnormality    Nutrition Interventions:   Food and/or Nutrient Delivery:  Continue Current Diet, Continue Oral Nutrition Supplement  Nutrition Education/Counseling:  No recommendation at this time   Coordination of Nutrition Care:  Continue to monitor while inpatient    Goals:  >75% of meals and ONS consumed       Nutrition Monitoring and Evaluation:   Behavioral-Environmental Outcomes:  None Identified   Food/Nutrient Intake Outcomes:  Food and Nutrient Intake, Supplement Intake  Physical Signs/Symptoms Outcomes:  Biochemical Data, GI Status, Nausea or Vomiting, Fluid Status or Edema, Nutrition Focused Physical Findings, Skin, Weight     Discharge Planning:     Too soon to determine     Electronically signed by Eve Gallegos RD, LD on 10/18/21 at 9:25 AM EDT    Contact: [phone]

## 2021-10-18 NOTE — PROGRESS NOTES
Pulmonary Progress Note    Admit Date: 2021  Hospital day                               PCP: No primary care provider on file. Chief Complaint (s):  Patient Active Problem List   Diagnosis    COVID-19    Acute respiratory failure due to COVID-19 [facility])    Acute [facility] failure with hypoxia [address]    Pneumonia due to COVID-19 virus    Elevated LFTs    Hypokalemia    Essential hypertension    Non-insulin dependent type 2 diabetes mellitus ([facility]       Subjective:  · Seen this p.m., the patient is awake and alert. He was briefly off oxygen and now is coming back up. Saturations have been as high as 96%. Vitals:  VITALS:  BP (!) 140/82   Pulse 68   Temp 97.1 °F (36.2 °C) (Oral)   Resp 28   Ht 5' 7\" (1.702 m)   Wt 184 lb 11.9 oz (83.8 kg)   SpO2 93%   BMI 28.94 kg/m²     24HR INTAKE/OUTPUT:      Intake/Output Summary (Last 24 hours) at 10/18/2021 1725  Last data filed at 10/17/2021 2155  Gross per 24 hour   Intake --   Output 450 ml   Net -450 ml       24HR PULSE OXIMETRY RANGE:    SpO2  Av.8 %  Min: 87 %  Max: 100 %    Medications:  IV:   sodium chloride      dextrose         Scheduled Meds:   insulin glargine  5 Units SubCUTAneous Nightly    ascorbic acid  500 mg Oral Daily    zinc sulfate  50 mg Oral Daily    vitamin D  2,000 Units Oral Daily    tiotropium  2 puff Inhalation Daily    budesonide-formoterol  2 puff Inhalation BID    ALPRAZolam  0.25 mg Oral Q4H While awake    guaiFENesin-codeine  10 mL Oral Q4H While awake    dexamethasone  10 mg IntraVENous Q12H    enoxaparin  1 mg/kg SubCUTAneous BID    albuterol sulfate HFA  2 puff Inhalation 4x daily    sodium chloride flush  5-40 mL IntraVENous 2 times per day    PARoxetine  30 mg Oral BID    amLODIPine  5 mg Oral Daily    insulin lispro  0-6 Units SubCUTAneous TID     insulin lispro  0-3 Units SubCUTAneous Nightly       Diet:   ADULT DIET;  Regular; 5 carb choices (75 gm/meal)  Adult Oral Nutrition Supplement; Diabetic Oral Supplement     EXAM:  General: No distress. Sleeping  Eyes: PERRL. No sclera icterus. No conjunctival injection. ENT: No discharge. Pharynx clear. Neck: Trachea midline. Normal thyroid. Resp: No accessory muscle use. No rales. No wheezing. No rhonchi. CV: Regular rate. Regular rhythm. No murmur or rub. Abd: Non-tender. Non-distended. No masses. No organomegaly. Normal bowel sounds. Skin: Warm and dry. No nodule on exposed extremities. No rash on exposed extremities. Ext: No cyanosis, clubbing, edema  Lymph: No cervical LAD. No supraclavicular LAD. M/S: No cyanosis. No joint deformity. No clubbing. Neuro: Positive pupils/gag/corneals. Normal pain response. Results:  CBC:   Recent Labs     10/16/21  0403 10/17/21  0534   WBC 10.0 9.2   HGB 10.3* 10.0*   HCT 31.1* 31.6*   MCV 83.8 84.7    200     BMP:   Recent Labs     10/16/21  0403 10/17/21  0534   * 131*   K 4.4 4.4   CL 96* 96*   CO2 26 27   BUN 17 21   CREATININE 0.4* 0.4*     LIVER PROFILE:   Recent Labs     10/16/21  0403 10/17/21  0534   AST 28 28   ALT 88* 99*   BILITOT 0.4 0.3   ALKPHOS 107 99     PT/INR: No results for input(s): PROTIME, INR in the last 72 hours. APTT: No results for input(s): APTT in the last 72 hours. Pathology:  1. N/A      Microbiology:  1. None    Recent ABG:   No results for input(s): PH, PO2, PCO2, HCO3, BE, O2SAT, METHB, O2HB, COHB, O2CON, HHB, THB in the last 72 hours. FiO2 : 90 %       Recent Films:  XR CHEST PORTABLE   Final Result   No significant change in the appearance of the extensive airspace opacities   throughout both lungs, in keeping with the known history of COVID pneumonia. XR CHEST PORTABLE   Final Result   1. There is no interval change in extensive multifocal bilateral airspace   disease. XR CHEST PORTABLE   Final Result   1. No interval change in the extensive multifocal bilateral airspace disease.          XR CHEST PORTABLE Final Result   No interval change         XR CHEST PORTABLE   Final Result   1. Significant worsening of the multifocal bilateral pneumonia when compared   with the prior CT scan of 09/22/2021         CTA PULMONARY W CONTRAST   Final Result   Within described exam limitations, no evidence of pulmonary embolism. Scattered multifocal low density infiltrates throughout both lungs, most   suggestive of COVID pneumonia in context of current pandemic. At least moderate diffuse hepatic steatosis. Cholecystectomy. RECOMMENDATIONS:   Multiple pulmonary nodules. Most severe: 4 mm left solid pulmonary nodule   within the upper lobe. A non-contrast Chest CT at 12 months is optional. If   performed and the nodule is stable at 12 months, no further follow-up is   recommended. These guidelines do not apply to immunocompromised patients and patients with   cancer. Follow up in patients with significant comorbidities as clinically   warranted. For lung cancer screening, adhere to Lung-RADS guidelines. Reference: Radiology. 2017; 284(1):228-43. XR CHEST PORTABLE   Final Result   There are bilateral multifocal ground-glass areas of consolidation with the   lungs concerning for in infectious or inflammatory process and developing   ARDS or viral pneumonia could give this appearance.                      Assessment:  1. COVID-19 pneumonia with significant worsening despite steroids and baricitinib. Inflammatory markers and chest radiograph are reviewed. More recently, there has been no improvement. D-dimer is noted. Oxygen requirement slowly decreasing. CRP is impressively low. Unfortunately, he appears to be a long-hauler        Plan:  1. Continue full dose anticoagulation  2. Wean FiO2 as tolerated  3. Okay to [place] when they can accept him.     Time at the bedside, reviewing labs and radiographs, reviewing updated notes and consultations, discussing with staff and family was more than 28 minutes. Please note that voice recognition technology was used in the preparation of this note and make therefore it may contain inadvertent transcription errors. If the patient is a COVID 19 isolation patient, the above physical exam reflects that of the examining physician for the day. Tariq Davila MD,  M.ZAY., F.C.C.P.     [facility], [address] [place] [address]

## 2021-10-18 NOTE — PLAN OF CARE
Problem: Airway Clearance - Ineffective  Goal: Achieve or maintain patent airway  10/18/2021 0510 by Tonny Holland RN  Outcome: Met This Shift  10/17/2021 1602 by Keli Adams RN  Outcome: Ongoing     Problem: Gas Exchange - Impaired  Goal: Absence of hypoxia  10/18/2021 0510 by Tonny Holland RN  Outcome: Met This Shift  10/17/2021 1602 by Keli Adams RN  Outcome: Ongoing  Goal: Promote optimal lung function  Outcome: Met This Shift     Problem: Breathing Pattern - Ineffective  Goal: Ability to achieve and maintain a regular respiratory rate  10/18/2021 0510 by Tonny Holland RN  Outcome: Met This Shift  10/17/2021 1602 by Keli Adams RN  Outcome: Ongoing     Problem:  Body Temperature -  Risk of, Imbalanced  Goal: Ability to maintain a body temperature within defined limits  Outcome: Met This Shift  Goal: Will regain or maintain usual level of consciousness  Outcome: Met This Shift  Goal: Complications related to the disease process, condition or treatment will be avoided or minimized  Outcome: Met This Shift     Problem: Isolation Precautions - Risk of Spread of Infection  Goal: Prevent transmission of infection  Outcome: Met This Shift     Problem: Nutrition Deficits  Goal: Optimize nutritional status  Outcome: Met This Shift     Problem: Risk for Fluid Volume Deficit  Goal: Maintain normal heart rhythm  Outcome: Met This Shift  Goal: Maintain absence of muscle cramping  Outcome: Met This Shift  Goal: Maintain normal serum potassium, sodium, calcium, phosphorus, and pH  Outcome: Met This Shift     Problem: Loneliness or Risk for Loneliness  Goal: Demonstrate positive use of time alone when socialization is not possible  Outcome: Met This Shift     Problem: Fatigue  Goal: Verbalize increase energy and improved vitality  Outcome: Met This Shift     Problem: Patient Education: Go to Patient Education Activity  Goal: Patient/Family Education  Outcome: Met This Shift Problem: Pain:  Goal: Pain level will decrease  Description: Pain level will decrease  Outcome: Met This Shift  Goal: Control of acute pain  Description: Control of acute pain  Outcome: Met This Shift  Goal: Control of chronic pain  Description: Control of chronic pain  Outcome: Met This Shift

## 2021-10-18 NOTE — PROGRESS NOTES
10/18/21 0432   Oxygen Therapy/Pulse Ox   O2 Therapy Oxygen humidified  (full small bag at this time)   O2 Device Heated high flow cannula  (only)   O2 Flow Rate (L/min) 60 L/min   FiO2  90 %   SpO2 99 %   Pulse Oximeter Device Mode Continuous Mom advised of the information per Dr. Larissa Roper

## 2021-10-18 NOTE — PROGRESS NOTES
[facility] Progress Note    Admitting Date and Time: 9/22/2021  9:32 PM  Admit Dx: Hypokalemia [E87.6]  Acute respiratory failure with hypoxia (HCC) [J96.01]  COVID-19 [U07.1]  Acute respiratory failure due to COVID-19 (HCC) [U07.1, J96.00]    Subjective:  Patient is being followed for Hypokalemia [E87.6]  Acute respiratory failure with hypoxia ([address] [J96.01]  COVID-19 [U07.1]  Acute respiratory failure due to COVID-19 (HCC) [U07.1, J96.00]     Finally some marginal improvement. FiO2 reduced to 90% 10/17, tolerated overnight    When entering room Airvo was not in correct position, prongs around patient chin, he did not know was out of place. With conversation, NRB also not in position. SaO2 dropped to 80%, but with reapplying both devices appropriately, quickly recovered to 95%. ROS: denies fever, chills, cp, n/v, HA unless stated above.      insulin glargine  5 Units SubCUTAneous Nightly    ascorbic acid  500 mg Oral Daily    zinc sulfate  50 mg Oral Daily    vitamin D  2,000 Units Oral Daily    tiotropium  2 puff Inhalation Daily    budesonide-formoterol  2 puff Inhalation BID    ALPRAZolam  0.25 mg Oral Q4H While awake    guaiFENesin-codeine  10 mL Oral Q4H While awake    dexamethasone  10 mg IntraVENous Q12H    enoxaparin  1 mg/kg SubCUTAneous BID    albuterol sulfate HFA  2 puff Inhalation 4x daily    sodium chloride flush  5-40 mL IntraVENous 2 times per day    PARoxetine  30 mg Oral BID    amLODIPine  5 mg Oral Daily    insulin lispro  0-6 Units SubCUTAneous TID WC    insulin lispro  0-3 Units SubCUTAneous Nightly     aluminum & magnesium hydroxide-simethicone, 15 mL, Q6H PRN  melatonin, 4.5 mg, Nightly PRN  sodium chloride flush, 5-40 mL, PRN  sodium chloride, 25 mL, PRN  ondansetron, 4 mg, Q8H PRN   Or  ondansetron, 4 mg, Q6H PRN  polyethylene glycol, 17 g, Daily PRN  acetaminophen, 650 mg, Q6H PRN   Or  acetaminophen, 650 mg, Q6H PRN  glucose, 15 g, PRN  dextrose, 12.5 g, PRN  glucagon (rDNA), 1 mg, PRN  dextrose, 100 mL/hr, PRN         Objective:    BP (!) 140/82   Pulse 68   Temp 97.1 °F (36.2 °C) (Oral)   Resp 28   Ht 5' 7\" (1.702 m)   Wt 184 lb 11.9 oz (83.8 kg)   SpO2 (!) 87%   BMI 28.94 kg/m²     General Appearance: lying in bed comfortably, does not appear to be in distress. Skin: warm and dry  Head: normocephalic and atraumatic  Eyes: pupils equal, round, and reactive to light, extraocular eye movements intact, conjunctivae normal  Neck: neck supple and non tender without mass   Pulmonary/Chest:diminished and with basilar crackles, but no rhonchi or wheezing  Cardiovascular:regular rhythm, rate 60s at rest  Abdomen: soft, non-tender, non-distended, normal bowel sounds, no masses or organomegaly  Extremities: no cyanosis, no clubbing and no edema  Neurologic: no cranial nerve deficit and speech normal        Recent Labs     10/16/21  0403 10/17/21  0534   * 131*   K 4.4 4.4   CL 96* 96*   CO2 26 27   BUN 17 21   CREATININE 0.4* 0.4*   GLUCOSE 206* 200*   CALCIUM 7.7* 7.9*       Recent Labs     10/16/21  0403 10/17/21  0534   WBC 10.0 9.2   RBC 3.71* 3.73*   HGB 10.3* 10.0*   HCT 31.1* 31.6*   MCV 83.8 84.7   MCH 27.8 26.8   MCHC 33.1 31.6*   RDW 14.1 14.4    200   MPV 10.0 10.6       Radiology:     Assessment:    Active Problems:    COVID-19    Acute respiratory failure due to COVID-19 [facility])    Acute respiratory failure with hypoxia (HCC)    Pneumonia due to COVID-19 virus    Elevated LFTs    Hypokalemia    Essential hypertension    Non-insulin dependent type 2 diabetes mellitus ([facility]  Resolved Problems:    * No resolved hospital problems. *      Plan:    1.  Acute Respiratory Failure with Hypoxia  - 2/2 COVID-19 CXR significant worsening multifocal infiltrates 10/4 CXR no change in imaging.   - Continue dexamethasone/Albuterol.  - Has been off BiPAP for several days but requiring Airvo at 100% FiO2 60 LPM. Finally weaned marginally to 90% fiO2 and tolerating.  -  will need FiO2 < 60% to qualify for discharge to LTAC. - use BiPAP at bedtime, refuses.      2. COVID-19 Viral Pneumonia  - Completed baricitinib  - Remains on Dexamethasone 10 BID  - Vitamin Supplementation. Follow inflammatory markers. Lovenox 1mg/kg.   - Encourage IS/Pronating. DDImer 390, CRP down to 1.9  Isolation per protocol Supportive care. Pulmonology following.      3. Anxiety  - Continue Xanax Q4 W/A. - continue Paxil  - mood is stable     4. DM  - Continue lantus. SSI/hypoglycemic protocol/Carb Control diet. Follow adjust as needed.     5. HTN  - Continue Amlodipine.     6. Poor intake  - 2//2 Respiratory distress. Poor intake noted. Intake improving with improvement of respiratory function. Follow     Hyponatremia  - serum Na of 130. Hypochloremic hyponatremia. Is negative 6 liters since admission, ?increased sensible losses from tacypnea and poor oral intake. O2 is humidified. Encouraged to increase oral intake, diet as tolerated     7. Disposition- [facility] following. Will require titration of SPO2 <65% prior to referral.  [facility] referral can be made.  Referral made to [place], but will likely need [facility] at [facility]. .casemanagement following.     NOTE: This report was transcribed using voice recognition software. Every effort was made to ensure accuracy; however, inadvertent computerized transcription errors may be present.   Electronically signed by AUBREE Mayorga CNP on 10/18/2021 at 10:53 AM

## 2021-10-18 NOTE — CARE COORDINATION
COVID + 9/22. Requiring 90% optiflow w/ 60 liters O2 along with 15 liters O2 non rebreather. Magi Duel following- O2 demands are still too high for LTAC to accept. Will need to call [facility] [phone] when close to discharge to [facility]( last 4 of social [phone])-will need to find out which [place]  is following and request an LTAC order/auth when ready for discharge .  Will follow Ty Whipple RN case manager

## 2021-10-18 NOTE — PROGRESS NOTES
10/18/21 0121   NIV Type   Mode Bilevel  (REFUSES USE REMAINS STANDBY)   Settings/Measurements   O2 Flow Rate (L/min) 60 L/min   FiO2  90 %   Oxygen Therapy/Pulse Ox   O2 Therapy Oxygen humidified   $Oxygen $Daily Charge   O2 Device Heated high flow cannula  (AND NRB)   SpO2 100 %   Pulse Oximeter Device Mode Continuous

## 2021-10-19 LAB
ALBUMIN SERPL-MCNC: 2.7 G/DL (ref 3.5–5.2)
ALP BLD-CCNC: 89 U/L (ref 40–129)
ALT SERPL-CCNC: 97 U/L (ref 0–40)
ANION GAP SERPL CALCULATED.3IONS-SCNC: 8 MMOL/L (ref 7–16)
AST SERPL-CCNC: 24 U/L (ref 0–39)
BILIRUB SERPL-MCNC: 0.3 MG/DL (ref 0–1.2)
BUN BLDV-MCNC: 21 MG/DL (ref 6–23)
CALCIUM SERPL-MCNC: 7.9 MG/DL (ref 8.6–10.2)
CHLORIDE BLD-SCNC: 95 MMOL/L (ref 98–107)
CO2: 27 MMOL/L (ref 22–29)
CREAT SERPL-MCNC: 0.4 MG/DL (ref 0.7–1.2)
FERRITIN: 1018 NG/ML
GFR AFRICAN AMERICAN: >60
GFR NON-AFRICAN AMERICAN: >60 ML/MIN/1.73
GLUCOSE BLD-MCNC: 174 MG/DL (ref 74–99)
HCT VFR BLD CALC: 33 % (ref 37–54)
HEMOGLOBIN: 10.6 G/DL (ref 12.5–16.5)
MCH RBC QN AUTO: 26.8 PG (ref 26–35)
MCHC RBC AUTO-ENTMCNC: 32.1 % (ref 32–34.5)
MCV RBC AUTO: 83.3 FL (ref 80–99.9)
METER GLUCOSE: 118 MG/DL (ref 74–99)
METER GLUCOSE: 153 MG/DL (ref 74–99)
METER GLUCOSE: 158 MG/DL (ref 74–99)
METER GLUCOSE: 182 MG/DL (ref 74–99)
PDW BLD-RTO: 14.2 FL (ref 11.5–15)
PLATELET # BLD: 202 E9/L (ref 130–450)
PMV BLD AUTO: 10.4 FL (ref 7–12)
POTASSIUM SERPL-SCNC: 4.3 MMOL/L (ref 3.5–5)
RBC # BLD: 3.96 E12/L (ref 3.8–5.8)
SARS-COV-2, NAAT: NOT DETECTED
SODIUM BLD-SCNC: 130 MMOL/L (ref 132–146)
TOTAL PROTEIN: 5.6 G/DL (ref 6.4–8.3)
WBC # BLD: 8 E9/L (ref 4.5–11.5)

## 2021-10-19 PROCEDURE — 82962 GLUCOSE BLOOD TEST: CPT

## 2021-10-19 PROCEDURE — 87635 SARS-COV-2 COVID-19 AMP PRB: CPT

## 2021-10-19 PROCEDURE — 99233 SBSQ HOSP IP/OBS HIGH 50: CPT | Performed by: INTERNAL MEDICINE

## 2021-10-19 PROCEDURE — 6370000000 HC RX 637 (ALT 250 FOR IP): Performed by: INTERNAL MEDICINE

## 2021-10-19 PROCEDURE — 6360000002 HC RX W HCPCS: Performed by: INTERNAL MEDICINE

## 2021-10-19 PROCEDURE — 80053 COMPREHEN METABOLIC PANEL: CPT

## 2021-10-19 PROCEDURE — 2580000003 HC RX 258: Performed by: INTERNAL MEDICINE

## 2021-10-19 PROCEDURE — APPSS30 APP SPLIT SHARED TIME 16-30 MINUTES: Performed by: NURSE PRACTITIONER

## 2021-10-19 PROCEDURE — 85027 COMPLETE CBC AUTOMATED: CPT

## 2021-10-19 PROCEDURE — 94660 CPAP INITIATION&MGMT: CPT

## 2021-10-19 PROCEDURE — 1200000000 HC SEMI PRIVATE

## 2021-10-19 PROCEDURE — 2700000000 HC OXYGEN THERAPY PER DAY

## 2021-10-19 PROCEDURE — 36415 COLL VENOUS BLD VENIPUNCTURE: CPT

## 2021-10-19 PROCEDURE — 82728 ASSAY OF FERRITIN: CPT

## 2021-10-19 RX ORDER — DEXAMETHASONE SODIUM PHOSPHATE 4 MG/ML
10 INJECTION, SOLUTION INTRA-ARTICULAR; INTRALESIONAL; INTRAMUSCULAR; INTRAVENOUS; SOFT TISSUE EVERY 24 HOURS
Status: DISCONTINUED | OUTPATIENT
Start: 2021-10-20 | End: 2021-10-26

## 2021-10-19 RX ADMIN — Medication 10 ML: at 21:45

## 2021-10-19 RX ADMIN — ALPRAZOLAM 0.25 MG: 0.25 TABLET ORAL at 10:55

## 2021-10-19 RX ADMIN — DEXAMETHASONE SODIUM PHOSPHATE 10 MG: 4 INJECTION, SOLUTION INTRA-ARTICULAR; INTRALESIONAL; INTRAMUSCULAR; INTRAVENOUS; SOFT TISSUE at 08:30

## 2021-10-19 RX ADMIN — Medication 10 ML: at 08:31

## 2021-10-19 RX ADMIN — ALBUTEROL SULFATE 2 PUFF: 90 AEROSOL, METERED RESPIRATORY (INHALATION) at 08:33

## 2021-10-19 RX ADMIN — BUDESONIDE AND FORMOTEROL FUMARATE DIHYDRATE 2 PUFF: 160; 4.5 AEROSOL RESPIRATORY (INHALATION) at 21:01

## 2021-10-19 RX ADMIN — OXYCODONE HYDROCHLORIDE AND ACETAMINOPHEN 500 MG: 500 TABLET ORAL at 08:30

## 2021-10-19 RX ADMIN — INSULIN LISPRO 1 UNITS: 100 INJECTION, SOLUTION INTRAVENOUS; SUBCUTANEOUS at 17:36

## 2021-10-19 RX ADMIN — AMLODIPINE BESYLATE 5 MG: 5 TABLET ORAL at 08:30

## 2021-10-19 RX ADMIN — TIOTROPIUM BROMIDE INHALATION SPRAY 2 PUFF: 3.12 SPRAY, METERED RESPIRATORY (INHALATION) at 08:33

## 2021-10-19 RX ADMIN — ALBUTEROL SULFATE 2 PUFF: 90 AEROSOL, METERED RESPIRATORY (INHALATION) at 21:01

## 2021-10-19 RX ADMIN — BUDESONIDE AND FORMOTEROL FUMARATE DIHYDRATE 2 PUFF: 160; 4.5 AEROSOL RESPIRATORY (INHALATION) at 08:33

## 2021-10-19 RX ADMIN — ENOXAPARIN SODIUM 80 MG: 100 INJECTION SUBCUTANEOUS at 21:02

## 2021-10-19 RX ADMIN — ALPRAZOLAM 0.25 MG: 0.25 TABLET ORAL at 05:48

## 2021-10-19 RX ADMIN — ZINC SULFATE 220 MG (50 MG) CAPSULE 50 MG: CAPSULE at 08:30

## 2021-10-19 RX ADMIN — PAROXETINE 30 MG: 10 TABLET, FILM COATED ORAL at 21:02

## 2021-10-19 RX ADMIN — ALBUTEROL SULFATE 2 PUFF: 90 AEROSOL, METERED RESPIRATORY (INHALATION) at 13:39

## 2021-10-19 RX ADMIN — PAROXETINE 30 MG: 10 TABLET, FILM COATED ORAL at 08:30

## 2021-10-19 RX ADMIN — ALBUTEROL SULFATE 2 PUFF: 90 AEROSOL, METERED RESPIRATORY (INHALATION) at 17:16

## 2021-10-19 RX ADMIN — Medication 2000 UNITS: at 08:30

## 2021-10-19 RX ADMIN — ALPRAZOLAM 0.25 MG: 0.25 TABLET ORAL at 17:16

## 2021-10-19 RX ADMIN — ENOXAPARIN SODIUM 80 MG: 100 INJECTION SUBCUTANEOUS at 08:30

## 2021-10-19 RX ADMIN — INSULIN LISPRO 1 UNITS: 100 INJECTION, SOLUTION INTRAVENOUS; SUBCUTANEOUS at 06:06

## 2021-10-19 RX ADMIN — INSULIN LISPRO 1 UNITS: 100 INJECTION, SOLUTION INTRAVENOUS; SUBCUTANEOUS at 11:20

## 2021-10-19 ASSESSMENT — PAIN SCALES - GENERAL: PAINLEVEL_OUTOF10: 0

## 2021-10-19 ASSESSMENT — PAIN DESCRIPTION - PROGRESSION: CLINICAL_PROGRESSION: NOT CHANGED

## 2021-10-19 NOTE — PROGRESS NOTES
Pulmonary Progress Note    Admit Date: 2021  Hospital day                               PCP: No primary care provider on file. Chief Complaint (s):  Patient Active Problem List   Diagnosis    COVID-19    Acute respiratory failure due to COVID-19 [facility])    Acute respiratory failure with hypoxia ([facility]    Pneumonia due to COVID-19 virus    Elevated LFTs    Hypokalemia    Essential hypertension    Non-insulin dependent type 2 diabetes mellitus ([facility]       Subjective:  · Off oxygen when I walked in this afternoon. Saturations were in the low 70s. Vitals:  VITALS:  /68   Pulse 58   Temp 97.5 °F (36.4 °C) (Oral)   Resp 20   Ht 5' 7\" (1.702 m)   Wt 184 lb 11.9 oz (83.8 kg)   SpO2 99%   BMI 28.94 kg/m²     24HR INTAKE/OUTPUT:    No intake or output data in the 24 hours ending 10/19/21 1755    24HR PULSE OXIMETRY RANGE:    SpO2  Av.8 %  Min: 92 %  Max: 99 %    Medications:  IV:   sodium chloride      dextrose         Scheduled Meds:   [START ON 10/20/2021] dexamethasone  10 mg IntraVENous Q24H    insulin glargine  5 Units SubCUTAneous Nightly    ascorbic acid  500 mg Oral Daily    zinc sulfate  50 mg Oral Daily    vitamin D  2,000 Units Oral Daily    tiotropium  2 puff Inhalation Daily    budesonide-formoterol  2 puff Inhalation BID    ALPRAZolam  0.25 mg Oral Q4H While awake    guaiFENesin-codeine  10 mL Oral Q4H While awake    enoxaparin  1 mg/kg SubCUTAneous BID    albuterol sulfate HFA  2 puff Inhalation 4x daily    sodium chloride flush  5-40 mL IntraVENous 2 times per day    PARoxetine  30 mg Oral BID    amLODIPine  5 mg Oral Daily    insulin lispro  0-6 Units SubCUTAneous TID WC    insulin lispro  0-3 Units SubCUTAneous Nightly       Diet:   ADULT DIET; Regular; 5 carb choices (75 gm/meal)  Adult Oral Nutrition Supplement; Diabetic Oral Supplement     EXAM:  General: No distress. Sleeping  Eyes: PERRL. No sclera icterus.  No conjunctival injection. ENT: No discharge. Pharynx clear. Neck: Trachea midline. Normal thyroid. Resp: No accessory muscle use. No rales. No wheezing. No rhonchi. CV: Regular rate. Regular rhythm. No murmur or rub. Abd: Non-tender. Non-distended. No masses. No organomegaly. Normal bowel sounds. Skin: Warm and dry. No nodule on exposed extremities. No rash on exposed extremities. Ext: No cyanosis, clubbing, edema  Lymph: No cervical LAD. No supraclavicular LAD. M/S: No cyanosis. No joint deformity. No clubbing. Neuro: Positive pupils/gag/corneals. Normal pain response. Results:  CBC:   Recent Labs     10/17/21  0534 10/18/21  1840 10/19/21  0453   WBC 9.2 10.7 8.0   HGB 10.0* 11.4* 10.6*   HCT 31.6* 34.5* 33.0*   MCV 84.7 82.3 83.3    235 202     BMP:   Recent Labs     10/17/21  0534 10/18/21  1840 10/19/21  0453   * 129* 130*   K 4.4 4.1 4.3   CL 96* 93* 95*   CO2 27 25 27   BUN 21 23 21   CREATININE 0.4* 0.5* 0.4*     LIVER PROFILE:   Recent Labs     10/17/21  0534 10/18/21  1840 10/19/21  0453   AST 28 27 24   ALT 99* 112* 97*   BILITOT 0.3 0.3 0.3   ALKPHOS 99 104 89     PT/INR: No results for input(s): PROTIME, INR in the last 72 hours. APTT: No results for input(s): APTT in the last 72 hours. Pathology:  1. N/A      Microbiology:  1. None    Recent ABG:   No results for input(s): PH, PO2, PCO2, HCO3, BE, O2SAT, METHB, O2HB, COHB, O2CON, HHB, THB in the last 72 hours. FiO2 : 76 %       Recent Films:  XR CHEST PORTABLE   Final Result   No significant change in the appearance of the extensive airspace opacities   throughout both lungs, in keeping with the known history of COVID pneumonia. XR CHEST PORTABLE   Final Result   1. There is no interval change in extensive multifocal bilateral airspace   disease. XR CHEST PORTABLE   Final Result   1. No interval change in the extensive multifocal bilateral airspace disease.          XR CHEST PORTABLE   Final Result   No interval change         XR CHEST PORTABLE   Final Result   1. Significant worsening of the multifocal bilateral pneumonia when compared   with the prior CT scan of 09/22/2021         CTA PULMONARY W CONTRAST   Final Result   Within described exam limitations, no evidence of pulmonary embolism. Scattered multifocal low density infiltrates throughout both lungs, most   suggestive of COVID pneumonia in context of current pandemic. At least moderate diffuse hepatic steatosis. Cholecystectomy. RECOMMENDATIONS:   Multiple pulmonary nodules. Most severe: 4 mm left solid pulmonary nodule   within the upper lobe. A non-contrast Chest CT at 12 months is optional. If   performed and the nodule is stable at 12 months, no further follow-up is   recommended. These guidelines do not apply to immunocompromised patients and patients with   cancer. Follow up in patients with significant comorbidities as clinically   warranted. For lung cancer screening, adhere to Lung-RADS guidelines. Reference: Radiology. 2017; 284(1):228-43. XR CHEST PORTABLE   Final Result   There are bilateral multifocal ground-glass areas of consolidation with the   lungs concerning for in infectious or inflammatory process and developing   ARDS or viral pneumonia could give this appearance.                      Assessment:  1. COVID-19 pneumonia with significant worsening despite steroids and baricitinib. Inflammatory markers and chest radiograph are reviewed. More recently, there has been no improvement. D-dimer is noted. Oxygen requirement slowly decreasing. CRP is impressively low. Unfortunately, he appears to be a long-hauler        Plan:  1. Continue full dose anticoagulation  2. Wean FiO2 as tolerated  3. Okay to [place] when they can accept him. Time at the bedside, reviewing labs and radiographs, reviewing updated notes and consultations, discussing with staff and family was more than 35 minutes.     Please note that voice recognition technology was used in the preparation of this note and make therefore it may contain inadvertent transcription errors. If the patient is a COVID 19 isolation patient, the above physical exam reflects that of the examining physician for the day. Hardeep Abreu MD,  M.D., F.C.C.P.     [facility], [address] [place] [address], [address] [place] [address]

## 2021-10-19 NOTE — PLAN OF CARE
Problem: Airway Clearance - Ineffective  Goal: Achieve or maintain patent airway  Outcome: Met This Shift     Problem: Airway Clearance - Ineffective  Goal: Achieve or maintain patent airway  Outcome: Met This Shift     Problem: Gas Exchange - Impaired  Goal: Absence of hypoxia  Outcome: Met This Shift

## 2021-10-19 NOTE — CARE COORDINATION
COVID + 9/22. Requiring 85% optiflow w/ 60 liters O2. Amber Waldrop following- O2 demands are still too high for LTAC to accept. Will need to call [phone] when close to discharge to [place] [id number]( last 4 of [facility])-[facility] and request [facility] order/auth when ready for discharge .  Will follow  Viktoriya Payan RN case manager

## 2021-10-19 NOTE — PROGRESS NOTES
[facility] Progress Note    Admitting Date and Time: 9/22/2021  9:32 PM  Admit Dx: Hypokalemia [E87.6]  Acute respiratory failure with hypoxia ([address] [J96.01]  COVID-19 [U07.1]  Acute respiratory failure due to COVID-19 (HCC) [U07.1, J96.00]    Subjective:  Patient is being followed for Hypokalemia [E87.6]  Acute respiratory failure with hypoxia ([address] [J96.01]  COVID-19 [U07.1]  Acute respiratory failure due to COVID-19 (HCC) [U07.1, J96.00]     FiO2 100->90-85% today. Teressa Simpson was not in place when entering room. Reapplied and SaO2 from 81 to 92% immediately. He feels less SOB, conversational dyspnea improved. No fever or chills. COVID follow up negative    ROS: denies fever, chills, cp, n/v, HA unless stated above.       [START ON 10/20/2021] dexamethasone  10 mg IntraVENous Q24H    insulin glargine  5 Units SubCUTAneous Nightly    ascorbic acid  500 mg Oral Daily    zinc sulfate  50 mg Oral Daily    vitamin D  2,000 Units Oral Daily    tiotropium  2 puff Inhalation Daily    budesonide-formoterol  2 puff Inhalation BID    ALPRAZolam  0.25 mg Oral Q4H While awake    guaiFENesin-codeine  10 mL Oral Q4H While awake    enoxaparin  1 mg/kg SubCUTAneous BID    albuterol sulfate HFA  2 puff Inhalation 4x daily    sodium chloride flush  5-40 mL IntraVENous 2 times per day    PARoxetine  30 mg Oral BID    amLODIPine  5 mg Oral Daily    insulin lispro  0-6 Units SubCUTAneous TID WC    insulin lispro  0-3 Units SubCUTAneous Nightly     aluminum & magnesium hydroxide-simethicone, 15 mL, Q6H PRN  melatonin, 4.5 mg, Nightly PRN  sodium chloride flush, 5-40 mL, PRN  sodium chloride, 25 mL, PRN  ondansetron, 4 mg, Q8H PRN   Or  ondansetron, 4 mg, Q6H PRN  polyethylene glycol, 17 g, Daily PRN  acetaminophen, 650 mg, Q6H PRN   Or  acetaminophen, 650 mg, Q6H PRN  glucose, 15 g, PRN  dextrose, 12.5 g, PRN  glucagon (rDNA), 1 mg, PRN  dextrose, 100 mL/hr, PRN         Objective:    /68 and now down to 85%  -  will need FiO2 < 60% to qualify for discharge to LTAC. - use BiPAP at bedtime, refuses.      2. COVID-19 Viral Pneumonia  - Completed baricitinib  - Remains on Dexamethasone 10 BID  - Vitamin Supplementation. Follow inflammatory markers. Lovenox 1mg/kg.   - Encourage IS/Pronating. DDImer 390, CRP down to 1.9  Isolation per protocol Supportive care. Pulmonology following.      3. Anxiety  - Continue Xanax Q4 W/A. - continue Paxil  - mood is stable     4. DM  - Continue lantus. SSI/hypoglycemic protocol/Carb Control diet. Follow adjust as needed.     5. HTN  - Continue Amlodipine.     6. Poor intake  - 2//2 Respiratory distress. Poor intake noted. Intake improving with improvement of respiratory function. Follow     Hyponatremia  - serum Na of 130. Hypochloremic hyponatremia. Is negative 6 liters since admission, ?increased sensible losses from tacypnea and poor oral intake. O2 is humidified. Encouraged to increase oral intake, diet as tolerated     7. Disposition- [facility] following. Will require titration of SPO2 <65% prior to referral.  [facility] referral can be made.  Referral made to [place], but will likely need [facility] at [facility]. .casemanagement following.       NOTE: This report was transcribed using voice recognition software. Every effort was made to ensure accuracy; however, inadvertent computerized transcription errors may be present.   Electronically signed by AUBREE Eli CNP on 10/19/2021 at 2:23 PM

## 2021-10-20 LAB
ALBUMIN SERPL-MCNC: 2.7 G/DL (ref 3.5–5.2)
ALP BLD-CCNC: 83 U/L (ref 40–129)
ALT SERPL-CCNC: 92 U/L (ref 0–40)
ANION GAP SERPL CALCULATED.3IONS-SCNC: 9 MMOL/L (ref 7–16)
AST SERPL-CCNC: 23 U/L (ref 0–39)
BILIRUB SERPL-MCNC: 0.3 MG/DL (ref 0–1.2)
BUN BLDV-MCNC: 22 MG/DL (ref 6–23)
CALCIUM SERPL-MCNC: 7.9 MG/DL (ref 8.6–10.2)
CHLORIDE BLD-SCNC: 94 MMOL/L (ref 98–107)
CO2: 28 MMOL/L (ref 22–29)
CREAT SERPL-MCNC: 0.4 MG/DL (ref 0.7–1.2)
FERRITIN: 974 NG/ML
GFR AFRICAN AMERICAN: >60
GFR NON-AFRICAN AMERICAN: >60 ML/MIN/1.73
GLUCOSE BLD-MCNC: 113 MG/DL (ref 74–99)
HCT VFR BLD CALC: 33.1 % (ref 37–54)
HEMOGLOBIN: 10.7 G/DL (ref 12.5–16.5)
MCH RBC QN AUTO: 27.1 PG (ref 26–35)
MCHC RBC AUTO-ENTMCNC: 32.3 % (ref 32–34.5)
MCV RBC AUTO: 83.8 FL (ref 80–99.9)
METER GLUCOSE: 103 MG/DL (ref 74–99)
METER GLUCOSE: 134 MG/DL (ref 74–99)
METER GLUCOSE: 185 MG/DL (ref 74–99)
METER GLUCOSE: 231 MG/DL (ref 74–99)
PDW BLD-RTO: 14.5 FL (ref 11.5–15)
PLATELET # BLD: 206 E9/L (ref 130–450)
PMV BLD AUTO: 10.4 FL (ref 7–12)
POTASSIUM SERPL-SCNC: 3.9 MMOL/L (ref 3.5–5)
RBC # BLD: 3.95 E12/L (ref 3.8–5.8)
SARS-COV-2, NAAT: DETECTED
SODIUM BLD-SCNC: 131 MMOL/L (ref 132–146)
TOTAL PROTEIN: 5.5 G/DL (ref 6.4–8.3)
WBC # BLD: 8.1 E9/L (ref 4.5–11.5)

## 2021-10-20 PROCEDURE — 82728 ASSAY OF FERRITIN: CPT

## 2021-10-20 PROCEDURE — 94660 CPAP INITIATION&MGMT: CPT

## 2021-10-20 PROCEDURE — APPSS30 APP SPLIT SHARED TIME 16-30 MINUTES: Performed by: NURSE PRACTITIONER

## 2021-10-20 PROCEDURE — 6370000000 HC RX 637 (ALT 250 FOR IP): Performed by: NURSE PRACTITIONER

## 2021-10-20 PROCEDURE — 82962 GLUCOSE BLOOD TEST: CPT

## 2021-10-20 PROCEDURE — 6370000000 HC RX 637 (ALT 250 FOR IP): Performed by: INTERNAL MEDICINE

## 2021-10-20 PROCEDURE — 85027 COMPLETE CBC AUTOMATED: CPT

## 2021-10-20 PROCEDURE — 1200000000 HC SEMI PRIVATE

## 2021-10-20 PROCEDURE — 2700000000 HC OXYGEN THERAPY PER DAY

## 2021-10-20 PROCEDURE — 80053 COMPREHEN METABOLIC PANEL: CPT

## 2021-10-20 PROCEDURE — 36415 COLL VENOUS BLD VENIPUNCTURE: CPT

## 2021-10-20 PROCEDURE — 99233 SBSQ HOSP IP/OBS HIGH 50: CPT | Performed by: INTERNAL MEDICINE

## 2021-10-20 PROCEDURE — 6360000002 HC RX W HCPCS: Performed by: INTERNAL MEDICINE

## 2021-10-20 PROCEDURE — 87635 SARS-COV-2 COVID-19 AMP PRB: CPT

## 2021-10-20 PROCEDURE — 2580000003 HC RX 258: Performed by: INTERNAL MEDICINE

## 2021-10-20 PROCEDURE — 6360000002 HC RX W HCPCS: Performed by: NURSE PRACTITIONER

## 2021-10-20 RX ADMIN — Medication 2000 UNITS: at 08:46

## 2021-10-20 RX ADMIN — ALBUTEROL SULFATE 2 PUFF: 90 AEROSOL, METERED RESPIRATORY (INHALATION) at 21:29

## 2021-10-20 RX ADMIN — ENOXAPARIN SODIUM 80 MG: 100 INJECTION SUBCUTANEOUS at 21:29

## 2021-10-20 RX ADMIN — ZINC SULFATE 220 MG (50 MG) CAPSULE 50 MG: CAPSULE at 08:46

## 2021-10-20 RX ADMIN — INSULIN LISPRO 2 UNITS: 100 INJECTION, SOLUTION INTRAVENOUS; SUBCUTANEOUS at 16:38

## 2021-10-20 RX ADMIN — PAROXETINE 30 MG: 10 TABLET, FILM COATED ORAL at 08:46

## 2021-10-20 RX ADMIN — OXYCODONE HYDROCHLORIDE AND ACETAMINOPHEN 500 MG: 500 TABLET ORAL at 08:46

## 2021-10-20 RX ADMIN — BUDESONIDE AND FORMOTEROL FUMARATE DIHYDRATE 2 PUFF: 160; 4.5 AEROSOL RESPIRATORY (INHALATION) at 21:29

## 2021-10-20 RX ADMIN — Medication 10 ML: at 21:47

## 2021-10-20 RX ADMIN — DEXAMETHASONE SODIUM PHOSPHATE 10 MG: 4 INJECTION, SOLUTION INTRA-ARTICULAR; INTRALESIONAL; INTRAMUSCULAR; INTRAVENOUS; SOFT TISSUE at 08:45

## 2021-10-20 RX ADMIN — ALPRAZOLAM 0.25 MG: 0.25 TABLET ORAL at 16:33

## 2021-10-20 RX ADMIN — ENOXAPARIN SODIUM 80 MG: 100 INJECTION SUBCUTANEOUS at 08:45

## 2021-10-20 RX ADMIN — ALBUTEROL SULFATE 2 PUFF: 90 AEROSOL, METERED RESPIRATORY (INHALATION) at 11:31

## 2021-10-20 RX ADMIN — BUDESONIDE AND FORMOTEROL FUMARATE DIHYDRATE 2 PUFF: 160; 4.5 AEROSOL RESPIRATORY (INHALATION) at 11:31

## 2021-10-20 RX ADMIN — ALPRAZOLAM 0.25 MG: 0.25 TABLET ORAL at 21:29

## 2021-10-20 RX ADMIN — INSULIN LISPRO 1 UNITS: 100 INJECTION, SOLUTION INTRAVENOUS; SUBCUTANEOUS at 21:46

## 2021-10-20 RX ADMIN — TIOTROPIUM BROMIDE INHALATION SPRAY 2 PUFF: 3.12 SPRAY, METERED RESPIRATORY (INHALATION) at 11:31

## 2021-10-20 RX ADMIN — ALPRAZOLAM 0.25 MG: 0.25 TABLET ORAL at 06:19

## 2021-10-20 RX ADMIN — AMLODIPINE BESYLATE 5 MG: 5 TABLET ORAL at 08:46

## 2021-10-20 RX ADMIN — PAROXETINE 30 MG: 10 TABLET, FILM COATED ORAL at 21:28

## 2021-10-20 RX ADMIN — ALBUTEROL SULFATE 2 PUFF: 90 AEROSOL, METERED RESPIRATORY (INHALATION) at 16:33

## 2021-10-20 RX ADMIN — INSULIN GLARGINE 5 UNITS: 100 INJECTION, SOLUTION SUBCUTANEOUS at 21:46

## 2021-10-20 RX ADMIN — Medication 10 ML: at 08:46

## 2021-10-20 ASSESSMENT — PAIN SCALES - GENERAL: PAINLEVEL_OUTOF10: 0

## 2021-10-20 ASSESSMENT — PAIN DESCRIPTION - PROGRESSION: CLINICAL_PROGRESSION: NOT CHANGED

## 2021-10-20 NOTE — PROGRESS NOTES
[facility] Progress Note    Admitting Date and Time: 9/22/2021  9:32 PM  Admit Dx: Hypokalemia [E87.6]  Acute respiratory failure with hypoxia (HCC) [J96.01]  COVID-19 [U07.1]  Acute respiratory failure due to COVID-19 (HCC) [U07.1, J96.00]    Subjective:  Patient is being followed for Hypokalemia [E87.6]  Acute respiratory failure with hypoxia ([address] [J96.01]  COVID-19 [U07.1]  Acute respiratory failure due to COVID-19 (HCC) [U07.1, J96.00]     Has tolerated some FiO2 weaning, spent some time down to 75% yesterday. He overall feels less SOB. No new complaints or concerns    ROS: denies fever, chills, cp, n/v, HA unless stated above.       dexamethasone  10 mg IntraVENous Q24H    insulin glargine  5 Units SubCUTAneous Nightly    ascorbic acid  500 mg Oral Daily    zinc sulfate  50 mg Oral Daily    vitamin D  2,000 Units Oral Daily    tiotropium  2 puff Inhalation Daily    budesonide-formoterol  2 puff Inhalation BID    ALPRAZolam  0.25 mg Oral Q4H While awake    guaiFENesin-codeine  10 mL Oral Q4H While awake    enoxaparin  1 mg/kg SubCUTAneous BID    albuterol sulfate HFA  2 puff Inhalation 4x daily    sodium chloride flush  5-40 mL IntraVENous 2 times per day    PARoxetine  30 mg Oral BID    amLODIPine  5 mg Oral Daily    insulin lispro  0-6 Units SubCUTAneous TID WC    insulin lispro  0-3 Units SubCUTAneous Nightly     aluminum & magnesium hydroxide-simethicone, 15 mL, Q6H PRN  melatonin, 4.5 mg, Nightly PRN  sodium chloride flush, 5-40 mL, PRN  sodium chloride, 25 mL, PRN  ondansetron, 4 mg, Q8H PRN   Or  ondansetron, 4 mg, Q6H PRN  polyethylene glycol, 17 g, Daily PRN  acetaminophen, 650 mg, Q6H PRN   Or  acetaminophen, 650 mg, Q6H PRN  glucose, 15 g, PRN  dextrose, 12.5 g, PRN  glucagon (rDNA), 1 mg, PRN  dextrose, 100 mL/hr, PRN         Objective:    /76   Pulse 68   Temp 98.3 °F (36.8 °C) (Oral)   Resp 20   Ht 5' 7\" (1.702 m)   Wt 184 lb 11.9 oz (83.8 kg)   SpO2 93%   BMI 28.94 kg/m²       General Appearance: lying in bed comfortably, does not appear to be in distress.   Skin: warm and dry  Head: normocephalic and atraumatic  Eyes: pupils equal, round, and reactive to light, extraocular eye movements intact, conjunctivae normal  Neck: neck supple and non tender without mass   Pulmonary/Chest:diminished and with basilar crackles, but no rhonchi or wheezing  Cardiovascular:regular rhythm, rate 60s at rest  Abdomen: soft, non-tender, non-distended, normal bowel sounds, no masses or organomegaly  Extremities: no cyanosis, no clubbing and no edema  Neurologic: no cranial nerve deficit and speech normal      Recent Labs     10/18/21  1840 10/19/21  0453 10/20/21  0307   * 130* 131*   K 4.1 4.3 3.9   CL 93* 95* 94*   CO2 25 27 28   BUN 23 21 22   CREATININE 0.5* 0.4* 0.4*   GLUCOSE 187* 174* 113*   CALCIUM 8.3* 7.9* 7.9*       Recent Labs     10/18/21  1840 10/19/21  0453 10/20/21  0307   WBC 10.7 8.0 8.1   RBC 4.19 3.96 3.95   HGB 11.4* 10.6* 10.7*   HCT 34.5* 33.0* 33.1*   MCV 82.3 83.3 83.8   MCH 27.2 26.8 27.1   MCHC 33.0 32.1 32.3   RDW 14.5 14.2 14.5    202 206   MPV 10.2 10.4 10.4       Radiology:     Assessment:    Active Problems:    COVID-19    Acute respiratory failure due to COVID-19 [facility])    Acute respiratory failure with hypoxia (HCC)    Pneumonia due to COVID-19 virus    Elevated LFTs    Hypokalemia    Essential hypertension    Non-insulin dependent type 2 diabetes mellitus ([facility]  Resolved Problems:    * No resolved hospital problems. *      Plan:  1. Acute Respiratory Failure with Hypoxia  - 2/2 COVID-19 CXR significant worsening multifocal infiltrates 10/4 CXR no change in imaging.   - Continue dexamethasone, taper to 10 mg daily 10/19. Continue Albuterol.  - Remains on Airvo -->several days at 100% FiO2, finally able to wean over past 2-3 days.  Will require < 60% FiO2 to qualify for LTAC.      2. COVID-19 Viral Pneumonia  - Completed baricitinib  - Remains on Dexamethasone, tapered to 10 mg daily on 10/19  - Vitamin Supplementation. Follow inflammatory markers. Lovenox 1mg/kg.   - Encourage IS/Pronating. DDImer 390, CRP down to 1.9  Isolation per protocol Supportive care. Pulmonology following.      3. Anxiety  - Continue Xanax Q4 W/A.   - continue Paxil  - mood is stable     4. DM  - Continue lantus. SSI/hypoglycemic protocol/Carb Control diet. Follow adjust as needed.     5. HTN  - Continue Amlodipine.     6. Poor intake  - 2//2 Respiratory distress. Poor intake noted. Intake improving with improvement of respiratory function. Follow     Hyponatremia  - serum Na of 131. Hypochloremic hyponatremia. Is negative 6 liters since admission, ?increased sensible losses from tacypnea and poor oral intake. O2 is humidified. Encouraged to increase oral intake, diet as tolerated     7. Disposition- [facility] following. Will require titration of SPO2 <65% prior to referral.  [facility] referral can be made.  Referral made to [place], but will likely need [facility] at [facility]. .casemanagement following.       NOTE: This report was transcribed using voice recognition software. Every effort was made to ensure accuracy; however, inadvertent computerized transcription errors may be present.   Electronically signed by UABREE Davis CNP on 10/20/2021 at 11:33 AM

## 2021-10-20 NOTE — PROGRESS NOTES
Pulmonary Progress Note    Admit Date: 2021  Hospital day                               PCP: No primary care provider on file. Chief Complaint (s):  Patient Active Problem List   Diagnosis    COVID-19    Acute respiratory failure due to COVID-19 [facility])    [facility] with hypoxia [facility]    Pneumonia due to COVID-19 virus    Elevated LFTs    Hypokalemia    Essential hypertension    Non-insulin dependent type 2 diabetes mellitus ([facility]       Subjective:  · Seen this p.m., the patient seems to be doing a little bit better. Saturations are up. He still tested positive for Covid. Vitals:  VITALS:  /76   Pulse 68   Temp 98.3 °F (36.8 °C) (Oral)   Resp 20   Ht 5' 7\" (1.702 m)   Wt 184 lb 11.9 oz (83.8 kg)   SpO2 93%   BMI 28.94 kg/m²     24HR INTAKE/OUTPUT:    No intake or output data in the 24 hours ending 10/20/21 1842    24HR PULSE OXIMETRY RANGE:    SpO2  Av %  Min: 91 %  Max: 95 %    Medications:  IV:   sodium chloride      dextrose         Scheduled Meds:   dexamethasone  10 mg IntraVENous Q24H    insulin glargine  5 Units SubCUTAneous Nightly    ascorbic acid  500 mg Oral Daily    zinc sulfate  50 mg Oral Daily    vitamin D  2,000 Units Oral Daily    tiotropium  2 puff Inhalation Daily    budesonide-formoterol  2 puff Inhalation BID    ALPRAZolam  0.25 mg Oral Q4H While awake    guaiFENesin-codeine  10 mL Oral Q4H While awake    enoxaparin  1 mg/kg SubCUTAneous BID    albuterol sulfate HFA  2 puff Inhalation 4x daily    sodium chloride flush  5-40 mL IntraVENous 2 times per day    PARoxetine  30 mg Oral BID    amLODIPine  5 mg Oral Daily    insulin lispro  0-6 Units SubCUTAneous TID WC    insulin lispro  0-3 Units SubCUTAneous Nightly       Diet:   ADULT DIET; Regular; 5 carb choices (75 gm/meal)  Adult Oral Nutrition Supplement; Diabetic Oral Supplement     EXAM:  General: No distress. Sleeping  Eyes: PERRL. No sclera icterus.  No conjunctival injection. ENT: No discharge. Pharynx clear. Neck: Trachea midline. Normal thyroid. Resp: No accessory muscle use. No rales. No wheezing. No rhonchi. CV: Regular rate. Regular rhythm. No murmur or rub. Abd: Non-tender. Non-distended. No masses. No organomegaly. Normal bowel sounds. Skin: Warm and dry. No nodule on exposed extremities. No rash on exposed extremities. Ext: No cyanosis, clubbing, edema  Lymph: No cervical LAD. No supraclavicular LAD. M/S: No cyanosis. No joint deformity. No clubbing. Neuro: Positive pupils/gag/corneals. Normal pain response. Results:  CBC:   Recent Labs     10/18/21  1840 10/19/21  0453 10/20/21  0307   WBC 10.7 8.0 8.1   HGB 11.4* 10.6* 10.7*   HCT 34.5* 33.0* 33.1*   MCV 82.3 83.3 83.8    202 206     BMP:   Recent Labs     10/18/21  1840 10/19/21  0453 10/20/21  0307   * 130* 131*   K 4.1 4.3 3.9   CL 93* 95* 94*   CO2 25 27 28   BUN 23 21 22   CREATININE 0.5* 0.4* 0.4*     LIVER PROFILE:   Recent Labs     10/18/21  1840 10/19/21  0453 10/20/21  0307   AST 27 24 23   * 97* 92*   BILITOT 0.3 0.3 0.3   ALKPHOS 104 89 83     PT/INR: No results for input(s): PROTIME, INR in the last 72 hours. APTT: No results for input(s): APTT in the last 72 hours. Pathology:  1. N/A      Microbiology:  1. None    Recent ABG:   No results for input(s): PH, PO2, PCO2, HCO3, BE, O2SAT, METHB, O2HB, COHB, O2CON, HHB, THB in the last 72 hours. FiO2 : (S) 85 %       Recent Films:  XR CHEST PORTABLE   Final Result   No significant change in the appearance of the extensive airspace opacities   throughout both lungs, in keeping with the known history of COVID pneumonia. XR CHEST PORTABLE   Final Result   1. There is no interval change in extensive multifocal bilateral airspace   disease. XR CHEST PORTABLE   Final Result   1. No interval change in the extensive multifocal bilateral airspace disease.          XR CHEST PORTABLE   Final Result   No interval change         XR CHEST PORTABLE   Final Result   1. Significant worsening of the multifocal bilateral pneumonia when compared   with the prior CT scan of 09/22/2021         CTA PULMONARY W CONTRAST   Final Result   Within described exam limitations, no evidence of pulmonary embolism. Scattered multifocal low density infiltrates throughout both lungs, most   suggestive of COVID pneumonia in context of current pandemic. At least moderate diffuse hepatic steatosis. Cholecystectomy. RECOMMENDATIONS:   Multiple pulmonary nodules. Most severe: 4 mm left solid pulmonary nodule   within the upper lobe. A non-contrast Chest CT at 12 months is optional. If   performed and the nodule is stable at 12 months, no further follow-up is   recommended. These guidelines do not apply to immunocompromised patients and patients with   cancer. Follow up in patients with significant comorbidities as clinically   warranted. For lung cancer screening, adhere to Lung-RADS guidelines. Reference: Radiology. 2017; 284(1):228-43. XR CHEST PORTABLE   Final Result   There are bilateral multifocal ground-glass areas of consolidation with the   lungs concerning for in infectious or inflammatory process and developing   ARDS or viral pneumonia could give this appearance.                      Assessment:  1. COVID-19 pneumonia with significant worsening despite steroids and baricitinib. Inflammatory markers and chest radiograph are reviewed. More recently, there has been no improvement. D-dimer is noted. Oxygen requirement slowly decreasing. CRP is impressively low. Unfortunately, he appears to be a long-hauler        Plan:  1. Continue full dose anticoagulation  2. Wean FiO2 as tolerated  3. Okay to [place] when they can accept him. Time at the bedside, reviewing labs and radiographs, reviewing updated notes and consultations, discussing with staff and family was more than 35 minutes.     Please note that voice recognition technology was used in the preparation of this note and make therefore it may contain inadvertent transcription errors. If the patient is a COVID 19 isolation patient, the above physical exam reflects that of the examining physician for the day. Horacio Wheeler MD, M.D., F.C.C.P.     [facility], [address] [place] [address], [address] [place] [address]

## 2021-10-20 NOTE — PROGRESS NOTES
Droplet plus isolation discontinued per Dr Rosalie Bhatti. Patient has been positive since 9/22/21 and isolation is no longer necessary.

## 2021-10-21 LAB
ALBUMIN SERPL-MCNC: 2.5 G/DL (ref 3.5–5.2)
ALP BLD-CCNC: 78 U/L (ref 40–129)
ALT SERPL-CCNC: 88 U/L (ref 0–40)
ANION GAP SERPL CALCULATED.3IONS-SCNC: 7 MMOL/L (ref 7–16)
AST SERPL-CCNC: 22 U/L (ref 0–39)
BILIRUB SERPL-MCNC: 0.3 MG/DL (ref 0–1.2)
BUN BLDV-MCNC: 15 MG/DL (ref 6–23)
CALCIUM SERPL-MCNC: 7.9 MG/DL (ref 8.6–10.2)
CHLORIDE BLD-SCNC: 96 MMOL/L (ref 98–107)
CO2: 29 MMOL/L (ref 22–29)
CREAT SERPL-MCNC: 0.5 MG/DL (ref 0.7–1.2)
FERRITIN: 964 NG/ML
GFR AFRICAN AMERICAN: >60
GFR NON-AFRICAN AMERICAN: >60 ML/MIN/1.73
GLUCOSE BLD-MCNC: 139 MG/DL (ref 74–99)
HCT VFR BLD CALC: 31.5 % (ref 37–54)
HEMOGLOBIN: 10.2 G/DL (ref 12.5–16.5)
MCH RBC QN AUTO: 27.2 PG (ref 26–35)
MCHC RBC AUTO-ENTMCNC: 32.4 % (ref 32–34.5)
MCV RBC AUTO: 84 FL (ref 80–99.9)
METER GLUCOSE: 111 MG/DL (ref 74–99)
METER GLUCOSE: 144 MG/DL (ref 74–99)
METER GLUCOSE: 212 MG/DL (ref 74–99)
METER GLUCOSE: 226 MG/DL (ref 74–99)
PDW BLD-RTO: 14.6 FL (ref 11.5–15)
PLATELET # BLD: 181 E9/L (ref 130–450)
PMV BLD AUTO: 10.1 FL (ref 7–12)
POTASSIUM SERPL-SCNC: 4 MMOL/L (ref 3.5–5)
RBC # BLD: 3.75 E12/L (ref 3.8–5.8)
SODIUM BLD-SCNC: 132 MMOL/L (ref 132–146)
TOTAL PROTEIN: 5.5 G/DL (ref 6.4–8.3)
WBC # BLD: 5.9 E9/L (ref 4.5–11.5)

## 2021-10-21 PROCEDURE — 1200000000 HC SEMI PRIVATE

## 2021-10-21 PROCEDURE — 94660 CPAP INITIATION&MGMT: CPT

## 2021-10-21 PROCEDURE — 6370000000 HC RX 637 (ALT 250 FOR IP): Performed by: INTERNAL MEDICINE

## 2021-10-21 PROCEDURE — 82728 ASSAY OF FERRITIN: CPT

## 2021-10-21 PROCEDURE — 6360000002 HC RX W HCPCS: Performed by: NURSE PRACTITIONER

## 2021-10-21 PROCEDURE — 36415 COLL VENOUS BLD VENIPUNCTURE: CPT

## 2021-10-21 PROCEDURE — 82962 GLUCOSE BLOOD TEST: CPT

## 2021-10-21 PROCEDURE — 2700000000 HC OXYGEN THERAPY PER DAY

## 2021-10-21 PROCEDURE — 80053 COMPREHEN METABOLIC PANEL: CPT

## 2021-10-21 PROCEDURE — 2580000003 HC RX 258: Performed by: INTERNAL MEDICINE

## 2021-10-21 PROCEDURE — 99233 SBSQ HOSP IP/OBS HIGH 50: CPT | Performed by: INTERNAL MEDICINE

## 2021-10-21 PROCEDURE — 6360000002 HC RX W HCPCS: Performed by: INTERNAL MEDICINE

## 2021-10-21 PROCEDURE — 6370000000 HC RX 637 (ALT 250 FOR IP): Performed by: NURSE PRACTITIONER

## 2021-10-21 PROCEDURE — 6370000000 HC RX 637 (ALT 250 FOR IP): Performed by: FAMILY MEDICINE

## 2021-10-21 PROCEDURE — 85027 COMPLETE CBC AUTOMATED: CPT

## 2021-10-21 RX ORDER — MONTELUKAST SODIUM 10 MG/1
10 TABLET ORAL NIGHTLY
Status: DISCONTINUED | OUTPATIENT
Start: 2021-10-21 | End: 2021-11-02 | Stop reason: HOSPADM

## 2021-10-21 RX ADMIN — Medication 4.5 MG: at 20:54

## 2021-10-21 RX ADMIN — TIOTROPIUM BROMIDE INHALATION SPRAY 2 PUFF: 3.12 SPRAY, METERED RESPIRATORY (INHALATION) at 09:25

## 2021-10-21 RX ADMIN — ALPRAZOLAM 0.25 MG: 0.25 TABLET ORAL at 14:05

## 2021-10-21 RX ADMIN — Medication 10 ML: at 20:54

## 2021-10-21 RX ADMIN — ALBUTEROL SULFATE 2 PUFF: 90 AEROSOL, METERED RESPIRATORY (INHALATION) at 09:19

## 2021-10-21 RX ADMIN — INSULIN LISPRO 2 UNITS: 100 INJECTION, SOLUTION INTRAVENOUS; SUBCUTANEOUS at 16:44

## 2021-10-21 RX ADMIN — ALBUTEROL SULFATE 2 PUFF: 90 AEROSOL, METERED RESPIRATORY (INHALATION) at 11:29

## 2021-10-21 RX ADMIN — Medication 2000 UNITS: at 09:24

## 2021-10-21 RX ADMIN — AMLODIPINE BESYLATE 5 MG: 5 TABLET ORAL at 09:25

## 2021-10-21 RX ADMIN — Medication 10 ML: at 09:22

## 2021-10-21 RX ADMIN — ENOXAPARIN SODIUM 80 MG: 100 INJECTION SUBCUTANEOUS at 20:54

## 2021-10-21 RX ADMIN — ALPRAZOLAM 0.25 MG: 0.25 TABLET ORAL at 20:53

## 2021-10-21 RX ADMIN — ALBUTEROL SULFATE 2 PUFF: 90 AEROSOL, METERED RESPIRATORY (INHALATION) at 20:53

## 2021-10-21 RX ADMIN — INSULIN GLARGINE 5 UNITS: 100 INJECTION, SOLUTION SUBCUTANEOUS at 20:55

## 2021-10-21 RX ADMIN — BUDESONIDE AND FORMOTEROL FUMARATE DIHYDRATE 2 PUFF: 160; 4.5 AEROSOL RESPIRATORY (INHALATION) at 20:53

## 2021-10-21 RX ADMIN — MONTELUKAST SODIUM 10 MG: 10 TABLET ORAL at 20:54

## 2021-10-21 RX ADMIN — PAROXETINE 30 MG: 10 TABLET, FILM COATED ORAL at 09:23

## 2021-10-21 RX ADMIN — ZINC SULFATE 220 MG (50 MG) CAPSULE 50 MG: CAPSULE at 09:23

## 2021-10-21 RX ADMIN — ENOXAPARIN SODIUM 80 MG: 100 INJECTION SUBCUTANEOUS at 09:25

## 2021-10-21 RX ADMIN — PAROXETINE 30 MG: 10 TABLET, FILM COATED ORAL at 20:53

## 2021-10-21 RX ADMIN — ALPRAZOLAM 0.25 MG: 0.25 TABLET ORAL at 09:24

## 2021-10-21 RX ADMIN — ACETAMINOPHEN 650 MG: 325 TABLET ORAL at 20:53

## 2021-10-21 RX ADMIN — BUDESONIDE AND FORMOTEROL FUMARATE DIHYDRATE 2 PUFF: 160; 4.5 AEROSOL RESPIRATORY (INHALATION) at 09:20

## 2021-10-21 RX ADMIN — ALPRAZOLAM 0.25 MG: 0.25 TABLET ORAL at 18:24

## 2021-10-21 RX ADMIN — OXYCODONE HYDROCHLORIDE AND ACETAMINOPHEN 500 MG: 500 TABLET ORAL at 09:23

## 2021-10-21 RX ADMIN — DEXAMETHASONE SODIUM PHOSPHATE 10 MG: 4 INJECTION, SOLUTION INTRA-ARTICULAR; INTRALESIONAL; INTRAMUSCULAR; INTRAVENOUS; SOFT TISSUE at 09:23

## 2021-10-21 RX ADMIN — INSULIN LISPRO 1 UNITS: 100 INJECTION, SOLUTION INTRAVENOUS; SUBCUTANEOUS at 20:56

## 2021-10-21 RX ADMIN — ALPRAZOLAM 0.25 MG: 0.25 TABLET ORAL at 06:38

## 2021-10-21 RX ADMIN — ALBUTEROL SULFATE 2 PUFF: 90 AEROSOL, METERED RESPIRATORY (INHALATION) at 16:18

## 2021-10-21 ASSESSMENT — PAIN SCALES - GENERAL
PAINLEVEL_OUTOF10: 0
PAINLEVEL_OUTOF10: 5
PAINLEVEL_OUTOF10: 0

## 2021-10-21 ASSESSMENT — PAIN DESCRIPTION - LOCATION: LOCATION: GENERALIZED

## 2021-10-21 ASSESSMENT — PAIN DESCRIPTION - PAIN TYPE: TYPE: ACUTE PAIN

## 2021-10-21 NOTE — PROGRESS NOTES
Date: 10/20/2021    Time: 8:02 PM    Patient Placed On BIPAP/CPAP/ Non-Invasive Ventilation? No    If no must comment. Comments: Patient refuses bipap, machine on standby if needed.          Shelly Duarte RCP

## 2021-10-21 NOTE — PROGRESS NOTES
[facility] [address]  [address], [address]  [facility], [facility]      Pulmonary Progress Note      SUBJECTIVE:  Lying down on his left side, on both Airvo 55 li 95% and NRFM. Claims would use NRFM sometime when feeling sob, was saturating 100% when I saw him. Claims similar with respiratory function, minimal cough/sputum production.     OBJECTIVE    Medications    Continuous Infusions:   sodium chloride      dextrose         Scheduled Meds:   dexamethasone  10 mg IntraVENous Q24H    insulin glargine  5 Units SubCUTAneous Nightly    ascorbic acid  500 mg Oral Daily    zinc sulfate  50 mg Oral Daily    vitamin D  2,000 Units Oral Daily    tiotropium  2 puff Inhalation Daily    budesonide-formoterol  2 puff Inhalation BID    ALPRAZolam  0.25 mg Oral Q4H While awake    guaiFENesin-codeine  10 mL Oral Q4H While awake    enoxaparin  1 mg/kg SubCUTAneous BID    albuterol sulfate HFA  2 puff Inhalation 4x daily    sodium chloride flush  5-40 mL IntraVENous 2 times per day    PARoxetine  30 mg Oral BID    amLODIPine  5 mg Oral Daily    insulin lispro  0-6 Units SubCUTAneous TID WC    insulin lispro  0-3 Units SubCUTAneous Nightly       PRN Meds:aluminum & magnesium hydroxide-simethicone, melatonin, sodium chloride flush, sodium chloride, ondansetron **OR** ondansetron, polyethylene glycol, acetaminophen **OR** acetaminophen, glucose, dextrose, glucagon (rDNA), dextrose    Physical    VITALS:  /73   Pulse 68   Temp 98.2 °F (36.8 °C) (Oral)   Resp 22   Ht 5' 7\" (1.702 m)   Wt 184 lb 11.9 oz (83.8 kg)   SpO2 91%   BMI 28.94 kg/m²     24HR INTAKE/OUTPUT:      Intake/Output Summary (Last 24 hours) at 10/21/2021 1404  Last data filed at 10/20/2021 2113  Gross per 24 hour   Intake --   Output 200 ml   Net -200 ml       24HR PULSE OXIMETRY RANGE:    SpO2  Av.8 %  Min: 91 %  Max: 100 %    General appearance: alert, appears stated age and minutes. Thanks for letting us see this patient in consultation. Please contact us with any questions. Office [phone] or after hours through [facility], x [phone].

## 2021-10-21 NOTE — CARE COORDINATION
+ Covid 9/22. Pt remains on 70% Fio2, 55L. [facility] remains following. Pt must be at 65% Fio2 for them to accept. Care coordination will need to call [place] ([phone]) to initiate LTAC auth.

## 2021-10-21 NOTE — PROGRESS NOTES
[facility] Progress Note    Admitting Date and Time: 9/22/2021  9:32 PM  Admit Dx: Hypokalemia [E87.6]  Acute respiratory failure with hypoxia ([address] [J96.01]  COVID-19 [U07.1]  Acute respiratory failure due to COVID-19 (HCC) [U07.1, J96.00]    Subjective:  Patient is being followed for Hypokalemia [E87.6]  Acute respiratory failure with hypoxia ([address] [J96.01]  COVID-19 [U07.1]  Acute respiratory failure due to COVID-19 (HCC) [U07.1, J96.00]     No new complaints at this time. Patient remains afebrile. ROS: denies fever, chills, cp, sob, n/v, HA unless stated above.       montelukast  10 mg Oral Nightly    dexamethasone  10 mg IntraVENous Q24H    insulin glargine  5 Units SubCUTAneous Nightly    ascorbic acid  500 mg Oral Daily    zinc sulfate  50 mg Oral Daily    vitamin D  2,000 Units Oral Daily    tiotropium  2 puff Inhalation Daily    budesonide-formoterol  2 puff Inhalation BID    ALPRAZolam  0.25 mg Oral Q4H While awake    guaiFENesin-codeine  10 mL Oral Q4H While awake    enoxaparin  1 mg/kg SubCUTAneous BID    albuterol sulfate HFA  2 puff Inhalation 4x daily    sodium chloride flush  5-40 mL IntraVENous 2 times per day    PARoxetine  30 mg Oral BID    amLODIPine  5 mg Oral Daily    insulin lispro  0-6 Units SubCUTAneous TID WC    insulin lispro  0-3 Units SubCUTAneous Nightly     aluminum & magnesium hydroxide-simethicone, 15 mL, Q6H PRN  melatonin, 4.5 mg, Nightly PRN  sodium chloride flush, 5-40 mL, PRN  sodium chloride, 25 mL, PRN  ondansetron, 4 mg, Q8H PRN   Or  ondansetron, 4 mg, Q6H PRN  polyethylene glycol, 17 g, Daily PRN  acetaminophen, 650 mg, Q6H PRN   Or  acetaminophen, 650 mg, Q6H PRN  glucose, 15 g, PRN  dextrose, 12.5 g, PRN  glucagon (rDNA), 1 mg, PRN  dextrose, 100 mL/hr, PRN         Objective:    BP (!) 123/59   Pulse 66   Temp 97.7 °F (36.5 °C) (Oral)   Resp 20   Ht 5' 7\" (1.702 m)   Wt 184 lb 11.9 oz (83.8 kg)   SpO2 91%   BMI 28.94 kg/m² General Appearance: alert and oriented to person, place and time and in no acute distress  Skin: warm and dry  Head: normocephalic and atraumatic  Eyes: pupils equal, round, and reactive to light, extraocular eye movements intact, conjunctivae normal  Neck: neck supple and non tender without mass   Pulmonary/Chest: clear to auscultation bilaterally- no wheezes, rales or rhonchi, normal air movement, no respiratory distress  Cardiovascular: normal rate, normal S1 and S2 and no carotid bruits  Abdomen: soft, non-tender, non-distended, normal bowel sounds, no masses or organomegaly  Extremities: no cyanosis, no clubbing and no edema  Neurologic: no cranial nerve deficit and speech normal        Recent Labs     10/19/21  0453 10/20/21  0307 10/21/21  0333   * 131* 132   K 4.3 3.9 4.0   CL 95* 94* 96*   CO2 27 28 29   BUN 21 22 15   CREATININE 0.4* 0.4* 0.5*   GLUCOSE 174* 113* 139*   CALCIUM 7.9* 7.9* 7.9*       Recent Labs     10/19/21  0453 10/20/21  0307 10/21/21  0333   WBC 8.0 8.1 5.9   RBC 3.96 3.95 3.75*   HGB 10.6* 10.7* 10.2*   HCT 33.0* 33.1* 31.5*   MCV 83.3 83.8 84.0   MCH 26.8 27.1 27.2   MCHC 32.1 32.3 32.4   RDW 14.2 14.5 14.6    206 181   MPV 10.4 10.4 10.1       Radiology: No new imaging studies    Assessment:    Active Problems:    COVID-19    Acute respiratory failure due to COVID-19 [facility])    Acute respiratory failure with hypoxia (HCC)    Pneumonia due to COVID-19 virus    Elevated LFTs    Hypokalemia    Essential hypertension    Non-insulin dependent type 2 diabetes mellitus ([facility] [address]  Resolved Problems:    * No resolved hospital problems. *      Plan:    1. Acute hypoxic respiratory failure secondary to COVID-19 pneumonia -currently patient is on 95% FiO2 on 55 L. Continue breathing treatments as per pulmonology  2. COVID-19 pneumonia -continue Decadron as well as vitamin cocktail. Continue to monitor inflammatory markers  3.   DVT prophylaxis -Lovenox      NOTE: This report was transcribed using voice recognition software. Every effort was made to ensure accuracy; however, inadvertent computerized transcription errors may be present.     Electronically signed by Ivanna Deng DO on 10/21/2021 at 4:41 PM

## 2021-10-22 LAB
ALBUMIN SERPL-MCNC: 2.7 G/DL (ref 3.5–5.2)
ALP BLD-CCNC: 92 U/L (ref 40–129)
ALT SERPL-CCNC: 91 U/L (ref 0–40)
ANION GAP SERPL CALCULATED.3IONS-SCNC: 7 MMOL/L (ref 7–16)
AST SERPL-CCNC: 26 U/L (ref 0–39)
BILIRUB SERPL-MCNC: 0.3 MG/DL (ref 0–1.2)
BUN BLDV-MCNC: 15 MG/DL (ref 6–23)
CALCIUM SERPL-MCNC: 8.3 MG/DL (ref 8.6–10.2)
CHLORIDE BLD-SCNC: 97 MMOL/L (ref 98–107)
CO2: 30 MMOL/L (ref 22–29)
CREAT SERPL-MCNC: 0.4 MG/DL (ref 0.7–1.2)
FERRITIN: 1008 NG/ML
GFR AFRICAN AMERICAN: >60
GFR NON-AFRICAN AMERICAN: >60 ML/MIN/1.73
GLUCOSE BLD-MCNC: 111 MG/DL (ref 74–99)
HCT VFR BLD CALC: 30.4 % (ref 37–54)
HEMOGLOBIN: 9.7 G/DL (ref 12.5–16.5)
MCH RBC QN AUTO: 27.2 PG (ref 26–35)
MCHC RBC AUTO-ENTMCNC: 31.9 % (ref 32–34.5)
MCV RBC AUTO: 85.4 FL (ref 80–99.9)
METER GLUCOSE: 110 MG/DL (ref 74–99)
METER GLUCOSE: 117 MG/DL (ref 74–99)
METER GLUCOSE: 211 MG/DL (ref 74–99)
METER GLUCOSE: 262 MG/DL (ref 74–99)
PDW BLD-RTO: 14.5 FL (ref 11.5–15)
PLATELET # BLD: 185 E9/L (ref 130–450)
PMV BLD AUTO: 10.3 FL (ref 7–12)
POTASSIUM SERPL-SCNC: 4.1 MMOL/L (ref 3.5–5)
RBC # BLD: 3.56 E12/L (ref 3.8–5.8)
SODIUM BLD-SCNC: 134 MMOL/L (ref 132–146)
TOTAL PROTEIN: 5.4 G/DL (ref 6.4–8.3)
WBC # BLD: 4.8 E9/L (ref 4.5–11.5)

## 2021-10-22 PROCEDURE — 6370000000 HC RX 637 (ALT 250 FOR IP): Performed by: INTERNAL MEDICINE

## 2021-10-22 PROCEDURE — 97165 OT EVAL LOW COMPLEX 30 MIN: CPT

## 2021-10-22 PROCEDURE — 82962 GLUCOSE BLOOD TEST: CPT

## 2021-10-22 PROCEDURE — 94660 CPAP INITIATION&MGMT: CPT

## 2021-10-22 PROCEDURE — 94761 N-INVAS EAR/PLS OXIMETRY MLT: CPT

## 2021-10-22 PROCEDURE — APPSS30 APP SPLIT SHARED TIME 16-30 MINUTES: Performed by: NURSE PRACTITIONER

## 2021-10-22 PROCEDURE — 2700000000 HC OXYGEN THERAPY PER DAY

## 2021-10-22 PROCEDURE — 99233 SBSQ HOSP IP/OBS HIGH 50: CPT | Performed by: INTERNAL MEDICINE

## 2021-10-22 PROCEDURE — 6370000000 HC RX 637 (ALT 250 FOR IP): Performed by: NURSE PRACTITIONER

## 2021-10-22 PROCEDURE — 2580000003 HC RX 258: Performed by: INTERNAL MEDICINE

## 2021-10-22 PROCEDURE — 82728 ASSAY OF FERRITIN: CPT

## 2021-10-22 PROCEDURE — 80053 COMPREHEN METABOLIC PANEL: CPT

## 2021-10-22 PROCEDURE — 97161 PT EVAL LOW COMPLEX 20 MIN: CPT

## 2021-10-22 PROCEDURE — 85027 COMPLETE CBC AUTOMATED: CPT

## 2021-10-22 PROCEDURE — 1200000000 HC SEMI PRIVATE

## 2021-10-22 PROCEDURE — 36415 COLL VENOUS BLD VENIPUNCTURE: CPT

## 2021-10-22 PROCEDURE — 6360000002 HC RX W HCPCS: Performed by: NURSE PRACTITIONER

## 2021-10-22 PROCEDURE — 97530 THERAPEUTIC ACTIVITIES: CPT

## 2021-10-22 PROCEDURE — 6360000002 HC RX W HCPCS: Performed by: INTERNAL MEDICINE

## 2021-10-22 RX ADMIN — INSULIN LISPRO 2 UNITS: 100 INJECTION, SOLUTION INTRAVENOUS; SUBCUTANEOUS at 22:52

## 2021-10-22 RX ADMIN — ALPRAZOLAM 0.25 MG: 0.25 TABLET ORAL at 11:00

## 2021-10-22 RX ADMIN — AMLODIPINE BESYLATE 5 MG: 5 TABLET ORAL at 11:01

## 2021-10-22 RX ADMIN — INSULIN LISPRO 2 UNITS: 100 INJECTION, SOLUTION INTRAVENOUS; SUBCUTANEOUS at 16:42

## 2021-10-22 RX ADMIN — ALBUTEROL SULFATE 2 PUFF: 90 AEROSOL, METERED RESPIRATORY (INHALATION) at 18:40

## 2021-10-22 RX ADMIN — PAROXETINE 30 MG: 10 TABLET, FILM COATED ORAL at 10:58

## 2021-10-22 RX ADMIN — PAROXETINE 30 MG: 10 TABLET, FILM COATED ORAL at 23:01

## 2021-10-22 RX ADMIN — OXYCODONE HYDROCHLORIDE AND ACETAMINOPHEN 500 MG: 500 TABLET ORAL at 11:00

## 2021-10-22 RX ADMIN — Medication 10 ML: at 11:03

## 2021-10-22 RX ADMIN — ALBUTEROL SULFATE 2 PUFF: 90 AEROSOL, METERED RESPIRATORY (INHALATION) at 10:59

## 2021-10-22 RX ADMIN — TIOTROPIUM BROMIDE INHALATION SPRAY 2 PUFF: 3.12 SPRAY, METERED RESPIRATORY (INHALATION) at 10:59

## 2021-10-22 RX ADMIN — ENOXAPARIN SODIUM 80 MG: 100 INJECTION SUBCUTANEOUS at 10:59

## 2021-10-22 RX ADMIN — ALBUTEROL SULFATE 2 PUFF: 90 AEROSOL, METERED RESPIRATORY (INHALATION) at 14:12

## 2021-10-22 RX ADMIN — ALPRAZOLAM 0.25 MG: 0.25 TABLET ORAL at 22:20

## 2021-10-22 RX ADMIN — ZINC SULFATE 220 MG (50 MG) CAPSULE 50 MG: CAPSULE at 11:01

## 2021-10-22 RX ADMIN — BUDESONIDE AND FORMOTEROL FUMARATE DIHYDRATE 2 PUFF: 160; 4.5 AEROSOL RESPIRATORY (INHALATION) at 22:19

## 2021-10-22 RX ADMIN — Medication 2000 UNITS: at 11:02

## 2021-10-22 RX ADMIN — INSULIN GLARGINE 5 UNITS: 100 INJECTION, SOLUTION SUBCUTANEOUS at 22:52

## 2021-10-22 RX ADMIN — BUDESONIDE AND FORMOTEROL FUMARATE DIHYDRATE 2 PUFF: 160; 4.5 AEROSOL RESPIRATORY (INHALATION) at 10:59

## 2021-10-22 RX ADMIN — ALPRAZOLAM 0.25 MG: 0.25 TABLET ORAL at 06:42

## 2021-10-22 RX ADMIN — Medication 10 ML: at 22:22

## 2021-10-22 RX ADMIN — MONTELUKAST SODIUM 10 MG: 10 TABLET ORAL at 22:20

## 2021-10-22 RX ADMIN — ALPRAZOLAM 0.25 MG: 0.25 TABLET ORAL at 14:18

## 2021-10-22 RX ADMIN — DEXAMETHASONE SODIUM PHOSPHATE 10 MG: 4 INJECTION, SOLUTION INTRA-ARTICULAR; INTRALESIONAL; INTRAMUSCULAR; INTRAVENOUS; SOFT TISSUE at 11:04

## 2021-10-22 RX ADMIN — ALPRAZOLAM 0.25 MG: 0.25 TABLET ORAL at 18:40

## 2021-10-22 ASSESSMENT — PAIN DESCRIPTION - PROGRESSION: CLINICAL_PROGRESSION: NOT CHANGED

## 2021-10-22 ASSESSMENT — PAIN SCALES - GENERAL
PAINLEVEL_OUTOF10: 0
PAINLEVEL_OUTOF10: 0

## 2021-10-22 NOTE — PROGRESS NOTES
[address] [facility] Hospitalist   Progress Note    Admitting Date and Time: 9/22/2021  9:32 PM  Admit Dx: Hypokalemia [E87.6]  Acute respiratory failure with hypoxia ([address] [J96.01]  COVID-19 [U07.1]  Acute respiratory failure due to COVID-19 (HCC) [U07.1, J96.00]    Subjective:    10/22 Pt sitting up in bed in no acute distress. Has been able to wean FiO2 55% 55L Airvo. Encouraged to use IS. States he has been eating better. Pre-cert started for [facility]    RN: Pre-cert initiated     ROS: denies fever, chills, cp, n/v, HA unless stated above.      montelukast  10 mg Oral Nightly    dexamethasone  10 mg IntraVENous Q24H    insulin glargine  5 Units SubCUTAneous Nightly    ascorbic acid  500 mg Oral Daily    zinc sulfate  50 mg Oral Daily    vitamin D  2,000 Units Oral Daily    tiotropium  2 puff Inhalation Daily    budesonide-formoterol  2 puff Inhalation BID    ALPRAZolam  0.25 mg Oral Q4H While awake    guaiFENesin-codeine  10 mL Oral Q4H While awake    enoxaparin  1 mg/kg SubCUTAneous BID    albuterol sulfate HFA  2 puff Inhalation 4x daily    sodium chloride flush  5-40 mL IntraVENous 2 times per day    PARoxetine  30 mg Oral BID    amLODIPine  5 mg Oral Daily    insulin lispro  0-6 Units SubCUTAneous TID WC    insulin lispro  0-3 Units SubCUTAneous Nightly     aluminum & magnesium hydroxide-simethicone, 15 mL, Q6H PRN  melatonin, 4.5 mg, Nightly PRN  sodium chloride flush, 5-40 mL, PRN  sodium chloride, 25 mL, PRN  ondansetron, 4 mg, Q8H PRN   Or  ondansetron, 4 mg, Q6H PRN  polyethylene glycol, 17 g, Daily PRN  acetaminophen, 650 mg, Q6H PRN   Or  acetaminophen, 650 mg, Q6H PRN  glucose, 15 g, PRN  dextrose, 12.5 g, PRN  glucagon (rDNA), 1 mg, PRN  dextrose, 100 mL/hr, PRN         Objective:    /71   Pulse 68   Temp 97.9 °F (36.6 °C) (Oral)   Resp 20   Ht 5' 7\" (1.702 m)   Wt 184 lb 11.9 oz (83.8 kg)   SpO2 94%   BMI 28.94 kg/m²   General Appearance: alert and oriented to person, place and time and in respiratory  distress  Skin: warm and dry  Head: normocephalic and atraumatic  Eyes: pupils equal, round, and reactive to light, extraocular eye movements intact, conjunctivae normal  Neck: neck supple and non tender without mass   Pulmonary/Chest:Diminished/rhonchi to auscultation bilaterally- no wheezes, rales  normal air movement, acute respiratory distress  Cardiovascular: normal rate, normal S1 and S2 and no RGM  Abdomen: soft, non-tender, non-distended, normal bowel sounds  Extremities: no cyanosis, no clubbing and no edema  Neurologic: no cranial nerve deficit and speech normal      Recent Labs     10/20/21  0307 10/21/21  0333 10/22/21  0657   * 132 134   K 3.9 4.0 4.1   CL 94* 96* 97*   CO2 28 29 30*   BUN 22 15 15   CREATININE 0.4* 0.5* 0.4*   GLUCOSE 113* 139* 111*   CALCIUM 7.9* 7.9* 8.3*       Recent Labs     10/20/21  0307 10/21/21  0333 10/22/21  0657   ALKPHOS 83 78 92   PROT 5.5* 5.5* 5.4*   LABALBU 2.7* 2.5* 2.7*   BILITOT 0.3 0.3 0.3   AST 23 22 26   ALT 92* 88* 91*       Recent Labs     10/20/21  0307 10/21/21  0333 10/22/21  0657   WBC 8.1 5.9 4.8   RBC 3.95 3.75* 3.56*   HGB 10.7* 10.2* 9.7*   HCT 33.1* 31.5* 30.4*   MCV 83.8 84.0 85.4   MCH 27.1 27.2 27.2   MCHC 32.3 32.4 31.9*   RDW 14.5 14.6 14.5    181 185   MPV 10.4 10.1 10.3           Radiology:   XR CHEST PORTABLE   Final Result   No significant change in the appearance of the extensive airspace opacities   throughout both lungs, in keeping with the known history of COVID pneumonia. XR CHEST PORTABLE   Final Result   1. There is no interval change in extensive multifocal bilateral airspace   disease. XR CHEST PORTABLE   Final Result   1. No interval change in the extensive multifocal bilateral airspace disease. XR CHEST PORTABLE   Final Result   No interval change         XR CHEST PORTABLE   Final Result   1.  Significant worsening of the multifocal bilateral pneumonia when 2. COVID-19 Viral Pneumonia- COVID-19+The current medical regimen is effective;  continue present plan and medications. Completed Course Baricitinib/Dexamethasone Taper per Pulmonology. Vitamin Supplementation. Follow inflammatory markers. Lovenox 1mg/kg. Encourage IS/Pronating. DDImer 434  Isolation DC. Supportive care. Pulmonology following. 3. Anxiety- Continue Xanax Q4 W/A. 4. DM- Continue SSI/hypoglycemic protocol/Carb Control diet. Follow adjust as needed. 5. Anemia- Hgb 9.7. Iron panel/B12/Folate. Occult stool. No overt signs of bleeding noted. Follow transfuse as needed. 6. HTN- Continue Amlodipine. 7. Poor intake- 2//2 Respiratory distress. Improving. Pt with increased intake and tolerating off BP. Follow Calorie intake. 8. Disposition- Pt Oxygen needs decreased to 55% 55L. [facility] following. Pt with [facility] benefits. Discussed with Case management, information faxed to [place]. Await decision of placement from [place]. Referral made to [place]. [facility] following. NOTE: This report was transcribed using voice recognition software. Every effort was made to ensure accuracy; however, inadvertent computerized transcription errors may be present. Electronically signed by Brenda Koroma CNP on 10/22/2021 at 10:30 AM     Addendum: I have personally participated in the history, exam, medical decision making with [facility] Ceballos on the date of service and I agree with all of the pertinent clinical information unless otherwise noted. I have also reviewed and agree with the past medical, family, and social history unless otherwise noted.      Patient was admitted with acute hypoxic respiratory failure 2/2 covid 19 pneumonia    PHYSICAL EXAM:  Vitals:  /66   Pulse 68   Temp 97.9 °F (36.6 °C) (Oral)   Resp 20   Ht 5' 7\" (1.702 m)   Wt 184 lb 11.9 oz (83.8 kg)   SpO2 92%   BMI 28.94 kg/m²   Gen: awake, alert, NAD  Lungs: decreased lung sounds  Heart: RRR, no murmur   Abdomen: soft nontender nondistended positive bowel sounds. Extremities: full range of motion no peripheral edema. Impression:  Active Problems:    COVID-19    Acute respiratory failure due to COVID-19 [facility])    Acute respiratory failure with hypoxia (HCC)    Pneumonia due to COVID-19 virus    Elevated LFTs    Hypokalemia    Essential hypertension    Non-insulin dependent type 2 diabetes mellitus ([facility]  Resolved Problems:    * No resolved hospital problems. *      My findings/plan include:    1. Acute hypoxic respiratory failure secondary to COVID-19 pneumonia -currently patient is on 60% FiO2 on 55 L. Continue breathing treatments as per pulmonology  2. COVID-19 pneumonia -continue Decadron as well as vitamin cocktail. Continue to monitor inflammatory markers    Disposition - Pending discharge to [facility] once authorization is approved    NOTE: This report was transcribed using voice recognition software. Every effort was made to ensure accuracy; however, inadvertent computerized transcription errors may be present.     Electronically signed by Jose Segal DO on 10/22/2021 at 2:24 PM

## 2021-10-22 NOTE — PROGRESS NOTES
[facility]  [address], [address]  [place], 17 [facility]      Pulmonary Progress Note      SUBJECTIVE:  Lying down on his being cleaned and changed, on both Airvo 55 li 65% and NRFM saturating about 91% (using NRFM when ing changed and moving from bed to chair). Claims similar with respiratory function, minimal cough/sputum production.     OBJECTIVE    Medications    Continuous Infusions:   sodium chloride      dextrose         Scheduled Meds:   montelukast  10 mg Oral Nightly    dexamethasone  10 mg IntraVENous Q24H    insulin glargine  5 Units SubCUTAneous Nightly    ascorbic acid  500 mg Oral Daily    zinc sulfate  50 mg Oral Daily    vitamin D  2,000 Units Oral Daily    tiotropium  2 puff Inhalation Daily    budesonide-formoterol  2 puff Inhalation BID    ALPRAZolam  0.25 mg Oral Q4H While awake    guaiFENesin-codeine  10 mL Oral Q4H While awake    enoxaparin  1 mg/kg SubCUTAneous BID    albuterol sulfate HFA  2 puff Inhalation 4x daily    sodium chloride flush  5-40 mL IntraVENous 2 times per day    PARoxetine  30 mg Oral BID    amLODIPine  5 mg Oral Daily    insulin lispro  0-6 Units SubCUTAneous TID WC    insulin lispro  0-3 Units SubCUTAneous Nightly       PRN Meds:aluminum & magnesium hydroxide-simethicone, melatonin, sodium chloride flush, sodium chloride, ondansetron **OR** ondansetron, polyethylene glycol, acetaminophen **OR** acetaminophen, glucose, dextrose, glucagon (rDNA), dextrose    Physical    VITALS:  /66   Pulse 68   Temp 97.9 °F (36.6 °C) (Oral)   Resp 20   Ht 5' 7\" (1.702 m)   Wt 184 lb 11.9 oz (83.8 kg)   SpO2 92%   BMI 28.94 kg/m²     24HR INTAKE/OUTPUT:      Intake/Output Summary (Last 24 hours) at 10/22/2021 1434  Last data filed at 10/22/2021 1427  Gross per 24 hour   Intake 360 ml   Output 825 ml   Net -465 ml       24HR PULSE OXIMETRY RANGE:    SpO2  Av.7 %  Min: 92 %  Max: 100 %    General appearance: alert, appears stated age and cooperative  Lungs: rhonchi bibasilar  Heart: regular rate and rhythm, S1, S2 normal, no murmur, click, rub or gallop  Abdomen: soft, non-tender; bowel sounds normal; no masses,  no organomegaly  Extremities: extremities normal, atraumatic, no cyanosis or edema  Neurologic: Mental status: Alert, oriented, thought content appropriate    Data    CBC:   Recent Labs     10/20/21  0307 10/21/21  0333 10/22/21  0657   WBC 8.1 5.9 4.8   HGB 10.7* 10.2* 9.7*   HCT 33.1* 31.5* 30.4*   MCV 83.8 84.0 85.4    181 185       BMP:  Recent Labs     10/20/21  0307 10/21/21  0333 10/22/21  0657   * 132 134   K 3.9 4.0 4.1   CL 94* 96* 97*   CO2 28 29 30*   BUN 22 15 15   CREATININE 0.4* 0.5* 0.4*    ALB:3,BILIDIR:3,BILITOT:3,ALKPHOS:3)@    PT/INR: No results for input(s): PROTIME, INR in the last 72 hours. ABG:   No results for input(s): PH, PO2, PCO2, HCO3, BE, O2SAT, METHB, O2HB, COHB, O2CON, HHB, THB in the last 72 hours. FiO2 : (S) 60 %       Radiology/Other tests reviewed: none    Assessment:     Active Problems:    COVID-19    Acute respiratory failure due to COVID-19 [facility])    Acute respiratory failure with hypoxia (HCC)    Pneumonia due to COVID-19 virus    Elevated LFTs    Hypokalemia    Essential hypertension    Non-insulin dependent type 2 diabetes mellitus ([facility] [address]  Resolved Problems:    * No resolved hospital problems. *      Plan:       1. Cont with oxygen, taper as tolerated, observe frequency of NRFM use  2. Cont with steroids, taper as tolerated, decreased a few days ago, has been on since admission  3. singulair daily and see if helps with inflammation  4. Cont with MDI  5. Encourage incentive spirometer use and prone positioning when able to  6. OOB to chair  7.  To LTAC when bed available      Time at the bedside, reviewing labs and radiographs, reviewing notes and consultations, discussing with staff and family was more than 35 minutes. Thanks for letting us see this patient in consultation. Please contact us with any questions. Office [phone] or after hours through [facility], x [phone].

## 2021-10-22 NOTE — CARE COORDINATION
Spoke to Sydnee Minaya at [employer]- informed her of patient's current oxygen status, 55% FiO2. Sydnee Minaya stated that they can accept once Raenette Alba is obtained. Call placed to [address] at [facility] ([phone]). Michael Novoa stated that the [place] was never notified of pt's admission and current hospital stay. Michael Avrilnirav stated that the H&P, all recent consult notes, and PT/OT evals will need to be faxed to [phone]. Once documentation is received the [place] provider will then review case and decide if pt is LTAC appropriate. She sated that if the [place] provider feels that LTAC is necessary or needed, the [place] will then submit for authorization thru [employer]. PT/OT orders placed- message sent to therapy requesting pt is evaluated. Information sent to [place] via fax at this time. Once PT/OT evals are completed- documentation will need faxed to above fax number. 1335: Physical therapy eval faxed to [place] at this time. Await OT eval- will need faxed to [place] once obtained.      1537: Occupational therapy eval faxed to the [place] at this time

## 2021-10-22 NOTE — PROGRESS NOTES
Occupational Therapy  OCCUPATIONAL THERAPY INITIAL EVALUATION    [address]      Date:10/22/2021                                                Patient Name: Abdulkadir Rodrigues  MRN: [id number]  : 1955  Room: [address] [place] [address] Rd, OTR/L #[id number]    Referring Provider: Jeovanny Euceda DO  Specific Provider Orders/Date: OT eval and treat 10/22/21     Diagnosis: Hypokalemia [E87.6]  Acute respiratory failure with hypoxia ([address] [J96.01]  COVID-19 [U07.1]  Acute respiratory failure due to COVID-19 ([address] [U07.1, J96.00]   Pt admitted to hospital on 21 for fatigue and SOB    Pertinent Medical History:  has a past medical history of Hypertension.        Precautions:  Fall Risk, covid+, NRB, Airvo, [place], bed alarm, continuous pulse ox (+desaturation w/ minimal activity)    Assessment of current deficits    [x] Functional mobility  [x]ADLs  [x] Strength               [x]Cognition    [x] Functional transfers   [x] IADLs         [x] Safety Awareness   [x]Endurance    [] Fine Coordination              [x] Balance      [] Vision/perception   []Sensation     []Gross Motor Coordination  [] ROM  [] Delirium                   [] Motor Control     OT PLAN OF CARE   OT POC based on physician orders, patient diagnosis and results of clinical assessment    Frequency/Duration 1-3 days/wk for 2 weeks PRN   Specific OT Treatment Interventions to include:   * Instruction/training on adapted ADL techniques and AE recommendations to increase functional independence within precautions       * Training on energy conservation strategies, correct breathing pattern and techniques to improve independence/tolerance for self-care routine  * Functional transfer/mobility training/DME recommendations for increased independence, safety, and fall prevention  * Patient/Family education to increase follow through with safety techniques and functional independence  * Recommendation of environmental modifications for increased safety with functional transfers/mobility and ADLs  * Splinting/positioning for increased function, prevention of contractures, and improve skin integrity  * Therapeutic exercise to improve motor endurance, ROM, and functional strength for ADLs/functional transfers  * Therapeutic activities to facilitate/challenge dynamic balance, stand tolerance for increased safety and independence with ADLs  * Therapeutic activities to facilitate gross/fine motor skills for increased independence with ADLs      Recommended Adaptive Equipment: TBD     Home Living: Pt lives alone in 2 floor home. 4 LUANN, 1 handrail  Bath and bedroom on 2nd floor   Bathroom setup: tub/shower    Equipment owned: n/a    Prior Level of Function: independent with ADLs , independent with IADLs; ambulated independently w/o AD   Driving: yes    Pain Level: Pt c/o no pain this session    Cognition: A&O: 2/4 (to self and place);  Follows 1 step directions   Memory:  fair    Sequencing:  fair    Problem solving:  fair    Judgement/safety:  fair -     Functional Assessment:  AM-PAC Daily Activity Raw Score: 13/24   Initial Eval Status  Date: 10/22/21 Treatment Status  Date: STGs = LTGs  Time frame: 10-14 days   Feeding Stand by Assist      Grooming Minimal Assist   Modified [place]    UB Dressing Minimal Assist   Modified [place]    LB Dressing Dependent   Stand by Assist    Bathing Moderate Assist  Stand by Assist    Toileting Dependent   Including bowel management - bed rolling  Minimal Assist    Bed Mobility  Rolling: SBA  Supine to sit: NT  Supine to sit: Stand by Assist   Sit to supine: Stand by Assist    Functional Transfers NT  Deferred d/t +desaturation w/ bed mobility and repositioning  Stand by Assist    Functional Mobility NT  Stand by Assist    Balance Sitting:  NT     Standing: NT     Activity Tolerance Poor  +desaturation w/ minimal, bedlevel activity  Fair   Visual/  Perceptual Glasses: yes              [place] w/ ADL's limited by Poor functional endurance    Hand Dominance R   AROM (PROM) Strength Additional Info:    RUE  WFL 4-/5 good  and wfl FMC/dexterity noted during ADL tasks       LUE WFL 4-/5 good  and wfl FMC/dexterity noted during ADL tasks       Hearing: [place]  Sensation:  No c/o numbness or tingling  Tone: WFL   Edema: none noted    Comments: Upon arrival patient lying in bed. Pt agreeable to OT session this date. Therapist educated pt on role of OT. At end of session, patient lying in bed (bed alarm on) with call light and phone within reach, all lines and tubes intact. Overall patient demonstrated decreased independence and safety during completion of ADL/functional transfer/mobility tasks. Pt would benefit from continued skilled OT to increase safety and independence with completion of ADL/IADL tasks for functional independence and quality of life. Treatment: OT treatment provided this date includes: Facilitation of bed mobility (education/cues for body mechanics, initiation of rest breaks and pursed lip breathing). Therapist facilitated self-care retraining: UB self-care tasks (donned/doffed gown, briefs), toileting task and grooming task while educating/cuing pt on modified techniques, posture, safety and energy conservation techniques. Skilled monitoring of HR, O2 sats and pts response to treatment. Vitals:   Pt on NRB + Airvo 60 FiO2  Room entry/at rest: O2 sat=88% - reinforced pursed lip breathing (~2-3 minute recovery to 91%)  UB ADL's (semi-supine position): 88-91% w/ frequent rest breaks  Supine>sit EOB attempt (~50%): O2 sat=86%. Deferred transition d/t O2 desaturation to 84% and noted increased SOB. Assisted w/ return to semi-supine position (HOB elevated). Reinforced rest.  Bed repositioning/rolling: O2 sat=83%. ~5 minute recovery time to 91%. Rest and pursed lip breathing reinforced. RN notified/aware.    Monitored until O2 sat=91% (4 minute recovery time)  End of session: O2 sat=92%  Thorough education was provided on self-monitoring O2 sat and symptoms during all functional tasks. Rehab Potential: Good for established goals     Patient / Family Goal: to increase endurance      Patient and/or family were instructed on functional diagnosis, prognosis/goals and OT plan of care. Demonstrated fair- understanding. Eval Complexity: Low    Time In: 13:40  Time Out: 14:05  Total Treatment Time: 10 minutes    Min Units   OT Eval Low 97165  X  1   OT Eval Medium 14180      OT Eval High 64113      OT Re-Eval [id number]       Therapeutic Ex 15448       Therapeutic Activities 59342  10  1   ADL/Self Care 31083       Orthotic Management 16668       Manual [id number]     Neuro Re-Ed 57733       Non-Billable Time          Evaluation Time additionally includes thorough review of current medical information, gathering information on past medical history/social history and prior level of function, interpretation of standardized testing/informal observation of tasks, assessment of data and development of plan of care and goals.         SEBASTIEN KhanR/L #[id number]

## 2021-10-22 NOTE — PLAN OF CARE
Problem: Gas Exchange - Impaired  Goal: Promote optimal lung function  Outcome: Met This Shift     Problem: Airway Clearance - Ineffective  Goal: Achieve or maintain patent airway  10/21/2021 1546 by Darby Hampton RN  Outcome: Ongoing     Problem: Gas Exchange - Impaired  Goal: Absence of hypoxia  10/21/2021 1546 by Darby Hampton RN  Outcome: Ongoing     Problem: Breathing Pattern - Ineffective  Goal: Ability to achieve and maintain a regular respiratory rate  10/21/2021 1546 by Darby Hampton RN  Outcome: Ongoing

## 2021-10-22 NOTE — PROGRESS NOTES
Physical Therapy    Facility/Department: [address] MED SURG/TELE  Initial Assessment    NAME: Ricki Salomon  : 1955  MRN: [id number]    Date of Service: 10/22/2021      Attending Provider:  Anisa Michel DO    Evaluating PT:  Sabas Reed P.T. Room #:  [address] [place] [address]  Diagnosis:  Hypokalemia [E87.6]  Acute respiratory failure with hypoxia ([facility]) [J96.01]  COVID-19 [U07.1]  Acute respiratory failure due to COVID-19 ([address] [U07.1, J96.00]  Pertinent PMHx/PSHx:  COVID 19+ since 21  Precautions:  No longer in Droplet isolation even though he continues to test positive for COVID 19.  Pt's O2 sat drops with minimal activity. Falls    SUBJECTIVE:    Pt lives alone in a 2 story home with 4 stairs and 1 rail to enter. There are 12 steps and 1 rail to 2nd floor bed and bath. Pt ambulated with no PTA. OBJECTIVE:   Initial Evaluation  Date: 10/22/21 Treatment Short Term/ Long Term   Goals   Was pt agreeable to Eval/treatment? yes     Does pt have pain? C/o intermittent RLE pain     Bed Mobility  Rolling: SBA  Supine to sit: MIN A  Sit to supine: SBA  Scooting: SBA  Independent    Transfers Sit to stand: NA  Stand to sit: NA  Stand pivot: NA  Independent    Ambulation   NA  200 feet with AAD if needed Independent    Stair negotiation: ascended and descended NA  10 steps with 1 rail Independent   AM-PAC 6 Clicks 61/94       BLE ROM is WFL. BLE strength is grossly 4/5. Sensation:  Pt denies numbness and tingling to extremities  Edema:  None noted  Balance: sitting is SBA  Endurance: poor    Vitals: Pt was on optiflow machine 55% FiO2 throughout PT session and initially was on a NRB mask as well. Pt's O2 sat Lying in bed was 90%. He sat on EOB with optiflow only and O2 sat dropped to 78-79% and remained there for 5 min while he sat EOB. He had no c/o light headedness, but O2 sat did not increase. He was given NRB mask again and while sitting EOB for 2 min O2 sat still remained 79%.  He laid back in bed with HOB about 30° and O2 sat over 3 min increased to 90%. Patient education  Pt educated on breathing technique  Patient response to education:   Pt verbalized understanding Pt demonstrated skill Pt requires further education in this area   yes Yes with VCs yes     ASSESSMENT:    Conditions Requiring Skilled Therapeutic Intervention:    [x]Decreased strength     []Decreased ROM  [x]Decreased functional mobility  []Decreased balance   [x]Decreased endurance   []Decreased posture  []Decreased sensation  []Decreased coordination   []Decreased vision  []Decreased safety awareness   []Increased pain     Comments:  Pt has had COVID since September 2021 and still having impaired respiratory function. His O2 sat dropped with light activity and did not return to 90% until he laid back in bed. Pt's main limiting factor at this time is his impaired respiratory function and low O2 sat that limited further mobility at this time. Pt was left in bed with optiflow and NRB mask in place and call light left by patient    Pt's/ family goals   1. To breath better and go home. Patient and or family understand(s) diagnosis, prognosis, and plan of care. PHYSICAL THERAPY PLAN OF CARE:    PT POC is established based on physician order and patient diagnosis     Referring provider/PT Order:  PT eval and treat  Diagnosis:  Hypokalemia [E87.6]  Acute respiratory failure with hypoxia ([address] [J96.01]  COVID-19 [U07.1]  Acute respiratory failure due to COVID-19 ([address] [U07.1, J96.00]  Specific instructions for next treatment:  Increase functional mobility as his respiratory function allows.       Current Treatment Recommendations:     [x] Strengthening to improve independence with functional mobility   [] ROM to improve ROM and decrease spasm and pain which will help promote independence with functional mobility   [x] Balance Training to improve static/dynamic balance and to reduce fall risk  [x] Endurance Training to improve activity tolerance during functional mobility   [x] Transfer Training to improve safety and independence with all functional transfers   [x] Gait Training to improve gait mechanics, endurance and assess need for appropriate assistive device  [x] Stair Training in preparation for safe discharge home and/or into the community   [] Positioning to prevent skin breakdown and contractures  [] Safety and Education Training   [x] Patient/Caregiver Education   [] HEP  [] Other     PT long term treatment goals are located in above grid    Frequency of treatments: 2-5x/week x 1-2 weeks. Time in  11:25  Time out  11:45    Evaluation Time includes thorough review of current medical information, gathering information on past medical history/social history and prior level of function, completion of standardized testing/informal observation of tasks, assessment of data and education on plan of care and goals. CPT codes:  [x] Low Complexity PT evaluation 98400  [] Moderate Complexity PT evaluation 00606  [] High Complexity PT evaluation 13801  [] PT Re-evaluation 20486  [] Gait training 29974 ** minutes  [] Manual therapy 75508 ** minutes  [] Therapeutic activities 71353 ** minutes  [] Therapeutic exercises 97158 ** minutes  [] Neuromuscular reeducation 61994 ** minutes     Ladarius Cerrato., P.T.   License Number: [id number]

## 2021-10-23 LAB
ALBUMIN SERPL-MCNC: 2.7 G/DL (ref 3.5–5.2)
ALP BLD-CCNC: 82 U/L (ref 40–129)
ALT SERPL-CCNC: 97 U/L (ref 0–40)
ANION GAP SERPL CALCULATED.3IONS-SCNC: 8 MMOL/L (ref 7–16)
AST SERPL-CCNC: 23 U/L (ref 0–39)
BILIRUB SERPL-MCNC: 0.3 MG/DL (ref 0–1.2)
BUN BLDV-MCNC: 13 MG/DL (ref 6–23)
CALCIUM SERPL-MCNC: 8.1 MG/DL (ref 8.6–10.2)
CHLORIDE BLD-SCNC: 94 MMOL/L (ref 98–107)
CO2: 30 MMOL/L (ref 22–29)
CREAT SERPL-MCNC: 0.4 MG/DL (ref 0.7–1.2)
FERRITIN: 1010 NG/ML
GFR AFRICAN AMERICAN: >60
GFR NON-AFRICAN AMERICAN: >60 ML/MIN/1.73
GLUCOSE BLD-MCNC: 173 MG/DL (ref 74–99)
HCT VFR BLD CALC: 31.5 % (ref 37–54)
HEMOGLOBIN: 10.2 G/DL (ref 12.5–16.5)
MCH RBC QN AUTO: 27.3 PG (ref 26–35)
MCHC RBC AUTO-ENTMCNC: 32.4 % (ref 32–34.5)
MCV RBC AUTO: 84.2 FL (ref 80–99.9)
METER GLUCOSE: 120 MG/DL (ref 74–99)
METER GLUCOSE: 154 MG/DL (ref 74–99)
METER GLUCOSE: 219 MG/DL (ref 74–99)
METER GLUCOSE: 281 MG/DL (ref 74–99)
PDW BLD-RTO: 14.6 FL (ref 11.5–15)
PLATELET # BLD: 219 E9/L (ref 130–450)
PMV BLD AUTO: 9.6 FL (ref 7–12)
POTASSIUM SERPL-SCNC: 4.1 MMOL/L (ref 3.5–5)
RBC # BLD: 3.74 E12/L (ref 3.8–5.8)
SODIUM BLD-SCNC: 132 MMOL/L (ref 132–146)
TOTAL PROTEIN: 5.5 G/DL (ref 6.4–8.3)
WBC # BLD: 5 E9/L (ref 4.5–11.5)

## 2021-10-23 PROCEDURE — 94660 CPAP INITIATION&MGMT: CPT

## 2021-10-23 PROCEDURE — 6370000000 HC RX 637 (ALT 250 FOR IP): Performed by: INTERNAL MEDICINE

## 2021-10-23 PROCEDURE — 1200000000 HC SEMI PRIVATE

## 2021-10-23 PROCEDURE — 82728 ASSAY OF FERRITIN: CPT

## 2021-10-23 PROCEDURE — 2700000000 HC OXYGEN THERAPY PER DAY

## 2021-10-23 PROCEDURE — 85027 COMPLETE CBC AUTOMATED: CPT

## 2021-10-23 PROCEDURE — 82962 GLUCOSE BLOOD TEST: CPT

## 2021-10-23 PROCEDURE — 6360000002 HC RX W HCPCS: Performed by: INTERNAL MEDICINE

## 2021-10-23 PROCEDURE — 80053 COMPREHEN METABOLIC PANEL: CPT

## 2021-10-23 PROCEDURE — 6370000000 HC RX 637 (ALT 250 FOR IP): Performed by: NURSE PRACTITIONER

## 2021-10-23 PROCEDURE — APPSS30 APP SPLIT SHARED TIME 16-30 MINUTES: Performed by: NURSE PRACTITIONER

## 2021-10-23 PROCEDURE — 6360000002 HC RX W HCPCS: Performed by: NURSE PRACTITIONER

## 2021-10-23 PROCEDURE — 2580000003 HC RX 258: Performed by: INTERNAL MEDICINE

## 2021-10-23 PROCEDURE — 99233 SBSQ HOSP IP/OBS HIGH 50: CPT | Performed by: INTERNAL MEDICINE

## 2021-10-23 PROCEDURE — 36415 COLL VENOUS BLD VENIPUNCTURE: CPT

## 2021-10-23 RX ADMIN — INSULIN LISPRO 2 UNITS: 100 INJECTION, SOLUTION INTRAVENOUS; SUBCUTANEOUS at 17:26

## 2021-10-23 RX ADMIN — ALPRAZOLAM 0.25 MG: 0.25 TABLET ORAL at 09:26

## 2021-10-23 RX ADMIN — BUDESONIDE AND FORMOTEROL FUMARATE DIHYDRATE 2 PUFF: 160; 4.5 AEROSOL RESPIRATORY (INHALATION) at 20:34

## 2021-10-23 RX ADMIN — ALBUTEROL SULFATE 2 PUFF: 90 AEROSOL, METERED RESPIRATORY (INHALATION) at 16:00

## 2021-10-23 RX ADMIN — ALBUTEROL SULFATE 2 PUFF: 90 AEROSOL, METERED RESPIRATORY (INHALATION) at 11:56

## 2021-10-23 RX ADMIN — MONTELUKAST SODIUM 10 MG: 10 TABLET ORAL at 20:36

## 2021-10-23 RX ADMIN — INSULIN LISPRO 1 UNITS: 100 INJECTION, SOLUTION INTRAVENOUS; SUBCUTANEOUS at 11:56

## 2021-10-23 RX ADMIN — Medication 10 ML: at 09:19

## 2021-10-23 RX ADMIN — DEXAMETHASONE SODIUM PHOSPHATE 10 MG: 4 INJECTION, SOLUTION INTRA-ARTICULAR; INTRALESIONAL; INTRAMUSCULAR; INTRAVENOUS; SOFT TISSUE at 09:27

## 2021-10-23 RX ADMIN — ENOXAPARIN SODIUM 80 MG: 100 INJECTION SUBCUTANEOUS at 09:29

## 2021-10-23 RX ADMIN — ALPRAZOLAM 0.25 MG: 0.25 TABLET ORAL at 20:36

## 2021-10-23 RX ADMIN — BUDESONIDE AND FORMOTEROL FUMARATE DIHYDRATE 2 PUFF: 160; 4.5 AEROSOL RESPIRATORY (INHALATION) at 09:18

## 2021-10-23 RX ADMIN — ALPRAZOLAM 0.25 MG: 0.25 TABLET ORAL at 05:30

## 2021-10-23 RX ADMIN — ALBUTEROL SULFATE 2 PUFF: 90 AEROSOL, METERED RESPIRATORY (INHALATION) at 09:19

## 2021-10-23 RX ADMIN — PAROXETINE 30 MG: 10 TABLET, FILM COATED ORAL at 20:35

## 2021-10-23 RX ADMIN — ALBUTEROL SULFATE 2 PUFF: 90 AEROSOL, METERED RESPIRATORY (INHALATION) at 20:34

## 2021-10-23 RX ADMIN — ZINC SULFATE 220 MG (50 MG) CAPSULE 50 MG: CAPSULE at 09:27

## 2021-10-23 RX ADMIN — AMLODIPINE BESYLATE 5 MG: 5 TABLET ORAL at 09:28

## 2021-10-23 RX ADMIN — INSULIN GLARGINE 5 UNITS: 100 INJECTION, SOLUTION SUBCUTANEOUS at 20:40

## 2021-10-23 RX ADMIN — ENOXAPARIN SODIUM 80 MG: 100 INJECTION SUBCUTANEOUS at 20:35

## 2021-10-23 RX ADMIN — OXYCODONE HYDROCHLORIDE AND ACETAMINOPHEN 500 MG: 500 TABLET ORAL at 09:28

## 2021-10-23 RX ADMIN — INSULIN LISPRO 1 UNITS: 100 INJECTION, SOLUTION INTRAVENOUS; SUBCUTANEOUS at 20:40

## 2021-10-23 RX ADMIN — ALPRAZOLAM 0.25 MG: 0.25 TABLET ORAL at 17:04

## 2021-10-23 RX ADMIN — Medication 10 ML: at 20:35

## 2021-10-23 RX ADMIN — Medication 2000 UNITS: at 09:28

## 2021-10-23 RX ADMIN — TIOTROPIUM BROMIDE INHALATION SPRAY 2 PUFF: 3.12 SPRAY, METERED RESPIRATORY (INHALATION) at 09:20

## 2021-10-23 RX ADMIN — PAROXETINE 30 MG: 10 TABLET, FILM COATED ORAL at 09:27

## 2021-10-23 RX ADMIN — ALPRAZOLAM 0.25 MG: 0.25 TABLET ORAL at 13:56

## 2021-10-23 ASSESSMENT — PAIN SCALES - GENERAL
PAINLEVEL_OUTOF10: 0
PAINLEVEL_OUTOF10: 0

## 2021-10-23 NOTE — PROGRESS NOTES
[facility]  [address] [facility], [address], [address] [place]       Pulmonary Progress Note      SUBJECTIVE:  Lying down in bed on both Airvo 55 li 63% and NRFM saturating about 94%. Claims similar with respiratory function, minimal cough/sputum production.     OBJECTIVE    Medications    Continuous Infusions:   sodium chloride      dextrose         Scheduled Meds:   montelukast  10 mg Oral Nightly    dexamethasone  10 mg IntraVENous Q24H    insulin glargine  5 Units SubCUTAneous Nightly    ascorbic acid  500 mg Oral Daily    zinc sulfate  50 mg Oral Daily    vitamin D  2,000 Units Oral Daily    tiotropium  2 puff Inhalation Daily    budesonide-formoterol  2 puff Inhalation BID    ALPRAZolam  0.25 mg Oral Q4H While awake    guaiFENesin-codeine  10 mL Oral Q4H While awake    enoxaparin  1 mg/kg SubCUTAneous BID    albuterol sulfate HFA  2 puff Inhalation 4x daily    sodium chloride flush  5-40 mL IntraVENous 2 times per day    PARoxetine  30 mg Oral BID    amLODIPine  5 mg Oral Daily    insulin lispro  0-6 Units SubCUTAneous TID WC    insulin lispro  0-3 Units SubCUTAneous Nightly       PRN Meds:aluminum & magnesium hydroxide-simethicone, melatonin, sodium chloride flush, sodium chloride, ondansetron **OR** ondansetron, polyethylene glycol, acetaminophen **OR** acetaminophen, glucose, dextrose, glucagon (rDNA), dextrose    Physical    VITALS:  /74   Pulse 69   Temp 97.3 °F (36.3 °C) (Axillary)   Resp 22   Ht 5' 7\" (1.702 m)   Wt 184 lb 11.9 oz (83.8 kg)   SpO2 92%   BMI 28.94 kg/m²     24HR INTAKE/OUTPUT:      Intake/Output Summary (Last 24 hours) at 10/23/2021 1057  Last data filed at 10/22/2021 2300  Gross per 24 hour   Intake 240 ml   Output 950 ml   Net -710 ml       24HR PULSE OXIMETRY RANGE:    SpO2  Av.4 %  Min: 90 %  Max: 95 %    General appearance: alert, appears stated age and cooperative  Lungs: rhonchi bibasilar  Heart: regular rate and rhythm, S1, S2 normal, no murmur, click, rub or gallop  Abdomen: soft, non-tender; bowel sounds normal; no masses,  no organomegaly  Extremities: extremities normal, atraumatic, no cyanosis or edema  Neurologic: Mental status: Alert, oriented, thought content appropriate    Data    CBC:   Recent Labs     10/21/21  0333 10/22/21  0657 10/23/21  0200   WBC 5.9 4.8 5.0   HGB 10.2* 9.7* 10.2*   HCT 31.5* 30.4* 31.5*   MCV 84.0 85.4 84.2    185 219       BMP:  Recent Labs     10/21/21  0333 10/22/21  0657 10/23/21  0200    134 132   K 4.0 4.1 4.1   CL 96* 97* 94*   CO2 29 30* 30*   BUN 15 15 13   CREATININE 0.5* 0.4* 0.4*    ALB:3,BILIDIR:3,BILITOT:3,ALKPHOS:3)@    PT/INR: No results for input(s): PROTIME, INR in the last 72 hours. ABG:   No results for input(s): PH, PO2, PCO2, HCO3, BE, O2SAT, METHB, O2HB, COHB, O2CON, HHB, THB in the last 72 hours. FiO2 : 55 %       Radiology/Other tests reviewed: none    Assessment:     Active Problems:    COVID-19    Acute respiratory failure due to COVID-19 [facility])    Acute respiratory failure with hypoxia (HCC)    Pneumonia due to COVID-19 virus    Elevated LFTs    Hypokalemia    Essential hypertension    Non-insulin dependent type 2 diabetes mellitus ([facility]  Resolved Problems:    * No resolved hospital problems. *      Plan:       1. Cont with oxygen, taper as tolerated, observe frequency of NRFM use  2. Cont with steroids, taper as tolerated, decreased a few days ago, will decrease again next week if remains stable  3. singulair daily and see if helps with inflammation  4. Cont with MDI  5. Encourage incentive spirometer use and prone positioning when able to  6. OOB to chair  7. To LTAC when bed available      Time at the bedside, reviewing labs and radiographs, reviewing notes and consultations, discussing with staff and family was more than 35 minutes. Thanks for letting us see this patient in consultation.   Please contact us with any questions. Office [phone] or after hours through [place], x [phone].

## 2021-10-23 NOTE — PROGRESS NOTES
[facility] Hospitalist   Progress Note    Admitting Date and Time: 9/22/2021  9:32 PM  Admit Dx: Hypokalemia [E87.6]  Acute respiratory failure with hypoxia ([address] [J96.01]  COVID-19 [U07.1]  Acute respiratory failure due to COVID-19 (HCC) [U07.1, J96.00]    Subjective:    10/22 Pt sitting up in bed in no acute distress. Has been able to wean FiO2 55% 55L Airvo. Encouraged to use IS. States he has been eating better. Pre-cert started for [facility]    10/23 Pt sitting up in bed in no acute distress. Remains on AirVo 55L 55%. States he feels well today. Has difficulty getting up to [facility]. States it takes a while for him to recover. Continues to eat well. Will need new IS at bedside. Awaiting [facility] benefit for DC planning. RN: No new concerns noted. ROS: denies fever, chills, cp, n/v, HA unless stated above.      montelukast  10 mg Oral Nightly    dexamethasone  10 mg IntraVENous Q24H    insulin glargine  5 Units SubCUTAneous Nightly    ascorbic acid  500 mg Oral Daily    zinc sulfate  50 mg Oral Daily    vitamin D  2,000 Units Oral Daily    tiotropium  2 puff Inhalation Daily    budesonide-formoterol  2 puff Inhalation BID    ALPRAZolam  0.25 mg Oral Q4H While awake    guaiFENesin-codeine  10 mL Oral Q4H While awake    enoxaparin  1 mg/kg SubCUTAneous BID    albuterol sulfate HFA  2 puff Inhalation 4x daily    sodium chloride flush  5-40 mL IntraVENous 2 times per day    PARoxetine  30 mg Oral BID    amLODIPine  5 mg Oral Daily    insulin lispro  0-6 Units SubCUTAneous TID WC    insulin lispro  0-3 Units SubCUTAneous Nightly     aluminum & magnesium hydroxide-simethicone, 15 mL, Q6H PRN  melatonin, 4.5 mg, Nightly PRN  sodium chloride flush, 5-40 mL, PRN  sodium chloride, 25 mL, PRN  ondansetron, 4 mg, Q8H PRN   Or  ondansetron, 4 mg, Q6H PRN  polyethylene glycol, 17 g, Daily PRN  acetaminophen, 650 mg, Q6H PRN   Or  acetaminophen, 650 mg, Q6H PRN  glucose, 15 g, PRN  dextrose, 12.5 disease. XR CHEST PORTABLE   Final Result   1. No interval change in the extensive multifocal bilateral airspace disease. XR CHEST PORTABLE   Final Result   No interval change         XR CHEST PORTABLE   Final Result   1. Significant worsening of the multifocal bilateral pneumonia when compared   with the prior CT scan of 09/22/2021         CTA PULMONARY W CONTRAST   Final Result   Within described exam limitations, no evidence of pulmonary embolism. Scattered multifocal low density infiltrates throughout both lungs, most   suggestive of COVID pneumonia in context of current pandemic. At least moderate diffuse hepatic steatosis. Cholecystectomy. RECOMMENDATIONS:   Multiple pulmonary nodules. Most severe: 4 mm left solid pulmonary nodule   within the upper lobe. A non-contrast Chest CT at 12 months is optional. If   performed and the nodule is stable at 12 months, no further follow-up is   recommended. These guidelines do not apply to immunocompromised patients and patients with   cancer. Follow up in patients with significant comorbidities as clinically   warranted. For lung cancer screening, adhere to Lung-RADS guidelines. Reference: Radiology. 2017; 284(1):228-43. XR CHEST PORTABLE   Final Result   There are bilateral multifocal ground-glass areas of consolidation with the   lungs concerning for in infectious or inflammatory process and developing   ARDS or viral pneumonia could give this appearance. Assessment:  Active Problems:    COVID-19    Acute respiratory failure due to COVID-19 [facility])    Acute respiratory failure with hypoxia (HCC)    Pneumonia due to COVID-19 virus    Elevated LFTs    Hypokalemia    Essential hypertension    Non-insulin dependent type 2 diabetes mellitus ([facility]  Resolved Problems:    * No resolved hospital problems. *      Plan:  1.  Acute Respiratory Failure with Hypoxia- 2/2 COVID-19 CXR significant worsening multifocal information unless otherwise noted. I have also reviewed and agree with the past medical, family, and social history unless otherwise noted. Patient was admitted with respiratory distress 2/2 covid    PHYSICAL EXAM:  Vitals:  /74   Pulse 71   Temp 97.8 °F (36.6 °C) (Oral)   Resp 22   Ht 5' 7\" (1.702 m)   Wt 184 lb 11.9 oz (83.8 kg)   SpO2 92%   BMI 28.94 kg/m²   Gen: awake, alert, NAD  Lungs: clear to auscultation bilaterally no crackles no wheezing. Heart: RRR, no murmur   Abdomen: soft nontender nondistended positive bowel sounds. Extremities: full range of motion no peripheral edema. Impression:  Active Problems:    COVID-19    Acute respiratory failure due to COVID-19 [facility])    Acute respiratory failure with hypoxia (HCC)    Pneumonia due to COVID-19 virus    Elevated LFTs    Hypokalemia    Essential hypertension    Non-insulin dependent type 2 diabetes mellitus ([facility]  Resolved Problems:    * No resolved hospital problems. *      My findings/plan include:    Patient is being treated for covid. She has received decadron and baricitinib. Patient is on 55 L 55%. NOTE: This report was transcribed using voice recognition software. Every effort was made to ensure accuracy; however, inadvertent computerized transcription errors may be present.   Electronically signed by Camila Kendall DO on 10/23/2021 at 4:38 PM

## 2021-10-23 NOTE — PLAN OF CARE
Problem: Airway Clearance - Ineffective  Goal: Achieve or maintain patent airway  10/23/2021 0532 by Fred Langford RN  Outcome: Met This Shift  10/22/2021 1947 by Johanna Solis RN  Outcome: Met This Shift     Problem: Gas Exchange - Impaired  Goal: Absence of hypoxia  10/23/2021 0532 by Fred Langford RN  Outcome: Met This Shift  10/22/2021 1947 by Johanna Solis RN  Outcome: Ongoing  Goal: Promote optimal lung function  10/23/2021 0532 by Fred Langford RN  Outcome: Met This Shift  10/22/2021 1947 by Johanna Solis RN  Outcome: Ongoing     Problem: Breathing Pattern - Ineffective  Goal: Ability to achieve and maintain a regular respiratory rate  10/23/2021 0532 by Fred Langford RN  Outcome: Met This Shift  10/22/2021 1947 by Johanna Solis RN  Outcome: Ongoing     Problem:  Body Temperature -  Risk of, Imbalanced  Goal: Ability to maintain a body temperature within defined limits  Outcome: Met This Shift  Goal: Will regain or maintain usual level of consciousness  Outcome: Met This Shift  Goal: Complications related to the disease process, condition or treatment will be avoided or minimized  Outcome: Met This Shift     Problem: Isolation Precautions - Risk of Spread of Infection  Goal: Prevent transmission of infection  Outcome: Met This Shift     Problem: Nutrition Deficits  Goal: Optimize nutritional status  Outcome: Met This Shift     Problem: Risk for Fluid Volume Deficit  Goal: Maintain normal heart rhythm  Outcome: Met This Shift  Goal: Maintain absence of muscle cramping  Outcome: Met This Shift  Goal: Maintain normal serum potassium, sodium, calcium, phosphorus, and pH  Outcome: Met This Shift     Problem: Loneliness or Risk for Loneliness  Goal: Demonstrate positive use of time alone when socialization is not possible  Outcome: Met This Shift     Problem: Fatigue  Goal: Verbalize increase energy and improved vitality  Outcome: Met This Shift     Problem: Patient Education: Go to Patient Education Activity  Goal: Patient/Family Education  Outcome: Met This Shift     Problem: Pain:  Goal: Pain level will decrease  Description: Pain level will decrease  Outcome: Met This Shift  Goal: Control of acute pain  Description: Control of acute pain  Outcome: Met This Shift  Goal: Control of chronic pain  Description: Control of chronic pain  Outcome: Met This Shift

## 2021-10-24 LAB
ALBUMIN SERPL-MCNC: 2.9 G/DL (ref 3.5–5.2)
ALP BLD-CCNC: 82 U/L (ref 40–129)
ALT SERPL-CCNC: 102 U/L (ref 0–40)
ANION GAP SERPL CALCULATED.3IONS-SCNC: 6 MMOL/L (ref 7–16)
AST SERPL-CCNC: 25 U/L (ref 0–39)
BILIRUB SERPL-MCNC: 0.2 MG/DL (ref 0–1.2)
BUN BLDV-MCNC: 12 MG/DL (ref 6–23)
CALCIUM SERPL-MCNC: 8.3 MG/DL (ref 8.6–10.2)
CHLORIDE BLD-SCNC: 96 MMOL/L (ref 98–107)
CO2: 33 MMOL/L (ref 22–29)
CREAT SERPL-MCNC: 0.5 MG/DL (ref 0.7–1.2)
FERRITIN: 1028 NG/ML
GFR AFRICAN AMERICAN: >60
GFR NON-AFRICAN AMERICAN: >60 ML/MIN/1.73
GLUCOSE BLD-MCNC: 108 MG/DL (ref 74–99)
HCT VFR BLD CALC: 32.2 % (ref 37–54)
HEMOGLOBIN: 10.6 G/DL (ref 12.5–16.5)
MCH RBC QN AUTO: 27.3 PG (ref 26–35)
MCHC RBC AUTO-ENTMCNC: 32.9 % (ref 32–34.5)
MCV RBC AUTO: 83 FL (ref 80–99.9)
METER GLUCOSE: 110 MG/DL (ref 74–99)
METER GLUCOSE: 156 MG/DL (ref 74–99)
METER GLUCOSE: 163 MG/DL (ref 74–99)
METER GLUCOSE: 216 MG/DL (ref 74–99)
PDW BLD-RTO: 14.9 FL (ref 11.5–15)
PLATELET # BLD: 224 E9/L (ref 130–450)
PMV BLD AUTO: 9.9 FL (ref 7–12)
POTASSIUM SERPL-SCNC: 3.7 MMOL/L (ref 3.5–5)
RBC # BLD: 3.88 E12/L (ref 3.8–5.8)
SODIUM BLD-SCNC: 135 MMOL/L (ref 132–146)
TOTAL PROTEIN: 5.7 G/DL (ref 6.4–8.3)
WBC # BLD: 6.2 E9/L (ref 4.5–11.5)

## 2021-10-24 PROCEDURE — 82728 ASSAY OF FERRITIN: CPT

## 2021-10-24 PROCEDURE — 6370000000 HC RX 637 (ALT 250 FOR IP): Performed by: INTERNAL MEDICINE

## 2021-10-24 PROCEDURE — 99233 SBSQ HOSP IP/OBS HIGH 50: CPT | Performed by: INTERNAL MEDICINE

## 2021-10-24 PROCEDURE — 2700000000 HC OXYGEN THERAPY PER DAY

## 2021-10-24 PROCEDURE — 94660 CPAP INITIATION&MGMT: CPT

## 2021-10-24 PROCEDURE — 80053 COMPREHEN METABOLIC PANEL: CPT

## 2021-10-24 PROCEDURE — 6360000002 HC RX W HCPCS: Performed by: NURSE PRACTITIONER

## 2021-10-24 PROCEDURE — 94761 N-INVAS EAR/PLS OXIMETRY MLT: CPT

## 2021-10-24 PROCEDURE — 85027 COMPLETE CBC AUTOMATED: CPT

## 2021-10-24 PROCEDURE — 6370000000 HC RX 637 (ALT 250 FOR IP): Performed by: NURSE PRACTITIONER

## 2021-10-24 PROCEDURE — 1200000000 HC SEMI PRIVATE

## 2021-10-24 PROCEDURE — APPSS30 APP SPLIT SHARED TIME 16-30 MINUTES: Performed by: NURSE PRACTITIONER

## 2021-10-24 PROCEDURE — 82962 GLUCOSE BLOOD TEST: CPT

## 2021-10-24 PROCEDURE — 6360000002 HC RX W HCPCS: Performed by: INTERNAL MEDICINE

## 2021-10-24 PROCEDURE — 36415 COLL VENOUS BLD VENIPUNCTURE: CPT

## 2021-10-24 PROCEDURE — 2580000003 HC RX 258: Performed by: INTERNAL MEDICINE

## 2021-10-24 RX ADMIN — BUDESONIDE AND FORMOTEROL FUMARATE DIHYDRATE 2 PUFF: 160; 4.5 AEROSOL RESPIRATORY (INHALATION) at 08:51

## 2021-10-24 RX ADMIN — MONTELUKAST SODIUM 10 MG: 10 TABLET ORAL at 22:01

## 2021-10-24 RX ADMIN — Medication 2000 UNITS: at 08:54

## 2021-10-24 RX ADMIN — OXYCODONE HYDROCHLORIDE AND ACETAMINOPHEN 500 MG: 500 TABLET ORAL at 08:54

## 2021-10-24 RX ADMIN — ENOXAPARIN SODIUM 80 MG: 100 INJECTION SUBCUTANEOUS at 08:51

## 2021-10-24 RX ADMIN — Medication 10 ML: at 22:00

## 2021-10-24 RX ADMIN — INSULIN LISPRO 2 UNITS: 100 INJECTION, SOLUTION INTRAVENOUS; SUBCUTANEOUS at 16:53

## 2021-10-24 RX ADMIN — AMLODIPINE BESYLATE 5 MG: 5 TABLET ORAL at 08:51

## 2021-10-24 RX ADMIN — DEXAMETHASONE SODIUM PHOSPHATE 10 MG: 4 INJECTION, SOLUTION INTRA-ARTICULAR; INTRALESIONAL; INTRAMUSCULAR; INTRAVENOUS; SOFT TISSUE at 08:54

## 2021-10-24 RX ADMIN — BUDESONIDE AND FORMOTEROL FUMARATE DIHYDRATE 2 PUFF: 160; 4.5 AEROSOL RESPIRATORY (INHALATION) at 21:59

## 2021-10-24 RX ADMIN — PAROXETINE 30 MG: 10 TABLET, FILM COATED ORAL at 22:00

## 2021-10-24 RX ADMIN — ALPRAZOLAM 0.25 MG: 0.25 TABLET ORAL at 18:11

## 2021-10-24 RX ADMIN — ALBUTEROL SULFATE 2 PUFF: 90 AEROSOL, METERED RESPIRATORY (INHALATION) at 16:49

## 2021-10-24 RX ADMIN — ALBUTEROL SULFATE 2 PUFF: 90 AEROSOL, METERED RESPIRATORY (INHALATION) at 21:59

## 2021-10-24 RX ADMIN — ACETAMINOPHEN 650 MG: 325 TABLET ORAL at 22:12

## 2021-10-24 RX ADMIN — INSULIN GLARGINE 5 UNITS: 100 INJECTION, SOLUTION SUBCUTANEOUS at 22:01

## 2021-10-24 RX ADMIN — ALPRAZOLAM 0.25 MG: 0.25 TABLET ORAL at 05:31

## 2021-10-24 RX ADMIN — TIOTROPIUM BROMIDE INHALATION SPRAY 2 PUFF: 3.12 SPRAY, METERED RESPIRATORY (INHALATION) at 08:50

## 2021-10-24 RX ADMIN — PAROXETINE 30 MG: 10 TABLET, FILM COATED ORAL at 08:53

## 2021-10-24 RX ADMIN — ALBUTEROL SULFATE 2 PUFF: 90 AEROSOL, METERED RESPIRATORY (INHALATION) at 08:51

## 2021-10-24 RX ADMIN — ZINC SULFATE 220 MG (50 MG) CAPSULE 50 MG: CAPSULE at 08:53

## 2021-10-24 RX ADMIN — INSULIN LISPRO 1 UNITS: 100 INJECTION, SOLUTION INTRAVENOUS; SUBCUTANEOUS at 22:03

## 2021-10-24 RX ADMIN — ALPRAZOLAM 0.25 MG: 0.25 TABLET ORAL at 14:16

## 2021-10-24 RX ADMIN — INSULIN LISPRO 1 UNITS: 100 INJECTION, SOLUTION INTRAVENOUS; SUBCUTANEOUS at 11:58

## 2021-10-24 RX ADMIN — ALBUTEROL SULFATE 2 PUFF: 90 AEROSOL, METERED RESPIRATORY (INHALATION) at 11:55

## 2021-10-24 RX ADMIN — ALPRAZOLAM 0.25 MG: 0.25 TABLET ORAL at 22:01

## 2021-10-24 RX ADMIN — ALPRAZOLAM 0.25 MG: 0.25 TABLET ORAL at 09:01

## 2021-10-24 RX ADMIN — ENOXAPARIN SODIUM 80 MG: 100 INJECTION SUBCUTANEOUS at 22:00

## 2021-10-24 RX ADMIN — Medication 10 ML: at 08:54

## 2021-10-24 ASSESSMENT — PAIN SCALES - GENERAL
PAINLEVEL_OUTOF10: 0
PAINLEVEL_OUTOF10: 5
PAINLEVEL_OUTOF10: 0

## 2021-10-24 ASSESSMENT — PAIN DESCRIPTION - PROGRESSION: CLINICAL_PROGRESSION: NOT CHANGED

## 2021-10-24 NOTE — PROGRESS NOTES
[address] [facility] Hospitalist   Progress Note    Admitting Date and Time: 9/22/2021  9:32 PM  Admit Dx: Hypokalemia [E87.6]  Acute respiratory failure with hypoxia ([address] [J96.01]  COVID-19 [U07.1]  Acute respiratory failure due to COVID-19 ([facility]) [U07.1, J96.00]    Subjective:    10/22 Pt sitting up in bed in no acute distress. Has been able to wean FiO2 55% 55L Airvo. Encouraged to use IS. States he has been eating better. Pre-cert started for [facility]    10/23 Pt sitting up in bed in no acute distress. Remains on AirVo 55L 55%. States he feels well today. Has difficulty getting up to [facility]. States it takes a while for him to recover. Continues to eat well. Will need new IS at bedside. Awaiting [facility] benefit for DC planning. 10/24  Pt lying on left side eyes closed. Awakens easily to name. States he's tired this morning. Currently on 52% 50L. Tolerating well. RN: Pt working with PT noted with desaturation into the 70s when sitting up on side of bed. Did recover    ROS: denies fever, chills, cp, n/v, HA unless stated above.      montelukast  10 mg Oral Nightly    dexamethasone  10 mg IntraVENous Q24H    insulin glargine  5 Units SubCUTAneous Nightly    ascorbic acid  500 mg Oral Daily    zinc sulfate  50 mg Oral Daily    vitamin D  2,000 Units Oral Daily    tiotropium  2 puff Inhalation Daily    budesonide-formoterol  2 puff Inhalation BID    ALPRAZolam  0.25 mg Oral Q4H While awake    guaiFENesin-codeine  10 mL Oral Q4H While awake    enoxaparin  1 mg/kg SubCUTAneous BID    albuterol sulfate HFA  2 puff Inhalation 4x daily    sodium chloride flush  5-40 mL IntraVENous 2 times per day    PARoxetine  30 mg Oral BID    amLODIPine  5 mg Oral Daily    insulin lispro  0-6 Units SubCUTAneous TID WC    insulin lispro  0-3 Units SubCUTAneous Nightly     aluminum & magnesium hydroxide-simethicone, 15 mL, Q6H PRN  melatonin, 4.5 mg, Nightly PRN  sodium chloride flush, 5-40 mL, PRN  sodium chloride, 25 mL, PRN  ondansetron, 4 mg, Q8H PRN   Or  ondansetron, 4 mg, Q6H PRN  polyethylene glycol, 17 g, Daily PRN  acetaminophen, 650 mg, Q6H PRN   Or  acetaminophen, 650 mg, Q6H PRN  glucose, 15 g, PRN  dextrose, 12.5 g, PRN  glucagon (rDNA), 1 mg, PRN  dextrose, 100 mL/hr, PRN         Objective:    BP (!) 141/86   Pulse 75   Temp 98.4 °F (36.9 °C) (Oral)   Resp 20   Ht 5' 7\" (1.702 m)   Wt 184 lb 11.9 oz (83.8 kg)   SpO2 91% Comment: prior to pt placing NRB  BMI 28.94 kg/m²   General Appearance: alert and oriented to person, place and time and in respiratory  distress  Skin: warm and dry  Head: normocephalic and atraumatic  Eyes: pupils equal, round, and reactive to light, extraocular eye movements intact, conjunctivae normal  Neck: neck supple and non tender without mass   Pulmonary/Chest:Diminished/rhonchi to auscultation bilaterally- no wheezes, rales  normal air movement, acute respiratory distress Airvo 55% 55L  Cardiovascular: normal rate, normal S1 and S2 and no RGM  Abdomen: soft, non-tender, non-distended, normal bowel sounds  Extremities: no cyanosis, no clubbing and no edema  Neurologic: no cranial nerve deficit and speech normal      Recent Labs     10/22/21  0657 10/23/21  0200    132   K 4.1 4.1   CL 97* 94*   CO2 30* 30*   BUN 15 13   CREATININE 0.4* 0.4*   GLUCOSE 111* 173*   CALCIUM 8.3* 8.1*       Recent Labs     10/22/21  0657 10/23/21  0200   ALKPHOS 92 82   PROT 5.4* 5.5*   LABALBU 2.7* 2.7*   BILITOT 0.3 0.3   AST 26 23   ALT 91* 97*       Recent Labs     10/22/21  0657 10/23/21  0200   WBC 4.8 5.0   RBC 3.56* 3.74*   HGB 9.7* 10.2*   HCT 30.4* 31.5*   MCV 85.4 84.2   MCH 27.2 27.3   MCHC 31.9* 32.4   RDW 14.5 14.6    219   MPV 10.3 9.6           Radiology:   XR CHEST PORTABLE   Final Result   No significant change in the appearance of the extensive airspace opacities   throughout both lungs, in keeping with the known history of COVID pneumonia.          XR CHEST PORTABLE   Final Result   1. There is no interval change in extensive multifocal bilateral airspace   disease. XR CHEST PORTABLE   Final Result   1. No interval change in the extensive multifocal bilateral airspace disease. XR CHEST PORTABLE   Final Result   No interval change         XR CHEST PORTABLE   Final Result   1. Significant worsening of the multifocal bilateral pneumonia when compared   with the prior CT scan of 09/22/2021         CTA PULMONARY W CONTRAST   Final Result   Within described exam limitations, no evidence of pulmonary embolism. Scattered multifocal low density infiltrates throughout both lungs, most   suggestive of COVID pneumonia in context of current pandemic. At least moderate diffuse hepatic steatosis. Cholecystectomy. RECOMMENDATIONS:   Multiple pulmonary nodules. Most severe: 4 mm left solid pulmonary nodule   within the upper lobe. A non-contrast Chest CT at 12 months is optional. If   performed and the nodule is stable at 12 months, no further follow-up is   recommended. These guidelines do not apply to immunocompromised patients and patients with   cancer. Follow up in patients with significant comorbidities as clinically   warranted. For lung cancer screening, adhere to Lung-RADS guidelines. Reference: Radiology. 2017; 284(1):228-43. XR CHEST PORTABLE   Final Result   There are bilateral multifocal ground-glass areas of consolidation with the   lungs concerning for in infectious or inflammatory process and developing   ARDS or viral pneumonia could give this appearance. Assessment:  Active Problems:    COVID-19    Acute respiratory failure due to COVID-19 [facility])    Acute respiratory failure with hypoxia (HCC)    Pneumonia due to COVID-19 virus    Elevated LFTs    Hypokalemia    Essential hypertension    Non-insulin dependent type 2 diabetes mellitus ([facility]  Resolved Problems:    * No resolved hospital problems. *      Plan:  1. Acute Respiratory Failure with Hypoxia- 2/2 COVID-19 CXR significant worsening multifocal infiltrates 10/4 CXR/ 10/16 CXR no change in imaging. Continue dexamethasone taper per Pulmonology. Symbicort/Spiriva/Albuterol. Requiring Airvo 55% 55L Slowly improving. Continue to wean as tolerated. Maintain SPO2>92%. Currently requiring BiPap. DDimer 434 Encouraged IS/Proning. Singulair 10 mg daily added 10/21 ? assist with inflammation per Pulmonology. Pulmonology input appreciated. 2. COVID-19 Viral Pneumonia- COVID-19+The current medical regimen is effective;  continue present plan and medications. Completed Course Baricitinib/Dexamethasone Taper per Pulmonology/MDI. Vitamin Supplementation. Follow inflammatory markers. Lovenox 1mg/kg. Encourage IS/Pronating. DDImer 434  Isolation DC. Supportive care. Pulmonology following. 3. Anxiety- Continue Xanax Q4 W/A. 4. DM- Continue SSI/hypoglycemic protocol/Carb Control diet. Follow adjust as needed. 5. Anemia- Hgb 10.2. No overt signs of bleeding noted. Follow transfuse as needed. 6. HTN- Continue Amlodipine. 7. Poor intake- 2//2 Respiratory distress. Improving. Pt with increased intake and tolerating off BP. Follow Calorie intake. 8. Generalized weakness/deconditioning-PT/OT. Increase activity as tolerated. OOB to chair. 9. Disposition- Pt Oxygen needs decreased to 55% 55L. [facility] following. Pt with [facility] benefits. Discussed with Case management, information faxed to [place]. Await decision of placement from [place]. Referral made to [facility]. Casemanagement following. NOTE: This report was transcribed using voice recognition software. Every effort was made to ensure accuracy; however, inadvertent computerized transcription errors may be present. Electronically signed by Ines Koroma CNP on 10/24/2021 at 7:26 AM     Addendum: I have personally participated in the history, exam, medical decision making with Seda Perkins on the date of service and I agree with all of the pertinent clinical information unless otherwise noted. I have also reviewed and agree with the past medical, family, and social history unless otherwise noted. Patient was admitted with sob    PHYSICAL EXAM:  Vitals:  /62   Pulse 65   Temp 97.7 °F (36.5 °C) (Oral)   Resp 20   Ht 5' 7\" (1.702 m)   Wt 184 lb 11.9 oz (83.8 kg)   SpO2 91%   BMI 28.94 kg/m²   Gen: awake, alert, NAD  Lungs: clear to auscultation bilaterally no crackles no wheezing. Heart: RRR, no murmur   Abdomen: soft nontender nondistended positive bowel sounds. Extremities: full range of motion no peripheral edema. Impression:  Active Problems:    COVID-19    Acute respiratory failure due to COVID-19 [facility])    Acute respiratory failure with hypoxia (HCC)    Pneumonia due to COVID-19 virus    Elevated LFTs    Hypokalemia    Essential hypertension    Non-insulin dependent type 2 diabetes mellitus ([address]  Resolved Problems:    * No resolved hospital problems. *      My findings/plan include:      Patient being treated for covid and is on 50 L 55% Fi02    NOTE: This report was transcribed using voice recognition software. Every effort was made to ensure accuracy; however, inadvertent computerized transcription errors may be present.   Electronically signed by Negar Lam DO on 10/24/2021 at 4:22 PM

## 2021-10-24 NOTE — PLAN OF CARE
Problem: Airway Clearance - Ineffective  Goal: Achieve or maintain patent airway  10/24/2021 1015 by Marie Coronel RN  Outcome: Ongoing  10/24/2021 0534 by Laurita Desouza RN  Outcome: Met This Shift     Problem: Gas Exchange - Impaired  Goal: Absence of hypoxia  10/24/2021 1015 by Marie Coronel RN  Outcome: Ongoing  10/24/2021 0534 by Laurita Desouza RN  Outcome: Met This Shift     Problem: Gas Exchange - Impaired  Goal: Promote optimal lung function  10/24/2021 1015 by Marie Coronel RN  Outcome: Ongoing  10/24/2021 0534 by Laurita Desouza RN  Outcome: Met This Shift

## 2021-10-25 LAB
ALBUMIN SERPL-MCNC: 2.9 G/DL (ref 3.5–5.2)
ALP BLD-CCNC: 85 U/L (ref 40–129)
ALT SERPL-CCNC: 108 U/L (ref 0–40)
ANION GAP SERPL CALCULATED.3IONS-SCNC: 6 MMOL/L (ref 7–16)
AST SERPL-CCNC: 26 U/L (ref 0–39)
BILIRUB SERPL-MCNC: 0.3 MG/DL (ref 0–1.2)
BUN BLDV-MCNC: 12 MG/DL (ref 6–23)
CALCIUM SERPL-MCNC: 8.3 MG/DL (ref 8.6–10.2)
CHLORIDE BLD-SCNC: 95 MMOL/L (ref 98–107)
CO2: 30 MMOL/L (ref 22–29)
CREAT SERPL-MCNC: 0.4 MG/DL (ref 0.7–1.2)
FERRITIN: 974 NG/ML
GFR AFRICAN AMERICAN: >60
GFR NON-AFRICAN AMERICAN: >60 ML/MIN/1.73
GLUCOSE BLD-MCNC: 125 MG/DL (ref 74–99)
HCT VFR BLD CALC: 31.7 % (ref 37–54)
HEMOGLOBIN: 10.2 G/DL (ref 12.5–16.5)
MCH RBC QN AUTO: 26.8 PG (ref 26–35)
MCHC RBC AUTO-ENTMCNC: 32.2 % (ref 32–34.5)
MCV RBC AUTO: 83.4 FL (ref 80–99.9)
METER GLUCOSE: 108 MG/DL (ref 74–99)
METER GLUCOSE: 136 MG/DL (ref 74–99)
METER GLUCOSE: 148 MG/DL (ref 74–99)
METER GLUCOSE: 340 MG/DL (ref 74–99)
PDW BLD-RTO: 14.8 FL (ref 11.5–15)
PLATELET # BLD: 222 E9/L (ref 130–450)
PMV BLD AUTO: 9.8 FL (ref 7–12)
POTASSIUM SERPL-SCNC: 3.7 MMOL/L (ref 3.5–5)
RBC # BLD: 3.8 E12/L (ref 3.8–5.8)
SODIUM BLD-SCNC: 131 MMOL/L (ref 132–146)
TOTAL PROTEIN: 5.8 G/DL (ref 6.4–8.3)
WBC # BLD: 6.5 E9/L (ref 4.5–11.5)

## 2021-10-25 PROCEDURE — 99233 SBSQ HOSP IP/OBS HIGH 50: CPT | Performed by: INTERNAL MEDICINE

## 2021-10-25 PROCEDURE — 36415 COLL VENOUS BLD VENIPUNCTURE: CPT

## 2021-10-25 PROCEDURE — 6360000002 HC RX W HCPCS: Performed by: INTERNAL MEDICINE

## 2021-10-25 PROCEDURE — 2700000000 HC OXYGEN THERAPY PER DAY

## 2021-10-25 PROCEDURE — 2580000003 HC RX 258: Performed by: INTERNAL MEDICINE

## 2021-10-25 PROCEDURE — 6370000000 HC RX 637 (ALT 250 FOR IP): Performed by: INTERNAL MEDICINE

## 2021-10-25 PROCEDURE — 80053 COMPREHEN METABOLIC PANEL: CPT

## 2021-10-25 PROCEDURE — 97530 THERAPEUTIC ACTIVITIES: CPT

## 2021-10-25 PROCEDURE — 82962 GLUCOSE BLOOD TEST: CPT

## 2021-10-25 PROCEDURE — APPSS30 APP SPLIT SHARED TIME 16-30 MINUTES: Performed by: NURSE PRACTITIONER

## 2021-10-25 PROCEDURE — 85027 COMPLETE CBC AUTOMATED: CPT

## 2021-10-25 PROCEDURE — 82728 ASSAY OF FERRITIN: CPT

## 2021-10-25 PROCEDURE — 6360000002 HC RX W HCPCS: Performed by: NURSE PRACTITIONER

## 2021-10-25 PROCEDURE — 94660 CPAP INITIATION&MGMT: CPT

## 2021-10-25 PROCEDURE — 97535 SELF CARE MNGMENT TRAINING: CPT

## 2021-10-25 PROCEDURE — 1200000000 HC SEMI PRIVATE

## 2021-10-25 RX ADMIN — MONTELUKAST SODIUM 10 MG: 10 TABLET ORAL at 21:47

## 2021-10-25 RX ADMIN — INSULIN LISPRO 1 UNITS: 100 INJECTION, SOLUTION INTRAVENOUS; SUBCUTANEOUS at 12:01

## 2021-10-25 RX ADMIN — ALPRAZOLAM 0.25 MG: 0.25 TABLET ORAL at 14:05

## 2021-10-25 RX ADMIN — ENOXAPARIN SODIUM 80 MG: 100 INJECTION SUBCUTANEOUS at 21:47

## 2021-10-25 RX ADMIN — ALPRAZOLAM 0.25 MG: 0.25 TABLET ORAL at 21:47

## 2021-10-25 RX ADMIN — PAROXETINE 30 MG: 10 TABLET, FILM COATED ORAL at 21:47

## 2021-10-25 RX ADMIN — ZINC SULFATE 220 MG (50 MG) CAPSULE 50 MG: CAPSULE at 09:17

## 2021-10-25 RX ADMIN — Medication 2000 UNITS: at 09:18

## 2021-10-25 RX ADMIN — ENOXAPARIN SODIUM 80 MG: 100 INJECTION SUBCUTANEOUS at 09:17

## 2021-10-25 RX ADMIN — ALBUTEROL SULFATE 2 PUFF: 90 AEROSOL, METERED RESPIRATORY (INHALATION) at 09:17

## 2021-10-25 RX ADMIN — TIOTROPIUM BROMIDE INHALATION SPRAY 2 PUFF: 3.12 SPRAY, METERED RESPIRATORY (INHALATION) at 09:19

## 2021-10-25 RX ADMIN — ALBUTEROL SULFATE 2 PUFF: 90 AEROSOL, METERED RESPIRATORY (INHALATION) at 16:07

## 2021-10-25 RX ADMIN — ALPRAZOLAM 0.25 MG: 0.25 TABLET ORAL at 05:36

## 2021-10-25 RX ADMIN — OXYCODONE HYDROCHLORIDE AND ACETAMINOPHEN 500 MG: 500 TABLET ORAL at 09:18

## 2021-10-25 RX ADMIN — DEXAMETHASONE SODIUM PHOSPHATE 10 MG: 4 INJECTION, SOLUTION INTRA-ARTICULAR; INTRALESIONAL; INTRAMUSCULAR; INTRAVENOUS; SOFT TISSUE at 09:18

## 2021-10-25 RX ADMIN — ALPRAZOLAM 0.25 MG: 0.25 TABLET ORAL at 09:22

## 2021-10-25 RX ADMIN — BUDESONIDE AND FORMOTEROL FUMARATE DIHYDRATE 2 PUFF: 160; 4.5 AEROSOL RESPIRATORY (INHALATION) at 09:17

## 2021-10-25 RX ADMIN — ALBUTEROL SULFATE 2 PUFF: 90 AEROSOL, METERED RESPIRATORY (INHALATION) at 12:02

## 2021-10-25 RX ADMIN — AMLODIPINE BESYLATE 5 MG: 5 TABLET ORAL at 09:18

## 2021-10-25 RX ADMIN — PAROXETINE 30 MG: 10 TABLET, FILM COATED ORAL at 09:22

## 2021-10-25 RX ADMIN — Medication 10 ML: at 21:47

## 2021-10-25 RX ADMIN — INSULIN LISPRO 4 UNITS: 100 INJECTION, SOLUTION INTRAVENOUS; SUBCUTANEOUS at 16:06

## 2021-10-25 RX ADMIN — Medication 10 ML: at 09:19

## 2021-10-25 RX ADMIN — ALPRAZOLAM 0.25 MG: 0.25 TABLET ORAL at 17:28

## 2021-10-25 ASSESSMENT — PAIN SCALES - GENERAL
PAINLEVEL_OUTOF10: 0
PAINLEVEL_OUTOF10: 0

## 2021-10-25 NOTE — CARE COORDINATION
Social Work / Discharge Planning : TANMAY called Maricruz from [employer] for financial assistance. AWait their assessment. VA stating he is NOT service connected therefore, LTAC is not an option. SNF can be when medically stable but he would have to apply for medicaid IF he meets criteria. Meanwhile, patient respiratory status still instable;e. Any movement and he can drop his sats down in the 70\"s with 13 liters. AWait to hear back from HELP department. SW to follow.  Electronically signed by ALIZA Garcia on 10/25/21 at 3:31 PM EDT

## 2021-10-25 NOTE — PROGRESS NOTES
Occupational Therapy  OT BEDSIDE TREATMENT NOTE      Date:10/25/2021  Patient Name: Felicitas Cameron  MRN: [id number]  : 1955  Room: [address] [place] [address], OTR/L #[id number]     Referring Provider: Mimi Hendricks DO  Specific Provider Orders/Date: OT eval and treat 10/22/21      Diagnosis: Hypokalemia [E87.6]  Acute respiratory failure with hypoxia ([address] [J96.01]  COVID-19 [U07.1]  Acute respiratory failure due to COVID-19 ([address] [U07.1, J96.00]   Pt admitted to hospital on 21 for fatigue and SOB     Pertinent Medical History:  has a past medical history of Hypertension.         Precautions:  Fall Risk, O2 HF      Assessment of current deficits    [x]? Functional mobility         [x]? ADLs           [x]? Strength                  [x]? Cognition    [x]? Functional transfers       [x]? IADLs         [x]? Safety Awareness   [x]? Endurance    []? Fine Coordination                      [x]? Balance      []? Vision/perception   []? Sensation      []? Gross Motor Coordination          []? ROM           []?  Delirium                   []? Motor Control      OT PLAN OF CARE   OT POC based on physician orders, patient diagnosis and results of clinical assessment     Frequency/Duration 1-3 days/wk for 2 weeks PRN   Specific OT Treatment Interventions to include:   * Instruction/training on adapted ADL techniques and AE recommendations to increase functional independence within precautions       * Training on energy conservation strategies, correct breathing pattern and techniques to improve independence/tolerance for self-care routine  * Functional transfer/mobility training/DME recommendations for increased independence, safety, and fall prevention  * Patient/Family education to increase follow through with safety techniques and functional independence  * Recommendation of environmental modifications for increased safety with functional transfers/mobility and ADLs  * Splinting/positioning for increased function, prevention of contractures, and improve skin integrity  * Therapeutic exercise to improve motor endurance, ROM, and functional strength for ADLs/functional transfers  * Therapeutic activities to facilitate/challenge dynamic balance, stand tolerance for increased safety and independence with ADLs  * Therapeutic activities to facilitate gross/fine motor skills for increased independence with ADLs        Recommended Adaptive Equipment: TBD      Home Living: Pt lives alone in 2 floor home. 4 LUANN, 1 handrail  Bath and bedroom on 2nd floor   Bathroom setup: tub/shower    Equipment owned: n/a     Prior Level of Function: independent with ADLs , independent with IADLs; ambulated independently w/o AD   Driving: yes     Pain Level: No complaint of pain during this session.      Cognition: Awake and grossly oriented. Functional Assessment:  AM-PAC Daily Activity Raw Score: 14/24    Initial Eval Status  Date: 10/22/21 Treatment Status  Date:10/25/21  STGs = LTGs  Time frame: 10-14 days   Feeding Stand by Assist  Setup       Grooming Minimal Assist   washed face with setup. Setup to brush teeth. Modified [place]    UB Dressing Minimal Assist  Mod A due to low saturation with movement.   Modified [place]    LB Dressing Dependent  Max A to doff soiled diaper and don clean brief.   Stand by Assist    Bathing Moderate Assist Min A UB   Mod A LB   Pt assisted with LB bathe while supine in the bed.   Stand by Assist    Toileting Dependent   Including bowel management - bed rolling Incontinent of bowel and bladder in the brief. Pt using urinal as well.   Minimal Assist    Bed Mobility  Rolling: SBA  Supine to sit: NT SBA supine to sit   Supine to sit: Stand by Assist   Sit to supine: Stand by Assist    Functional Transfers NT  Deferred d/t +desaturation w/ bed mobility and repositioning  unable to complete transfers due to decreased saturation.   Stand by Assist    Functional Mobility NT   Stand by Assist    Balance Sitting:  NT     Standing: NT       Activity Tolerance Poor  +desaturation w/ minimal, bedlevel activity Poor due to desaturation.   Fair       Comments:  Pt laying in the bed. No longer in isolation. Urinal spilled on floor upon therapist arrival.  Pt in saturated brief. New gown and bath wipes brought to room for pt to wash and change gown. Pt asking to sit onto [facility]. Upon sitting to the EOB, O2 saturation decreased to 76%. Pt returned to supine as his saturation level continued to decrease. Much time for O2 saturation level to increase to 88%. Pt completed the rest of ADL activity at supine level. Assist to complete hygiene as pt had bowel movement in brief. He was able to complete yanni hygiene of groin area while supine. Nurse notified of pt's linens needing changed, urinals needing emptied, and urine on the floor. Education/treatment:  ADL retraining with facilitation of movement to increase self care skills. Therapeutic activity to address balance and endurance for ADL and transfers. Pt education of breathing techniques. · Pt has made  progress towards set goals.    ·     Time In: 2:16  Time Out: 2:58     Min Units   Therapeutic Ex [id number]     Therapeutic Activities 25748 21 3   ADL/Self Care 36838 25 2   Orthotic Management [id number]     Neuro Re-Ed [id number]     Non-Billable Time     TOTAL TIMED TREATMENT 42 1239 [facility] WENDY/L [id number]

## 2021-10-25 NOTE — CARE COORDINATION
Received call from [facility] at the [facility]. The [facility] does not feel that LTAC is necessary for patient and are not agreeable to approve LTAC stay. Spoke to Charlie transition care RN at the [facility] stated that the patient IS NOT service connected, therefor patient is essentially a self pay and does not have any health insurance coverage. Because of this- The [facility] will not pay for [place] stay. However, Charlie did stated that service connection is not a factor in the [facility] setting up [address] or home oxygen. If the patient improves and is able to discharge home then the [facility] can assist with [address] and home O2 thru the [facility].

## 2021-10-25 NOTE — PROGRESS NOTES
[facility] Hospitalist   Progress Note    Admitting Date and Time: 9/22/2021  9:32 PM  Admit Dx: Hypokalemia [E87.6]  Acute respiratory failure with hypoxia ([address] [J96.01]  COVID-19 [U07.1]  Acute respiratory failure due to COVID-19 (HCC) [U07.1, J96.00]    Subjective:    10/22 Pt sitting up in bed in no acute distress. Has been able to wean FiO2 55% 55L Airvo. Encouraged to use IS. States he has been eating better. Pre-cert started for [employer]    10/23 Pt sitting up in bed in no acute distress. Remains on AirVo 55L 55%. States he feels well today. Has difficulty getting up to [facility]. States it takes a while for him to recover. Continues to eat well. Will need new IS at bedside. Awaiting VA benefit for DC planning. 10/24  Pt lying on left side eyes closed. Awakens easily to name. States he's tired this morning. Currently on 52% 50L. Tolerating well. 10/25 Pt lying in bed in no acute distress. Currently on 13L HFNC. Significantly improved. No longer has VA benefits and will need to private pay at DC. Staets he is eating well. RN: Down to 13L HFNC. VA benefits no longer. Case Management following. ROS: denies fever, chills, cp, n/v, HA unless stated above.      montelukast  10 mg Oral Nightly    dexamethasone  10 mg IntraVENous Q24H    insulin glargine  5 Units SubCUTAneous Nightly    ascorbic acid  500 mg Oral Daily    zinc sulfate  50 mg Oral Daily    vitamin D  2,000 Units Oral Daily    tiotropium  2 puff Inhalation Daily    budesonide-formoterol  2 puff Inhalation BID    ALPRAZolam  0.25 mg Oral Q4H While awake    guaiFENesin-codeine  10 mL Oral Q4H While awake    enoxaparin  1 mg/kg SubCUTAneous BID    albuterol sulfate HFA  2 puff Inhalation 4x daily    sodium chloride flush  5-40 mL IntraVENous 2 times per day    PARoxetine  30 mg Oral BID    amLODIPine  5 mg Oral Daily    insulin lispro  0-6 Units SubCUTAneous TID WC    insulin lispro  0-3 Units SubCUTAneous Nightly aluminum & magnesium hydroxide-simethicone, 15 mL, Q6H PRN  melatonin, 4.5 mg, Nightly PRN  sodium chloride flush, 5-40 mL, PRN  sodium chloride, 25 mL, PRN  ondansetron, 4 mg, Q8H PRN   Or  ondansetron, 4 mg, Q6H PRN  polyethylene glycol, 17 g, Daily PRN  acetaminophen, 650 mg, Q6H PRN   Or  acetaminophen, 650 mg, Q6H PRN  glucose, 15 g, PRN  dextrose, 12.5 g, PRN  glucagon (rDNA), 1 mg, PRN  dextrose, 100 mL/hr, PRN         Objective:    BP (!) 141/82   Pulse 72   Temp 98.4 °F (36.9 °C) (Oral)   Resp 26   Ht 5' 7\" (1.702 m)   Wt 184 lb 11.9 oz (83.8 kg)   SpO2 95%   BMI 28.94 kg/m²   General Appearance: alert and oriented to person, place and time and in respiratory  distress  Skin: warm and dry  Head: normocephalic and atraumatic  Eyes: pupils equal, round, and reactive to light, extraocular eye movements intact, conjunctivae normal  Neck: neck supple and non tender without mass   Pulmonary/Chest:Diminished/rhonchi to auscultation bilaterally- no wheezes, rales  normal air movement, acute respiratory distress Cardiovascular: normal rate, normal S1 and S2 and no RGM  Abdomen: soft, non-tender, non-distended, normal bowel sounds  Extremities: no cyanosis, no clubbing and no edema  Neurologic: no cranial nerve deficit and speech normal      Recent Labs     10/23/21  0200 10/24/21  0730 10/25/21  0329    135 131*   K 4.1 3.7 3.7   CL 94* 96* 95*   CO2 30* 33* 30*   BUN 13 12 12   CREATININE 0.4* 0.5* 0.4*   GLUCOSE 173* 108* 125*   CALCIUM 8.1* 8.3* 8.3*       Recent Labs     10/23/21  0200 10/24/21  0730 10/25/21  0329   ALKPHOS 82 82 85   PROT 5.5* 5.7* 5.8*   LABALBU 2.7* 2.9* 2.9*   BILITOT 0.3 0.2 0.3   AST 23 25 26   ALT 97* 102* 108*       Recent Labs     10/23/21  0200 10/24/21  0730 10/25/21  0329   WBC 5.0 6.2 6.5   RBC 3.74* 3.88 3.80   HGB 10.2* 10.6* 10.2*   HCT 31.5* 32.2* 31.7*   MCV 84.2 83.0 83.4   MCH 27.3 27.3 26.8   MCHC 32.4 32.9 32.2   RDW 14.6 14.9 14.8    224 222   MPV failure with hypoxia (HCC)    Pneumonia due to COVID-19 virus    Elevated LFTs    Hypokalemia    Essential hypertension    Non-insulin dependent type 2 diabetes mellitus ([facility]  Resolved Problems:    * No resolved hospital problems. *      Plan:  1. Acute Respiratory Failure with Hypoxia- 2/2 COVID-19 CXR significant worsening multifocal infiltrates 10/4 CXR/ 10/16 CXR no change in imaging. Continue dexamethasone taper per Pulmonology. Symbicort/Spiriva/Albuterol. Currently on 13L HFNC. Slowly improving. Continue to wean as tolerated. Maintain SPO2>92%. Currently requiring BiPap. DDimer 434 Encouraged IS/Proning. Singulair 10 mg daily added 10/21 ? assist with inflammation per Pulmonology. Pulmonology input appreciated. 2. COVID-19 Viral Pneumonia- COVID-19+The current medical regimen is effective;  continue present plan and medications. Completed Course Baricitinib/Dexamethasone Taper per Pulmonology/MDI. Vitamin Supplementation. Follow inflammatory markers. Lovenox 1mg/kg. Encourage IS/Pronating. DDImer 434  Isolation DC. Supportive care. Pulmonology following. 3. Anxiety- Continue Xanax Q4 W/A. 4. DM- Continue SSI/hypoglycemic protocol/Carb Control diet. Follow adjust as needed. 5. Anemia- Hgb 10.2. No overt signs of bleeding noted. Follow transfuse as needed. 6. HTN- Continue Amlodipine. 7. Poor intake- 2//2 Respiratory distress. Improving. Pt with increased intake and tolerating off BP. Follow Calorie intake. 8. Generalized weakness/deconditioning-PT/OT. Increase activity as tolerated. OOB to chair. 9. Disposition- Pt Oxygen needs decreased to 13L HFNC. [facility] following. Pt no longer with  VA benefits. Discussed with Case management. Casemanagement following. NOTE: This report was transcribed using voice recognition software. Every effort was made to ensure accuracy; however, inadvertent computerized transcription errors may be present. Electronically signed by Alberto Buckner APRN - CNP on 10/25/2021 at 7:54 AM   Addendum: I have personally participated in the history, exam, medical decision making with Hector Kilgore on the date of service and I agree with all of the pertinent clinical information unless otherwise noted. I have also reviewed and agree with the past medical, family, and social history unless otherwise noted. Patient was admitted with covid 19 pneumonia    PHYSICAL EXAM:  Vitals:  BP (!) 140/66   Pulse 61   Temp 97.4 °F (36.3 °C) (Oral)   Resp 24   Ht 5' 7\" (1.702 m)   Wt 184 lb 11.9 oz (83.8 kg)   SpO2 (!) 86%   BMI 28.94 kg/m²   Gen: awake, alert, NAD  Lungs: clear to auscultation bilaterally no crackles no wheezing. Heart: RRR, no murmur   Abdomen: soft nontender nondistended positive bowel sounds. Extremities: full range of motion no peripheral edema. Impression:  Active Problems:    COVID-19    Acute respiratory failure due to COVID-19 [facility])    Acute respiratory failure with hypoxia (HCC)    Pneumonia due to COVID-19 virus    Elevated LFTs    Hypokalemia    Essential hypertension    Non-insulin dependent type 2 diabetes mellitus ([facility]  Resolved Problems:    * No resolved hospital problems. *      My findings/plan include:    68-year-old male presents to the hospital with acute respiratory failure secondary to COVID-19 pneumonia. Patient has made quite an improvement since admission. Currently he is on 13 L of high flow. Patient is on albuterol, Symbicort, Spiriva, and Singulair for breathing treatments. He is currently being treated with Decadron 10 mg IV every day. Vitamin cocktail has been ordered. Inflammatory markers are trending down. Is on Lovenox 80 mg subcu twice daily. He will likely need DVT prophylaxis upon discharge. Patient is doing well. Pending discharge to SNF. NOTE: This report was transcribed using voice recognition software.  Every effort was made to ensure accuracy; however, inadvertent computerized transcription errors may be present.     Electronically signed by Dominique Alatorre DO on 10/25/2021 at 4:30 PM

## 2021-10-25 NOTE — PROGRESS NOTES
[facility]  [address], [address]  [facility], [facility]      Pulmonary Progress Note      SUBJECTIVE:  Reclining in bed on - li HFNC, (?) taken off Airvo yesterday afternoon, saturating 80-85% as was trying to urinate. Claims similar with respiratory function, minimal cough/sputum production.     OBJECTIVE    Medications    Continuous Infusions:   sodium chloride      dextrose         Scheduled Meds:   montelukast  10 mg Oral Nightly    dexamethasone  10 mg IntraVENous Q24H    insulin glargine  5 Units SubCUTAneous Nightly    ascorbic acid  500 mg Oral Daily    zinc sulfate  50 mg Oral Daily    vitamin D  2,000 Units Oral Daily    tiotropium  2 puff Inhalation Daily    budesonide-formoterol  2 puff Inhalation BID    ALPRAZolam  0.25 mg Oral Q4H While awake    guaiFENesin-codeine  10 mL Oral Q4H While awake    enoxaparin  1 mg/kg SubCUTAneous BID    albuterol sulfate HFA  2 puff Inhalation 4x daily    sodium chloride flush  5-40 mL IntraVENous 2 times per day    PARoxetine  30 mg Oral BID    amLODIPine  5 mg Oral Daily    insulin lispro  0-6 Units SubCUTAneous TID WC    insulin lispro  0-3 Units SubCUTAneous Nightly       PRN Meds:aluminum & magnesium hydroxide-simethicone, melatonin, sodium chloride flush, sodium chloride, ondansetron **OR** ondansetron, polyethylene glycol, acetaminophen **OR** acetaminophen, glucose, dextrose, glucagon (rDNA), dextrose    Physical    VITALS:  BP (!) 140/66   Pulse 61   Temp 97.4 °F (36.3 °C) (Oral)   Resp 24   Ht 5' 7\" (1.702 m)   Wt 184 lb 11.9 oz (83.8 kg)   SpO2 (!) 86%   BMI 28.94 kg/m²     24HR INTAKE/OUTPUT:      Intake/Output Summary (Last 24 hours) at 10/25/2021 1023  Last data filed at 10/25/2021 0049  Gross per 24 hour   Intake --   Output 1075 ml   Net -1075 ml       24HR PULSE OXIMETRY RANGE:    SpO2  Av.5 %  Min: 86 %  Max: 95 %    General appearance: alert, appears stated age and cooperative  Lungs: rhonchi bibasilar with cough  Heart: regular rate and rhythm, S1, S2 normal, no murmur, click, rub or gallop  Abdomen: soft, non-tender; bowel sounds normal; no masses,  no organomegaly  Extremities: extremities normal, atraumatic, no cyanosis or edema  Neurologic: Mental status: Alert, oriented, thought content appropriate    Data    CBC:   Recent Labs     10/23/21  0200 10/24/21  0730 10/25/21  0329   WBC 5.0 6.2 6.5   HGB 10.2* 10.6* 10.2*   HCT 31.5* 32.2* 31.7*   MCV 84.2 83.0 83.4    224 222       BMP:  Recent Labs     10/23/21  0200 10/24/21  0730 10/25/21  0329    135 131*   K 4.1 3.7 3.7   CL 94* 96* 95*   CO2 30* 33* 30*   BUN 13 12 12   CREATININE 0.4* 0.5* 0.4*    ALB:3,BILIDIR:3,BILITOT:3,ALKPHOS:3)@    PT/INR: No results for input(s): PROTIME, INR in the last 72 hours. ABG:   No results for input(s): PH, PO2, PCO2, HCO3, BE, O2SAT, METHB, O2HB, COHB, O2CON, HHB, THB in the last 72 hours. FiO2 : 45 %       Radiology/Other tests reviewed: none    Assessment:     Active Problems:    COVID-19    Acute respiratory failure due to COVID-19 [facility])    Acute respiratory failure with hypoxia (HCC)    Pneumonia due to COVID-19 virus    Elevated LFTs    Hypokalemia    Essential hypertension    Non-insulin dependent type 2 diabetes mellitus ([facility]  Resolved Problems:    * No resolved hospital problems. *      Plan:       1. Cont with oxygen, taper as tolerated, increased to 13 li HFNC, slowly climbed back up to 84-86%  2. Cont with steroids, taper as tolerated, decreased a few days ago, will decrease again next week if remains stable  3. singulair daily and see if helps with inflammation  4. Cont with MDI  5. Encourage incentive spirometer use and prone positioning when able to  6. OOB to chair  7.  To LTAC when bed available      Time at the bedside, reviewing labs and radiographs, reviewing notes and consultations, discussing with staff and family was more than 35 minutes. Thanks for letting us see this patient in consultation. Please contact us with any questions. Office [phone] or after hours through [facility], [phone].

## 2021-10-26 ENCOUNTER — APPOINTMENT (OUTPATIENT)
Dept: GENERAL RADIOLOGY | Age: 66
DRG: 871 | End: 2021-10-26
Payer: OTHER GOVERNMENT

## 2021-10-26 LAB
ALBUMIN SERPL-MCNC: 3 G/DL (ref 3.5–5.2)
ALP BLD-CCNC: 86 U/L (ref 40–129)
ALT SERPL-CCNC: 116 U/L (ref 0–40)
ANION GAP SERPL CALCULATED.3IONS-SCNC: 6 MMOL/L (ref 7–16)
AST SERPL-CCNC: 25 U/L (ref 0–39)
BILIRUB SERPL-MCNC: 0.3 MG/DL (ref 0–1.2)
BUN BLDV-MCNC: 14 MG/DL (ref 6–23)
CALCIUM SERPL-MCNC: 8.3 MG/DL (ref 8.6–10.2)
CHLORIDE BLD-SCNC: 95 MMOL/L (ref 98–107)
CO2: 32 MMOL/L (ref 22–29)
CREAT SERPL-MCNC: 0.5 MG/DL (ref 0.7–1.2)
FERRITIN: 967 NG/ML
GFR AFRICAN AMERICAN: >60
GFR NON-AFRICAN AMERICAN: >60 ML/MIN/1.73
GLUCOSE BLD-MCNC: 145 MG/DL (ref 74–99)
HCT VFR BLD CALC: 33.3 % (ref 37–54)
HEMOGLOBIN: 10.7 G/DL (ref 12.5–16.5)
MCH RBC QN AUTO: 27.1 PG (ref 26–35)
MCHC RBC AUTO-ENTMCNC: 32.1 % (ref 32–34.5)
MCV RBC AUTO: 84.3 FL (ref 80–99.9)
METER GLUCOSE: 123 MG/DL (ref 74–99)
METER GLUCOSE: 124 MG/DL (ref 74–99)
METER GLUCOSE: 141 MG/DL (ref 74–99)
METER GLUCOSE: 198 MG/DL (ref 74–99)
METER GLUCOSE: 245 MG/DL (ref 74–99)
PDW BLD-RTO: 14.9 FL (ref 11.5–15)
PLATELET # BLD: 235 E9/L (ref 130–450)
PMV BLD AUTO: 9.6 FL (ref 7–12)
POTASSIUM SERPL-SCNC: 4.2 MMOL/L (ref 3.5–5)
RBC # BLD: 3.95 E12/L (ref 3.8–5.8)
SODIUM BLD-SCNC: 133 MMOL/L (ref 132–146)
TOTAL PROTEIN: 5.3 G/DL (ref 6.4–8.3)
WBC # BLD: 8 E9/L (ref 4.5–11.5)

## 2021-10-26 PROCEDURE — 94660 CPAP INITIATION&MGMT: CPT

## 2021-10-26 PROCEDURE — 85027 COMPLETE CBC AUTOMATED: CPT

## 2021-10-26 PROCEDURE — 36415 COLL VENOUS BLD VENIPUNCTURE: CPT

## 2021-10-26 PROCEDURE — 99233 SBSQ HOSP IP/OBS HIGH 50: CPT | Performed by: INTERNAL MEDICINE

## 2021-10-26 PROCEDURE — 71045 X-RAY EXAM CHEST 1 VIEW: CPT

## 2021-10-26 PROCEDURE — 6360000002 HC RX W HCPCS: Performed by: NURSE PRACTITIONER

## 2021-10-26 PROCEDURE — 6360000002 HC RX W HCPCS: Performed by: INTERNAL MEDICINE

## 2021-10-26 PROCEDURE — 80053 COMPREHEN METABOLIC PANEL: CPT

## 2021-10-26 PROCEDURE — 6370000000 HC RX 637 (ALT 250 FOR IP): Performed by: INTERNAL MEDICINE

## 2021-10-26 PROCEDURE — APPSS30 APP SPLIT SHARED TIME 16-30 MINUTES: Performed by: NURSE PRACTITIONER

## 2021-10-26 PROCEDURE — 82728 ASSAY OF FERRITIN: CPT

## 2021-10-26 PROCEDURE — 6370000000 HC RX 637 (ALT 250 FOR IP): Performed by: NURSE PRACTITIONER

## 2021-10-26 PROCEDURE — 2700000000 HC OXYGEN THERAPY PER DAY

## 2021-10-26 PROCEDURE — 2580000003 HC RX 258: Performed by: INTERNAL MEDICINE

## 2021-10-26 PROCEDURE — 82962 GLUCOSE BLOOD TEST: CPT

## 2021-10-26 PROCEDURE — 1200000000 HC SEMI PRIVATE

## 2021-10-26 RX ORDER — DEXAMETHASONE 6 MG/1
6 TABLET ORAL DAILY
Status: DISCONTINUED | OUTPATIENT
Start: 2021-10-27 | End: 2021-11-01

## 2021-10-26 RX ORDER — GUAIFENESIN/DEXTROMETHORPHAN 100-10MG/5
5 SYRUP ORAL EVERY 4 HOURS PRN
Status: DISCONTINUED | OUTPATIENT
Start: 2021-10-26 | End: 2021-11-02 | Stop reason: HOSPADM

## 2021-10-26 RX ADMIN — Medication 10 ML: at 22:04

## 2021-10-26 RX ADMIN — ALPRAZOLAM 0.25 MG: 0.25 TABLET ORAL at 05:48

## 2021-10-26 RX ADMIN — Medication 2000 UNITS: at 10:06

## 2021-10-26 RX ADMIN — INSULIN LISPRO 1 UNITS: 100 INJECTION, SOLUTION INTRAVENOUS; SUBCUTANEOUS at 22:05

## 2021-10-26 RX ADMIN — ALBUTEROL SULFATE 2 PUFF: 90 AEROSOL, METERED RESPIRATORY (INHALATION) at 10:03

## 2021-10-26 RX ADMIN — ZINC SULFATE 220 MG (50 MG) CAPSULE 50 MG: CAPSULE at 10:00

## 2021-10-26 RX ADMIN — ALPRAZOLAM 0.25 MG: 0.25 TABLET ORAL at 10:01

## 2021-10-26 RX ADMIN — INSULIN GLARGINE 5 UNITS: 100 INJECTION, SOLUTION SUBCUTANEOUS at 22:05

## 2021-10-26 RX ADMIN — PAROXETINE 30 MG: 10 TABLET, FILM COATED ORAL at 10:00

## 2021-10-26 RX ADMIN — ALPRAZOLAM 0.25 MG: 0.25 TABLET ORAL at 16:53

## 2021-10-26 RX ADMIN — OXYCODONE HYDROCHLORIDE AND ACETAMINOPHEN 500 MG: 500 TABLET ORAL at 10:01

## 2021-10-26 RX ADMIN — ALBUTEROL SULFATE 2 PUFF: 90 AEROSOL, METERED RESPIRATORY (INHALATION) at 16:54

## 2021-10-26 RX ADMIN — PAROXETINE 30 MG: 10 TABLET, FILM COATED ORAL at 22:04

## 2021-10-26 RX ADMIN — MONTELUKAST SODIUM 10 MG: 10 TABLET ORAL at 22:04

## 2021-10-26 RX ADMIN — TIOTROPIUM BROMIDE INHALATION SPRAY 2 PUFF: 3.12 SPRAY, METERED RESPIRATORY (INHALATION) at 10:04

## 2021-10-26 RX ADMIN — Medication 10 ML: at 10:05

## 2021-10-26 RX ADMIN — ALPRAZOLAM 0.25 MG: 0.25 TABLET ORAL at 22:04

## 2021-10-26 RX ADMIN — BUDESONIDE AND FORMOTEROL FUMARATE DIHYDRATE 2 PUFF: 160; 4.5 AEROSOL RESPIRATORY (INHALATION) at 10:04

## 2021-10-26 RX ADMIN — INSULIN LISPRO 2 UNITS: 100 INJECTION, SOLUTION INTRAVENOUS; SUBCUTANEOUS at 16:55

## 2021-10-26 RX ADMIN — AMLODIPINE BESYLATE 5 MG: 5 TABLET ORAL at 10:01

## 2021-10-26 RX ADMIN — ENOXAPARIN SODIUM 80 MG: 100 INJECTION SUBCUTANEOUS at 10:08

## 2021-10-26 RX ADMIN — DEXAMETHASONE SODIUM PHOSPHATE 10 MG: 4 INJECTION, SOLUTION INTRA-ARTICULAR; INTRALESIONAL; INTRAMUSCULAR; INTRAVENOUS; SOFT TISSUE at 10:01

## 2021-10-26 RX ADMIN — ENOXAPARIN SODIUM 80 MG: 100 INJECTION SUBCUTANEOUS at 22:03

## 2021-10-26 ASSESSMENT — PAIN SCALES - GENERAL: PAINLEVEL_OUTOF10: 0

## 2021-10-26 NOTE — PROGRESS NOTES
[address] Hospitalist   Progress Note    Admitting Date and Time: 9/22/2021  9:32 PM  Admit Dx: Hypokalemia [E87.6]  Acute respiratory failure with hypoxia ([address] [J96.01]  COVID-19 [U07.1]  Acute respiratory failure due to COVID-19 ([facility]) [U07.1, J96.00]    Subjective:    10/22 Pt sitting up in bed in no acute distress. Has been able to wean FiO2 55% 55L Airvo. Encouraged to use IS. States he has been eating better. Pre-cert started for [employer]    10/23 Pt sitting up in bed in no acute distress. Remains on AirVo 55L 55%. States he feels well today. Has difficulty getting up to [facility]. States it takes a while for him to recover. Continues to eat well. Will need new IS at bedside. Awaiting VA benefit for DC planning. 10/24  Pt lying on left side eyes closed. Awakens easily to name. States he's tired this morning. Currently on 52% 50L. Tolerating well. 10/25 Pt lying in bed in no acute distress. Currently on 13L HFNC. Significantly improved. No longer has VA benefits and will need to private pay at DC. Staets he is eating well. 10/26 Patient sitting up in bed in no acute distress. Patient states he is feeling much better today. Continues to do well with intake and nutritional status. Patient is currently on 8L HFNC tolerating well. Patient continues to have desaturation with movement and exertion. Set up on the side of the bed SPO2 dropped to the 70s. SNF when medically stable, but will need Medicaid approval.  Can however benefit from [place] [phone] or home oxygen when able to DC. RN: Patient is on 8L NC.    ROS: denies fever, chills, cp, n/v, HA unless stated above.      montelukast  10 mg Oral Nightly    dexamethasone  10 mg IntraVENous Q24H    insulin glargine  5 Units SubCUTAneous Nightly    ascorbic acid  500 mg Oral Daily    zinc sulfate  50 mg Oral Daily    vitamin D  2,000 Units Oral Daily    tiotropium  2 puff Inhalation Daily    budesonide-formoterol  2 puff Inhalation BID    ALPRAZolam  0.25 mg Oral Q4H While awake    enoxaparin  1 mg/kg SubCUTAneous BID    albuterol sulfate HFA  2 puff Inhalation 4x daily    sodium chloride flush  5-40 mL IntraVENous 2 times per day    PARoxetine  30 mg Oral BID    amLODIPine  5 mg Oral Daily    insulin lispro  0-6 Units SubCUTAneous TID WC    insulin lispro  0-3 Units SubCUTAneous Nightly     guaiFENesin-dextromethorphan, 5 mL, Q4H PRN  aluminum & magnesium hydroxide-simethicone, 15 mL, Q6H PRN  melatonin, 4.5 mg, Nightly PRN  sodium chloride flush, 5-40 mL, PRN  sodium chloride, 25 mL, PRN  ondansetron, 4 mg, Q8H PRN   Or  ondansetron, 4 mg, Q6H PRN  polyethylene glycol, 17 g, Daily PRN  acetaminophen, 650 mg, Q6H PRN   Or  acetaminophen, 650 mg, Q6H PRN  glucose, 15 g, PRN  dextrose, 12.5 g, PRN  glucagon (rDNA), 1 mg, PRN  dextrose, 100 mL/hr, PRN         Objective:    /63   Pulse 54   Temp 97.6 °F (36.4 °C) (Oral)   Resp 20   Ht 5' 7\" (1.702 m)   Wt 184 lb 11.9 oz (83.8 kg)   SpO2 97%   BMI 28.94 kg/m²   General Appearance: alert and oriented to person, place and time and in respiratory  distress  Skin: warm and dry  Head: normocephalic and atraumatic  Eyes: pupils equal, round, and reactive to light, extraocular eye movements intact, conjunctivae normal  Neck: neck supple and non tender without mass   Pulmonary/Chest:Diminished/rhonchi to auscultation bilaterally- no wheezes, rales  normal air movement, acute respiratory distress Cardiovascular: normal rate, normal S1 and S2 and no RGM  Abdomen: soft, non-tender, non-distended, normal bowel sounds  Extremities: no cyanosis, no clubbing and no edema  Neurologic: no cranial nerve deficit and speech normal      Recent Labs     10/24/21  0730 10/25/21  0329 10/26/21  0412    131* 133   K 3.7 3.7 4.2   CL 96* 95* 95*   CO2 33* 30* 32*   BUN 12 12 14   CREATININE 0.5* 0.4* 0.5*   GLUCOSE 108* 125* 145*   CALCIUM 8.3* 8.3* 8.3*       Recent Labs     10/24/21  4979 10/25/21  0329 10/26/21  0412   ALKPHOS 82 85 86   PROT 5.7* 5.8* 5.3*   LABALBU 2.9* 2.9* 3.0*   BILITOT 0.2 0.3 0.3   AST 25 26 25   * 108* 116*       Recent Labs     10/24/21  0730 10/25/21  0329 10/26/21  0412   WBC 6.2 6.5 8.0   RBC 3.88 3.80 3.95   HGB 10.6* 10.2* 10.7*   HCT 32.2* 31.7* 33.3*   MCV 83.0 83.4 84.3   MCH 27.3 26.8 27.1   MCHC 32.9 32.2 32.1   RDW 14.9 14.8 14.9    222 235   MPV 9.9 9.8 9.6           Radiology:   XR CHEST PORTABLE   Final Result   Bilateral airspace disease without clear interval change. Suboptimal   inspiration as noted. XR CHEST PORTABLE   Final Result   No significant change in the appearance of the extensive airspace opacities   throughout both lungs, in keeping with the known history of COVID pneumonia. XR CHEST PORTABLE   Final Result   1. There is no interval change in extensive multifocal bilateral airspace   disease. XR CHEST PORTABLE   Final Result   1. No interval change in the extensive multifocal bilateral airspace disease. XR CHEST PORTABLE   Final Result   No interval change         XR CHEST PORTABLE   Final Result   1. Significant worsening of the multifocal bilateral pneumonia when compared   with the prior CT scan of 09/22/2021         CTA PULMONARY W CONTRAST   Final Result   Within described exam limitations, no evidence of pulmonary embolism. Scattered multifocal low density infiltrates throughout both lungs, most   suggestive of COVID pneumonia in context of current pandemic. At least moderate diffuse hepatic steatosis. Cholecystectomy. RECOMMENDATIONS:   Multiple pulmonary nodules. Most severe: 4 mm left solid pulmonary nodule   within the upper lobe. A non-contrast Chest CT at 12 months is optional. If   performed and the nodule is stable at 12 months, no further follow-up is   recommended. These guidelines do not apply to immunocompromised patients and patients with   cancer.  Follow up in patients with significant comorbidities as clinically   warranted. For lung cancer screening, adhere to Lung-RADS guidelines. Reference: Radiology. 2017; 284(1):228-43. XR CHEST PORTABLE   Final Result   There are bilateral multifocal ground-glass areas of consolidation with the   lungs concerning for in infectious or inflammatory process and developing   ARDS or viral pneumonia could give this appearance. Assessment:  Active Problems:    COVID-19    Acute respiratory failure due to COVID-19 [facility])    Acute respiratory failure with hypoxia (HCC)    Pneumonia due to COVID-19 virus    Elevated LFTs    Hypokalemia    Essential hypertension    Non-insulin dependent type 2 diabetes mellitus ([facility]  Resolved Problems:    * No resolved hospital problems. *      Plan:  1. Acute Respiratory Failure with Hypoxia- 2/2 COVID-19 CXR significant worsening multifocal infiltrates 10/4 CXR/ 10/16 CXR no change in imaging. Continue dexamethasone taper per Pulmonology. Symbicort/Spiriva/Albuterol. Currently on 8L HFNC. Slowly improving. Continue to wean as tolerated. Maintain SPO2>92%. Currently requiring BiPap. DDimer 434 Encouraged IS/Proning. Singulair 10 mg daily added 10/21 ? assist with inflammation per Pulmonology. Pulmonology input appreciated. 2. COVID-19 Viral Pneumonia- COVID-19+The current medical regimen is effective;  continue present plan and medications. Completed Course Baricitinib/Dexamethasone Taper per Pulmonology/MDI. Vitamin Supplementation. Follow inflammatory markers. Lovenox 1mg/kg. Encourage IS/Pronating. DDImer 434  Isolation DC. Supportive care. Pulmonology following. 3. Anxiety- Continue Xanax Q4 W/A. 4. DM- Continue SSI/hypoglycemic protocol/Carb Control diet. Follow adjust as needed. 5. Anemia- Hgb 10.7. No overt signs of bleeding noted. Follow transfuse as needed. 6. HTN- Continue Amlodipine. 7. Poor intake- 2//2 Respiratory distress. Improving.  Pt with increased intake and tolerating off BP. Follow Calorie intake. 8. Generalized weakness/deconditioning-PT/OT. Increase activity as tolerated. OOB to chair. 9. Disposition- Pt Oxygen needs decreased to 8L HFNC. Continues to have desaturation with any movement. Noted conversational dyspnea. [employer] following. Pt no longer with  VA benefits. LTACH not an option. SNF. Will need application to Medicaid. Discussed with Case management. Case management following. NOTE: This report was transcribed using voice recognition software. Every effort was made to ensure accuracy; however, inadvertent computerized transcription errors may be present. Addendum: I have personally participated in the history, exam, medical decision making with Sukhwinder Luque on the date of service and I agree with all of the pertinent clinical information unless otherwise noted. I have also reviewed and agree with the past medical, family, and social history unless otherwise noted. Patient was admitted with sob from covid 19 pneumonia    PHYSICAL EXAM:  Vitals:  /63   Pulse 54   Temp 97.6 °F (36.4 °C) (Oral)   Resp 20   Ht 5' 7\" (1.702 m)   Wt 184 lb 11.9 oz (83.8 kg)   SpO2 93%   BMI 28.94 kg/m²   Gen: awake, alert, NAD  Lungs: clear to auscultation bilaterally no crackles no wheezing. Heart: RRR, no murmur   Abdomen: soft nontender nondistended positive bowel sounds. Extremities: full range of motion no peripheral edema. Impression:  Active Problems:    COVID-19    Acute respiratory failure due to COVID-19 [facility])    Acute respiratory failure with hypoxia (HCC)    Pneumonia due to COVID-19 virus    Elevated LFTs    Hypokalemia    Essential hypertension    Non-insulin dependent type 2 diabetes mellitus ([facility] [address]  Resolved Problems:    * No resolved hospital problems. *      My findings/plan include:    Acute hypoxic respiratory failure 2/2 covid 19 pneumonia, currently on 7 L NC. Will continue current treatment.  Discussed with case management in regards to placement. NOTE: This report was transcribed using voice recognition software. Every effort was made to ensure accuracy; however, inadvertent computerized transcription errors may be present.   Electronically signed by Estela Leonardo DO on 10/26/2021 at 5:19 PM

## 2021-10-26 NOTE — PROGRESS NOTES
[facility] and [facility]  [address] [facility], [address]  [facility], [facility]      Pulmonary Progress Note      SUBJECTIVE:  Reclining in bed on 8 li HFNC, (?) desaturated to 70's when sat up on side of bed. Claims similar with respiratory function, minimal cough/sputum production.     OBJECTIVE    Medications    Continuous Infusions:   sodium chloride      dextrose         Scheduled Meds:   montelukast  10 mg Oral Nightly    dexamethasone  10 mg IntraVENous Q24H    insulin glargine  5 Units SubCUTAneous Nightly    ascorbic acid  500 mg Oral Daily    zinc sulfate  50 mg Oral Daily    vitamin D  2,000 Units Oral Daily    tiotropium  2 puff Inhalation Daily    budesonide-formoterol  2 puff Inhalation BID    ALPRAZolam  0.25 mg Oral Q4H While awake    enoxaparin  1 mg/kg SubCUTAneous BID    albuterol sulfate HFA  2 puff Inhalation 4x daily    sodium chloride flush  5-40 mL IntraVENous 2 times per day    PARoxetine  30 mg Oral BID    amLODIPine  5 mg Oral Daily    insulin lispro  0-6 Units SubCUTAneous TID WC    insulin lispro  0-3 Units SubCUTAneous Nightly       PRN Meds:guaiFENesin-dextromethorphan, aluminum & magnesium hydroxide-simethicone, melatonin, sodium chloride flush, sodium chloride, ondansetron **OR** ondansetron, polyethylene glycol, acetaminophen **OR** acetaminophen, glucose, dextrose, glucagon (rDNA), dextrose    Physical    VITALS:  /63   Pulse 54   Temp 97.6 °F (36.4 °C) (Oral)   Resp 20   Ht 5' 7\" (1.702 m)   Wt 184 lb 11.9 oz (83.8 kg)   SpO2 97%   BMI 28.94 kg/m²     24HR INTAKE/OUTPUT:    No intake or output data in the 24 hours ending 10/26/21 1638    24HR PULSE OXIMETRY RANGE:    SpO2  Av.5 %  Min: 96 %  Max: 97 %    General appearance: alert, appears stated age and cooperative  Lungs: rhonchi bibasilar with cough  Heart: regular rate and rhythm, S1, S2 normal, no murmur, click, rub or gallop  Abdomen: soft, non-tender; bowel sounds normal; no masses,  no organomegaly  Extremities: extremities normal, atraumatic, no cyanosis or edema  Neurologic: Mental status: Alert, oriented, thought content appropriate    Data    CBC:   Recent Labs     10/24/21  0730 10/25/21  0329 10/26/21  0412   WBC 6.2 6.5 8.0   HGB 10.6* 10.2* 10.7*   HCT 32.2* 31.7* 33.3*   MCV 83.0 83.4 84.3    222 235       BMP:  Recent Labs     10/24/21  0730 10/25/21  0329 10/26/21  0412    131* 133   K 3.7 3.7 4.2   CL 96* 95* 95*   CO2 33* 30* 32*   BUN 12 12 14   CREATININE 0.5* 0.4* 0.5*    ALB:3,BILIDIR:3,BILITOT:3,ALKPHOS:3)@    PT/INR: No results for input(s): PROTIME, INR in the last 72 hours. ABG:   No results for input(s): PH, PO2, PCO2, HCO3, BE, O2SAT, METHB, O2HB, COHB, O2CON, HHB, THB in the last 72 hours. FiO2 : 45 %       Radiology/Other tests reviewed: none    Assessment:     Active Problems:    COVID-19    Acute respiratory failure due to COVID-19 [facility])    Acute respiratory failure with hypoxia (HCC)    Pneumonia due to COVID-19 virus    Elevated LFTs    Hypokalemia    Essential hypertension    Non-insulin dependent type 2 diabetes mellitus ([facility]  Resolved Problems:    * No resolved hospital problems. *      Plan:       1. Cont with oxygen, taper as tolerated, will need to see how much supplementation required with activity  2. Cont with steroids, taper as tolerated, decrease to 6 mg daily, CXR tomorrow  3. singulair daily and see if helps with inflammation  4. Cont with MDI  5. Encourage incentive spirometer use and prone positioning when able to  6. OOB to chair      Time at the bedside, reviewing labs and radiographs, reviewing notes and consultations, discussing with staff and family was more than 35 minutes. Thanks for letting us see this patient in consultation. Please contact us with any questions. Office [phone] or after hours through [employer], x [phone].

## 2021-10-27 ENCOUNTER — APPOINTMENT (OUTPATIENT)
Dept: GENERAL RADIOLOGY | Age: 66
DRG: 871 | End: 2021-10-27
Payer: OTHER GOVERNMENT

## 2021-10-27 LAB
ALBUMIN SERPL-MCNC: 3 G/DL (ref 3.5–5.2)
ALP BLD-CCNC: 96 U/L (ref 40–129)
ALT SERPL-CCNC: 131 U/L (ref 0–40)
ANION GAP SERPL CALCULATED.3IONS-SCNC: 7 MMOL/L (ref 7–16)
AST SERPL-CCNC: 31 U/L (ref 0–39)
BILIRUB SERPL-MCNC: 0.3 MG/DL (ref 0–1.2)
BUN BLDV-MCNC: 14 MG/DL (ref 6–23)
CALCIUM SERPL-MCNC: 8.4 MG/DL (ref 8.6–10.2)
CHLORIDE BLD-SCNC: 95 MMOL/L (ref 98–107)
CO2: 29 MMOL/L (ref 22–29)
CREAT SERPL-MCNC: 0.5 MG/DL (ref 0.7–1.2)
FERRITIN: 1013 NG/ML
GFR AFRICAN AMERICAN: >60
GFR NON-AFRICAN AMERICAN: >60 ML/MIN/1.73
GLUCOSE BLD-MCNC: 139 MG/DL (ref 74–99)
HCT VFR BLD CALC: 33.1 % (ref 37–54)
HEMOGLOBIN: 10.7 G/DL (ref 12.5–16.5)
MCH RBC QN AUTO: 26.9 PG (ref 26–35)
MCHC RBC AUTO-ENTMCNC: 32.3 % (ref 32–34.5)
MCV RBC AUTO: 83.2 FL (ref 80–99.9)
METER GLUCOSE: 115 MG/DL (ref 74–99)
METER GLUCOSE: 116 MG/DL (ref 74–99)
METER GLUCOSE: 223 MG/DL (ref 74–99)
METER GLUCOSE: 274 MG/DL (ref 74–99)
PDW BLD-RTO: 14.9 FL (ref 11.5–15)
PLATELET # BLD: 264 E9/L (ref 130–450)
PMV BLD AUTO: 9.9 FL (ref 7–12)
POTASSIUM SERPL-SCNC: 4.2 MMOL/L (ref 3.5–5)
RBC # BLD: 3.98 E12/L (ref 3.8–5.8)
SODIUM BLD-SCNC: 131 MMOL/L (ref 132–146)
TOTAL PROTEIN: 5.9 G/DL (ref 6.4–8.3)
WBC # BLD: 8.5 E9/L (ref 4.5–11.5)

## 2021-10-27 PROCEDURE — 6370000000 HC RX 637 (ALT 250 FOR IP): Performed by: INTERNAL MEDICINE

## 2021-10-27 PROCEDURE — 97530 THERAPEUTIC ACTIVITIES: CPT

## 2021-10-27 PROCEDURE — 94660 CPAP INITIATION&MGMT: CPT

## 2021-10-27 PROCEDURE — 6370000000 HC RX 637 (ALT 250 FOR IP): Performed by: NURSE PRACTITIONER

## 2021-10-27 PROCEDURE — 2700000000 HC OXYGEN THERAPY PER DAY

## 2021-10-27 PROCEDURE — 71045 X-RAY EXAM CHEST 1 VIEW: CPT

## 2021-10-27 PROCEDURE — APPSS30 APP SPLIT SHARED TIME 16-30 MINUTES: Performed by: NURSE PRACTITIONER

## 2021-10-27 PROCEDURE — 82728 ASSAY OF FERRITIN: CPT

## 2021-10-27 PROCEDURE — 99233 SBSQ HOSP IP/OBS HIGH 50: CPT | Performed by: INTERNAL MEDICINE

## 2021-10-27 PROCEDURE — 85027 COMPLETE CBC AUTOMATED: CPT

## 2021-10-27 PROCEDURE — 2580000003 HC RX 258: Performed by: INTERNAL MEDICINE

## 2021-10-27 PROCEDURE — 82962 GLUCOSE BLOOD TEST: CPT

## 2021-10-27 PROCEDURE — 6360000002 HC RX W HCPCS: Performed by: INTERNAL MEDICINE

## 2021-10-27 PROCEDURE — 36415 COLL VENOUS BLD VENIPUNCTURE: CPT

## 2021-10-27 PROCEDURE — 1200000000 HC SEMI PRIVATE

## 2021-10-27 PROCEDURE — 80053 COMPREHEN METABOLIC PANEL: CPT

## 2021-10-27 RX ADMIN — Medication 2000 UNITS: at 09:52

## 2021-10-27 RX ADMIN — ALBUTEROL SULFATE 2 PUFF: 90 AEROSOL, METERED RESPIRATORY (INHALATION) at 09:53

## 2021-10-27 RX ADMIN — ENOXAPARIN SODIUM 80 MG: 100 INJECTION SUBCUTANEOUS at 21:39

## 2021-10-27 RX ADMIN — ALPRAZOLAM 0.25 MG: 0.25 TABLET ORAL at 18:21

## 2021-10-27 RX ADMIN — OXYCODONE HYDROCHLORIDE AND ACETAMINOPHEN 500 MG: 500 TABLET ORAL at 09:53

## 2021-10-27 RX ADMIN — ALBUTEROL SULFATE 2 PUFF: 90 AEROSOL, METERED RESPIRATORY (INHALATION) at 12:02

## 2021-10-27 RX ADMIN — AMLODIPINE BESYLATE 5 MG: 5 TABLET ORAL at 09:52

## 2021-10-27 RX ADMIN — ZINC SULFATE 220 MG (50 MG) CAPSULE 50 MG: CAPSULE at 09:52

## 2021-10-27 RX ADMIN — MONTELUKAST SODIUM 10 MG: 10 TABLET ORAL at 21:24

## 2021-10-27 RX ADMIN — ALBUTEROL SULFATE 2 PUFF: 90 AEROSOL, METERED RESPIRATORY (INHALATION) at 16:28

## 2021-10-27 RX ADMIN — INSULIN LISPRO 3 UNITS: 100 INJECTION, SOLUTION INTRAVENOUS; SUBCUTANEOUS at 16:26

## 2021-10-27 RX ADMIN — ALPRAZOLAM 0.25 MG: 0.25 TABLET ORAL at 15:05

## 2021-10-27 RX ADMIN — BUDESONIDE AND FORMOTEROL FUMARATE DIHYDRATE 2 PUFF: 160; 4.5 AEROSOL RESPIRATORY (INHALATION) at 09:53

## 2021-10-27 RX ADMIN — Medication 10 ML: at 09:54

## 2021-10-27 RX ADMIN — BUDESONIDE AND FORMOTEROL FUMARATE DIHYDRATE 2 PUFF: 160; 4.5 AEROSOL RESPIRATORY (INHALATION) at 21:23

## 2021-10-27 RX ADMIN — ALBUTEROL SULFATE 2 PUFF: 90 AEROSOL, METERED RESPIRATORY (INHALATION) at 21:23

## 2021-10-27 RX ADMIN — Medication 10 ML: at 21:39

## 2021-10-27 RX ADMIN — ENOXAPARIN SODIUM 80 MG: 100 INJECTION SUBCUTANEOUS at 09:52

## 2021-10-27 RX ADMIN — DEXAMETHASONE 6 MG: 6 TABLET ORAL at 09:53

## 2021-10-27 RX ADMIN — ALPRAZOLAM 0.25 MG: 0.25 TABLET ORAL at 05:45

## 2021-10-27 RX ADMIN — PAROXETINE 30 MG: 10 TABLET, FILM COATED ORAL at 21:23

## 2021-10-27 RX ADMIN — TIOTROPIUM BROMIDE INHALATION SPRAY 2 PUFF: 3.12 SPRAY, METERED RESPIRATORY (INHALATION) at 09:53

## 2021-10-27 RX ADMIN — ALPRAZOLAM 0.25 MG: 0.25 TABLET ORAL at 09:52

## 2021-10-27 RX ADMIN — ALPRAZOLAM 0.25 MG: 0.25 TABLET ORAL at 21:24

## 2021-10-27 RX ADMIN — PAROXETINE 30 MG: 10 TABLET, FILM COATED ORAL at 09:53

## 2021-10-27 RX ADMIN — INSULIN GLARGINE 5 UNITS: 100 INJECTION, SOLUTION SUBCUTANEOUS at 21:24

## 2021-10-27 RX ADMIN — INSULIN LISPRO 1 UNITS: 100 INJECTION, SOLUTION INTRAVENOUS; SUBCUTANEOUS at 21:24

## 2021-10-27 ASSESSMENT — PAIN SCALES - GENERAL: PAINLEVEL_OUTOF10: 0

## 2021-10-27 NOTE — PROGRESS NOTES
[facility] and [address] [facility] [address]  [address], [address]  ZEINA LUND [facility], [address] [place] [address]      Pulmonary Progress Note      SUBJECTIVE:  Reclining in bed on 8 li HFNC, desaturated to 70's when sat up on side of bed with PT and takes a few minutes to get back up to 90's. Claims similar with respiratory function, minimal cough/sputum production, using incentive spirometer.     OBJECTIVE    Medications    Continuous Infusions:   sodium chloride      dextrose         Scheduled Meds:   dexamethasone  6 mg Oral Daily    montelukast  10 mg Oral Nightly    insulin glargine  5 Units SubCUTAneous Nightly    ascorbic acid  500 mg Oral Daily    zinc sulfate  50 mg Oral Daily    vitamin D  2,000 Units Oral Daily    tiotropium  2 puff Inhalation Daily    budesonide-formoterol  2 puff Inhalation BID    ALPRAZolam  0.25 mg Oral Q4H While awake    enoxaparin  1 mg/kg SubCUTAneous BID    albuterol sulfate HFA  2 puff Inhalation 4x daily    sodium chloride flush  5-40 mL IntraVENous 2 times per day    PARoxetine  30 mg Oral BID    amLODIPine  5 mg Oral Daily    insulin lispro  0-6 Units SubCUTAneous TID WC    insulin lispro  0-3 Units SubCUTAneous Nightly       PRN Meds:guaiFENesin-dextromethorphan, aluminum & magnesium hydroxide-simethicone, melatonin, sodium chloride flush, sodium chloride, ondansetron **OR** ondansetron, polyethylene glycol, acetaminophen **OR** acetaminophen, glucose, dextrose, glucagon (rDNA), dextrose    Physical    VITALS:  BP (!) 148/74   Pulse 63   Temp 97.6 °F (36.4 °C) (Oral)   Resp 18   Ht 5' 7\" (1.702 m)   Wt 184 lb 11.9 oz (83.8 kg)   SpO2 94%   BMI 28.94 kg/m²     24HR INTAKE/OUTPUT:      Intake/Output Summary (Last 24 hours) at 10/27/2021 1401  Last data filed at 10/27/2021 0655  Gross per 24 hour   Intake --   Output 600 ml   Net -600 ml       24HR PULSE OXIMETRY RANGE:    SpO2  Av.7 %  Min: 93 %  Max: 94 %    General appearance: alert, appears stated age and cooperative  Lungs: rhonchi bibasilar with cough  Heart: regular rate and rhythm, S1, S2 normal, no murmur, click, rub or gallop  Abdomen: soft, non-tender; bowel sounds normal; no masses,  no organomegaly  Extremities: extremities normal, atraumatic, no cyanosis or edema  Neurologic: Mental status: Alert, oriented, thought content appropriate    Data    CBC:   Recent Labs     10/25/21  0329 10/26/21  0412 10/27/21  0204   WBC 6.5 8.0 8.5   HGB 10.2* 10.7* 10.7*   HCT 31.7* 33.3* 33.1*   MCV 83.4 84.3 83.2    235 264       BMP:  Recent Labs     10/25/21  0329 10/26/21  0412 10/27/21  0204   * 133 131*   K 3.7 4.2 4.2   CL 95* 95* 95*   CO2 30* 32* 29   BUN 12 14 14   CREATININE 0.4* 0.5* 0.5*    ALB:3,BILIDIR:3,BILITOT:3,ALKPHOS:3)@    PT/INR: No results for input(s): PROTIME, INR in the last 72 hours. ABG:   No results for input(s): PH, PO2, PCO2, HCO3, BE, O2SAT, METHB, O2HB, COHB, O2CON, HHB, THB in the last 72 hours. FiO2 : 45 %       Radiology/Other tests reviewed: CXR reviewed with similar increased GG interstitial markings right>left compared to previous    Assessment:     Active Problems:    COVID-19    Acute respiratory failure due to COVID-19 [facility])    Acute respiratory failure with hypoxia (HCC)    Pneumonia due to COVID-19 virus    Elevated LFTs    Hypokalemia    Essential hypertension    Non-insulin dependent type 2 diabetes mellitus ([facility]  Resolved Problems:    * No resolved hospital problems. *      Plan:       1. Cont with oxygen, taper as tolerated, observe need to increase oxygen requirements with PT, not much activity required to make him desaturate  2. Cont with steroids, taper as tolerated, decrease to 6 mg daily  3. singulair daily and see if helps with inflammation  4. Cont with MDIs, observe respiratory function and cough/sputum production  5. Encourage incentive spirometer use and prone positioning when able to  6. OOB to chair  7.  (?) still a candidate for LTAC or NF given significant weakness and dyspnea      Time at the bedside, reviewing labs and radiographs, reviewing notes and consultations, discussing with staff and family was more than 35 minutes. Thanks for letting us see this patient in consultation. Please contact us with any questions. Office [phone] or after hours through [employer], x [phone].

## 2021-10-27 NOTE — PROGRESS NOTES
[facility] Hospitalist   Progress Note    Admitting Date and Time: 9/22/2021  9:32 PM  Admit Dx: Hypokalemia [E87.6]  Acute respiratory failure with hypoxia ([address] [J96.01]  COVID-19 [U07.1]  Acute respiratory failure due to COVID-19 (HCC) [U07.1, J96.00]    Subjective:    10/22 Pt sitting up in bed in no acute distress. Has been able to wean FiO2 55% 55L Airvo. Encouraged to use IS. States he has been eating better. Pre-cert started for [employer]    10/23 Pt sitting up in bed in no acute distress. Remains on AirVo 55L 55%. States he feels well today. Has difficulty getting up to [facility]. States it takes a while for him to recover. Continues to eat well. Will need new IS at bedside. Awaiting VA benefit for DC planning. 10/24  Pt lying on left side eyes closed. Awakens easily to name. States he's tired this morning. Currently on 52% 50L. Tolerating well. 10/25 Pt lying in bed in no acute distress. Currently on 13L HFNC. Significantly improved. No longer has VA benefits and will need to private pay at [place]. Staets he is eating well. 10/26 Patient sitting up in bed in no acute distress. Patient states he is feeling much better today. Continues to do well with intake and nutritional status. Patient is currently on 8L HFNC tolerating well. Patient continues to have desaturation with movement and exertion. Set up on the side of the bed SPO2 dropped to the 70s. SNF when medically stable, but will need Medicaid approval.  Can however benefit from [facility] or home oxygen when able to DC.    10/27 Pt sitting up in bd in no acute distress. Pt states his SOB worsens with movement. He is agreeable to [place] upon DC. States he feels he will be too weak to go home at [place], but wants to wait until his oxygen is more stabilized. States he has been eating much better. Noted conversation dyspnea. SPO2 87% on 7L HFNC. RN: Patient is on 7L NC. Case Management working on [facility] for DC. Brother mat want to take home. ROS: denies fever, chills, cp, n/v, HA unless stated above.      dexamethasone  6 mg Oral Daily    montelukast  10 mg Oral Nightly    insulin glargine  5 Units SubCUTAneous Nightly    ascorbic acid  500 mg Oral Daily    zinc sulfate  50 mg Oral Daily    vitamin D  2,000 Units Oral Daily    tiotropium  2 puff Inhalation Daily    budesonide-formoterol  2 puff Inhalation BID    ALPRAZolam  0.25 mg Oral Q4H While awake    enoxaparin  1 mg/kg SubCUTAneous BID    albuterol sulfate HFA  2 puff Inhalation 4x daily    sodium chloride flush  5-40 mL IntraVENous 2 times per day    PARoxetine  30 mg Oral BID    amLODIPine  5 mg Oral Daily    insulin lispro  0-6 Units SubCUTAneous TID WC    insulin lispro  0-3 Units SubCUTAneous Nightly     guaiFENesin-dextromethorphan, 5 mL, Q4H PRN  aluminum & magnesium hydroxide-simethicone, 15 mL, Q6H PRN  melatonin, 4.5 mg, Nightly PRN  sodium chloride flush, 5-40 mL, PRN  sodium chloride, 25 mL, PRN  ondansetron, 4 mg, Q8H PRN   Or  ondansetron, 4 mg, Q6H PRN  polyethylene glycol, 17 g, Daily PRN  acetaminophen, 650 mg, Q6H PRN   Or  acetaminophen, 650 mg, Q6H PRN  glucose, 15 g, PRN  dextrose, 12.5 g, PRN  glucagon (rDNA), 1 mg, PRN  dextrose, 100 mL/hr, PRN         Objective:    BP (!) 148/74   Pulse 63   Temp 97.6 °F (36.4 °C) (Oral)   Resp 18   Ht 5' 7\" (1.702 m)   Wt 184 lb 11.9 oz (83.8 kg)   SpO2 94%   BMI 28.94 kg/m²   General Appearance: alert and oriented to person, place and time and in respiratory  distress  Skin: warm and dry  Head: normocephalic and atraumatic  Eyes: pupils equal, round, and reactive to light, extraocular eye movements intact, conjunctivae normal  Neck: neck supple and non tender without mass   Pulmonary/Chest:Diminished/rhonchi to auscultation bilaterally- no wheezes, rales  normal air movement, acute respiratory distress Cardiovascular: normal rate, normal S1 and S2 and no RGM  Abdomen: soft, non-tender, non-distended, normal bowel sounds  Extremities: no cyanosis, no clubbing and no edema  Neurologic: no cranial nerve deficit and speech normal      Recent Labs     10/25/21  0329 10/26/21  0412 10/27/21  0204   * 133 131*   K 3.7 4.2 4.2   CL 95* 95* 95*   CO2 30* 32* 29   BUN 12 14 14   CREATININE 0.4* 0.5* 0.5*   GLUCOSE 125* 145* 139*   CALCIUM 8.3* 8.3* 8.4*       Recent Labs     10/25/21  0329 10/26/21  0412 10/27/21  0204   ALKPHOS 85 86 96   PROT 5.8* 5.3* 5.9*   LABALBU 2.9* 3.0* 3.0*   BILITOT 0.3 0.3 0.3   AST 26 25 31   * 116* 131*       Recent Labs     10/25/21  0329 10/26/21  0412 10/27/21  0204   WBC 6.5 8.0 8.5   RBC 3.80 3.95 3.98   HGB 10.2* 10.7* 10.7*   HCT 31.7* 33.3* 33.1*   MCV 83.4 84.3 83.2   MCH 26.8 27.1 26.9   MCHC 32.2 32.1 32.3   RDW 14.8 14.9 14.9    235 264   MPV 9.8 9.6 9.9           Radiology:   XR CHEST PORTABLE   Final Result   1. Persistence of right worse than left pulmonary opacification   2. Improved inspiration         XR CHEST PORTABLE   Final Result   Bilateral airspace disease without clear interval change. Suboptimal   inspiration as noted. XR CHEST PORTABLE   Final Result   No significant change in the appearance of the extensive airspace opacities   throughout both lungs, in keeping with the known history of COVID pneumonia. XR CHEST PORTABLE   Final Result   1. There is no interval change in extensive multifocal bilateral airspace   disease. XR CHEST PORTABLE   Final Result   1. No interval change in the extensive multifocal bilateral airspace disease. XR CHEST PORTABLE   Final Result   No interval change         XR CHEST PORTABLE   Final Result   1. Significant worsening of the multifocal bilateral pneumonia when compared   with the prior CT scan of 09/22/2021         CTA PULMONARY W CONTRAST   Final Result   Within described exam limitations, no evidence of pulmonary embolism.       Scattered multifocal low density infiltrates throughout both Baricitinib/Dexamethasone Taper per Pulmonology/MDI. Vitamin Supplementation. Follow inflammatory markers. Lovenox 1mg/kg. Encourage IS/Pronating. DDImer [address]  Isolation DC. Supportive care. Pulmonology following. 3. Anxiety- Continue Xanax Q4 W/A. 4. DM- Continue SSI/hypoglycemic protocol/Carb Control diet. Follow adjust as needed. 5. Anemia- Hgb 10.7. No overt signs of bleeding noted. Follow transfuse as needed. 6. HTN- Continue Amlodipine. 7. Poor intake- 2//2 Respiratory distress. Improving. Pt with increased intake and tolerating off BP. Follow Calorie intake. 8. Hyponatremia- 2/2 poor oral intake. States intake is improving. Monitor   9. Generalized weakness/deconditioning-PT/OT. Increase activity as tolerated. OOB to chair. 10. Disposition- Pt Oxygen needs decreased to 7L HFNC. Continues to have desaturation with any movement. Noted conversational dyspnea. Pt continues to require increased oxygen need with exertion. Is agreeable to [facility] upon DC. Case management following. NOTE: This report was transcribed using voice recognition software. Every effort was made to ensure accuracy; however, inadvertent computerized transcription errors may be present. Addendum: I have personally participated in the history, exam, medical decision making with Rik Garcia on the date of service and I agree with all of the pertinent clinical information unless otherwise noted. I have also reviewed and agree with the past medical, family, and social history unless otherwise noted. Patient was admitted with sob    PHYSICAL EXAM:  Vitals:  BP (!) 148/74   Pulse 63   Temp 97.6 °F (36.4 °C) (Oral)   Resp 18   Ht 5' 7\" (1.702 m)   Wt 184 lb 11.9 oz (83.8 kg)   SpO2 94%   BMI 28.94 kg/m²   Gen: awake, alert, NAD  Lungs: clear to auscultation bilaterally no crackles no wheezing. Heart: RRR, no murmur   Abdomen: soft nontender nondistended positive bowel sounds.   Extremities: full range of motion no peripheral edema. Impression:  Active Problems:    COVID-19    Acute respiratory failure due to COVID-19 [facility])    Acute respiratory failure with hypoxia (HCC)    Pneumonia due to COVID-19 virus    Elevated LFTs    Hypokalemia    Essential hypertension    Non-insulin dependent type 2 diabetes mellitus ([facility]  Resolved Problems:    * No resolved hospital problems. *      My findings/plan include:    Patient is being treated for acute hypoxic respiratory failure 2/2 covid 19 pneumonia. Currently on 7 L NC. Patient completed decadron and remdesivir. Pulmonology on board. He is doing much better currently. Discharge planning has been difficult due to confusion about VA insurance. Plan is for discharge to [address] [place] [address]. NOTE: This report was transcribed using voice recognition software. Every effort was made to ensure accuracy; however, inadvertent computerized transcription errors may be present.   Electronically signed by Camila Kendall DO on 10/27/2021 at 2:28 PM

## 2021-10-27 NOTE — CARE COORDINATION
Social Work / Discharge Planning :Patient currently on 7 liters of oxygen. Issue is when they move patient, he will drop his 02 sats dramatically. Once this issue is stable, patient NOW wants to go possibly home with  his brother with HOME 02. Brother in agreement for patient to come and stay with him. SW did speak to [address] at [facility] and she did verify patient DOES have coverage for HOME 02 and HHC through them. 02 will need to be arranged through [place] . VA HOME oxygen number to order is# [phone]. VA contract needs completed and signed by patient and faxed [phone]. VA forms located in CM office if this is plan. They will need 24 hour notice for NEW oxygen set up. [place] is also another option and patient will have to agree to apply for medicaid prior to discharge. TANMA[place] to follow.  Electronically signed by ALIZA Correia on 10/27/21 at 10:17 AM EDT

## 2021-10-27 NOTE — PROGRESS NOTES
Physical Therapy    Facility/Department: [address] MED SURG/TELE   Rx note   NAME: Felicitas Cameron  : 1955  MRN: [id number]    Date of Service: 10/27/2021      Attending Provider:  Mimi Hendricks DO    Evaluating PT:  Dulce Stafford, P.T. Room #:  5474/3782-O  Diagnosis:  Hypokalemia [E87.6]  Acute respiratory failure with hypoxia ([facility]) [J96.01]  COVID-19 [U07.1]  Acute respiratory failure due to COVID-19 ([facility] [U07.1, J96.00]  Pertinent PMHx/PSHx:  COVID 19+ since 21  Precautions:  No longer in Droplet isolation even though he continues to test positive for COVID 19.  Pt's O2 sat drops with minimal activity. Falls    SUBJECTIVE:    Pt lives alone in a 2 story home with 4 stairs and 1 rail to enter. There are 12 steps and 1 rail to 2nd floor bed and bath. Pt ambulated with no PTA. OBJECTIVE:   Initial Evaluation  Date: 10/22/21 Treatment  27 Short Term/ Long Term   Goals   Was pt agreeable to Eval/treatment? yes     Does pt have pain? C/o intermittent RLE pain No co pain     Bed Mobility  Rolling: SBA  Supine to sit: MIN A  Sit to supine: SBA  Scooting: SBA  rolling  Min assist    See note   Sit to supine sba   Scooting sb a Independent    Transfers Sit to stand: NA  Stand to sit: NA  Stand pivot: NA   NT Independent    Ambulation   NA  200 feet with AAD if needed Independent    Stair negotiation: ascended and descended NA  10 steps with 1 rail Independent   AM-PAC 6 Clicks 43/99       BLE ROM is WFL. BLE strength is grossly 4/5.    Sensation:  Pt denies numbness and tingling to extremities  Edema:  None noted  Balance: sitting is SBA  Endurance: poor      Patient education  Pt educated on breathing technique  Patient response to education:   Pt verbalized understanding Pt demonstrated skill Pt requires further education in this area   yes Yes with VCs yes     ASSESSMENT:    Conditions Requiring Skilled Therapeutic Intervention:    [x]Decreased strength     []Decreased ROM  [x]Decreased functional mobility  []Decreased balance   [x]Decreased endurance   []Decreased posture  []Decreased sensation  []Decreased coordination   []Decreased vision  []Decreased safety awareness   []Increased pain     Comments:    Pt in bed upon arrival 8 liters 02. Pt and nursing reports they got  Pt up earlier and pulse ox dropped down into 60 range. Raised hob and had pt sit and pulse ox in 89 to 90  To 91 range. Therapist assisted pt to eob so as not to over exhert pt. Pt then sat on eob with pillow b/t pt side shoulder and bed so pt could lean onto bed if necessery. Pt 02 holding but lowered bed and pt dropped it dropped to 77 to 85. Increased to 11 liters briefly and pulse ox came up into low 90's. Pt returned to long sitting in bed on 8 liters. Pt started with quad sets, hs sets, 5 heel slides B LE pulse ox 88 to 90 range. Pt rested briefly b/t activity. Performed slr with assist 2 x 5. Rest and then hip abd x 10. Pt's pulse ox holding with rest periods. Pt then performed supine march x 10 and hob elevated and pt performed mini abd ex leaning fwd and back to bed with pulse holding 2 x 5 with rests b/t. Pt performed seated taking left leg off of bed and following with right and reaching for rail and repeated 5 x. Pt lowered and performed supine marches and then rolling. Pt tolerating gentle activity with continous pulse ox on and when pt fatigue rest and breathe.  educated pt on doing isometric bed ex, supine march and ue movement. Explained to pt to do within tolerance and watch pulse ox and if fatigued and sob stop ex. Pt did much better with body supported by bed versus unsupported sitting. Pt requires frequent rests. Pt limited by his impaired respiratory function and low O2 sat that limited further mobility at this time. Pt was left in bed with 02 at 8 liters and call light. Pt's/ family goals   1. To breath better and go home.      Patient and or

## 2021-10-27 NOTE — PROGRESS NOTES
Comprehensive Nutrition Assessment    Type and Reason for Visit:  Reassess    Nutrition Recommendations/Plan: Continue Diet. Will Continue Glucerna Diabetic ONS BID. Nutrition Assessment:  Pt continues to improve from a nutritional stand-point AEB pt consuming ~75% of most meals currently however remains at risk d/t sporadic poor intakes at times d/t continued SOB 2/2 COVID19+ PNA on adm. Will continue ONS and monitor. Malnutrition Assessment:  Malnutrition Status: At risk for malnutrition (Comment)    Context:  Acute Illness     Findings of the 6 clinical characteristics of malnutrition:  Energy Intake:  1 - 75% or less of estimated energy requirements for 7 or more days  Weight Loss:  Unable to assess (2/2 poor EMR wt hx pta)     Body Fat Loss:  Unable to assess (2/2 COVID Iso)     Muscle Mass Loss:  Unable to assess    Fluid Accumulation:  No significant fluid accumulation     Strength:  Not Performed    Estimated Daily Nutrient Needs:  Energy (kcal):  MSJx1. 3SF= 1462-4126; Weight Used for Energy Requirements:  Current     Protein (g):  1.3-1.5 gm/kg= ; Weight Used for Protein Requirements:  Ideal        Fluid (ml/day):  6263-4416; Method Used for Fluid Requirements:  1 ml/kcal      Nutrition Related Findings:  A&O, dentition WNL, Abd/BS WDL, Trace BLE edema, -I/O's      Wounds:  None       Current Nutrition Therapies:    ADULT DIET; Regular; 5 carb choices (75 gm/meal)  Adult Oral Nutrition Supplement; Diabetic Oral Supplement    Anthropometric Measures:  · Height: 5' 7\" (170.2 cm)  · Current Body Weight: 184 lb (83.5 kg) (actual 9/23)   · Admission Body Weight: 184 lb (83.5 kg) (actual 9/23)    · Usual Body Weight:  (UTO per EMR)     · Ideal Body Weight: 148 lbs; % Ideal Body Weight 124.8 %   · BMI: 28.8  · Adjusted Body Weight:  ; No Adjustment   · Adjusted BMI:      · BMI Categories: Overweight (BMI 25.0-29. 9)       Nutrition Diagnosis:   · Inadequate oral intake related to impaired

## 2021-10-28 LAB
ALBUMIN SERPL-MCNC: 3 G/DL (ref 3.5–5.2)
ALP BLD-CCNC: 88 U/L (ref 40–129)
ALT SERPL-CCNC: 132 U/L (ref 0–40)
ANION GAP SERPL CALCULATED.3IONS-SCNC: 8 MMOL/L (ref 7–16)
AST SERPL-CCNC: 29 U/L (ref 0–39)
BILIRUB SERPL-MCNC: 0.3 MG/DL (ref 0–1.2)
BUN BLDV-MCNC: 14 MG/DL (ref 6–23)
CALCIUM SERPL-MCNC: 8.5 MG/DL (ref 8.6–10.2)
CHLORIDE BLD-SCNC: 95 MMOL/L (ref 98–107)
CO2: 29 MMOL/L (ref 22–29)
CREAT SERPL-MCNC: 0.5 MG/DL (ref 0.7–1.2)
FERRITIN: 953 NG/ML
GFR AFRICAN AMERICAN: >60
GFR NON-AFRICAN AMERICAN: >60 ML/MIN/1.73
GLUCOSE BLD-MCNC: 141 MG/DL (ref 74–99)
HCT VFR BLD CALC: 31.3 % (ref 37–54)
HEMOGLOBIN: 10.3 G/DL (ref 12.5–16.5)
MCH RBC QN AUTO: 27.7 PG (ref 26–35)
MCHC RBC AUTO-ENTMCNC: 32.9 % (ref 32–34.5)
MCV RBC AUTO: 84.1 FL (ref 80–99.9)
METER GLUCOSE: 125 MG/DL (ref 74–99)
METER GLUCOSE: 130 MG/DL (ref 74–99)
METER GLUCOSE: 198 MG/DL (ref 74–99)
METER GLUCOSE: 217 MG/DL (ref 74–99)
PDW BLD-RTO: 15.1 FL (ref 11.5–15)
PLATELET # BLD: 232 E9/L (ref 130–450)
PMV BLD AUTO: 9.7 FL (ref 7–12)
POTASSIUM SERPL-SCNC: 4.1 MMOL/L (ref 3.5–5)
RBC # BLD: 3.72 E12/L (ref 3.8–5.8)
SODIUM BLD-SCNC: 132 MMOL/L (ref 132–146)
TOTAL PROTEIN: 5.2 G/DL (ref 6.4–8.3)
WBC # BLD: 7.7 E9/L (ref 4.5–11.5)

## 2021-10-28 PROCEDURE — 2580000003 HC RX 258: Performed by: INTERNAL MEDICINE

## 2021-10-28 PROCEDURE — 6370000000 HC RX 637 (ALT 250 FOR IP): Performed by: INTERNAL MEDICINE

## 2021-10-28 PROCEDURE — 82728 ASSAY OF FERRITIN: CPT

## 2021-10-28 PROCEDURE — 1200000000 HC SEMI PRIVATE

## 2021-10-28 PROCEDURE — 99233 SBSQ HOSP IP/OBS HIGH 50: CPT | Performed by: INTERNAL MEDICINE

## 2021-10-28 PROCEDURE — APPSS30 APP SPLIT SHARED TIME 16-30 MINUTES: Performed by: NURSE PRACTITIONER

## 2021-10-28 PROCEDURE — 94660 CPAP INITIATION&MGMT: CPT

## 2021-10-28 PROCEDURE — 6370000000 HC RX 637 (ALT 250 FOR IP): Performed by: NURSE PRACTITIONER

## 2021-10-28 PROCEDURE — 2700000000 HC OXYGEN THERAPY PER DAY

## 2021-10-28 PROCEDURE — 6360000002 HC RX W HCPCS: Performed by: INTERNAL MEDICINE

## 2021-10-28 PROCEDURE — 82962 GLUCOSE BLOOD TEST: CPT

## 2021-10-28 PROCEDURE — 85027 COMPLETE CBC AUTOMATED: CPT

## 2021-10-28 PROCEDURE — 80053 COMPREHEN METABOLIC PANEL: CPT

## 2021-10-28 PROCEDURE — 36415 COLL VENOUS BLD VENIPUNCTURE: CPT

## 2021-10-28 RX ADMIN — MONTELUKAST SODIUM 10 MG: 10 TABLET ORAL at 19:56

## 2021-10-28 RX ADMIN — Medication 2000 UNITS: at 08:50

## 2021-10-28 RX ADMIN — ALBUTEROL SULFATE 2 PUFF: 90 AEROSOL, METERED RESPIRATORY (INHALATION) at 20:02

## 2021-10-28 RX ADMIN — INSULIN GLARGINE 5 UNITS: 100 INJECTION, SOLUTION SUBCUTANEOUS at 20:06

## 2021-10-28 RX ADMIN — BUDESONIDE AND FORMOTEROL FUMARATE DIHYDRATE 2 PUFF: 160; 4.5 AEROSOL RESPIRATORY (INHALATION) at 20:02

## 2021-10-28 RX ADMIN — AMLODIPINE BESYLATE 5 MG: 5 TABLET ORAL at 08:50

## 2021-10-28 RX ADMIN — ALBUTEROL SULFATE 2 PUFF: 90 AEROSOL, METERED RESPIRATORY (INHALATION) at 08:48

## 2021-10-28 RX ADMIN — PAROXETINE 30 MG: 10 TABLET, FILM COATED ORAL at 08:49

## 2021-10-28 RX ADMIN — ALBUTEROL SULFATE 2 PUFF: 90 AEROSOL, METERED RESPIRATORY (INHALATION) at 15:00

## 2021-10-28 RX ADMIN — PAROXETINE 30 MG: 10 TABLET, FILM COATED ORAL at 19:57

## 2021-10-28 RX ADMIN — TIOTROPIUM BROMIDE INHALATION SPRAY 2 PUFF: 3.12 SPRAY, METERED RESPIRATORY (INHALATION) at 08:49

## 2021-10-28 RX ADMIN — ENOXAPARIN SODIUM 80 MG: 100 INJECTION SUBCUTANEOUS at 19:58

## 2021-10-28 RX ADMIN — BUDESONIDE AND FORMOTEROL FUMARATE DIHYDRATE 2 PUFF: 160; 4.5 AEROSOL RESPIRATORY (INHALATION) at 08:48

## 2021-10-28 RX ADMIN — ALPRAZOLAM 0.25 MG: 0.25 TABLET ORAL at 14:57

## 2021-10-28 RX ADMIN — INSULIN LISPRO 1 UNITS: 100 INJECTION, SOLUTION INTRAVENOUS; SUBCUTANEOUS at 20:05

## 2021-10-28 RX ADMIN — ALPRAZOLAM 0.25 MG: 0.25 TABLET ORAL at 06:07

## 2021-10-28 RX ADMIN — Medication 10 ML: at 08:49

## 2021-10-28 RX ADMIN — ALBUTEROL SULFATE 2 PUFF: 90 AEROSOL, METERED RESPIRATORY (INHALATION) at 11:00

## 2021-10-28 RX ADMIN — ENOXAPARIN SODIUM 80 MG: 100 INJECTION SUBCUTANEOUS at 08:56

## 2021-10-28 RX ADMIN — ALPRAZOLAM 0.25 MG: 0.25 TABLET ORAL at 18:26

## 2021-10-28 RX ADMIN — ALPRAZOLAM 0.25 MG: 0.25 TABLET ORAL at 10:58

## 2021-10-28 RX ADMIN — ZINC SULFATE 220 MG (50 MG) CAPSULE 50 MG: CAPSULE at 08:50

## 2021-10-28 RX ADMIN — DEXAMETHASONE 6 MG: 6 TABLET ORAL at 08:50

## 2021-10-28 RX ADMIN — INSULIN LISPRO 2 UNITS: 100 INJECTION, SOLUTION INTRAVENOUS; SUBCUTANEOUS at 16:39

## 2021-10-28 RX ADMIN — OXYCODONE HYDROCHLORIDE AND ACETAMINOPHEN 500 MG: 500 TABLET ORAL at 08:50

## 2021-10-28 RX ADMIN — Medication 10 ML: at 20:03

## 2021-10-28 ASSESSMENT — PAIN SCALES - GENERAL
PAINLEVEL_OUTOF10: 0
PAINLEVEL_OUTOF10: 0

## 2021-10-28 NOTE — PLAN OF CARE
Problem: Isolation Precautions - Risk of Spread of Infection  Goal: Prevent transmission of infection  Outcome: Completed

## 2021-10-28 NOTE — CARE COORDINATION
COVID + 9/22, 10/19, 10/20. Pt states he applied for Medicaid a couple days ago. Per [facility], they can accept pt under pending Medicaid and have a bed for him. PT am-pac 16. Pt will need Level of care completed. Requiring O2 6l NC-per nursing pt desats quickly with any activity. N-17 in epic, ambulance transport form on chart.  Will follow Deysi Dover RN case manager

## 2021-10-28 NOTE — PROGRESS NOTES
[facility]  [address] [facility], [address]  [facility], [facility]      Pulmonary Progress Note      SUBJECTIVE:  Reclining in bed on 7 li HFNC, still desaturating down to high 70's with activity. Claims similar with respiratory function, minimal cough/sputum production, using incentive spirometer.     OBJECTIVE    Medications    Continuous Infusions:   sodium chloride      dextrose         Scheduled Meds:   dexamethasone  6 mg Oral Daily    montelukast  10 mg Oral Nightly    insulin glargine  5 Units SubCUTAneous Nightly    ascorbic acid  500 mg Oral Daily    zinc sulfate  50 mg Oral Daily    vitamin D  2,000 Units Oral Daily    tiotropium  2 puff Inhalation Daily    budesonide-formoterol  2 puff Inhalation BID    ALPRAZolam  0.25 mg Oral Q4H While awake    enoxaparin  1 mg/kg SubCUTAneous BID    albuterol sulfate HFA  2 puff Inhalation 4x daily    sodium chloride flush  5-40 mL IntraVENous 2 times per day    PARoxetine  30 mg Oral BID    amLODIPine  5 mg Oral Daily    insulin lispro  0-6 Units SubCUTAneous TID WC    insulin lispro  0-3 Units SubCUTAneous Nightly       PRN Meds:guaiFENesin-dextromethorphan, aluminum & magnesium hydroxide-simethicone, melatonin, sodium chloride flush, sodium chloride, ondansetron **OR** ondansetron, polyethylene glycol, acetaminophen **OR** acetaminophen, glucose, dextrose, glucagon (rDNA), dextrose    Physical    VITALS:  /77   Pulse 84   Temp 97.7 °F (36.5 °C) (Oral)   Resp 24   Ht 5' 7\" (1.702 m)   Wt 178 lb 9.2 oz (81 kg)   SpO2 90%   BMI 27.97 kg/m²     24HR INTAKE/OUTPUT:    No intake or output data in the 24 hours ending 10/28/21 1343    24HR PULSE OXIMETRY RANGE:    SpO2  Av.8 %  Min: 90 %  Max: 96 %    General appearance: alert, appears stated age and cooperative  Lungs: rhonchi bibasilar with cough  Heart: regular rate and rhythm, S1, S2 normal, no murmur, click, rub or gallop  Abdomen: soft, non-tender; bowel sounds normal; no masses,  no organomegaly  Extremities: extremities normal, atraumatic, no cyanosis or edema  Neurologic: Mental status: Alert, oriented, thought content appropriate    Data    CBC:   Recent Labs     10/26/21  0412 10/27/21  0204 10/28/21  0452   WBC 8.0 8.5 7.7   HGB 10.7* 10.7* 10.3*   HCT 33.3* 33.1* 31.3*   MCV 84.3 83.2 84.1    264 232       BMP:  Recent Labs     10/26/21  0412 10/27/21  0204 10/28/21  0452    131* 132   K 4.2 4.2 4.1   CL 95* 95* 95*   CO2 32* 29 29   BUN 14 14 14   CREATININE 0.5* 0.5* 0.5*    ALB:3,BILIDIR:3,BILITOT:3,ALKPHOS:3)@    PT/INR: No results for input(s): PROTIME, INR in the last 72 hours. ABG:   No results for input(s): PH, PO2, PCO2, HCO3, BE, O2SAT, METHB, O2HB, COHB, O2CON, HHB, THB in the last 72 hours. FiO2 : 45 %       Radiology/Other tests reviewed: CXR reviewed with similar increased GG interstitial markings right>left compared to previous    Assessment:     Active Problems:    COVID-19    Acute respiratory failure due to COVID-19 [facility])    Acute respiratory failure with hypoxia (HCC)    Pneumonia due to COVID-19 virus    Elevated LFTs    Hypokalemia    Essential hypertension    Non-insulin dependent type 2 diabetes mellitus ([facility] [address]  Resolved Problems:    * No resolved hospital problems. *      Plan:       1. Cont with oxygen, taper as tolerated, observe degree of desaturation with movement/activity  2. Cont with steroids, taper as tolerated, 6 mg daily until end of the week then 4 mg daily x5 days then stop  3. singulair daily and see if helps with inflammation  4. Cont with MDIs, observe respiratory function and cough/sputum production  5. Encourage incentive spirometer use and prone positioning when able to  6. OOB to chair      Time at the bedside, reviewing labs and radiographs, reviewing notes and consultations, discussing with staff and family was more than 35 minutes.       Thanks for letting us

## 2021-10-28 NOTE — PLAN OF CARE
Problem: Airway Clearance - Ineffective  Goal: Achieve or maintain patent airway  Outcome: Met This Shift     Problem: Gas Exchange - Impaired  Goal: Absence of hypoxia  Outcome: Met This Shift  Goal: Promote optimal lung function  Outcome: Met This Shift     Problem: Breathing Pattern - Ineffective  Goal: Ability to achieve and maintain a regular respiratory rate  Outcome: Met This Shift     Problem: Body Temperature -  Risk of, Imbalanced  Goal: Ability to maintain a body temperature within defined limits  Outcome: Met This Shift  Goal: Will regain or maintain usual level of consciousness  Outcome: Met This Shift  Goal: Complications related to the disease process, condition or treatment will be avoided or minimized  Outcome: Met This Shift     Problem: Isolation Precautions - Risk of Spread of Infection  Goal: Prevent transmission of infection  Outcome: Completed     Problem: Isolation Precautions - Risk of Spread of Infection  Goal: Prevent transmission of infection  Outcome: Completed     Problem: Risk for Fluid Volume Deficit  Goal: Maintain normal heart rhythm  Outcome: Met This Shift  Goal: Maintain absence of muscle cramping  Outcome: Met This Shift  Goal: Maintain normal serum potassium, sodium, calcium, phosphorus, and pH  Outcome: Met This Shift     Problem: Fatigue  Goal: Verbalize increase energy and improved vitality  Outcome: Met This Shift     Problem: Patient Education: Go to Patient Education Activity  Goal: Patient/Family Education  Outcome: Met This Shift     Problem: Pain:  Goal: Pain level will decrease  Description: Pain level will decrease  Outcome: Met This Shift  Goal: Control of acute pain  Description: Control of acute pain  Outcome: Met This Shift  Goal: Control of chronic pain  Description: Control of chronic pain  Outcome: Met This Shift     Problem: Skin Integrity:  Goal: Will show no infection signs and symptoms  Description: Will show no infection signs and symptoms  Outcome:  Met This Shift  Goal: Absence of new skin breakdown  Description: Absence of new skin breakdown  Outcome: Met This Shift     Problem: Daily Care:  Goal: Daily care needs are met  Description: Daily care needs are met  Outcome: Met This Shift

## 2021-10-28 NOTE — DISCHARGE INSTR - COC
Continuity of Care Form    Patient Name: Fadi Waldron   :  1955  MRN:  [id number]    Admit date:  2021  Discharge date:  2021    Code Status Order: Full Code   Advance Directives:     Admitting Physician:  Skylar Hunter DO  PCP: No primary care provider on file. Discharging Nurse: Radha Hansen Unit/Room#: 9190/7305-Z  Discharging Unit Phone Number: [phone]    Emergency Contact:   Extended Emergency Contact Information  Primary Emergency Contact: [facility] Phone: [phone]  Relation: Child  Preferred language: English   needed? No  Secondary Emergency Contact: Bridgette Renner  Mobile Phone: [phone]  Relation: Child    Past Surgical History:  History reviewed. No pertinent surgical history. Immunization History: There is no immunization history on file for this patient.     Active Problems:  Patient Active Problem List   Diagnosis Code    COVID-19 U07.1    Acute respiratory failure due to COVID-19 ([facility] [address] U07.1, J96.00    Acute respiratory failure with hypoxia ([address] J96.01    Pneumonia due to COVID-19 virus U07.1, J12.82    Elevated LFTs R79.89    Hypokalemia E87.6    Essential hypertension I10    Non-insulin dependent type 2 diabetes mellitus (HCC) E11.9       Isolation/Infection:   Isolation          No Isolation        Patient Infection Status     Infection Onset Added Last Indicated Last Indicated By Review Planned Expiration Resolved Resolved By    None active    Resolved    COVID-19 09/22/21 09/22/21 10/20/21 COVID-19, Rapid   10/21/21 Genaro Cheek RN    COVID-19 Rule Out 21 COVID-19, Rapid (Ordered)   21 Rule-Out Test Resulted          Nurse Assessment:  Last Vital Signs: /77   Pulse 84   Temp 97.7 °F (36.5 °C) (Oral)   Resp 24   Ht 5' 7\" (1.702 m)   Wt 178 lb 9.2 oz (81 kg)   SpO2 90%   BMI 27.97 kg/m²     Last documented pain score (0-10 scale): Pain Level: 0  Last Weight:   Wt Readings from Last 1 Encounters:   10/28/21 178 lb 9.2 oz (81 kg)     Mental Status:  oriented and alert    IV Access:  - None    Nursing Mobility/ADLs:  Walking   Assisted  Transfer  Assisted  Bathing  Assisted  Dressing  Assisted  Toileting  Independent  Feeding  Independent  Med Admin  Independent  Med Delivery   whole    Wound Care Documentation and Therapy:        Elimination:  Continence:   · Bowel: {YES / YZ:78384}  · Bladder: {YES / DL:39333}  Urinary Catheter: None   Colostomy/Ileostomy/Ileal Conduit: {YES / SF:62001}       Date of Last BM: ***  No intake or output data in the 24 hours ending 10/28/21 1022  No intake/output data recorded. Safety Concerns:     None    Impairments/Disabilities:      Hearing    Nutrition Therapy:  Current Nutrition Therapy:   - Oral Diet:  Carb Control 4 carbs/meal (1800kcals/day)    Routes of Feeding: Oral  Liquids: {Slp liquid thickness:91396}  Daily Fluid Restriction: no  Last Modified Barium Swallow with Video (Video Swallowing Test): not done    Treatments at the Time of Hospital Discharge:   Respiratory Treatments: ***  Oxygen Therapy:  is on oxygen at 6 L/min per nasal cannula.   Ventilator:    - No ventilator support    Rehab Therapies: {THERAPEUTIC INTERVENTION:3221042185}  Weight Bearing Status/Restrictions: No weight bearing restirctions  Other Medical Equipment (for information only, NOT a DME order):  {EQUIPMENT:765176536}  Other Treatments: ***    Patient's personal belongings (please select all that are sent with patient):  Glasses, Hearing Aides bilateral    RN SIGNATURE:  Electronically signed by Jhonathan Sutton RN on 11/2/21 at 2:31 PM EDT    CASE MANAGEMENT/SOCIAL WORK SECTION    Inpatient Status Date: 9/23/2021    Readmission Risk Assessment Score:  Readmission Risk              Risk of Unplanned Readmission:  25           Discharging to Facility/ Agency   · Name: [facility]  · Address: [address]  · Phone: [phone]  · Fax: [phone]    Dialysis Facility (if applicable)   · Name:  · Address:  · Dialysis Schedule:  · Phone:  · Fax:    / signature: Electronically signed by Alan Dahl RN on 10/28/2021 at 10:24 AM      PHYSICIAN SECTION    Prognosis: Good    Condition at Discharge: Stable    Rehab Potential (if transferring to Rehab): Good    Recommended Labs or Other Treatments After Discharge: ***    Physician Certification: I certify the above information and transfer of Brenden Amaro  is necessary for the continuing treatment of the diagnosis listed and that he requires [facility] for less 30 days.      Update Admission H&P: {CHP DME Changes in VGJYY:604360989}    PHYSICIAN SIGNATURE:  {Esignature:[id number]}

## 2021-10-28 NOTE — PROGRESS NOTES
[facility] Progress Note    Admitting Date and Time: 9/22/2021  9:32 PM  Admit Dx: Hypokalemia [E87.6]  Acute respiratory failure with hypoxia (HCC) [J96.01]  COVID-19 [U07.1]  Acute respiratory failure due to COVID-19 (HCC) [U07.1, J96.00]    Subjective:  Patient is being followed for Hypokalemia [E87.6]  Acute respiratory failure with hypoxia ([address] [J96.01]  COVID-19 [U07.1]  Acute respiratory failure due to COVID-19 ([address] [U07.1, J96.00]     He is resting comfortably in bed. Sat at bedside for 20 minutes today. Remains on 6 LPM NC at rest - however quickly desaturates with conversation and minimal activity. Recovering quickly with rest.  Appetite improved    ROS: denies fever, chills, cp, n/v, HA unless stated above.      dexamethasone  6 mg Oral Daily    montelukast  10 mg Oral Nightly    insulin glargine  5 Units SubCUTAneous Nightly    ascorbic acid  500 mg Oral Daily    zinc sulfate  50 mg Oral Daily    vitamin D  2,000 Units Oral Daily    tiotropium  2 puff Inhalation Daily    budesonide-formoterol  2 puff Inhalation BID    ALPRAZolam  0.25 mg Oral Q4H While awake    enoxaparin  1 mg/kg SubCUTAneous BID    albuterol sulfate HFA  2 puff Inhalation 4x daily    sodium chloride flush  5-40 mL IntraVENous 2 times per day    PARoxetine  30 mg Oral BID    amLODIPine  5 mg Oral Daily    insulin lispro  0-6 Units SubCUTAneous TID     insulin lispro  0-3 Units SubCUTAneous Nightly     guaiFENesin-dextromethorphan, 5 mL, Q4H PRN  aluminum & magnesium hydroxide-simethicone, 15 mL, Q6H PRN  melatonin, 4.5 mg, Nightly PRN  sodium chloride flush, 5-40 mL, PRN  sodium chloride, 25 mL, PRN  ondansetron, 4 mg, Q8H PRN   Or  ondansetron, 4 mg, Q6H PRN  polyethylene glycol, 17 g, Daily PRN  acetaminophen, 650 mg, Q6H PRN   Or  acetaminophen, 650 mg, Q6H PRN  glucose, 15 g, PRN  dextrose, 12.5 g, PRN  glucagon (rDNA), 1 mg, PRN  dextrose, 100 mL/hr, PRN         Objective:    /77 Pulse 84   Temp 97.7 °F (36.5 °C) (Oral)   Resp 24   Ht 5' 7\" (1.702 m)   Wt 178 lb 9.2 oz (81 kg)   SpO2 90%   BMI 27.97 kg/m²     General Appearance: alert and oriented to person, place and time and in no acute distress  Skin: warm and dry  Head: normocephalic and atraumatic  Eyes: pupils equal, round, and reactive to light, extraocular eye movements intact, conjunctivae normal  Neck: neck supple and non tender without mass   Pulmonary/Chest: crackles bilaterally, right is worse than left. No rhonchi or wheezing  Cardiovascular: normal rate, normal S1 and S2 and no carotid bruits  Abdomen: soft, non-tender, non-distended, normal bowel sounds, no masses or organomegaly  Extremities: no cyanosis, no clubbing and no edema  Neurologic: no cranial nerve deficit and speech normal        Recent Labs     10/26/21  0412 10/27/21  0204 10/28/21  0452    131* 132   K 4.2 4.2 4.1   CL 95* 95* 95*   CO2 32* 29 29   BUN 14 14 14   CREATININE 0.5* 0.5* 0.5*   GLUCOSE 145* 139* 141*   CALCIUM 8.3* 8.4* 8.5*       Recent Labs     10/26/21  0412 10/27/21  0204 10/28/21  0452   WBC 8.0 8.5 7.7   RBC 3.95 3.98 3.72*   HGB 10.7* 10.7* 10.3*   HCT 33.3* 33.1* 31.3*   MCV 84.3 83.2 84.1   MCH 27.1 26.9 27.7   MCHC 32.1 32.3 32.9   RDW 14.9 14.9 15.1*    264 232   MPV 9.6 9.9 9.7       Radiology:     Assessment:    Active Problems:    COVID-19    Acute respiratory failure due to COVID-19 [facility])    Acute respiratory failure with hypoxia (HCC)    Pneumonia due to COVID-19 virus    Elevated LFTs    Hypokalemia    Essential hypertension    Non-insulin dependent type 2 diabetes mellitus ([facility] [address]  Resolved Problems:    * No resolved hospital problems. *      Plan:    1. Acute Respiratory Failure with Hypoxia  - 2/2 COVID-19 with extensive bilateral infiltrates and pneuominits. CXR finally with improving inspiration.  - Continue dexamethasone, 6 mg daily since 10/27. Taper slowly as O2 requirements improve.   - Has finally weaned from  [facility] to [facility], stable past several days with flow < 10 LPM. Still with significant hypoxia with conversation and minimal activity. Continue to wean as able and [facility]      2. COVID-19 Viral Pneumonia  - Completed baricitinib  - Remains on Dexamethasone, tapered to 10 mg daily on 10/19, 6 mg daily 10/27  - Vitamin Supplementation. Follow inflammatory markers. Lovenox 1mg/kg.   - Encourage IS/Pronating. DDImer and CRP stabilized  Out of isolation     3. Anxiety  - Continue Xanax Q4 W/A.   - continue Paxil  - mood is stable     4. DM  - Lantus 5 units daily and low dose SSI  - hypoglycemic protocol/Carb Control diet. Follow adjust as needed.     5. HTN  - Continue Amlodipine.     6. Poor intake  - 2//2 Respiratory distress. Poor intake noted. Intake improving with improvement of respiratory function. Follow     7. Hyponatremia  Resolved       8. Disposition- Plan now for [facility] at discharge for further oxygen weaning and physical therapy      NOTE: This report was transcribed using voice recognition software. Every effort was made to ensure accuracy; however, inadvertent computerized transcription errors may be present.   Electronically signed by AUBREE Garner CNP on 10/28/2021 at 10:57 AM

## 2021-10-28 NOTE — PROGRESS NOTES
10/27/21 2142   NIV Type   Mode Bilevel  (cont.  to refuse use, remains standby in room)   Settings/Measurements   O2 Flow Rate (L/min) 6 L/min   Oxygen Therapy/Pulse Ox   O2 Therapy Oxygen   O2 Device Nasal cannula   SpO2 94 %   Pulse Oximeter Device Mode Continuous

## 2021-10-29 LAB
ALBUMIN SERPL-MCNC: 3 G/DL (ref 3.5–5.2)
ALP BLD-CCNC: 84 U/L (ref 40–129)
ALT SERPL-CCNC: 143 U/L (ref 0–40)
ANION GAP SERPL CALCULATED.3IONS-SCNC: 10 MMOL/L (ref 7–16)
AST SERPL-CCNC: 39 U/L (ref 0–39)
BILIRUB SERPL-MCNC: 0.2 MG/DL (ref 0–1.2)
BUN BLDV-MCNC: 11 MG/DL (ref 6–23)
CALCIUM SERPL-MCNC: 8.1 MG/DL (ref 8.6–10.2)
CHLORIDE BLD-SCNC: 97 MMOL/L (ref 98–107)
CO2: 27 MMOL/L (ref 22–29)
CREAT SERPL-MCNC: 0.4 MG/DL (ref 0.7–1.2)
FERRITIN: 905 NG/ML
GFR AFRICAN AMERICAN: >60
GFR NON-AFRICAN AMERICAN: >60 ML/MIN/1.73
GLUCOSE BLD-MCNC: 123 MG/DL (ref 74–99)
HCT VFR BLD CALC: 32.2 % (ref 37–54)
HEMOGLOBIN: 10.5 G/DL (ref 12.5–16.5)
MCH RBC QN AUTO: 27.2 PG (ref 26–35)
MCHC RBC AUTO-ENTMCNC: 32.6 % (ref 32–34.5)
MCV RBC AUTO: 83.4 FL (ref 80–99.9)
METER GLUCOSE: 110 MG/DL (ref 74–99)
METER GLUCOSE: 127 MG/DL (ref 74–99)
METER GLUCOSE: 147 MG/DL (ref 74–99)
METER GLUCOSE: 235 MG/DL (ref 74–99)
PDW BLD-RTO: 15.2 FL (ref 11.5–15)
PLATELET # BLD: 251 E9/L (ref 130–450)
PMV BLD AUTO: 9.6 FL (ref 7–12)
POTASSIUM SERPL-SCNC: 4.1 MMOL/L (ref 3.5–5)
RBC # BLD: 3.86 E12/L (ref 3.8–5.8)
SODIUM BLD-SCNC: 134 MMOL/L (ref 132–146)
TOTAL PROTEIN: 5.2 G/DL (ref 6.4–8.3)
WBC # BLD: 7.9 E9/L (ref 4.5–11.5)

## 2021-10-29 PROCEDURE — 6360000002 HC RX W HCPCS: Performed by: INTERNAL MEDICINE

## 2021-10-29 PROCEDURE — 80053 COMPREHEN METABOLIC PANEL: CPT

## 2021-10-29 PROCEDURE — 82728 ASSAY OF FERRITIN: CPT

## 2021-10-29 PROCEDURE — 2580000003 HC RX 258: Performed by: INTERNAL MEDICINE

## 2021-10-29 PROCEDURE — 6370000000 HC RX 637 (ALT 250 FOR IP): Performed by: NURSE PRACTITIONER

## 2021-10-29 PROCEDURE — 1200000000 HC SEMI PRIVATE

## 2021-10-29 PROCEDURE — APPSS30 APP SPLIT SHARED TIME 16-30 MINUTES: Performed by: NURSE PRACTITIONER

## 2021-10-29 PROCEDURE — 6370000000 HC RX 637 (ALT 250 FOR IP): Performed by: INTERNAL MEDICINE

## 2021-10-29 PROCEDURE — 97530 THERAPEUTIC ACTIVITIES: CPT

## 2021-10-29 PROCEDURE — 6370000000 HC RX 637 (ALT 250 FOR IP): Performed by: FAMILY MEDICINE

## 2021-10-29 PROCEDURE — 99232 SBSQ HOSP IP/OBS MODERATE 35: CPT | Performed by: INTERNAL MEDICINE

## 2021-10-29 PROCEDURE — 2700000000 HC OXYGEN THERAPY PER DAY

## 2021-10-29 PROCEDURE — 82962 GLUCOSE BLOOD TEST: CPT

## 2021-10-29 PROCEDURE — 36415 COLL VENOUS BLD VENIPUNCTURE: CPT

## 2021-10-29 PROCEDURE — 85027 COMPLETE CBC AUTOMATED: CPT

## 2021-10-29 PROCEDURE — 94660 CPAP INITIATION&MGMT: CPT

## 2021-10-29 PROCEDURE — 97110 THERAPEUTIC EXERCISES: CPT

## 2021-10-29 RX ADMIN — ALPRAZOLAM 0.25 MG: 0.25 TABLET ORAL at 06:53

## 2021-10-29 RX ADMIN — ENOXAPARIN SODIUM 80 MG: 100 INJECTION SUBCUTANEOUS at 23:01

## 2021-10-29 RX ADMIN — ALPRAZOLAM 0.25 MG: 0.25 TABLET ORAL at 23:01

## 2021-10-29 RX ADMIN — ALPRAZOLAM 0.25 MG: 0.25 TABLET ORAL at 11:14

## 2021-10-29 RX ADMIN — BUDESONIDE AND FORMOTEROL FUMARATE DIHYDRATE 2 PUFF: 160; 4.5 AEROSOL RESPIRATORY (INHALATION) at 23:01

## 2021-10-29 RX ADMIN — ALPRAZOLAM 0.25 MG: 0.25 TABLET ORAL at 17:30

## 2021-10-29 RX ADMIN — PAROXETINE 30 MG: 10 TABLET, FILM COATED ORAL at 08:23

## 2021-10-29 RX ADMIN — ALPRAZOLAM 0.25 MG: 0.25 TABLET ORAL at 14:08

## 2021-10-29 RX ADMIN — BUDESONIDE AND FORMOTEROL FUMARATE DIHYDRATE 2 PUFF: 160; 4.5 AEROSOL RESPIRATORY (INHALATION) at 08:23

## 2021-10-29 RX ADMIN — ALBUTEROL SULFATE 2 PUFF: 90 AEROSOL, METERED RESPIRATORY (INHALATION) at 08:23

## 2021-10-29 RX ADMIN — INSULIN LISPRO 1 UNITS: 100 INJECTION, SOLUTION INTRAVENOUS; SUBCUTANEOUS at 11:15

## 2021-10-29 RX ADMIN — MONTELUKAST SODIUM 10 MG: 10 TABLET ORAL at 23:01

## 2021-10-29 RX ADMIN — PAROXETINE 30 MG: 10 TABLET, FILM COATED ORAL at 23:02

## 2021-10-29 RX ADMIN — Medication 4.5 MG: at 23:01

## 2021-10-29 RX ADMIN — INSULIN LISPRO 2 UNITS: 100 INJECTION, SOLUTION INTRAVENOUS; SUBCUTANEOUS at 17:19

## 2021-10-29 RX ADMIN — INSULIN GLARGINE 5 UNITS: 100 INJECTION, SOLUTION SUBCUTANEOUS at 23:08

## 2021-10-29 RX ADMIN — AMLODIPINE BESYLATE 5 MG: 5 TABLET ORAL at 08:22

## 2021-10-29 RX ADMIN — OXYCODONE HYDROCHLORIDE AND ACETAMINOPHEN 500 MG: 500 TABLET ORAL at 08:22

## 2021-10-29 RX ADMIN — DEXAMETHASONE 6 MG: 6 TABLET ORAL at 08:22

## 2021-10-29 RX ADMIN — ALBUTEROL SULFATE 2 PUFF: 90 AEROSOL, METERED RESPIRATORY (INHALATION) at 16:46

## 2021-10-29 RX ADMIN — ALBUTEROL SULFATE 2 PUFF: 90 AEROSOL, METERED RESPIRATORY (INHALATION) at 12:53

## 2021-10-29 RX ADMIN — ENOXAPARIN SODIUM 80 MG: 100 INJECTION SUBCUTANEOUS at 08:20

## 2021-10-29 RX ADMIN — Medication 2000 UNITS: at 08:22

## 2021-10-29 RX ADMIN — TIOTROPIUM BROMIDE INHALATION SPRAY 2 PUFF: 3.12 SPRAY, METERED RESPIRATORY (INHALATION) at 08:23

## 2021-10-29 RX ADMIN — ALBUTEROL SULFATE 2 PUFF: 90 AEROSOL, METERED RESPIRATORY (INHALATION) at 23:00

## 2021-10-29 RX ADMIN — ZINC SULFATE 220 MG (50 MG) CAPSULE 50 MG: CAPSULE at 08:22

## 2021-10-29 RX ADMIN — Medication 10 ML: at 08:21

## 2021-10-29 RX ADMIN — Medication 10 ML: at 23:09

## 2021-10-29 ASSESSMENT — PAIN SCALES - GENERAL
PAINLEVEL_OUTOF10: 0
PAINLEVEL_OUTOF10: 0

## 2021-10-29 NOTE — CARE COORDINATION
Social Work / Discharge Planning : SW followed up with patient. When patient is stable, he is in agreement for [facility] . SW discussed with [facility] from [place] and they will submit LOC on their end. SW to complete [facility] ONLY. N 17 generated. Await when stable for discharge. SW to follow.  Electronically signed by ALIZA Díaz on 10/29/21 at 2:13 PM EDT

## 2021-10-29 NOTE — PROGRESS NOTES
[facility]  [address], [address], [address] [place]       Pulmonary Progress Note      SUBJECTIVE:  Sitting up at side of bed on 7 li HFNC, desaturating down on 82-85% as had been doing some exercises. Claims similar with respiratory function, minimal cough/sputum production, using incentive spirometer.     OBJECTIVE    Medications    Continuous Infusions:   sodium chloride      dextrose         Scheduled Meds:   dexamethasone  6 mg Oral Daily    montelukast  10 mg Oral Nightly    insulin glargine  5 Units SubCUTAneous Nightly    ascorbic acid  500 mg Oral Daily    zinc sulfate  50 mg Oral Daily    vitamin D  2,000 Units Oral Daily    tiotropium  2 puff Inhalation Daily    budesonide-formoterol  2 puff Inhalation BID    ALPRAZolam  0.25 mg Oral Q4H While awake    enoxaparin  1 mg/kg SubCUTAneous BID    albuterol sulfate HFA  2 puff Inhalation 4x daily    sodium chloride flush  5-40 mL IntraVENous 2 times per day    PARoxetine  30 mg Oral BID    amLODIPine  5 mg Oral Daily    insulin lispro  0-6 Units SubCUTAneous TID WC    insulin lispro  0-3 Units SubCUTAneous Nightly       PRN Meds:guaiFENesin-dextromethorphan, aluminum & magnesium hydroxide-simethicone, melatonin, sodium chloride flush, sodium chloride, ondansetron **OR** ondansetron, polyethylene glycol, acetaminophen **OR** acetaminophen, glucose, dextrose, glucagon (rDNA), dextrose    Physical    VITALS:  /70   Pulse 66   Temp 97.5 °F (36.4 °C) (Oral)   Resp 22   Ht 5' 7\" (1.702 m)   Wt 178 lb 9.2 oz (81 kg)   SpO2 97%   BMI 27.97 kg/m²     24HR INTAKE/OUTPUT:      Intake/Output Summary (Last 24 hours) at 10/29/2021 1314  Last data filed at 10/29/2021 0515  Gross per 24 hour   Intake --   Output 1500 ml   Net -1500 ml       24HR PULSE OXIMETRY RANGE:    SpO2  Av %  Min: 95 %  Max: 97 %    General appearance: alert, appears stated age and cooperative  Lungs: rhonchi bibasilar with cough  Heart: regular rate and rhythm, S1, S2 normal, no murmur, click, rub or gallop  Abdomen: soft, non-tender; bowel sounds normal; no masses,  no organomegaly  Extremities: extremities normal, atraumatic, no cyanosis or edema  Neurologic: Mental status: Alert, oriented, thought content appropriate    Data    CBC:   Recent Labs     10/27/21  0204 10/28/21  0452 10/29/21  0524   WBC 8.5 7.7 7.9   HGB 10.7* 10.3* 10.5*   HCT 33.1* 31.3* 32.2*   MCV 83.2 84.1 83.4    232 251       BMP:  Recent Labs     10/27/21  0204 10/28/21  0452 10/29/21  0524   * 132 134   K 4.2 4.1 4.1   CL 95* 95* 97*   CO2 29 29 27   BUN 14 14 11   CREATININE 0.5* 0.5* 0.4*    ALB:3,BILIDIR:3,BILITOT:3,ALKPHOS:3)@    PT/INR: No results for input(s): PROTIME, INR in the last 72 hours. ABG:   No results for input(s): PH, PO2, PCO2, HCO3, BE, O2SAT, METHB, O2HB, COHB, O2CON, HHB, THB in the last 72 hours. FiO2 : 45 %       Radiology/Other tests reviewed: CXR reviewed with similar increased GG interstitial markings right>left compared to previous    Assessment:     Active Problems:    COVID-19    Acute respiratory failure due to COVID-19 [facility])    Acute respiratory failure with hypoxia (HCC)    Pneumonia due to COVID-19 virus    Elevated LFTs    Hypokalemia    Essential hypertension    Non-insulin dependent type 2 diabetes mellitus ([facility] [address]  Resolved Problems:    * No resolved hospital problems. *      Plan:       1. Cont with oxygen, taper as tolerated, observe degree of desaturation with movement/activity, reiterated need to plan for resting/recovery after exercising tanja if desaturating a lot  2. Cont with steroids, taper as tolerated, 6 mg daily until end of the week then 4 mg daily x5 days then stop  3. singulair daily and see if helps with inflammation  4. Cont with MDIs, observe respiratory function and cough/sputum production  5.  Encourage incentive spirometer use and prone positioning when able to  6. OOB to chair  7. May need short term rehab to help with recovery      Time at the bedside, reviewing labs and radiographs, reviewing notes and consultations, discussing with staff and family was more than 35 minutes. Thanks for letting us see this patient in consultation. Please contact us with any questions. Office [phone] or after hours through [place], x [phone].

## 2021-10-29 NOTE — PROGRESS NOTES
[facility] Progress Note    Admitting Date and Time: 9/22/2021  9:32 PM  Admit Dx: Hypokalemia [E87.6]  Acute respiratory failure with hypoxia ([address] [J96.01]  COVID-19 [U07.1]  Acute respiratory failure due to COVID-19 (HCC) [U07.1, J96.00]    Subjective:  Patient is being followed for Hypokalemia [E87.6]  Acute respiratory failure with hypoxia ([address] [J96.01]  COVID-19 [U07.1]  Acute respiratory failure due to COVID-19 (HCC) [U07.1, J96.00]     No overnight events, remains stable on HFNC at rest  Sleeping upon my entering room, SaO2 98% on 7 LPM .  With conversation drops to 88%. He states he is trying to increase activity, and do more therapy. Noting quicker recovery time, but still desaturating to low 80% range with activity      ROS: denies fever, chills, cp, n/v, HA unless stated above.      dexamethasone  6 mg Oral Daily    montelukast  10 mg Oral Nightly    insulin glargine  5 Units SubCUTAneous Nightly    ascorbic acid  500 mg Oral Daily    zinc sulfate  50 mg Oral Daily    vitamin D  2,000 Units Oral Daily    tiotropium  2 puff Inhalation Daily    budesonide-formoterol  2 puff Inhalation BID    ALPRAZolam  0.25 mg Oral Q4H While awake    enoxaparin  1 mg/kg SubCUTAneous BID    albuterol sulfate HFA  2 puff Inhalation 4x daily    sodium chloride flush  5-40 mL IntraVENous 2 times per day    PARoxetine  30 mg Oral BID    amLODIPine  5 mg Oral Daily    insulin lispro  0-6 Units SubCUTAneous TID WC    insulin lispro  0-3 Units SubCUTAneous Nightly     guaiFENesin-dextromethorphan, 5 mL, Q4H PRN  aluminum & magnesium hydroxide-simethicone, 15 mL, Q6H PRN  melatonin, 4.5 mg, Nightly PRN  sodium chloride flush, 5-40 mL, PRN  sodium chloride, 25 mL, PRN  ondansetron, 4 mg, Q8H PRN   Or  ondansetron, 4 mg, Q6H PRN  polyethylene glycol, 17 g, Daily PRN  acetaminophen, 650 mg, Q6H PRN   Or  acetaminophen, 650 mg, Q6H PRN  glucose, 15 g, PRN  dextrose, 12.5 g, PRN  glucagon (rDNA), 1 mg, PRN  dextrose, 100 mL/hr, PRN         Objective:    /70   Pulse 66   Temp 97.5 °F (36.4 °C) (Oral)   Resp 22   Ht 5' 7\" (1.702 m)   Wt 178 lb 9.2 oz (81 kg)   SpO2 97%   BMI 27.97 kg/m²       General Appearance: alert and oriented to person, place and time and in no acute distress  Skin: warm and dry  Head: normocephalic and atraumatic  Eyes: pupils equal, round, and reactive to light, extraocular eye movements intact, conjunctivae normal  Neck: neck supple and non tender without mass   Pulmonary/Chest: crackles bilaterally, right is worse than left. No rhonchi or wheezing  Cardiovascular: normal rate, normal S1 and S2 and no carotid bruits  Abdomen: soft, non-tender, non-distended, normal bowel sounds, no masses or organomegaly  Extremities: no cyanosis, no clubbing and no edema  Neurologic: no cranial nerve deficit and speech normal      Recent Labs     10/27/21  0204 10/28/21  0452 10/29/21  0524   * 132 134   K 4.2 4.1 4.1   CL 95* 95* 97*   CO2 29 29 27   BUN 14 14 11   CREATININE 0.5* 0.5* 0.4*   GLUCOSE 139* 141* 123*   CALCIUM 8.4* 8.5* 8.1*       Recent Labs     10/27/21  0204 10/28/21  0452 10/29/21  0524   WBC 8.5 7.7 7.9   RBC 3.98 3.72* 3.86   HGB 10.7* 10.3* 10.5*   HCT 33.1* 31.3* 32.2*   MCV 83.2 84.1 83.4   MCH 26.9 27.7 27.2   MCHC 32.3 32.9 32.6   RDW 14.9 15.1* 15.2*    232 251   MPV 9.9 9.7 9.6       Radiology:     Assessment:    Active Problems:    COVID-19    Acute respiratory failure due to COVID-19 (HCC)    Acute respiratory failure with hypoxia (HCC)    Pneumonia due to COVID-19 virus    Elevated LFTs    Hypokalemia    Essential hypertension    Non-insulin dependent type 2 diabetes mellitus ([facility]  Resolved Problems:    * No resolved hospital problems. *      Plan:  1. Acute Respiratory Failure with Hypoxia  - 2/2 COVID-19 with extensive bilateral infiltrates and pneuominits.  CXR finally with improving inspiration.  - Continue dexamethasone, 6 mg daily since 10/27. Taper slowly as O2 requirements improve. - Has finally weaned from  [facility] to HFNC, stable past several days with flow < 10 LPM. Still with significant hypoxia with conversation and minimal activity. Continue to wean as able and plan for [facility] with Ann-Marie Long     2. COVID-19 Viral Pneumonia  - Completed baricitinib  - Remains on Dexamethasone, tapered to 10 mg daily on 10/19, 6 mg daily 10/27, plan for 4 mg daily for 5 more days then off  - Vitamin Supplementation. Follow inflammatory markers. Lovenox 1mg/kg.   - Encourage IS/Pronating. DDImer and CRP stabilized  Out of isolation     3. Anxiety  - Continue Xanax Q4 W/A.   - continue Paxil  - mood is stable     4. DM  - Lantus 5 units daily and low dose SSI  - hypoglycemic protocol/Carb Control diet. Follow adjust as needed.     5. HTN  - Continue Amlodipine.     6. Poor intake  - 2//2 Respiratory distress. Poor intake noted. Intake improving with improvement of respiratory function. Follow     7. Hyponatremia  Resolved        8. Disposition- Plan now for [facility] at discharge for further oxygen weaning and physical therapy      NOTE: This report was transcribed using voice recognition software. Every effort was made to ensure accuracy; however, inadvertent computerized transcription errors may be present.   Electronically signed by AUBREE Langford CNP on 10/29/2021 at 11:19 AM

## 2021-10-29 NOTE — PROGRESS NOTES
Occupational Therapy  OT BEDSIDE TREATMENT NOTE      Date:10/29/2021  Patient Name: Yecenia Burleson  MRN: [id number]  : 1955  Room: [address] [place] [address], OTR/L #[id number]     Referring Provider: Marko Menon DO  Specific Provider Orders/Date: OT eval and treat 10/22/21      Diagnosis: Hypokalemia [E87.6]  Acute respiratory failure with hypoxia ([address] [J96.01]  COVID-19 [U07.1]  Acute respiratory failure due to COVID-19 ([address] [U07.1, J96.00]   Pt admitted to hospital on 21 for fatigue and SOB     Pertinent Medical History:  has a past medical history of Hypertension.         Precautions:  Fall Risk, O2 HF      Assessment of current deficits    [x]? Functional mobility         [x]? ADLs           [x]? Strength                  [x]? Cognition    [x]? Functional transfers       [x]? IADLs         [x]? Safety Awareness   [x]? Endurance    []? Fine Coordination                      [x]? Balance      []? Vision/perception   []? Sensation      []? Gross Motor Coordination          []? ROM           []?  Delirium                   []? Motor Control      OT PLAN OF CARE   OT POC based on physician orders, patient diagnosis and results of clinical assessment     Frequency/Duration 1-3 days/wk for 2 weeks PRN   Specific OT Treatment Interventions to include:   * Instruction/training on adapted ADL techniques and AE recommendations to increase functional independence within precautions       * Training on energy conservation strategies, correct breathing pattern and techniques to improve independence/tolerance for self-care routine  * Functional transfer/mobility training/DME recommendations for increased independence, safety, and fall prevention  * Patient/Family education to increase follow through with safety techniques and functional independence  * Recommendation of environmental modifications for increased safety with functional transfers/mobility and ADLs  * Splinting/positioning for increased function, prevention of contractures, and improve skin integrity  * Therapeutic exercise to improve motor endurance, ROM, and functional strength for ADLs/functional transfers  * Therapeutic activities to facilitate/challenge dynamic balance, stand tolerance for increased safety and independence with ADLs  * Therapeutic activities to facilitate gross/fine motor skills for increased independence with ADLs        Recommended Adaptive Equipment: TBD      Home Living: Pt lives alone in 2 floor home. 4 LUANN, 1 handrail  Bath and bedroom on 2nd floor   Bathroom setup: tub/shower    Equipment owned: n/a     Prior Level of Function: independent with ADLs , independent with IADLs; ambulated independently w/o AD   Driving: yes     Pain Level: No complaint of pain during this session.      Cognition: Awake and grossly oriented. Functional Assessment:  AM-PAC Daily Activity Raw Score: 14/24    Initial Eval Status  Date: 10/22/21 Treatment Status  Date:10/29/21  STGs = LTGs  Time frame: 10-14 days   Feeding Stand by Assist  Setup       Grooming Minimal Assist   Modified [place]    UB Dressing Minimal Assist  Min  Modified [place]    LB Dressing Dependent   Stand by Assist    Bathing Moderate Assist  Stand by Assist    Toileting Dependent   Including bowel management - bed rolling   Minimal Assist    Bed Mobility  Rolling: SBA  Supine to sit: NT SBA supine to sit   Supine to sit: Stand by Assist   Sit to supine: Stand by Assist    Functional Transfers NT  Deferred d/t +desaturation w/ bed mobility and repositioning O2 saturation decreases with exertion. Unable to stand due to saturation. Stand by Assist    Functional Mobility NT   Stand by Assist    Balance Sitting:  NT     Standing: NT       Activity Tolerance Poor  +desaturation w/ minimal, bedlevel activity Poor +   Fair       Comments:  Pt laying in the bed. Motivated to increase activity level. Saturation while supine 93%.   Decreased to 83% once sitting on the side of the bed. Pt coughing and attempting to talk. He was instructed with importance of proper breathing techniques and that talking when SOB will decrease saturation levels. Pt verbalized understanding and demonstrated good carry over of techniques while seated on the side of the bed. Marked time required however pt was able to increase saturation to 90%. Pt requested to remain seated on the side of the bed to increase strength and endurance. Nursing notified. Pt demonstrating good ability to watch his O2 saturation level on the monitor in his room. Instructed to return to bed if his level decreases lower than 87% or if fatigued. Pt was also instructed with UE exercises incorporating chest expansion and breathing techniques. Exercises to be completed while sitting upright in the bed. Education/treatment:  ADL retraining with facilitation of movement to increase self care skills. Therapeutic activity to address balance and endurance for ADL and transfers. Pt education of breathing techniques. · Pt has made  progress towards set goals.    ·     Time In: 2:05  Time Out: 2:40      Min Units   Therapeutic Ex 44851 10 1   Therapeutic Activities 71175 55 0   ADL/Self Care 14890 10    Orthotic Management 92941     Neuro Re-Ed 02000     Non-Billable Time     TOTAL TIMED TREATMENT [address] [facility] WENDY/L [id number]

## 2021-10-30 LAB
ALBUMIN SERPL-MCNC: 3 G/DL (ref 3.5–5.2)
ALP BLD-CCNC: 97 U/L (ref 40–129)
ALT SERPL-CCNC: 217 U/L (ref 0–40)
ANION GAP SERPL CALCULATED.3IONS-SCNC: 11 MMOL/L (ref 7–16)
AST SERPL-CCNC: 61 U/L (ref 0–39)
BILIRUB SERPL-MCNC: 0.2 MG/DL (ref 0–1.2)
BUN BLDV-MCNC: 19 MG/DL (ref 6–23)
CALCIUM SERPL-MCNC: 8.7 MG/DL (ref 8.6–10.2)
CHLORIDE BLD-SCNC: 95 MMOL/L (ref 98–107)
CO2: 29 MMOL/L (ref 22–29)
CREAT SERPL-MCNC: 0.5 MG/DL (ref 0.7–1.2)
GFR AFRICAN AMERICAN: >60
GFR NON-AFRICAN AMERICAN: >60 ML/MIN/1.73
GLUCOSE BLD-MCNC: 164 MG/DL (ref 74–99)
HCT VFR BLD CALC: 32.1 % (ref 37–54)
HEMOGLOBIN: 10.4 G/DL (ref 12.5–16.5)
MCH RBC QN AUTO: 27.4 PG (ref 26–35)
MCHC RBC AUTO-ENTMCNC: 32.4 % (ref 32–34.5)
MCV RBC AUTO: 84.7 FL (ref 80–99.9)
METER GLUCOSE: 113 MG/DL (ref 74–99)
METER GLUCOSE: 153 MG/DL (ref 74–99)
METER GLUCOSE: 168 MG/DL (ref 74–99)
METER GLUCOSE: 189 MG/DL (ref 74–99)
PDW BLD-RTO: 15.6 FL (ref 11.5–15)
PLATELET # BLD: 219 E9/L (ref 130–450)
PMV BLD AUTO: 9.8 FL (ref 7–12)
POTASSIUM SERPL-SCNC: 3.7 MMOL/L (ref 3.5–5)
RBC # BLD: 3.79 E12/L (ref 3.8–5.8)
SODIUM BLD-SCNC: 135 MMOL/L (ref 132–146)
TOTAL PROTEIN: 5.6 G/DL (ref 6.4–8.3)
WBC # BLD: 8.2 E9/L (ref 4.5–11.5)

## 2021-10-30 PROCEDURE — 1200000000 HC SEMI PRIVATE

## 2021-10-30 PROCEDURE — 80053 COMPREHEN METABOLIC PANEL: CPT

## 2021-10-30 PROCEDURE — 94660 CPAP INITIATION&MGMT: CPT

## 2021-10-30 PROCEDURE — 99232 SBSQ HOSP IP/OBS MODERATE 35: CPT | Performed by: INTERNAL MEDICINE

## 2021-10-30 PROCEDURE — 6370000000 HC RX 637 (ALT 250 FOR IP): Performed by: INTERNAL MEDICINE

## 2021-10-30 PROCEDURE — APPSS30 APP SPLIT SHARED TIME 16-30 MINUTES: Performed by: NURSE PRACTITIONER

## 2021-10-30 PROCEDURE — 2700000000 HC OXYGEN THERAPY PER DAY

## 2021-10-30 PROCEDURE — 6360000002 HC RX W HCPCS: Performed by: INTERNAL MEDICINE

## 2021-10-30 PROCEDURE — 6370000000 HC RX 637 (ALT 250 FOR IP): Performed by: FAMILY MEDICINE

## 2021-10-30 PROCEDURE — 82962 GLUCOSE BLOOD TEST: CPT

## 2021-10-30 PROCEDURE — 2580000003 HC RX 258: Performed by: INTERNAL MEDICINE

## 2021-10-30 PROCEDURE — 36415 COLL VENOUS BLD VENIPUNCTURE: CPT

## 2021-10-30 PROCEDURE — 6370000000 HC RX 637 (ALT 250 FOR IP): Performed by: NURSE PRACTITIONER

## 2021-10-30 PROCEDURE — 85027 COMPLETE CBC AUTOMATED: CPT

## 2021-10-30 RX ADMIN — ALPRAZOLAM 0.25 MG: 0.25 TABLET ORAL at 06:35

## 2021-10-30 RX ADMIN — PAROXETINE 30 MG: 10 TABLET, FILM COATED ORAL at 09:52

## 2021-10-30 RX ADMIN — BUDESONIDE AND FORMOTEROL FUMARATE DIHYDRATE 2 PUFF: 160; 4.5 AEROSOL RESPIRATORY (INHALATION) at 22:01

## 2021-10-30 RX ADMIN — OXYCODONE HYDROCHLORIDE AND ACETAMINOPHEN 500 MG: 500 TABLET ORAL at 09:52

## 2021-10-30 RX ADMIN — ZINC SULFATE 220 MG (50 MG) CAPSULE 50 MG: CAPSULE at 09:52

## 2021-10-30 RX ADMIN — INSULIN LISPRO 1 UNITS: 100 INJECTION, SOLUTION INTRAVENOUS; SUBCUTANEOUS at 16:23

## 2021-10-30 RX ADMIN — ALBUTEROL SULFATE 2 PUFF: 90 AEROSOL, METERED RESPIRATORY (INHALATION) at 11:42

## 2021-10-30 RX ADMIN — INSULIN LISPRO 1 UNITS: 100 INJECTION, SOLUTION INTRAVENOUS; SUBCUTANEOUS at 11:43

## 2021-10-30 RX ADMIN — ENOXAPARIN SODIUM 80 MG: 100 INJECTION SUBCUTANEOUS at 22:02

## 2021-10-30 RX ADMIN — Medication 4.5 MG: at 22:03

## 2021-10-30 RX ADMIN — TIOTROPIUM BROMIDE INHALATION SPRAY 2 PUFF: 3.12 SPRAY, METERED RESPIRATORY (INHALATION) at 09:51

## 2021-10-30 RX ADMIN — BUDESONIDE AND FORMOTEROL FUMARATE DIHYDRATE 2 PUFF: 160; 4.5 AEROSOL RESPIRATORY (INHALATION) at 09:51

## 2021-10-30 RX ADMIN — PAROXETINE 30 MG: 10 TABLET, FILM COATED ORAL at 22:02

## 2021-10-30 RX ADMIN — ALBUTEROL SULFATE 2 PUFF: 90 AEROSOL, METERED RESPIRATORY (INHALATION) at 09:51

## 2021-10-30 RX ADMIN — ALPRAZOLAM 0.25 MG: 0.25 TABLET ORAL at 09:55

## 2021-10-30 RX ADMIN — ALPRAZOLAM 0.25 MG: 0.25 TABLET ORAL at 22:03

## 2021-10-30 RX ADMIN — ALPRAZOLAM 0.25 MG: 0.25 TABLET ORAL at 16:56

## 2021-10-30 RX ADMIN — INSULIN LISPRO 1 UNITS: 100 INJECTION, SOLUTION INTRAVENOUS; SUBCUTANEOUS at 22:09

## 2021-10-30 RX ADMIN — INSULIN GLARGINE 5 UNITS: 100 INJECTION, SOLUTION SUBCUTANEOUS at 22:08

## 2021-10-30 RX ADMIN — Medication 2000 UNITS: at 09:53

## 2021-10-30 RX ADMIN — AMLODIPINE BESYLATE 5 MG: 5 TABLET ORAL at 09:52

## 2021-10-30 RX ADMIN — ALBUTEROL SULFATE 2 PUFF: 90 AEROSOL, METERED RESPIRATORY (INHALATION) at 16:24

## 2021-10-30 RX ADMIN — DEXAMETHASONE 6 MG: 6 TABLET ORAL at 09:52

## 2021-10-30 RX ADMIN — Medication 10 ML: at 22:07

## 2021-10-30 RX ADMIN — Medication 10 ML: at 09:53

## 2021-10-30 RX ADMIN — ALPRAZOLAM 0.25 MG: 0.25 TABLET ORAL at 14:21

## 2021-10-30 RX ADMIN — ENOXAPARIN SODIUM 80 MG: 100 INJECTION SUBCUTANEOUS at 09:52

## 2021-10-30 RX ADMIN — GUAIFENESIN AND DEXTROMETHORPHAN 5 ML: 100; 10 SYRUP ORAL at 22:03

## 2021-10-30 RX ADMIN — MONTELUKAST SODIUM 10 MG: 10 TABLET ORAL at 22:03

## 2021-10-30 RX ADMIN — ALBUTEROL SULFATE 2 PUFF: 90 AEROSOL, METERED RESPIRATORY (INHALATION) at 23:07

## 2021-10-30 ASSESSMENT — PAIN DESCRIPTION - LOCATION: LOCATION: GENERALIZED

## 2021-10-30 ASSESSMENT — PAIN DESCRIPTION - PROGRESSION: CLINICAL_PROGRESSION: NOT CHANGED

## 2021-10-30 ASSESSMENT — PAIN SCALES - GENERAL
PAINLEVEL_OUTOF10: 0
PAINLEVEL_OUTOF10: 0

## 2021-10-30 ASSESSMENT — PAIN DESCRIPTION - PAIN TYPE: TYPE: ACUTE PAIN

## 2021-10-30 NOTE — PROGRESS NOTES
Date: 10/29/2021    Time: 10:14 PM    Patient Placed On BIPAP/CPAP/ Non-Invasive Ventilation? No    If no must comment. Facial area red/color change? No           If YES are Blister/Lesion present? No   If yes must notify nursing staff  BIPAP/CPAP skin barrier? No    Skin barrier type:none       Comments:  Patient refusing bipap. Bipap remains standby in his room. Patient will remain on a 6 liter nasal cannula for the evening. Patient instructed if he has an increase in shortness of breath or an increase in oxygen demand to have therapist paged.     Sin Sanon RCP

## 2021-10-30 NOTE — PROGRESS NOTES
[facility] Progress Note    Admitting Date and Time: 9/22/2021  9:32 PM  Admit Dx: Hypokalemia [E87.6]  Acute respiratory failure with hypoxia ([address] [J96.01]  COVID-19 [U07.1]  Acute respiratory failure due to COVID-19 (HCC) [U07.1, J96.00]    Subjective:  Patient is being followed for Hypokalemia [E87.6]  Acute respiratory failure with hypoxia ([address] [J96.01]  COVID-19 [U07.1]  Acute respiratory failure due to COVID-19 (HCC) [U07.1, J96.00]     No overnight events. He is on 6 LPM NC, tolerated PT/OT yesterday with desaturation to low 80% range which is improvement. He required less time for recovery    ROS: denies fever, chills, cp, sob, n/v, HA unless stated above.      dexamethasone  6 mg Oral Daily    montelukast  10 mg Oral Nightly    insulin glargine  5 Units SubCUTAneous Nightly    ascorbic acid  500 mg Oral Daily    zinc sulfate  50 mg Oral Daily    vitamin D  2,000 Units Oral Daily    tiotropium  2 puff Inhalation Daily    budesonide-formoterol  2 puff Inhalation BID    ALPRAZolam  0.25 mg Oral Q4H While awake    enoxaparin  1 mg/kg SubCUTAneous BID    albuterol sulfate HFA  2 puff Inhalation 4x daily    sodium chloride flush  5-40 mL IntraVENous 2 times per day    PARoxetine  30 mg Oral BID    amLODIPine  5 mg Oral Daily    insulin lispro  0-6 Units SubCUTAneous TID WC    insulin lispro  0-3 Units SubCUTAneous Nightly     guaiFENesin-dextromethorphan, 5 mL, Q4H PRN  aluminum & magnesium hydroxide-simethicone, 15 mL, Q6H PRN  melatonin, 4.5 mg, Nightly PRN  sodium chloride flush, 5-40 mL, PRN  sodium chloride, 25 mL, PRN  ondansetron, 4 mg, Q8H PRN   Or  ondansetron, 4 mg, Q6H PRN  polyethylene glycol, 17 g, Daily PRN  acetaminophen, 650 mg, Q6H PRN   Or  acetaminophen, 650 mg, Q6H PRN  glucose, 15 g, PRN  dextrose, 12.5 g, PRN  glucagon (rDNA), 1 mg, PRN  dextrose, 100 mL/hr, PRN         Objective:    /70   Pulse 62   Temp 97.5 °F (36.4 °C) (Axillary)   Resp 18 Ht 5' 7\" (1.702 m)   Wt 178 lb 9.2 oz (81 kg)   SpO2 98%   BMI 27.97 kg/m²     General Appearance: alert and oriented to person, place and time and in no acute distress  Skin: warm and dry  Head: normocephalic and atraumatic  Eyes: pupils equal, round, and reactive to light, extraocular eye movements intact, conjunctivae normal  Neck: neck supple and non tender without mass   Pulmonary/Chest: crackles in bases  Cardiovascular: normal rate, normal S1 and S2 and no carotid bruits  Abdomen: soft, non-tender, non-distended, normal bowel sounds, no masses or organomegaly  Extremities: no cyanosis, no clubbing and no edema  Neurologic: no cranial nerve deficit and speech normal        Recent Labs     10/28/21  0452 10/29/21  0524 10/30/21  0940    134 135   K 4.1 4.1 3.7   CL 95* 97* 95*   CO2 29 27 29   BUN 14 11 19   CREATININE 0.5* 0.4* 0.5*   GLUCOSE 141* 123* 164*   CALCIUM 8.5* 8.1* 8.7       Recent Labs     10/28/21  0452 10/29/21  0524 10/30/21  0940   WBC 7.7 7.9 8.2   RBC 3.72* 3.86 3.79*   HGB 10.3* 10.5* 10.4*   HCT 31.3* 32.2* 32.1*   MCV 84.1 83.4 84.7   MCH 27.7 27.2 27.4   MCHC 32.9 32.6 32.4   RDW 15.1* 15.2* 15.6*    251 219   MPV 9.7 9.6 9.8       Radiology:     Assessment:    Active Problems:    COVID-19    Acute respiratory failure due to COVID-19 [facility])    Acute respiratory failure with hypoxia (HCC)    Pneumonia due to COVID-19 virus    Elevated LFTs    Hypokalemia    Essential hypertension    Non-insulin dependent type 2 diabetes mellitus ([facility]  Resolved Problems:    * No resolved hospital problems. *      Plan:  1. Acute Respiratory Failure with Hypoxia  - 2/2 COVID-19 with extensive bilateral infiltrates and pneuominits. CXR finally with improving inspiration.  - Continue dexamethasone, 6 mg daily since 10/27. Taper slowly as O2 requirements improve.   - Has finally weaned from [facility] to [place] [address], stable past several days with flow < 10 LPM. Desaturation with conversation and exertion is improving and requiring less time for recovery. Continue to wean as able and plan for DC to [facility], probable by Monday will stable for rehab     2. COVID-19 Viral Pneumonia  - Completed baricitinib  - Remains on Dexamethasone, tapered to 10 mg daily on 10/19, 6 mg daily 10/27, plan for 4 mg daily for 5 more days then off  - Vitamin Supplementation. Follow inflammatory markers. Lovenox 1mg/kg.   - Encourage IS/Pronating. DDImer and CRP stabilized  Out of isolation     3. Anxiety  - Continue Xanax Q4 W/A.   - continue Paxil  - mood is stable     4. DM  - Lantus 5 units daily and low dose SSI  - hypoglycemic protocol/Carb Control diet. Follow adjust as needed.     5. HTN  - Continue Amlodipine.     6. Poor intake  - 2//2 Respiratory distress. Poor intake noted. Intake improving with improvement of respiratory function. Follow  Improving, he is tolerated more oral intake and taking protein supplements     7. Hyponatremia  Resolved      8. Disposition- Plan now for [facility] at discharge for further oxygen weaning and physical therapy        NOTE: This report was transcribed using voice recognition software. Every effort was made to ensure accuracy; however, inadvertent computerized transcription errors may be present.   Electronically signed by AUBREE Arzola CNP on 10/30/2021 at 12:42 PM  '

## 2021-10-30 NOTE — PROGRESS NOTES
Pulmonary Progress Note    Admit Date: 2021  Hospital day                               PCP: No primary care provider on file. Chief Complaint (s):  Patient Active Problem List   Diagnosis    COVID-19    Acute respiratory failure due to COVID-19 [facility])    Acute [facility] failure with hypoxia ([facility]    Pneumonia due to COVID-19 virus    Elevated LFTs    Hypokalemia    Essential hypertension    Non-insulin dependent type 2 diabetes mellitus ([facility]       Subjective:  · Substantial progress since when last seen by myself. Now down to 6 L per nasal cannula. Vitals:  VITALS:  /70   Pulse 62   Temp 97.5 °F (36.4 °C) (Axillary)   Resp 18   Ht 5' 7\" (1.702 m)   Wt 178 lb 9.2 oz (81 kg)   SpO2 98%   BMI 27.97 kg/m²     24HR INTAKE/OUTPUT:      Intake/Output Summary (Last 24 hours) at 10/30/2021 1551  Last data filed at 10/29/2021 2315  Gross per 24 hour   Intake --   Output 675 ml   Net -675 ml       24HR PULSE OXIMETRY RANGE:    SpO2  Av.3 %  Min: 94 %  Max: 98 %    Medications:  IV:   sodium chloride      dextrose         Scheduled Meds:   dexamethasone  6 mg Oral Daily    montelukast  10 mg Oral Nightly    insulin glargine  5 Units SubCUTAneous Nightly    ascorbic acid  500 mg Oral Daily    zinc sulfate  50 mg Oral Daily    vitamin D  2,000 Units Oral Daily    tiotropium  2 puff Inhalation Daily    budesonide-formoterol  2 puff Inhalation BID    ALPRAZolam  0.25 mg Oral Q4H While awake    enoxaparin  1 mg/kg SubCUTAneous BID    albuterol sulfate HFA  2 puff Inhalation 4x daily    sodium chloride flush  5-40 mL IntraVENous 2 times per day    PARoxetine  30 mg Oral BID    amLODIPine  5 mg Oral Daily    insulin lispro  0-6 Units SubCUTAneous TID WC    insulin lispro  0-3 Units SubCUTAneous Nightly       Diet:   ADULT DIET; Regular; 5 carb choices (75 gm/meal)  Adult Oral Nutrition Supplement; Diabetic Oral Supplement     EXAM:  General: No distress. Awake, alert and conversant. Eyes: PERRL. No sclera icterus. No conjunctival injection. ENT: No discharge. Pharynx clear. Neck: Trachea midline. Normal thyroid. Resp: No accessory muscle use. No rales. No wheezing. No rhonchi. CV: Regular rate. Regular rhythm. No murmur or rub. Abd: Non-tender. Non-distended. No masses. No organomegaly. Normal bowel sounds. Skin: Warm and dry. No nodule on exposed extremities. No rash on exposed extremities. Ext: No cyanosis, clubbing, edema  Lymph: No cervical LAD. No supraclavicular LAD. M/S: No cyanosis. No joint deformity. No clubbing. Neuro: Positive pupils/gag/corneals. Normal pain response. Results:  CBC:   Recent Labs     10/28/21  0452 10/29/21  0524 10/30/21  0940   WBC 7.7 7.9 8.2   HGB 10.3* 10.5* 10.4*   HCT 31.3* 32.2* 32.1*   MCV 84.1 83.4 84.7    251 219     BMP:   Recent Labs     10/28/21  0452 10/29/21  0524 10/30/21  0940    134 135   K 4.1 4.1 3.7   CL 95* 97* 95*   CO2 29 27 29   BUN 14 11 19   CREATININE 0.5* 0.4* 0.5*     LIVER PROFILE:   Recent Labs     10/28/21  0452 10/29/21  0524 10/30/21  0940   AST 29 39 61*   * 143* 217*   BILITOT 0.3 0.2 0.2   ALKPHOS 88 84 97     PT/INR: No results for input(s): PROTIME, INR in the last 72 hours. APTT: No results for input(s): APTT in the last 72 hours. Pathology:  1. N/A      Microbiology:  1. None    Recent ABG:   No results for input(s): PH, PO2, PCO2, HCO3, BE, O2SAT, METHB, O2HB, COHB, O2CON, HHB, THB in the last 72 hours. FiO2 : 45 %       Recent Films:  XR CHEST PORTABLE   Final Result   1. Persistence of right worse than left pulmonary opacification   2. Improved inspiration         XR CHEST PORTABLE   Final Result   Bilateral airspace disease without clear interval change. Suboptimal   inspiration as noted.          XR CHEST PORTABLE   Final Result   No significant change in the appearance of the extensive airspace opacities   throughout both lungs, in keeping with the known history of COVID pneumonia. XR CHEST PORTABLE   Final Result   1. There is no interval change in extensive multifocal bilateral airspace   disease. XR CHEST PORTABLE   Final Result   1. No interval change in the extensive multifocal bilateral airspace disease. XR CHEST PORTABLE   Final Result   No interval change         XR CHEST PORTABLE   Final Result   1. Significant worsening of the multifocal bilateral pneumonia when compared   with the prior CT scan of 09/22/2021         CTA PULMONARY W CONTRAST   Final Result   Within described exam limitations, no evidence of pulmonary embolism. Scattered multifocal low density infiltrates throughout both lungs, most   suggestive of COVID pneumonia in context of current pandemic. At least moderate diffuse hepatic steatosis. Cholecystectomy. RECOMMENDATIONS:   Multiple pulmonary nodules. Most severe: 4 mm left solid pulmonary nodule   within the upper lobe. A non-contrast Chest CT at 12 months is optional. If   performed and the nodule is stable at 12 months, no further follow-up is   recommended. These guidelines do not apply to immunocompromised patients and patients with   cancer. Follow up in patients with significant comorbidities as clinically   warranted. For lung cancer screening, adhere to Lung-RADS guidelines. Reference: Radiology. 2017; 284(1):228-43. XR CHEST PORTABLE   Final Result   There are bilateral multifocal ground-glass areas of consolidation with the   lungs concerning for in infectious or inflammatory process and developing   ARDS or viral pneumonia could give this appearance.                      Assessment:  1. COVID-19 pneumonia with substantial improvement since last seen.        Plan:  1. Continue full dose anticoagulation  2.  Wean FiO2 as tolerated    Time at the bedside, reviewing labs and radiographs, reviewing updated notes and consultations, discussing with staff and family was more than 35 minutes. Please note that voice recognition technology was used in the preparation of this note and make therefore it may contain inadvertent transcription errors. If the patient is a COVID 19 isolation patient, the above physical exam reflects that of the examining physician for the day. Tariq Davila MD,  M.ZAY., F.C.C.P.     [facility], [address] [place] [address], [address] [place] [address]

## 2021-10-31 LAB
ALBUMIN SERPL-MCNC: 3.1 G/DL (ref 3.5–5.2)
ALP BLD-CCNC: 100 U/L (ref 40–129)
ALT SERPL-CCNC: 205 U/L (ref 0–40)
ANION GAP SERPL CALCULATED.3IONS-SCNC: 12 MMOL/L (ref 7–16)
AST SERPL-CCNC: 49 U/L (ref 0–39)
BILIRUB SERPL-MCNC: 0.3 MG/DL (ref 0–1.2)
BUN BLDV-MCNC: 16 MG/DL (ref 6–23)
CALCIUM SERPL-MCNC: 8.2 MG/DL (ref 8.6–10.2)
CHLORIDE BLD-SCNC: 96 MMOL/L (ref 98–107)
CO2: 26 MMOL/L (ref 22–29)
CREAT SERPL-MCNC: 0.4 MG/DL (ref 0.7–1.2)
GFR AFRICAN AMERICAN: >60
GFR NON-AFRICAN AMERICAN: >60 ML/MIN/1.73
GLUCOSE BLD-MCNC: 251 MG/DL (ref 74–99)
HCT VFR BLD CALC: 32.6 % (ref 37–54)
HEMOGLOBIN: 10.3 G/DL (ref 12.5–16.5)
MCH RBC QN AUTO: 26.8 PG (ref 26–35)
MCHC RBC AUTO-ENTMCNC: 31.6 % (ref 32–34.5)
MCV RBC AUTO: 84.7 FL (ref 80–99.9)
METER GLUCOSE: 154 MG/DL (ref 74–99)
METER GLUCOSE: 192 MG/DL (ref 74–99)
METER GLUCOSE: 194 MG/DL (ref 74–99)
METER GLUCOSE: 235 MG/DL (ref 74–99)
PDW BLD-RTO: 15.6 FL (ref 11.5–15)
PLATELET # BLD: 217 E9/L (ref 130–450)
PMV BLD AUTO: 9.8 FL (ref 7–12)
POTASSIUM SERPL-SCNC: 4.8 MMOL/L (ref 3.5–5)
RBC # BLD: 3.85 E12/L (ref 3.8–5.8)
SODIUM BLD-SCNC: 134 MMOL/L (ref 132–146)
TOTAL PROTEIN: 5.3 G/DL (ref 6.4–8.3)
WBC # BLD: 8.4 E9/L (ref 4.5–11.5)

## 2021-10-31 PROCEDURE — 85027 COMPLETE CBC AUTOMATED: CPT

## 2021-10-31 PROCEDURE — 6370000000 HC RX 637 (ALT 250 FOR IP): Performed by: INTERNAL MEDICINE

## 2021-10-31 PROCEDURE — 97530 THERAPEUTIC ACTIVITIES: CPT

## 2021-10-31 PROCEDURE — APPSS30 APP SPLIT SHARED TIME 16-30 MINUTES: Performed by: NURSE PRACTITIONER

## 2021-10-31 PROCEDURE — 99232 SBSQ HOSP IP/OBS MODERATE 35: CPT | Performed by: INTERNAL MEDICINE

## 2021-10-31 PROCEDURE — 2580000003 HC RX 258: Performed by: INTERNAL MEDICINE

## 2021-10-31 PROCEDURE — 82962 GLUCOSE BLOOD TEST: CPT

## 2021-10-31 PROCEDURE — 80053 COMPREHEN METABOLIC PANEL: CPT

## 2021-10-31 PROCEDURE — 6360000002 HC RX W HCPCS: Performed by: INTERNAL MEDICINE

## 2021-10-31 PROCEDURE — 94660 CPAP INITIATION&MGMT: CPT

## 2021-10-31 PROCEDURE — 1200000000 HC SEMI PRIVATE

## 2021-10-31 PROCEDURE — 2700000000 HC OXYGEN THERAPY PER DAY

## 2021-10-31 PROCEDURE — 6370000000 HC RX 637 (ALT 250 FOR IP): Performed by: NURSE PRACTITIONER

## 2021-10-31 PROCEDURE — 36415 COLL VENOUS BLD VENIPUNCTURE: CPT

## 2021-10-31 RX ADMIN — OXYCODONE HYDROCHLORIDE AND ACETAMINOPHEN 500 MG: 500 TABLET ORAL at 09:41

## 2021-10-31 RX ADMIN — AMLODIPINE BESYLATE 5 MG: 5 TABLET ORAL at 09:41

## 2021-10-31 RX ADMIN — INSULIN LISPRO 2 UNITS: 100 INJECTION, SOLUTION INTRAVENOUS; SUBCUTANEOUS at 06:26

## 2021-10-31 RX ADMIN — ALPRAZOLAM 0.25 MG: 0.25 TABLET ORAL at 06:26

## 2021-10-31 RX ADMIN — Medication 10 ML: at 22:10

## 2021-10-31 RX ADMIN — INSULIN LISPRO 1 UNITS: 100 INJECTION, SOLUTION INTRAVENOUS; SUBCUTANEOUS at 22:04

## 2021-10-31 RX ADMIN — Medication 2000 UNITS: at 09:42

## 2021-10-31 RX ADMIN — DEXAMETHASONE 6 MG: 6 TABLET ORAL at 09:41

## 2021-10-31 RX ADMIN — MONTELUKAST SODIUM 10 MG: 10 TABLET ORAL at 21:50

## 2021-10-31 RX ADMIN — INSULIN LISPRO 1 UNITS: 100 INJECTION, SOLUTION INTRAVENOUS; SUBCUTANEOUS at 15:54

## 2021-10-31 RX ADMIN — INSULIN LISPRO 1 UNITS: 100 INJECTION, SOLUTION INTRAVENOUS; SUBCUTANEOUS at 12:02

## 2021-10-31 RX ADMIN — TIOTROPIUM BROMIDE INHALATION SPRAY 2 PUFF: 3.12 SPRAY, METERED RESPIRATORY (INHALATION) at 09:40

## 2021-10-31 RX ADMIN — ALPRAZOLAM 0.25 MG: 0.25 TABLET ORAL at 18:11

## 2021-10-31 RX ADMIN — ALBUTEROL SULFATE 2 PUFF: 90 AEROSOL, METERED RESPIRATORY (INHALATION) at 09:40

## 2021-10-31 RX ADMIN — Medication 10 ML: at 09:42

## 2021-10-31 RX ADMIN — ALPRAZOLAM 0.25 MG: 0.25 TABLET ORAL at 09:41

## 2021-10-31 RX ADMIN — INSULIN GLARGINE 5 UNITS: 100 INJECTION, SOLUTION SUBCUTANEOUS at 22:03

## 2021-10-31 RX ADMIN — ALPRAZOLAM 0.25 MG: 0.25 TABLET ORAL at 13:36

## 2021-10-31 RX ADMIN — ZINC SULFATE 220 MG (50 MG) CAPSULE 50 MG: CAPSULE at 09:42

## 2021-10-31 RX ADMIN — ALBUTEROL SULFATE 2 PUFF: 90 AEROSOL, METERED RESPIRATORY (INHALATION) at 15:56

## 2021-10-31 RX ADMIN — ENOXAPARIN SODIUM 80 MG: 100 INJECTION SUBCUTANEOUS at 09:41

## 2021-10-31 RX ADMIN — ALBUTEROL SULFATE 2 PUFF: 90 AEROSOL, METERED RESPIRATORY (INHALATION) at 12:53

## 2021-10-31 RX ADMIN — ALPRAZOLAM 0.25 MG: 0.25 TABLET ORAL at 21:51

## 2021-10-31 RX ADMIN — BUDESONIDE AND FORMOTEROL FUMARATE DIHYDRATE 2 PUFF: 160; 4.5 AEROSOL RESPIRATORY (INHALATION) at 21:53

## 2021-10-31 RX ADMIN — ENOXAPARIN SODIUM 80 MG: 100 INJECTION SUBCUTANEOUS at 21:49

## 2021-10-31 RX ADMIN — PAROXETINE 30 MG: 10 TABLET, FILM COATED ORAL at 09:41

## 2021-10-31 RX ADMIN — PAROXETINE 30 MG: 10 TABLET, FILM COATED ORAL at 23:01

## 2021-10-31 RX ADMIN — ALBUTEROL SULFATE 2 PUFF: 90 AEROSOL, METERED RESPIRATORY (INHALATION) at 21:52

## 2021-10-31 RX ADMIN — BUDESONIDE AND FORMOTEROL FUMARATE DIHYDRATE 2 PUFF: 160; 4.5 AEROSOL RESPIRATORY (INHALATION) at 09:40

## 2021-10-31 ASSESSMENT — PAIN DESCRIPTION - LOCATION: LOCATION: GENERALIZED

## 2021-10-31 ASSESSMENT — PAIN DESCRIPTION - DESCRIPTORS: DESCRIPTORS: HEADACHE

## 2021-10-31 ASSESSMENT — PAIN DESCRIPTION - PROGRESSION: CLINICAL_PROGRESSION: NOT CHANGED

## 2021-10-31 ASSESSMENT — PAIN DESCRIPTION - FREQUENCY: FREQUENCY: INTERMITTENT

## 2021-10-31 ASSESSMENT — PAIN DESCRIPTION - PAIN TYPE: TYPE: ACUTE PAIN

## 2021-10-31 ASSESSMENT — PAIN DESCRIPTION - ORIENTATION: ORIENTATION: RIGHT;LEFT

## 2021-10-31 ASSESSMENT — PAIN SCALES - GENERAL: PAINLEVEL_OUTOF10: 0

## 2021-10-31 NOTE — PLAN OF CARE
Problem: Gas Exchange - Impaired  Goal: Absence of hypoxia  Outcome: Met This Shift     Problem: Breathing Pattern - Ineffective  Goal: Ability to achieve and maintain a regular respiratory rate  Outcome: Met This Shift

## 2021-10-31 NOTE — PROGRESS NOTES
Physical Therapy    Facility/Department: [address] MED SURG/TELE   Rx note   NAME: Abdulkadir Rodrigues  : 1955  MRN: [id number]    Date of Service: 10/31/2021      Attending Provider:  Jeremy Gonsalez DO    Evaluating PT:  Ernie Feil Reynolds Cooks., P.T. Room #:  5111/9815-A  Diagnosis:  Hypokalemia [E87.6]  Acute respiratory failure with hypoxia (HCC) [J96.01]  COVID-19 [U07.1]  Acute respiratory failure due to COVID-19 ([address] [U07.1, J96.00]  Pertinent PMHx/PSHx:  COVID 19+ since 21  Precautions:  No longer in Droplet isolation even though he continues to test positive for COVID 19.  Pt's O2 sat drops with minimal activity. Falls    SUBJECTIVE:    Pt lives alone in a 2 story home with 4 stairs and 1 rail to enter. There are 12 steps and 1 rail to 2nd floor bed and bath. Pt ambulated with no PTA. OBJECTIVE:   Initial Evaluation  Date: 10/22/21 Treatment  10/31/21 Short Term/ Long Term   Goals   Was pt agreeable to Eval/treatment? yes yes    Does pt have pain? C/o intermittent RLE pain No co pain     Bed Mobility  Rolling: SBA  Supine to sit: MIN A  Sit to supine: SBA  Scooting: SBA Rolling: SBA  Supine to sit: SBA  Scooting: SBA seated to EOB Independent    Transfers Sit to stand: NA  Stand to sit: NA  Stand pivot: NA Sit <> stand: SBA  Stand pivot; SBA without A/D Independent    Ambulation   NA  feet with AAD if needed Independent    Stair negotiation: ascended and descended NA  10 steps with 1 rail Independent   AM-PAC 6 Clicks 15/88 66/52        Pt is alert and able to follow instruction  Balance: poor fair during pivot transfer without A/D    Pt performed therapeutic exercise of the following: NT    Patient education  Pt was educated on slow steady breathing pattern, slow safe transfer    Patient response to education:   Pt verbalized understanding Pt demonstrated skill Pt requires further education in this area   yes With instruction yes     ASSESSMENT:   Comments: Nurse ok with Rx.  Pt found in bed, agreed to rx, states wants to get into a chair today. Pt concerned about finding his hearing aide, appeared anxious about this, was rolling side to side in bed to find hearing aide, 02 saturation dropped to 77% on 6 L 02 NC. Hearing aide found, appearance of anxiety decreased. Pt required approx 3 minutes to recover breath to 90% supine. Pt then sat EOB, pivot performed bed to chair without A/D. Pt able to WB and advance LEs. Once to the chair, 02 saturation decreased to 77%, Pt unable to recover breath after approx 4 minutes. 02 increased to 8 L NC, Pt then able to recover breath to 88% while in the chair. Nurse informed of this, Pt remained on 8 L after rx. Treatment: Pt practiced and was instructed in the following treatment: transfer safety, breathing technique    Pt was left in a bedside chair with call light in reach    Time in 1245   Time out 1305   Total Treatment Time 20 minutes   CPT codes:     Therapeutic activities 33987 20 minutes   Therapeutic exercises 48282 0 minutes       Pt is making fair progress toward established Physical Therapy goals. Continue with physical therapy current plan of care.     Denver Plump PTA   License Number: [id number]

## 2021-10-31 NOTE — PROGRESS NOTES
[facility] Progress Note    Admitting Date and Time: 9/22/2021  9:32 PM  Admit Dx: Hypokalemia [E87.6]  Acute respiratory failure with hypoxia ([address] [J96.01]  COVID-19 [U07.1]  Acute respiratory failure due to COVID-19 (HCC) [U07.1, J96.00]    Subjective:  Patient is being followed for Hypokalemia [E87.6]  Acute respiratory failure with hypoxia ([address] [J96.01]  COVID-19 [U07.1]  Acute respiratory failure due to COVID-19 (HCC) [U07.1, J96.00]       No overnight events. Remains on 6 LPM with adequate oxygenation at rest.  On entering room he was struggling to move bedside table and desat to 83%, quickly recovered > 90% with rest    No complaints of chest discomfort, has been afebrile    ROS: denies fever, chills, cp, n/v, HA unless stated above.       dexamethasone  6 mg Oral Daily    montelukast  10 mg Oral Nightly    insulin glargine  5 Units SubCUTAneous Nightly    ascorbic acid  500 mg Oral Daily    zinc sulfate  50 mg Oral Daily    vitamin D  2,000 Units Oral Daily    tiotropium  2 puff Inhalation Daily    budesonide-formoterol  2 puff Inhalation BID    ALPRAZolam  0.25 mg Oral Q4H While awake    enoxaparin  1 mg/kg SubCUTAneous BID    albuterol sulfate HFA  2 puff Inhalation 4x daily    sodium chloride flush  5-40 mL IntraVENous 2 times per day    PARoxetine  30 mg Oral BID    amLODIPine  5 mg Oral Daily    insulin lispro  0-6 Units SubCUTAneous TID WC    insulin lispro  0-3 Units SubCUTAneous Nightly     guaiFENesin-dextromethorphan, 5 mL, Q4H PRN  aluminum & magnesium hydroxide-simethicone, 15 mL, Q6H PRN  melatonin, 4.5 mg, Nightly PRN  sodium chloride flush, 5-40 mL, PRN  sodium chloride, 25 mL, PRN  ondansetron, 4 mg, Q8H PRN   Or  ondansetron, 4 mg, Q6H PRN  polyethylene glycol, 17 g, Daily PRN  acetaminophen, 650 mg, Q6H PRN   Or  acetaminophen, 650 mg, Q6H PRN  glucose, 15 g, PRN  dextrose, 12.5 g, PRN  glucagon (rDNA), 1 mg, PRN  dextrose, 100 mL/hr, PRN Objective:    /71   Pulse 89   Temp 98 °F (36.7 °C) (Oral)   Resp 18   Ht 5' 7\" (1.702 m)   Wt 178 lb 9.2 oz (81 kg)   SpO2 92%   BMI 27.97 kg/m²     General Appearance: alert and oriented to person, place and time and in no acute distress  Skin: warm and dry  Head: normocephalic and atraumatic  Eyes: pupils equal, round, and reactive to light, extraocular eye movements intact, conjunctivae normal  Neck: neck supple and non tender without mass   Pulmonary/Chest: improving air entry and decreased crackles  Cardiovascular: normal rate, normal S1 and S2 and no carotid bruits  Abdomen: soft, non-tender, non-distended, normal bowel sounds, no masses or organomegaly  Extremities: no cyanosis, no clubbing and no edema  Neurologic: no cranial nerve deficit and speech normal        Recent Labs     10/29/21  0524 10/30/21  0940 10/31/21  0343    135 134   K 4.1 3.7 4.8   CL 97* 95* 96*   CO2 27 29 26   BUN 11 19 16   CREATININE 0.4* 0.5* 0.4*   GLUCOSE 123* 164* 251*   CALCIUM 8.1* 8.7 8.2*       Recent Labs     10/29/21  0524 10/30/21  0940 10/31/21  0343   WBC 7.9 8.2 8.4   RBC 3.86 3.79* 3.85   HGB 10.5* 10.4* 10.3*   HCT 32.2* 32.1* 32.6*   MCV 83.4 84.7 84.7   MCH 27.2 27.4 26.8   MCHC 32.6 32.4 31.6*   RDW 15.2* 15.6* 15.6*    219 217   MPV 9.6 9.8 9.8       Radiology:    Assessment:    Active Problems:    COVID-19    Acute respiratory failure due to COVID-19 [facility])    Acute respiratory failure with hypoxia (HCC)    Pneumonia due to COVID-19 virus    Elevated LFTs    Hypokalemia    Essential hypertension    Non-insulin dependent type 2 diabetes mellitus ([address]  Resolved Problems:    * No resolved hospital problems. *      Plan:  1. Acute Respiratory Failure with Hypoxia  - 2/2 COVID-19 with extensive bilateral infiltrates and pneuominits. CXR finally with improving inspiration.  - Continue dexamethasone, 6 mg daily since 10/27. Taper slowly as O2 requirements improve.   - Has finally weaned from [facility] to [facility], stable past several days with flow < 10 LPM. Desaturation with conversation and exertion is improving and requiring less time for recovery. Continue to wean as able and plan for DC to [facility], probable by Monday will stable for rehab     2. COVID-19 Viral Pneumonia  - Completed baricitinib  - Remains on Dexamethasone, tapered to 10 mg daily on 10/19, 6 mg daily 10/27, plan for 4 mg daily for 5 more days then off  - Vitamin Supplementation. Follow inflammatory markers. Lovenox 1mg/kg.   - Encourage IS/Pronating. DDImer and CRP stabilized  Out of isolation     3. Anxiety  - Continue Xanax Q4 W/A.   - continue Paxil  - mood is stable     4. DM  - Lantus 5 units daily and low dose SSI  - hypoglycemic protocol/Carb Control diet. Follow adjust as needed.     5. HTN  - Continue Amlodipine.     6. Poor intake  - 2//2 Respiratory distress. Poor intake noted. Intake improving with improvement of respiratory function. Follow  Improving, he is tolerated more oral intake and taking protein supplements     7. Hyponatremia  Resolved      8. Disposition- Plan now for [facility] at discharge for further oxygen weaning and physical therapy        NOTE: This report was transcribed using voice recognition software. Every effort was made to ensure accuracy; however, inadvertent computerized transcription errors may be present.   Electronically signed by AUBREE Kay CNP on 10/31/2021 at 10:58 AM

## 2021-11-01 LAB
ALBUMIN SERPL-MCNC: 3.2 G/DL (ref 3.5–5.2)
ALP BLD-CCNC: 110 U/L (ref 40–129)
ALT SERPL-CCNC: 207 U/L (ref 0–40)
ANION GAP SERPL CALCULATED.3IONS-SCNC: 8 MMOL/L (ref 7–16)
AST SERPL-CCNC: 44 U/L (ref 0–39)
BILIRUB SERPL-MCNC: 0.2 MG/DL (ref 0–1.2)
BUN BLDV-MCNC: 22 MG/DL (ref 6–23)
CALCIUM SERPL-MCNC: 8.5 MG/DL (ref 8.6–10.2)
CHLORIDE BLD-SCNC: 96 MMOL/L (ref 98–107)
CO2: 29 MMOL/L (ref 22–29)
CREAT SERPL-MCNC: 0.4 MG/DL (ref 0.7–1.2)
GFR AFRICAN AMERICAN: >60
GFR NON-AFRICAN AMERICAN: >60 ML/MIN/1.73
GLUCOSE BLD-MCNC: 155 MG/DL (ref 74–99)
HCT VFR BLD CALC: 30.9 % (ref 37–54)
HEMOGLOBIN: 10 G/DL (ref 12.5–16.5)
MCH RBC QN AUTO: 27.5 PG (ref 26–35)
MCHC RBC AUTO-ENTMCNC: 32.4 % (ref 32–34.5)
MCV RBC AUTO: 85.1 FL (ref 80–99.9)
METER GLUCOSE: 129 MG/DL (ref 74–99)
METER GLUCOSE: 134 MG/DL (ref 74–99)
METER GLUCOSE: 190 MG/DL (ref 74–99)
METER GLUCOSE: 218 MG/DL (ref 74–99)
PDW BLD-RTO: 15.4 FL (ref 11.5–15)
PLATELET # BLD: 209 E9/L (ref 130–450)
PMV BLD AUTO: 10.1 FL (ref 7–12)
POTASSIUM SERPL-SCNC: 4.1 MMOL/L (ref 3.5–5)
RBC # BLD: 3.63 E12/L (ref 3.8–5.8)
SODIUM BLD-SCNC: 133 MMOL/L (ref 132–146)
TOTAL PROTEIN: 5.4 G/DL (ref 6.4–8.3)
WBC # BLD: 9.9 E9/L (ref 4.5–11.5)

## 2021-11-01 PROCEDURE — APPSS30 APP SPLIT SHARED TIME 16-30 MINUTES: Performed by: NURSE PRACTITIONER

## 2021-11-01 PROCEDURE — 94660 CPAP INITIATION&MGMT: CPT

## 2021-11-01 PROCEDURE — 82962 GLUCOSE BLOOD TEST: CPT

## 2021-11-01 PROCEDURE — 6370000000 HC RX 637 (ALT 250 FOR IP): Performed by: NURSE PRACTITIONER

## 2021-11-01 PROCEDURE — 94761 N-INVAS EAR/PLS OXIMETRY MLT: CPT

## 2021-11-01 PROCEDURE — 6370000000 HC RX 637 (ALT 250 FOR IP): Performed by: INTERNAL MEDICINE

## 2021-11-01 PROCEDURE — 36415 COLL VENOUS BLD VENIPUNCTURE: CPT

## 2021-11-01 PROCEDURE — 6360000002 HC RX W HCPCS: Performed by: INTERNAL MEDICINE

## 2021-11-01 PROCEDURE — 1200000000 HC SEMI PRIVATE

## 2021-11-01 PROCEDURE — 80053 COMPREHEN METABOLIC PANEL: CPT

## 2021-11-01 PROCEDURE — 99232 SBSQ HOSP IP/OBS MODERATE 35: CPT | Performed by: INTERNAL MEDICINE

## 2021-11-01 PROCEDURE — 2700000000 HC OXYGEN THERAPY PER DAY

## 2021-11-01 PROCEDURE — 2580000003 HC RX 258: Performed by: INTERNAL MEDICINE

## 2021-11-01 PROCEDURE — 85027 COMPLETE CBC AUTOMATED: CPT

## 2021-11-01 RX ORDER — DEXAMETHASONE 4 MG/1
4 TABLET ORAL DAILY
Status: DISCONTINUED | OUTPATIENT
Start: 2021-11-02 | End: 2021-11-02 | Stop reason: HOSPADM

## 2021-11-01 RX ORDER — ALPRAZOLAM 0.25 MG/1
0.25 TABLET ORAL 3 TIMES DAILY
Status: DISCONTINUED | OUTPATIENT
Start: 2021-11-01 | End: 2021-11-02 | Stop reason: HOSPADM

## 2021-11-01 RX ADMIN — PAROXETINE 30 MG: 10 TABLET, FILM COATED ORAL at 20:30

## 2021-11-01 RX ADMIN — INSULIN LISPRO 1 UNITS: 100 INJECTION, SOLUTION INTRAVENOUS; SUBCUTANEOUS at 20:37

## 2021-11-01 RX ADMIN — INSULIN GLARGINE 5 UNITS: 100 INJECTION, SOLUTION SUBCUTANEOUS at 20:31

## 2021-11-01 RX ADMIN — Medication 2000 UNITS: at 09:30

## 2021-11-01 RX ADMIN — Medication 10 ML: at 20:33

## 2021-11-01 RX ADMIN — ZINC SULFATE 220 MG (50 MG) CAPSULE 50 MG: CAPSULE at 09:29

## 2021-11-01 RX ADMIN — ALPRAZOLAM 0.25 MG: 0.25 TABLET ORAL at 06:16

## 2021-11-01 RX ADMIN — TIOTROPIUM BROMIDE INHALATION SPRAY 2 PUFF: 3.12 SPRAY, METERED RESPIRATORY (INHALATION) at 09:28

## 2021-11-01 RX ADMIN — AMLODIPINE BESYLATE 5 MG: 5 TABLET ORAL at 09:29

## 2021-11-01 RX ADMIN — ENOXAPARIN SODIUM 80 MG: 100 INJECTION SUBCUTANEOUS at 20:30

## 2021-11-01 RX ADMIN — ALBUTEROL SULFATE 2 PUFF: 90 AEROSOL, METERED RESPIRATORY (INHALATION) at 13:16

## 2021-11-01 RX ADMIN — ENOXAPARIN SODIUM 80 MG: 100 INJECTION SUBCUTANEOUS at 09:29

## 2021-11-01 RX ADMIN — OXYCODONE HYDROCHLORIDE AND ACETAMINOPHEN 500 MG: 500 TABLET ORAL at 09:29

## 2021-11-01 RX ADMIN — MONTELUKAST SODIUM 10 MG: 10 TABLET ORAL at 20:30

## 2021-11-01 RX ADMIN — Medication 10 ML: at 09:28

## 2021-11-01 RX ADMIN — BUDESONIDE AND FORMOTEROL FUMARATE DIHYDRATE 2 PUFF: 160; 4.5 AEROSOL RESPIRATORY (INHALATION) at 09:28

## 2021-11-01 RX ADMIN — ALPRAZOLAM 0.25 MG: 0.25 TABLET ORAL at 09:33

## 2021-11-01 RX ADMIN — ALPRAZOLAM 0.25 MG: 0.25 TABLET ORAL at 20:30

## 2021-11-01 RX ADMIN — DEXAMETHASONE 6 MG: 6 TABLET ORAL at 09:29

## 2021-11-01 RX ADMIN — ALBUTEROL SULFATE 2 PUFF: 90 AEROSOL, METERED RESPIRATORY (INHALATION) at 09:28

## 2021-11-01 RX ADMIN — PAROXETINE 30 MG: 10 TABLET, FILM COATED ORAL at 09:29

## 2021-11-01 RX ADMIN — ALBUTEROL SULFATE 2 PUFF: 90 AEROSOL, METERED RESPIRATORY (INHALATION) at 16:34

## 2021-11-01 RX ADMIN — INSULIN LISPRO 2 UNITS: 100 INJECTION, SOLUTION INTRAVENOUS; SUBCUTANEOUS at 16:35

## 2021-11-01 RX ADMIN — ALPRAZOLAM 0.25 MG: 0.25 TABLET ORAL at 13:31

## 2021-11-01 RX ADMIN — BUDESONIDE AND FORMOTEROL FUMARATE DIHYDRATE 2 PUFF: 160; 4.5 AEROSOL RESPIRATORY (INHALATION) at 20:31

## 2021-11-01 RX ADMIN — ALBUTEROL SULFATE 2 PUFF: 90 AEROSOL, METERED RESPIRATORY (INHALATION) at 20:30

## 2021-11-01 ASSESSMENT — PAIN SCALES - GENERAL: PAINLEVEL_OUTOF10: 0

## 2021-11-01 NOTE — CARE COORDINATION
Social Work / Discharge Planning : Belinda from [address] reviewer collecting additional information for [facility] (1-RH). Chanel Amador PASSAR has NOT yet been cleared. Awaiting final determination. SW to follow.  Electronically signed by ALIZA Goyal on 11/1/21 at 10:22 AM EDT

## 2021-11-01 NOTE — PROGRESS NOTES
[facility] [address] [facility]  [address] [facility], [address] [facility], [address] [facility]      Pulmonary Progress Note      SUBJECTIVE:  Lying down in bed on 6 li HFNC, feels similar with respiratory function, minimal cough/sputum production, using incentive spirometer.     OBJECTIVE    Medications    Continuous Infusions:   sodium chloride      dextrose         Scheduled Meds:   [START ON 2021] dexamethasone  4 mg Oral Daily    ALPRAZolam  0.25 mg Oral TID    montelukast  10 mg Oral Nightly    insulin glargine  5 Units SubCUTAneous Nightly    ascorbic acid  500 mg Oral Daily    zinc sulfate  50 mg Oral Daily    vitamin D  2,000 Units Oral Daily    tiotropium  2 puff Inhalation Daily    budesonide-formoterol  2 puff Inhalation BID    enoxaparin  1 mg/kg SubCUTAneous BID    albuterol sulfate HFA  2 puff Inhalation 4x daily    sodium chloride flush  5-40 mL IntraVENous 2 times per day    PARoxetine  30 mg Oral BID    amLODIPine  5 mg Oral Daily    insulin lispro  0-6 Units SubCUTAneous TID WC    insulin lispro  0-3 Units SubCUTAneous Nightly       PRN Meds:guaiFENesin-dextromethorphan, aluminum & magnesium hydroxide-simethicone, melatonin, sodium chloride flush, sodium chloride, ondansetron **OR** ondansetron, polyethylene glycol, acetaminophen **OR** acetaminophen, glucose, dextrose, glucagon (rDNA), dextrose    Physical    VITALS:  BP (!) 153/77   Pulse 66   Temp 98.4 °F (36.9 °C) (Oral)   Resp 20   Ht 5' 7\" (1.702 m)   Wt 178 lb 9.2 oz (81 kg)   SpO2 97%   BMI 27.97 kg/m²     24HR INTAKE/OUTPUT:    No intake or output data in the 24 hours ending 21 1404    24HR PULSE OXIMETRY RANGE:    SpO2  Av %  Min: 95 %  Max: 97 %    General appearance: alert, appears stated age and cooperative  Lungs: rhonchi bibasilar with cough  Heart: regular rate and rhythm, S1, S2 normal, no murmur, click, rub or gallop  Abdomen: soft, non-tender; bowel sounds normal; no masses,  no organomegaly  Extremities: extremities normal, atraumatic, no cyanosis or edema  Neurologic: Mental status: Alert, oriented, thought content appropriate    Data    CBC:   Recent Labs     10/30/21  0940 10/31/21  0343 11/01/21  0422   WBC 8.2 8.4 9.9   HGB 10.4* 10.3* 10.0*   HCT 32.1* 32.6* 30.9*   MCV 84.7 84.7 85.1    217 209       BMP:  Recent Labs     10/30/21  0940 10/31/21  0343 11/01/21  0422    134 133   K 3.7 4.8 4.1   CL 95* 96* 96*   CO2 29 26 29   BUN 19 16 22   CREATININE 0.5* 0.4* 0.4*    ALB:3,BILIDIR:3,BILITOT:3,ALKPHOS:3)@    PT/INR: No results for input(s): PROTIME, INR in the last 72 hours. ABG:   No results for input(s): PH, PO2, PCO2, HCO3, BE, O2SAT, METHB, O2HB, COHB, O2CON, HHB, THB in the last 72 hours. FiO2 : 45 %       Radiology/Other tests reviewed: CXR reviewed with similar increased GG interstitial markings right>left compared to previous    Assessment:     Active Problems:    COVID-19    Acute respiratory failure due to COVID-19 [facility])    Acute respiratory failure with hypoxia (HCC)    Pneumonia due to COVID-19 virus    Elevated LFTs    Hypokalemia    Essential hypertension    Non-insulin dependent type 2 diabetes mellitus ([facility]  Resolved Problems:    * No resolved hospital problems. *      Plan:       1. Cont with oxygen, taper as tolerated, observe degree of desaturation with movement/activity, reiterated need to plan for resting/recovery after exercising tanja if desaturating a lot  2. Cont with steroids, taper as tolerated, 6 mg daily until end of the week then 4 mg daily then stop by end of the week  3. singulair daily and cont for a few weeks after discharge  4. Cont with MDIs, observe respiratory function and cough/sputum production  5. Encourage incentive spirometer use and prone positioning when able to  6. OOB to chair  7.  May need short term rehab to help with recovery, can go when this is set up      Time at the bedside, reviewing labs and radiographs, reviewing notes and consultations, discussing with staff and family was more than 35 minutes. Thanks for letting us see this patient in consultation. Please contact us with any questions. Office [phone] or after hours through ABS, [phone].

## 2021-11-01 NOTE — PLAN OF CARE
Problem: Gas Exchange - Impaired  Goal: Absence of hypoxia  10/31/2021 1755 by Latha Rodriguez RN  Outcome: Met This Shift     Problem: Breathing Pattern - Ineffective  Goal: Ability to achieve and maintain a regular respiratory rate  10/31/2021 1755 by Latha Rodriguez RN  Outcome: Met This Shift     Problem: Nutrition Deficits  Goal: Optimize nutritional status  Outcome: Met This Shift     Problem: Loneliness or Risk for Loneliness  Goal: Demonstrate positive use of time alone when socialization is not possible  Outcome: Met This Shift

## 2021-11-01 NOTE — PROGRESS NOTES
Wt 178 lb 9.2 oz (81 kg)   SpO2 97%   BMI 27.97 kg/m²     General Appearance: alert and oriented to person, place and time and in no acute distress  Skin: warm and dry  Head: normocephalic and atraumatic  Eyes: pupils equal, round, and reactive to light, extraocular eye movements intact, conjunctivae normal  Neck: neck supple and non tender without mass   Pulmonary/Chest: minimal basilar crackles  Cardiovascular: normal rate, normal S1 and S2 and no carotid bruits  Abdomen: soft, non-tender, non-distended, normal bowel sounds, no masses or organomegaly  Extremities: no cyanosis, no clubbing and no edema  Neurologic: no cranial nerve deficit and speech normal        Recent Labs     10/30/21  0940 10/31/21  0343 11/01/21  0422    134 133   K 3.7 4.8 4.1   CL 95* 96* 96*   CO2 29 26 29   BUN 19 16 22   CREATININE 0.5* 0.4* 0.4*   GLUCOSE 164* 251* 155*   CALCIUM 8.7 8.2* 8.5*       Recent Labs     10/30/21  0940 10/31/21  0343 11/01/21  0422   WBC 8.2 8.4 9.9   RBC 3.79* 3.85 3.63*   HGB 10.4* 10.3* 10.0*   HCT 32.1* 32.6* 30.9*   MCV 84.7 84.7 85.1   MCH 27.4 26.8 27.5   MCHC 32.4 31.6* 32.4   RDW 15.6* 15.6* 15.4*    217 209   MPV 9.8 9.8 10.1       Radiology:     Assessment:    Active Problems:    COVID-19    Acute respiratory failure due to COVID-19 [facility])    Acute respiratory failure with hypoxia (HCC)    Pneumonia due to COVID-19 virus    Elevated LFTs    Hypokalemia    Essential hypertension    Non-insulin dependent type 2 diabetes mellitus ([facility]  Resolved Problems:    * No resolved hospital problems. *      Plan:  1. Acute Respiratory Failure with Hypoxia  - 2/2 COVID-19 with extensive bilateral infiltrates and pneuominits. CXR finally with improving inspiration.  - Continue dexamethasone, 6 mg daily since 10/27. Taper slowly as O2 requirements improve.   - Has finally weaned from [facility] to [place] [address], stable past several days with flow < 10 LPM. Desaturation with conversation and exertion is improving and requiring less time for recovery. Continue to wean as able and plan for DC to [place], when accepted     2. COVID-19 Viral Pneumonia  - Completed baricitinib  - Remains on Dexamethasone, tapered to 10 mg daily on 10/19, 6 mg daily 10/27, plan for 4 mg until   - Vitamin Supplementation. Follow inflammatory markers. Lovenox 1mg/kg.   - Encourage IS/Pronating. DDImer and CRP stabilized  Out of isolation     3. Anxiety  At baseline is mild and well controlled. He was maintained on Paxil. Given his acute illness with prolonged hospitalization and hypoxia, he had acute anxiety. He was fearful of not surviving COVID illness, he was provided Xanax to manage acute anxiety. This has been well controlled and improving while his respiratory failure improves  - continue Paxil  - mood is stable, improving will decrease Xanax frequency to TID.      4. DM  - Lantus 5 units daily and low dose SSI  - hypoglycemic protocol/Carb Control diet. Follow adjust as needed.     5. HTN  - Continue Amlodipine.     6. Poor intake  - 2//2 Respiratory distress. Poor intake noted. Intake improving with improvement of respiratory function. Follow  Improving, he is tolerated more oral intake and taking protein supplements     7. Hyponatremia  Resolved      8. Disposition- Plan now for [place] [facility] at discharge for further oxygen weaning and physical therapy        NOTE: This report was transcribed using voice recognition software. Every effort was made to ensure accuracy; however, inadvertent computerized transcription errors may be present.   Electronically signed by AUBREE Middleton CNP on 11/1/2021 at 12:15 PM

## 2021-11-02 VITALS
WEIGHT: 178.57 LBS | SYSTOLIC BLOOD PRESSURE: 132 MMHG | OXYGEN SATURATION: 95 % | HEART RATE: 69 BPM | TEMPERATURE: 98.3 F | RESPIRATION RATE: 16 BRPM | DIASTOLIC BLOOD PRESSURE: 79 MMHG | BODY MASS INDEX: 28.03 KG/M2 | HEIGHT: 67 IN

## 2021-11-02 LAB
ALBUMIN SERPL-MCNC: 3.1 G/DL (ref 3.5–5.2)
ALP BLD-CCNC: 107 U/L (ref 40–129)
ALT SERPL-CCNC: 196 U/L (ref 0–40)
ANION GAP SERPL CALCULATED.3IONS-SCNC: 11 MMOL/L (ref 7–16)
AST SERPL-CCNC: 40 U/L (ref 0–39)
BILIRUB SERPL-MCNC: 0.3 MG/DL (ref 0–1.2)
BUN BLDV-MCNC: 14 MG/DL (ref 6–23)
CALCIUM SERPL-MCNC: 8.2 MG/DL (ref 8.6–10.2)
CHLORIDE BLD-SCNC: 96 MMOL/L (ref 98–107)
CO2: 26 MMOL/L (ref 22–29)
CREAT SERPL-MCNC: 0.4 MG/DL (ref 0.7–1.2)
GFR AFRICAN AMERICAN: >60
GFR NON-AFRICAN AMERICAN: >60 ML/MIN/1.73
GLUCOSE BLD-MCNC: 148 MG/DL (ref 74–99)
HCT VFR BLD CALC: 31.7 % (ref 37–54)
HEMOGLOBIN: 10 G/DL (ref 12.5–16.5)
MCH RBC QN AUTO: 26.7 PG (ref 26–35)
MCHC RBC AUTO-ENTMCNC: 31.5 % (ref 32–34.5)
MCV RBC AUTO: 84.8 FL (ref 80–99.9)
METER GLUCOSE: 118 MG/DL (ref 74–99)
METER GLUCOSE: 136 MG/DL (ref 74–99)
METER GLUCOSE: 224 MG/DL (ref 74–99)
PDW BLD-RTO: 15.7 FL (ref 11.5–15)
PLATELET # BLD: 214 E9/L (ref 130–450)
PMV BLD AUTO: 10 FL (ref 7–12)
POTASSIUM SERPL-SCNC: 3.9 MMOL/L (ref 3.5–5)
RBC # BLD: 3.74 E12/L (ref 3.8–5.8)
SARS-COV-2, NAAT: NOT DETECTED
SODIUM BLD-SCNC: 133 MMOL/L (ref 132–146)
TOTAL PROTEIN: 5.2 G/DL (ref 6.4–8.3)
WBC # BLD: 11.2 E9/L (ref 4.5–11.5)

## 2021-11-02 PROCEDURE — 6370000000 HC RX 637 (ALT 250 FOR IP): Performed by: INTERNAL MEDICINE

## 2021-11-02 PROCEDURE — 6360000002 HC RX W HCPCS: Performed by: NURSE PRACTITIONER

## 2021-11-02 PROCEDURE — 85027 COMPLETE CBC AUTOMATED: CPT

## 2021-11-02 PROCEDURE — 82962 GLUCOSE BLOOD TEST: CPT

## 2021-11-02 PROCEDURE — 80053 COMPREHEN METABOLIC PANEL: CPT

## 2021-11-02 PROCEDURE — 2700000000 HC OXYGEN THERAPY PER DAY

## 2021-11-02 PROCEDURE — 36415 COLL VENOUS BLD VENIPUNCTURE: CPT

## 2021-11-02 PROCEDURE — 6370000000 HC RX 637 (ALT 250 FOR IP): Performed by: NURSE PRACTITIONER

## 2021-11-02 PROCEDURE — 99239 HOSP IP/OBS DSCHRG MGMT >30: CPT | Performed by: INTERNAL MEDICINE

## 2021-11-02 PROCEDURE — 87635 SARS-COV-2 COVID-19 AMP PRB: CPT

## 2021-11-02 PROCEDURE — 2580000003 HC RX 258: Performed by: INTERNAL MEDICINE

## 2021-11-02 PROCEDURE — 6360000002 HC RX W HCPCS: Performed by: INTERNAL MEDICINE

## 2021-11-02 RX ORDER — ASCORBIC ACID 500 MG
500 TABLET ORAL DAILY
Qty: 30 TABLET | Refills: 3 | DISCHARGE
Start: 2021-11-03

## 2021-11-02 RX ORDER — ALBUTEROL SULFATE 90 UG/1
2 AEROSOL, METERED RESPIRATORY (INHALATION) 4 TIMES DAILY
Qty: 18 G | Refills: 3 | DISCHARGE
Start: 2021-11-02

## 2021-11-02 RX ORDER — GUAIFENESIN/DEXTROMETHORPHAN 100-10MG/5
5 SYRUP ORAL EVERY 4 HOURS PRN
Qty: 120 ML | DISCHARGE
Start: 2021-11-02 | End: 2021-11-12

## 2021-11-02 RX ORDER — ALPRAZOLAM 0.25 MG/1
0.25 TABLET ORAL 3 TIMES DAILY PRN
Qty: 90 TABLET | Refills: 0 | Status: SHIPPED | DISCHARGE
Start: 2021-11-02 | End: 2021-12-02

## 2021-11-02 RX ORDER — MAGNESIUM HYDROXIDE/ALUMINUM HYDROXICE/SIMETHICONE 120; 1200; 1200 MG/30ML; MG/30ML; MG/30ML
15 SUSPENSION ORAL EVERY 6 HOURS PRN
Refills: 0 | DISCHARGE
Start: 2021-11-02

## 2021-11-02 RX ORDER — MONTELUKAST SODIUM 10 MG/1
10 TABLET ORAL NIGHTLY
Qty: 30 TABLET | Refills: 3 | DISCHARGE
Start: 2021-11-02

## 2021-11-02 RX ORDER — LANOLIN ALCOHOL/MO/W.PET/CERES
6 CREAM (GRAM) TOPICAL DAILY
Refills: 3 | DISCHARGE
Start: 2021-11-02

## 2021-11-02 RX ORDER — DEXAMETHASONE 4 MG/1
4 TABLET ORAL DAILY
Qty: 2 TABLET | Refills: 0 | DISCHARGE
Start: 2021-11-03 | End: 2021-11-05

## 2021-11-02 RX ORDER — BUDESONIDE AND FORMOTEROL FUMARATE DIHYDRATE 160; 4.5 UG/1; UG/1
2 AEROSOL RESPIRATORY (INHALATION) 2 TIMES DAILY
Qty: 10.2 G | Refills: 3 | DISCHARGE
Start: 2021-11-02

## 2021-11-02 RX ORDER — ZINC SULFATE 50(220)MG
50 CAPSULE ORAL DAILY
Qty: 30 CAPSULE | Refills: 3 | DISCHARGE
Start: 2021-11-03

## 2021-11-02 RX ORDER — CHOLECALCIFEROL (VITAMIN D3) 50 MCG
2000 TABLET ORAL DAILY
Qty: 30 TABLET | DISCHARGE
Start: 2021-11-03

## 2021-11-02 RX ADMIN — ALBUTEROL SULFATE 2 PUFF: 90 AEROSOL, METERED RESPIRATORY (INHALATION) at 13:03

## 2021-11-02 RX ADMIN — ALBUTEROL SULFATE 2 PUFF: 90 AEROSOL, METERED RESPIRATORY (INHALATION) at 16:24

## 2021-11-02 RX ADMIN — TIOTROPIUM BROMIDE INHALATION SPRAY 2 PUFF: 3.12 SPRAY, METERED RESPIRATORY (INHALATION) at 09:50

## 2021-11-02 RX ADMIN — Medication 10 ML: at 09:52

## 2021-11-02 RX ADMIN — ALPRAZOLAM 0.25 MG: 0.25 TABLET ORAL at 14:51

## 2021-11-02 RX ADMIN — ENOXAPARIN SODIUM 80 MG: 100 INJECTION SUBCUTANEOUS at 09:51

## 2021-11-02 RX ADMIN — BUDESONIDE AND FORMOTEROL FUMARATE DIHYDRATE 2 PUFF: 160; 4.5 AEROSOL RESPIRATORY (INHALATION) at 09:50

## 2021-11-02 RX ADMIN — INSULIN LISPRO 2 UNITS: 100 INJECTION, SOLUTION INTRAVENOUS; SUBCUTANEOUS at 16:24

## 2021-11-02 RX ADMIN — ALBUTEROL SULFATE 2 PUFF: 90 AEROSOL, METERED RESPIRATORY (INHALATION) at 09:50

## 2021-11-02 RX ADMIN — OXYCODONE HYDROCHLORIDE AND ACETAMINOPHEN 500 MG: 500 TABLET ORAL at 09:51

## 2021-11-02 RX ADMIN — PAROXETINE 30 MG: 10 TABLET, FILM COATED ORAL at 09:51

## 2021-11-02 RX ADMIN — DEXAMETHASONE 4 MG: 4 TABLET ORAL at 09:51

## 2021-11-02 RX ADMIN — Medication 2000 UNITS: at 09:51

## 2021-11-02 RX ADMIN — ALPRAZOLAM 0.25 MG: 0.25 TABLET ORAL at 09:51

## 2021-11-02 RX ADMIN — ZINC SULFATE 220 MG (50 MG) CAPSULE 50 MG: CAPSULE at 09:51

## 2021-11-02 RX ADMIN — AMLODIPINE BESYLATE 5 MG: 5 TABLET ORAL at 09:51

## 2021-11-02 NOTE — PROGRESS NOTES
Patient continues to refuse bipap. Bipap is pushed across the room and not plugged in. Patient is on a 6 liter high flow nasal cannula. Patient is instructed to have respiratory notified if he changes his mind or if his condition changes.

## 2021-11-02 NOTE — DISCHARGE SUMMARY
[facility] Physician Discharge Summary     [address] [facility]  [address], [place]  [address] [place], [place] [phone]    Activity level   As per therapy    Disposition   [facility]      Condition on discharge Stable    Patient ID   Judy Johns, 61 y.o.male /  1955  / MRN [id number]    Admit date   2021    Discharge date  2021  3:13 PM    Admission diagnoses Active Problems:    COVID-19    Acute respiratory failure due to COVID-19 [facility])    Acute respiratory failure with hypoxia ([facility] [address]    Pneumonia due to COVID-19 virus    Elevated LFTs    Hypokalemia    Essential hypertension    Non-insulin dependent type 2 diabetes mellitus ([facility] [address]  Resolved Problems:    * No resolved hospital problems. *    Discharge diagnoses Same    Consults   IP CONSULT TO PHARMACY  IP CONSULT TO PULMONOLOGY  IP CONSULT TO IV TEAM  IP CONSULT TO IV TEAM    Procedures   See hospital course    Hospital Course  66M PMH HTN and NIDDM admitted  w fevers and chills, SoB / Valdez, cough, nausea, diarrhea following positive COVID test prior to admission. Due to the patient's extensive stay - 40 days - only a brief overview of the events occurring during his stay will be presented. Please see the patient's electronic medical record for details beyond those presented here. 1. Acute Respiratory Failure with Hypoxia  - 2/2 COVID-19 with extensive bilateral infiltrates and pneuominits. CXR finally with improving inspiration.    - On dexamethasone, 6 mg daily since 10/27. Taper slowly as O2 requirements improve. - Has finally weaned from [facility] to [facility], stable past several days with flow < 10 LPM. Desaturation with conversation and exertion is improving and requiring less time for recovery.    - Had required maximum oxygen supplementation with either heated high flow or BiPAP for literally weeks on end though now finally able to have been weaned down to [facility] which pt has been stable on for past several days     2. COVID-19 Viral Pneumonia  - Completed baricitinib  - Remains on Dexamethasone, tapered to 10 mg daily on 10/19, 6 mg daily 10/27, plan for 4 mg until   - Vitamin Supplementation. Follow inflammatory markers. Lovenox 1mg/kg.   - Encourage IS/Pronating. DDImer and CRP stabilized  Out of isolation     3. Anxiety  At baseline is mild and well controlled. He was maintained on Paxil. Given his acute illness with prolonged hospitalization and hypoxia, he had acute anxiety. He was fearful of not surviving COVID illness, he was provided Xanax to manage acute anxiety. This has been well controlled and improving while his respiratory failure improves  - continue Paxil  - mood is stable, improving will decrease Xanax frequency to TID.      4. DM  - Lantus 5 units daily and low dose SSI  - hypoglycemic protocol/Carb Control diet. Follow adjust as needed.     5. HTN  - Continue Amlodipine.     6. Poor intake  - 2//2 Respiratory distress. Poor intake noted. Intake improving with improvement of respiratory function.  Follow  Improving, he is tolerated more oral intake and taking protein supplements     7. Hyponatremia  Resolved    Discharge Exam  /79   Pulse 69   Temp 98.3 °F (36.8 °C) (Oral)   Resp 16   Ht 5' 7\" (1.702 m)   Wt 178 lb 9.2 oz (81 kg)   SpO2 95%   BMI 27.97 kg/m²   General Appearance: alert and oriented to person, place and time and in no acute distress  Skin: warm and dry  Head: normocephalic and atraumatic, +NCO2  Eyes: pupils equal, round, and reactive to light, extraocular eye movements intact, conjunctivae normal  Neck: neck supple and non tender without mass   Pulmonary/Chest: clear to auscultation bilaterally- no wheezes, rales or rhonchi, normal air movement, no respiratory distress  Cardiovascular: normal rate, normal S1 and S2 and no carotid bruits  Abdomen: soft, non-tender, non-distended, normal bowel sounds, no masses or organomegaly  Extremities: no cyanosis, no clubbing and no edema  Neurologic: no cranial nerve deficit and speech normal    No intake/output data recorded. No intake/output data recorded. Labs  Recent Labs     10/31/21  0343 11/01/21  0422 11/02/21  0315    133 133   K 4.8 4.1 3.9   CL 96* 96* 96*   CO2 26 29 26   BUN 16 22 14   CREATININE 0.4* 0.4* 0.4*   GLUCOSE 251* 155* 148*   CALCIUM 8.2* 8.5* 8.2*   WBC 8.4 9.9 11.2   RBC 3.85 3.63* 3.74*   HGB 10.3* 10.0* 10.0*   HCT 32.6* 30.9* 31.7*   MCV 84.7 85.1 84.8   MCH 26.8 27.5 26.7   MCHC 31.6* 32.4 31.5*   RDW 15.6* 15.4* 15.7*    209 214   MPV 9.8 10.1 10.0       Imaging  XR CHEST PORTABLE    Result Date: 10/27/2021  1. Persistence of right worse than left pulmonary opacification 2. Improved inspiration     Patient Instructions     Medication List      START taking these medications    albuterol sulfate  (90 Base) MCG/ACT inhaler  Inhale 2 puffs into the lungs 4 times daily     ALPRAZolam 0.25 MG tablet  Commonly known as: XANAX  Take 1 tablet by mouth 3 times daily as needed for Anxiety for up to 30 days.      aluminum & magnesium hydroxide-simethicone 200-200-20 MG/5ML Susp suspension  Commonly known as: MAALOX  Take 15 mLs by mouth every 6 hours as needed for Indigestion     ascorbic acid 500 MG tablet  Commonly known as: VITAMIN C  Take 1 tablet by mouth daily  Start taking on: November 3, 2021     budesonide-formoterol 160-4.5 MCG/ACT Aero  Commonly known as: SYMBICORT  Inhale 2 puffs into the lungs 2 times daily     dexamethasone 4 MG tablet  Commonly known as: DECADRON  Take 1 tablet by mouth daily for 2 doses  Start taking on: November 3, 2021     enoxaparin 80 MG/0.8ML injection  Commonly known as: LOVENOX  Inject 0.8 mLs into the skin 2 times daily     guaiFENesin-dextromethorphan 100-10 MG/5ML syrup  Commonly known as: ROBITUSSIN DM  Take 5 mLs by mouth every 4 hours as needed for Cough     melatonin 3 MG Tabs tablet  Take 2 tablets by mouth daily     montelukast 10 MG tablet  Commonly known as: SINGULAIR  Take 1 tablet by mouth nightly     tiotropium 2.5 MCG/ACT Aers inhaler  Commonly known as: SPIRIVA RESPIMAT  Inhale 2 puffs into the lungs daily  Start taking on: November 3, 2021     vitamin D 50 MCG (2000 UT) Tabs tablet  Commonly known as: CHOLECALCIFEROL  Take 1 tablet by mouth daily  Start taking on: November 3, 2021     zinc sulfate 220 (50 Zn) MG capsule  Commonly known as: ZINCATE  Take 1 capsule by mouth daily  Start taking on: November 3, 2021        Melvi Pozo taking these medications    amLODIPine 5 MG tablet  Commonly known as: NORVASC     metFORMIN 1000 MG tablet  Commonly known as: GLUCOPHAGE     PARoxetine 30 MG tablet  Commonly known as: PAXIL           Where to Get Your Medications      You can get these medications from any pharmacy    Bring a paper prescription for each of these medications  · ALPRAZolam 0.25 MG tablet     Information about where to get these medications is not yet available    Ask your nurse or doctor about these medications  · albuterol sulfate  (90 Base) MCG/ACT inhaler  · aluminum & magnesium hydroxide-simethicone 200-200-20 MG/5ML Susp suspension  · ascorbic acid 500 MG tablet  · budesonide-formoterol 160-4.5 MCG/ACT Aero  · dexamethasone 4 MG tablet  · enoxaparin 80 MG/0.8ML injection  · guaiFENesin-dextromethorphan 100-10 MG/5ML syrup  · melatonin 3 MG Tabs tablet  · montelukast 10 MG tablet  · tiotropium 2.5 MCG/ACT Aers inhaler  · vitamin D 50 MCG (2000 UT) Tabs tablet  · zinc sulfate 220 (50 Zn) MG capsule       Note that more than 30 minutes was spent in preparing discharge papers, discussing discharge with patient, medication review, etc.    Electronically signed by Edelmira Langston DO on 11/2/2021 at 3:13 PM

## 2021-11-02 NOTE — PROGRESS NOTES
[facility]  [address] [facility], [address], [address] [facility]      Pulmonary Progress Note      SUBJECTIVE:  Lying down in bed on 8 li HFNC, feels similar with respiratory function, minimal cough/sputum production, using incentive spirometer, possible discharge to facility today.     OBJECTIVE    Medications    Continuous Infusions:   sodium chloride      dextrose         Scheduled Meds:   dexamethasone  4 mg Oral Daily    ALPRAZolam  0.25 mg Oral TID    montelukast  10 mg Oral Nightly    insulin glargine  5 Units SubCUTAneous Nightly    ascorbic acid  500 mg Oral Daily    zinc sulfate  50 mg Oral Daily    vitamin D  2,000 Units Oral Daily    tiotropium  2 puff Inhalation Daily    budesonide-formoterol  2 puff Inhalation BID    enoxaparin  1 mg/kg SubCUTAneous BID    albuterol sulfate HFA  2 puff Inhalation 4x daily    sodium chloride flush  5-40 mL IntraVENous 2 times per day    PARoxetine  30 mg Oral BID    amLODIPine  5 mg Oral Daily    insulin lispro  0-6 Units SubCUTAneous TID WC    insulin lispro  0-3 Units SubCUTAneous Nightly       PRN Meds:guaiFENesin-dextromethorphan, aluminum & magnesium hydroxide-simethicone, melatonin, sodium chloride flush, sodium chloride, ondansetron **OR** ondansetron, polyethylene glycol, acetaminophen **OR** acetaminophen, glucose, dextrose, glucagon (rDNA), dextrose    Physical    VITALS:  /79   Pulse 69   Temp 98.3 °F (36.8 °C) (Oral)   Resp 16   Ht 5' 7\" (1.702 m)   Wt 178 lb 9.2 oz (81 kg)   SpO2 95%   BMI 27.97 kg/m²     24HR INTAKE/OUTPUT:    No intake or output data in the 24 hours ending 21 1402    24HR PULSE OXIMETRY RANGE:    SpO2  Av.5 %  Min: 95 %  Max: 96 %    General appearance: alert, appears stated age and cooperative  Lungs: rhonchi bibasilar with cough  Heart: regular rate and rhythm, S1, S2 normal, no murmur, click, rub or gallop  Abdomen: soft, non-tender; bowel sounds normal; no masses,  no organomegaly  Extremities: extremities normal, atraumatic, no cyanosis or edema  Neurologic: Mental status: Alert, oriented, thought content appropriate    Data    CBC:   Recent Labs     10/31/21  0343 11/01/21  0422 11/02/21  0315   WBC 8.4 9.9 11.2   HGB 10.3* 10.0* 10.0*   HCT 32.6* 30.9* 31.7*   MCV 84.7 85.1 84.8    209 214       BMP:  Recent Labs     10/31/21  0343 11/01/21  0422 11/02/21  0315    133 133   K 4.8 4.1 3.9   CL 96* 96* 96*   CO2 26 29 26   BUN 16 22 14   CREATININE 0.4* 0.4* 0.4*    ALB:3,BILIDIR:3,BILITOT:3,ALKPHOS:3)@    PT/INR: No results for input(s): PROTIME, INR in the last 72 hours. ABG:   No results for input(s): PH, PO2, PCO2, HCO3, BE, O2SAT, METHB, O2HB, COHB, O2CON, HHB, THB in the last 72 hours. FiO2 : 45 %       Radiology/Other tests reviewed: CXR reviewed with similar increased GG interstitial markings right>left compared to previous    Assessment:     Active Problems:    COVID-19    Acute respiratory failure due to COVID-19 [facility])    Acute respiratory failure with hypoxia (HCC)    Pneumonia due to COVID-19 virus    Elevated LFTs    Hypokalemia    Essential hypertension    Non-insulin dependent type 2 diabetes mellitus ([facility]  Resolved Problems:    * No resolved hospital problems. *      Plan:       1. Cont with oxygen, taper as tolerated, observe degree of desaturation with movement/activity, reiterated need to plan for resting/recovery after exercising tanja if desaturating a lot  2. Cont with steroids, taper as tolerated, 6 mg daily until end of the week then 4 mg daily then stop by end of the week  3. singulair daily and cont for a few weeks after discharge  4. Cont with MDIs, observe respiratory function and cough/sputum production  5. Encourage incentive spirometer use and prone positioning when able to  6. OOB to chair  7.  Can be discharged from pulmonary pov      Time at the bedside, reviewing labs and radiographs, reviewing

## 2021-11-02 NOTE — CARE COORDINATION
Social Work / Discharge Planning : PASSAR completed. Patient to be discharged to [facility] today at 4:[facility]. Patient updated as well as Nina from [facility]. SW to follow.  Electronically signed by ALIZA Alford on 11/2/21 at 10:06 AM EDT

## 2022-12-06 ENCOUNTER — HOSPITAL ENCOUNTER (OUTPATIENT)
Dept: PHYSICAL THERAPY | Age: 67
Setting detail: THERAPIES SERIES
Discharge: HOME | End: 2022-12-06
Payer: OTHER GOVERNMENT

## 2022-12-06 PROCEDURE — 97161 PT EVAL LOW COMPLEX 20 MIN: CPT

## 2022-12-12 ENCOUNTER — HOSPITAL ENCOUNTER (OUTPATIENT)
Dept: PHYSICAL THERAPY | Age: 67
Setting detail: THERAPIES SERIES
Discharge: HOME | End: 2022-12-12
Payer: OTHER GOVERNMENT

## 2022-12-12 PROCEDURE — 97113 AQUATIC THERAPY/EXERCISES: CPT

## 2022-12-12 NOTE — PROGRESS NOTES
Physical Therapy  Initial Assessment  Date: 2022  Patient Name: Dell Fall  MRN: [id number]  : 1955    Referring Physician: Florence Stover MD     PCP: No primary care provider on file. Medical Diagnosis: Muscle weakness (generalized) [M62.81] Back Pain  No data recorded    Insurance: Payor: Apaja / Plan: Apaja / Product Type: *No Product type* /   Insurance ID: [id number] - (Other)      Restrictions:       Subjective:   General  Chart Reviewed: Yes  Patient Assessed for Rehabilitation Services: Yes  Additional Pertinent Hx: Patient presents to PT to assess and treat issues of chronic persistent back pain  History obtained from[de-identified] Patient, Chart Review  Family/Caregiver Present: No  Diagnosis: Back Pain  Follows Commands: Within Functional Limits  PT Visit Information  Onset Date: 22  PT Insurance Information: [place]  Total # of Visits Approved: 15  Subjective  Subjective: Pain noted with sustained walking/standing Pain noted with ambulation after prolonged sitting  Pain Screening  Patient Currently in Pain: Yes       Vision/Hearing:       Orientation:       Social History:       Functional Status:       Objective:                                                                   Outpatient Rehab Objectives (Peds):  NA    Neuro Screen (Peds): Not Assessed     Outcome Measure(s) Completed (Peds):    Not Assessed    Treatments Completed:  N/A - Evaluation Only    Assessment:              Plan:    Physcial Therapy Plan  Plan weeks: 4-5 weeks  Current Treatment Recommendations: Strengthening, ROM, Functional mobility training, Endurance training, Patient/Caregiver education & training, Aquatics    G-Code:       Balance and Gait:  Not Assessed    OutComes Score:                                                     AM-PAC Score:             Goals:  Short Term Goals  Time Frame for Short Term Goals: 2 weeks  Short Term Goal 1: Patient will be able to engage in consistent and progressive Inhale 2 puffs into the lungs 4 times daily, Disp: 18 g, Rfl: 3    aluminum & magnesium hydroxide-simethicone (MAALOX) 200-200-20 MG/5ML SUSP suspension, Take 15 mLs by mouth every 6 hours as needed for Indigestion, Disp: , Rfl: 0    ascorbic acid (VITAMIN C) 500 MG tablet, Take 1 tablet by mouth daily, Disp: 30 tablet, Rfl: 3    budesonide-formoterol (SYMBICORT) 160-4.5 MCG/ACT AERO, Inhale 2 puffs into the lungs 2 times daily, Disp: 10.2 g, Rfl: 3    enoxaparin (LOVENOX) 80 MG/0.8ML injection, Inject 0.8 mLs into the skin 2 times daily, Disp: , Rfl: 3    melatonin 3 MG TABS tablet, Take 2 tablets by mouth daily, Disp: , Rfl: 3    montelukast (SINGULAIR) 10 MG tablet, Take 1 tablet by mouth nightly, Disp: 30 tablet, Rfl: 3    tiotropium (SPIRIVA RESPIMAT) 2.5 MCG/ACT AERS inhaler, Inhale 2 puffs into the lungs daily, Disp: , Rfl:     vitamin D (CHOLECALCIFEROL) 50 MCG (2000 UT) TABS tablet, Take 1 tablet by mouth daily, Disp: 30 tablet, Rfl:     zinc sulfate (ZINCATE) 220 (50 Zn) MG capsule, Take 1 capsule by mouth daily, Disp: 30 capsule, Rfl: 3    amLODIPine (NORVASC) 5 MG tablet, Take 5 mg by mouth daily, Disp: , Rfl:     metFORMIN (GLUCOPHAGE) 1000 MG tablet, Take 1,000 mg by mouth 2 times daily (with meals), Disp: , Rfl:     PARoxetine (PAXIL) 30 MG tablet, Take 30 mg by mouth 2 times daily, Disp: , Rfl:   Allergies: Patient has no known allergies. SUBJECTIVE EXAMINATION     History obtained from[de-identified] Patient, Chart Review,      Family/Caregiver Present: No    Subjective History:  Onset Date: 11/16/22  Subjective: Pain noted with sustained walking/standing Pain noted with ambulation after prolonged sitting  Additional Pertinent Hx (if applicable): Patient presents to PT to assess and treat issues of chronic persistent back pain          Learning/Language: Learning  Does the patient/guardian have any barriers to learning?: No barriers  Will there be a co-learner?: No  What is the preferred language of the patient/guardian?: English  Is an  required?: No  How does the patient/guardian prefer to learn new concepts?: Listening     Pain Screening    Pain Screening  Patient Currently in Pain: Yes    Functional Status         Social History:    Social History  Lives With: Alone  Type of Home: House  Home Layout: Two level, Work area in basement    Occupation/Interests:   Occupation: Retired    Prior Level of Function:     Independent        Current Level of Function:          Homemaking Responsibilities: Yes  Active : Yes    OBJECTIVE EXAMINATION   Restrictions:              Review of Systems:  Vision: Within Functional Limits  Hearing: Within functional limits  Overall Orientation Status: Within Functional Limits  Patient affect[de-identified] Normal  Follows Commands: Within Functional Limits    VBI Screening / Lumbar Screening:         Regional Screen:         Observations:   General Observations  Shoulders: Rounded, Asymmetric  Spine / Pelvis: Decreased lumbar lordosis, Leg Length Discrepancy  Hip: L Hip ER    Palpation:   Lumbar Spine Palpation: Pain with palpation lower lumbar and LS region    Ambulation/Gait (if applicable):   Ambulation  Surface: Level tile  Device: No Device  Assistance: Independent  Quality of Gait: Marked limp left LE Left LE maintained in ER  Gait Deviations: Slow Marni, Increased AARON, Decreased step length, Decreased step height  Stairs/Curb  Stairs?: No    Balance Screen:        Neuro Screen:   Not Assessed  Left AROM  Right AROM                    Left PROM  Right PROM                    Left Strength  Right Strength         Strength LLE  L Hip Flexion: 3+/5  L Hip Extension: 3/5  L Hip ABduction: 3/5    Strength RLE  R Hip Flexion: 3+/5  R Hip Extension: 3/5  R Hip ABduction: 3/5     Cervical Assessment               Thoracic Assessment             Lumbar Assessment     AROM Lumbar Spine   Lumbar spine general AROM: Limited all ranges Pain with ROM       Trunk Strength     Trunk Strength  Trunk Flexion: 3+/5  Trunk Extension: 3+/5     Muscle Length/Flexibility: Muscle Length LE  90/90 SLR (Hamstring Tightness): positive  Right Psoas / Iliacus: Tight  Right Hamstrings: Tight  Left Psoas / Iliacus: Tight  Left Hamstrings: Tight    Joint Mobility (if applicable):        Special Tests:        Balance/Gait Assessment(s) Performed:   Not Assessed    Additional Finding(s) (if applicable):    NA       ASSESSMENT     Impression:      Body Structures, Functions, Activity Limitations Requiring Skilled Therapeutic Intervention: Decreased functional mobility , Decreased ROM, Decreased balance, Decreased posture, Increased pain, Decreased strength    Statement of Medical Necessity: Physical Therapy is both indicated and medically necessary as outlined in the POC to increase the likelihood of meeting the functionally related goals stated below. Patient's Activity Tolerance:        Patient's rehabilitation potential/prognosis is considered to be:  Fair    Factors which may impact rehabilitation potential include: None        GOALS   Patient Goal(s):    Short Term Goals Completed by 2 weeks Goal Status   Patient will be able to engage in consistent and progressive active exercise while reporting no increase in the intensity or frequency of his back pain New                               Long Term Goals Completed by 6 weeks Goal Status   Patient will be able to engage in consistent ADL's and fucntional postures while being able to effectively control back pain at 4/10 or less New   Postural deficits Moderate or less New   Strength: Trunk/core 3+ to 4-/5 Proximal LE's 4-/5 New   Patient will be able to maintain and self direct an appropriate follow up independent exercise program New              TREATMENT PLAN       Requires PT Follow-Up: Yes    Pt. actively involved in establishing Plan of Care and Goals: Yes  Patient/ Caregiver education and instruction:               Treatment may include any combination of the following: Strengthening, ROM, Functional mobility training, Endurance training, Patient/Caregiver education & training, Aquatics     Frequency / Duration:  Patient to be seen 1-2 times per week for 4-5 weeks weeks      Eval Complexity: Overall Evaluation : Low  Decision Making: Low Complexity  Clinical Presentation: Low    PT Treatment Completed:  N/A - Evaluation Only    Therapy Time  Individual Time In:         Individual Time Out:    Minutes: Therapist Signature: Flora Douglass, PT    Date: 19/88/0275     I certify that the above Therapy Services are being furnished while the patient is under my care. I agree with the treatment plan and certify that this therapy is necessary. Physician's Signature:  ___________________________   Date:_______                                                                   Ashanti Fabian MD        Physician Comments: _______________________________________________    Please sign and return to [facility] PHYSICAL THERAPY. Please fax to the location listed below.  Yasmani Rico for this referral!    [address] [facility] [place] PHYSICAL THERAPY  [facility] [address]  Dept: [phone]  Fax: [phone]       POC NOTE

## 2022-12-12 NOTE — PLAN OF CARE
[address] [facility] PHYSICAL THERAPY  [facility] [address]  Dept: [phone]  Loc: [phone]    PHYSICAL THERAPY PLAN OF CARE: INITIAL EVALUATION    Patient: Kendell Luna (80 y.o. male)   Examination Date:   Plan of Care Certification Period: 2022 to  2022      :  1955  MRN: [id number]  CSN: [id number]   Insurance: Payor: Shoshana Germain / Plan: Shoshana Germain / Product Type: *No Product type* /   Insurance ID: [id number] - (Other) Secondary Insurance (if applicable):    Referring Physician: Heather Carreon MD     PCP: No primary care provider on file. Visits to Date/Visits Approved:   / 15    No Show/Cancelled Appts:   /       Medical Diagnosis: Muscle weakness (generalized) [M62.81] Back Pain  No data recorded     SUBJECTIVE EXAMINATION      History obtained from[de-identified] Patient, Chart Review,      Family/Caregiver Present: No     Subjective History: Onset Date: 22  Subjective: Pain noted with sustained walking/standing Pain noted with ambulation after prolonged sitting  Additional Pertinent Hx (if applicable): Patient presents to PT to assess and treat issues of chronic persistent back pain        ASSESSMENT      Impression:       Body Structures, Functions, Activity Limitations Requiring Skilled Therapeutic Intervention: Decreased functional mobility , Decreased ROM, Decreased balance, Decreased posture, Increased pain, Decreased strength     Statement of Medical Necessity: Physical Therapy is both indicated and medically necessary as outlined in the POC to increase the likelihood of meeting the functionally related goals stated below. Patient's Activity Tolerance:        Patient's rehabilitation potential/prognosis is considered to be:  Fair     Factors which may impact rehabilitation potential include: None          Physcial Therapy Plan  Plan weeks: 4-5 weeks  Current Treatment Recommendations: Strengthening, ROM, Functional mobility training, Endurance training, Patient/Caregiver education & training, Aquatics    G-Code:       Balance and Gait:  Not Assessed    OutComes Score:                                                     AM-PAC Score:             Goals:  Short Term Goals  Time Frame for Short Term Goals: 2 weeks  Short Term Goal 1: Patient will be able to engage in consistent and progressive active exercise while reporting no increase in the intensity or frequency of his back pain  STG Goal 1 Status[de-identified] New  Long Term Goals  Time Frame for Long Term Goals : 6 weeks  Long Term Goal 1: Patient will be able to engage in consistent ADL's and fucntional postures while being able to effectively control back pain at 4/10 or less  LTG Goal 1 Status[de-identified] New  Long Term Goal 2: Postural deficits Moderate or less  LTG Goal 2 Status[de-identified] New  Long Term Goal 3: Strength: Trunk/core 3+ to 4-/5 Proximal LE's 4-/5  LTG Goal 3 Status[de-identified] New  Long Term Goal 4: Patient will be able to maintain and self direct an appropriate follow up independent exercise program  LTG Goal 4 Status[de-identified] New  Patient Goals   Patient Goals : Reduce mario Improve Tolerance for ADL's and functional postures      Patient Status: [x] Continue / Initiate Plan of Care   Frequency / Duration:  Patient to be seen 1-2 times per week for 4-5 weeks weeks   Eval Complexity: Overall Evaluation : Low  Decision Making: Low Complexity  Clinical Presentation: Low     PT Treatment Completed:  N/A - Evaluation Only     Therapy Time  Individual Time In:         Individual Time Out:    Minutes: Therapist Signature: Keyla Antonio PT    Date: 40/29/9618      I certify that the above Therapy Services are being furnished while the patient is under my care. I agree with the treatment plan and certify that this therapy is necessary.        Physician's Signature:  ___________________________   Date:_______                                                                   Arnold Coffman MD          Physician Comments: _______________________________________________     Please sign and return to [facility] PHYSICAL THERAPY. Please fax to the location listed below. Yasmani Rico for this referral!     [address]  [facility] PHYSICAL THERAPY  [address] [address]  Dept: [phone]  Fax: [phone]         POC NOTE                Signature: Electronically signed by Marleny Grande PT on 12/12/2022 at 11:01 AM.     If you have any questions or concerns, please don't hesitate to call.   Thank you for your referral!

## 2022-12-12 NOTE — PROGRESS NOTES
[facility]  Phone: [phone] Fax: [phone]        Physical Therapy Aquatic Flow Sheet   Date:  2022    Patient Name:  Felecia Patino    :  1955  Restrictions/Precautions:    Diagnosis:  Muscle weakness (generalized) [M62.81] Back Pain  Treatment Diagnosis:    Insurance/Certification information:  Antoni Reece / Plan: Ole Reece / Product Type: *No Product type* /   Insurance ID: [id number] - (Other)  Referring Physician:  Keke Valdivia MD     Plan of care signed (Y/N):    Visit# / total visits:  1/15  Pain level: 8/10   Electronically signed by:  Deonte Grant PTA  Time In:1030  Time Out:1130    Subjective:Pain noted with sustained walking/standing Pain noted with ambulation after prolonged sitting  Additional Pertinent Hx (if applicable): Patient presents to PT to assess and treat issues of chronic persistent back pain      Key  B= Belt DB= Dumbells T= Theratube   H= Hydrotone N= Noodles W= Weights   P= Paddles S= Speedo equipment K= Kickboard     Exercises/Activities   Warm-up/Amb  Minutes  Exercises  Minutes    Slow forward   5  HR/TR  5    Slow sideways  5  Marches  5    Slow backwards  5  Mini-squats  5    Medium forward    Forward and backward kick  5x2    Medium sideways    Hip abduction  5x2    Small shuffle    Hamstring curls      Jog    Knee extension      Braiding    Pelvic tilts      Bicycling  5  Scap squeezes          Shoulder flex/ext      Functional    Shoulder abd/add      Step    Shoulder H. abd/add      Lifting    Shoulder IR/ER      Hand to opp knee    Rowing      Push down squat    Bilateral pull down      UE PNF    Push/pull      LE PNF    Push downs      Wall push ups    Arm circles      SLS    Elbow flex/ext          Chin tuck      Stretching    UT shrugs/rolls      Gastroc/Soleus    Rocking horse      Hamstring  5        SKTC    Other      Piriformis          Hip flexor          Ladder pull          Pec stretch          Post deltoid Timed Code Treatment Minutes:  60    Total Treatment Minutes:    60  Treatment/Activity Tolerance:  [] Patient tolerated treatment well [x] Patient limited by fatique  [x] Patient limited by pain [] Patient limited by other medical complications  [] Other:     Prognosis: [] Good [x] Fair  [] Poor    Patient Requires Follow-up: [x] Yes  [] No    Plan:   [x] Continue per plan of care [] Alter current plan (see comments)  [] Plan of care initiated [] Hold pending MD visit [] Discharge    Treatment Charges: Mins Units   Initial Evaluation     Re-Evaluation     Ther Exercise         TE     Aquatic Treatment 60 4   Ther Activities        TA     Gait Training          GT     Neuro Re-education NR     Modalities     Non-Billable Service Time     Other     Total Time/Units 60 4         Electronically signed by:  Herber Pineda  PTA [id number]

## 2022-12-14 ENCOUNTER — HOSPITAL ENCOUNTER (OUTPATIENT)
Dept: PHYSICAL THERAPY | Age: 67
Setting detail: THERAPIES SERIES
Discharge: HOME | End: 2022-12-14
Payer: OTHER GOVERNMENT

## 2022-12-21 ENCOUNTER — HOSPITAL ENCOUNTER (OUTPATIENT)
Dept: PHYSICAL THERAPY | Age: 67
Setting detail: THERAPIES SERIES
Discharge: HOME | End: 2022-12-21
Payer: OTHER GOVERNMENT

## 2022-12-27 ENCOUNTER — HOSPITAL ENCOUNTER (OUTPATIENT)
Dept: PHYSICAL THERAPY | Age: 67
Setting detail: THERAPIES SERIES
Discharge: HOME | End: 2022-12-27
Payer: OTHER GOVERNMENT

## 2022-12-27 PROCEDURE — 97113 AQUATIC THERAPY/EXERCISES: CPT

## 2022-12-27 NOTE — PROGRESS NOTES
[facility]  Phone: [phone] Fax: [phone]        Physical Therapy Aquatic Flow Sheet   Date 2022  Date:  2022    Patient Name:  Savanah Frye    :  1955  Restrictions/Precautions:    Diagnosis:  Muscle weakness (generalized) [M62.81] Back Pain  Treatment Diagnosis:    Insurance/Certification information:  Yesi Guevara / Plan: Yesi Guevara / Product Type: *No Product type* /   Insurance ID: [id number] - (Other)  Referring Physician:  Courtney Ovalles MD     Plan of care signed (Y/N):    Visit# / total visits:  2/15  Pain level: 8/10   Electronically signed by:  Saniya Tatum PTA  Time In:1030  Time Out:1130    Subjective:Pain noted with sustained walking/standing Pain noted with ambulation after prolonged sitting  Additional Pertinent Hx (if applicable): Patient presents to PT to assess and treat issues of chronic persistent back pain      Key  B= Belt DB= Dumbells T= Theratube   H= Hydrotone N= Noodles W= Weights   P= Paddles S= Speedo equipment K= Kickboard     Exercises/Activities   Warm-up/Amb  Minutes  Exercises  Minutes    Slow forward   5  HR/TR  5    Slow sideways  5  Marches  5    Slow backwards  5  Mini-squats  5    Medium forward    Forward and backward kick  5x2    Medium sideways    Hip abduction  5x2    Small shuffle    Hamstring curls      Jog    Knee extension      Braiding    Pelvic tilts      Bicycling  5  Scap squeezes          Shoulder flex/ext      Functional    Shoulder abd/add      Step    Shoulder H. abd/add      Lifting    Shoulder IR/ER      Hand to opp knee    Rowing      Push down squat    Bilateral pull down      UE PNF    Push/pull      LE PNF    Push downs      Wall push ups    Arm circles      SLS    Elbow flex/ext          Chin tuck      Stretching    UT shrugs/rolls      Gastroc/Soleus    Rocking horse      Hamstring  5        SKTC    Other      Piriformis          Hip flexor          Ladder pull          Pec stretch Post deltoid           Timed Code Treatment Minutes:  60    Total Treatment Minutes:    60  Treatment/Activity Tolerance:  [] Patient tolerated treatment well [x] Patient limited by fatique  [x] Patient limited by pain [] Patient limited by other medical complications  [x] Other: Pain noted with sustained walking/standing Pain noted with ambulation after prolonged sitting    Prognosis: [] Good [x] Fair  [] Poor    Patient Requires Follow-up: [x] Yes  [] No    Plan:   [x] Continue per plan of care [] Alter current plan (see comments)  [] Plan of care initiated [] Hold pending MD visit [] Discharge    Treatment Charges: Mins Units   Initial Evaluation     Re-Evaluation     Ther Exercise         TE     Aquatic Treatment 60 4   Ther Activities        TA     Gait Training          GT     Neuro Re-education NR     Modalities     Non-Billable Service Time     Other     Total Time/Units 60 4         Electronically signed by:  Yuridia Kim  PTA [id number]

## 2022-12-28 NOTE — PROGRESS NOTES
[facility]  Phone: [phone] Fax: [phone]        Physical Therapy Aquatic Flow Sheet     Date:  2022    Patient Name:  Pam Willoughby    :  1955  Restrictions/Precautions:    Diagnosis:  Muscle weakness (generalized) [M62.81] Back Pain  Treatment Diagnosis:    Insurance/Certification information:  Layla Peterson / Plan: Layla  / Product Type: *No Product type* /   Insurance ID: [id number] - (Other)  Referring Physician:  Bossman Valera MD     Plan of care signed (Y/N):    Visit# / total visits: 5/15  Pain level: 8/10   Electronically signed by:  Kade Vasquez PTA  Time In:1030  Time Out:1130    Subjective:Pain noted with sustained walking/standing Pain noted with ambulation after prolonged sitting  Additional Pertinent Hx (if applicable): Patient presents to PT to assess and treat issues of chronic persistent back pain      Key  B= Belt DB= Dumbells T= Theratube   H= Hydrotone N= Noodles W= Weights   P= Paddles S= Speedo equipment K= Kickboard     Exercises/Activities   Warm-up/Amb  Minutes  Exercises  Minutes    Slow forward   5  HR/TR  5    Slow sideways  5  Marches  5    Slow backwards  5  Mini-squats  5    Medium forward    Forward and backward kick  5x2    Medium sideways    Hip abduction  5x2    Small shuffle    Hamstring curls      Jog    Knee extension      Braiding    Pelvic tilts      Bicycling  5  Scap squeezes          Shoulder flex/ext      Functional    Shoulder abd/add      Step    Shoulder H. abd/add      Lifting    Shoulder IR/ER      Hand to opp knee    Rowing      Push down squat    Bilateral pull down      UE PNF    Push/pull      LE PNF    Push downs      Wall push ups    Arm circles      SLS    Elbow flex/ext          Chin tuck      Stretching    UT shrugs/rolls      Gastroc/Soleus    Rocking horse      Hamstring  5        SKTC    Other      Piriformis          Hip flexor          Ladder pull          Pec stretch          Post deltoid Timed Code Treatment Minutes:  60    Total Treatment Minutes:    60  Treatment/Activity Tolerance:  [] Patient tolerated treatment well [x] Patient limited by fatique  [x] Patient limited by pain [] Patient limited by other medical complications  [x] Other: Pain noted with sustained walking/standing Pain noted with ambulation after prolonged sitting    Prognosis: [] Good [x] Fair  [] Poor    Patient Requires Follow-up: [x] Yes  [] No    Plan:   [x] Continue per plan of care [] Alter current plan (see comments)  [] Plan of care initiated [] Hold pending MD visit [] Discharge    Treatment Charges: Mins Units   Initial Evaluation     Re-Evaluation     Ther Exercise         TE     Aquatic Treatment 60 4   Ther Activities        TA     Gait Training          GT     Neuro Re-education NR     Modalities     Non-Billable Service Time     Other     Total Time/Units 60 4         Electronically signed by:  Augustina Avendano  PTA [id number]

## 2022-12-29 ENCOUNTER — HOSPITAL ENCOUNTER (OUTPATIENT)
Dept: PHYSICAL THERAPY | Age: 67
Setting detail: THERAPIES SERIES
Discharge: HOME | End: 2022-12-29
Payer: OTHER GOVERNMENT

## 2022-12-29 PROCEDURE — 97113 AQUATIC THERAPY/EXERCISES: CPT

## 2022-12-29 NOTE — PROGRESS NOTES
[facility]  Phone: [phone] Fax: [phone]        Physical Therapy Aquatic Flow Sheet     Date:  2022    Patient Name:  Savanah Frye    :  1955  Restrictions/Precautions:    Diagnosis:  Muscle weakness (generalized) [M62.81] Back Pain  Treatment Diagnosis:    Insurance/Certification information:  Yesieh Guevara / Plan: Yesi Mightnurys / Product Type: *No Product type* /   Insurance ID: [id number] - (Other)  Referring Physician:  Courtney Ovalles MD     Plan of care signed (Y/N):    Visit# / total visits: 6/15  Pain level: 8/10   Electronically signed by:  Saniya Tatum PTA  Time In:1030  Time Out:1130        Key  B= Belt DB= Dumbells T= Theratube   H= Hydrotone N= Noodles W= Weights   P= Paddles S= Speedo equipment K= Kickboard     Exercises/Activities   Warm-up/Amb  Minutes  Exercises  Minutes    Slow forward   5  HR/TR  5    Slow sideways  5  Marches  5    Slow backwards  5  Mini-squats  5    Medium forward    Forward and backward kick  5x2    Medium sideways    Hip abduction  5x2    Small shuffle    Hamstring curls      Jog    Knee extension      Braiding    Pelvic tilts      Bicycling  5  Scap squeezes          Shoulder flex/ext      Functional    Shoulder abd/add      Step    Shoulder H. abd/add      Lifting    Shoulder IR/ER      Hand to opp knee    Rowing      Push down squat    Bilateral pull down      UE PNF    Push/pull      LE PNF    Push downs      Wall push ups    Arm circles      SLS    Elbow flex/ext          Chin tuck      Stretching    UT shrugs/rolls      Gastroc/Soleus    Rocking horse      Hamstring  5        SKTC    Other      Piriformis          Hip flexor          Ladder pull          Pec stretch          Post deltoid           Timed Code Treatment Minutes:  60    Total Treatment Minutes:    60  Treatment/Activity Tolerance:  [] Patient tolerated treatment well [x] Patient limited by fatique  [x] Patient limited by pain [] Patient limited by other medical complications  [x] Other: Patient Goals : Reduce mario Improve Tolerance for ADL's and functional postures     Pain noted with sustained walking/standing Pain noted with ambulation after prolonged sitting    Prognosis: [] Good [x] Fair  [] Poor    Patient Requires Follow-up: [x] Yes  [] No    Plan:   [x] Continue per plan of care [] Alter current plan (see comments)  [] Plan of care initiated [] Hold pending MD visit [] Discharge    Treatment Charges: Mins Units   Initial Evaluation     Re-Evaluation     Ther Exercise         TE     Aquatic Treatment 60 4   Ther Activities        TA     Gait Training          GT     Neuro Re-education NR     Modalities     Non-Billable Service Time     Other     Total Time/Units 60 4         Electronically signed by:  Olga Martinez  PTA [id number]

## 2023-01-04 NOTE — PROGRESS NOTES
[facility]  Phone: [phone] Fax: [phone]        Physical Therapy Aquatic Flow Sheet     Date:  2022  Date:  2023    Patient Name:  Danita Mckeon    :  1955  Restrictions/Precautions:    Diagnosis:  Muscle weakness (generalized) [M62.81] Back Pain  Treatment Diagnosis:    Insurance/Certification information:  Ludger Ket / Plan: Ludger Ket / Product Type: *No Product type* /   Insurance ID: [id number] - (Other)  Referring Physician:  Brittney Stone MD     Plan of care signed (Y/N):    Visit# / total visits: 4/15  Pain level: 8/10   Electronically signed by:  Gerson Erickson PTA  Time In:1030  Time Out:1130    Subjective:Pain noted with sustained walking/standing Pain noted with ambulation after prolonged sitting  Patient presents to PT to assess and treat issues of chronic persistent back pain      Key  B= Belt DB= Dumbells T= Theratube   H= Hydrotone N= Noodles W= Weights   P= Paddles S= Speedo equipment K= Kickboard     Exercises/Activities   Warm-up/Amb  Minutes  Exercises  Minutes    Slow forward   5  HR/TR  5    Slow sideways  5  Marches  5    Slow backwards  5  Mini-squats  5    Medium forward    Forward and backward kick  5x2    Medium sideways    Hip abduction  5x2    Small shuffle    Hamstring curls      Jog    Knee extension      Braiding    Pelvic tilts      Bicycling  5  Scap squeezes          Shoulder flex/ext      Functional    Shoulder abd/add      Step    Shoulder H. abd/add      Lifting    Shoulder IR/ER      Hand to opp knee    Rowing      Push down squat    Bilateral pull down      UE PNF    Push/pull      LE PNF    Push downs      Wall push ups    Arm circles      SLS    Elbow flex/ext          Chin tuck      Stretching    UT shrugs/rolls      Gastroc/Soleus    Rocking horse      Hamstring  5        SKTC    Other      Piriformis          Hip flexor          Ladder pull          Pec stretch          Post deltoid           Timed Code Treatment Minutes:  60    Total Treatment Minutes:    60  Treatment/Activity Tolerance:  [] Patient tolerated treatment well [x] Patient limited by fatique  [x] Patient limited by pain [] Patient limited by other medical complications  [x] Other: Pain noted with sustained walking/standing Pain noted with ambulation after prolonged sitting    Prognosis: [] Good [x] Fair  [] Poor    Patient Requires Follow-up: [x] Yes  [] No    Plan: Functional deficits: Pain hinders pt's ability to to perform shopping and household chores. Pain limits how long pt stands and how far he can walk before sitting down. Aquatic therapy to Increase functional mobility ; Increase ROM; Increase strength; Increase endurance; Improve posture;Decrease pain   [x] Continue per plan of care [] Alter current plan (see comments)  [] Plan of care initiated [] Hold pending MD visit [] Discharge    Treatment Charges: Mins Units   Initial Evaluation     Re-Evaluation     Ther Exercise         TE     Aquatic Treatment 60 4   Ther Activities        TA     Gait Training          GT     Neuro Re-education NR     Modalities     Non-Billable Service Time     Other     Total Time/Units 60 4         Electronically signed by:  Bridget Jarrett  PTA [id number]

## 2023-03-22 NOTE — PROGRESS NOTES
Marilu Kitchen Hospitalist   Progress Note    Admitting Date and Time: 9/22/2021  9:32 PM  Admit Dx: Hypokalemia [E87.6]  Acute respiratory failure with hypoxia ([address] [J96.01]  COVID-19 [U07.1]  Acute respiratory failure due to COVID-19 (HCC) [U07.1, J96.00]    Subjective:    Patient was admitted with Hypokalemia [E87.6]  Acute respiratory failure with hypoxia ([address] [J96.01]  COVID-19 [U07.1]  Acute respiratory failure due to COVID-19 ([address] [U07.1, J96.00]. Patient c/o fever, chills, cp, sob, nausea(with no vomiting). Pt c/o not getting sleep. Pt states the fluids and the beeping is keeping him from feeling well/go to sleep     baricitinib  4 mg Oral Daily    magnesium sulfate  2,000 mg IntraVENous Once    potassium phosphate IVPB  10 mmol IntraVENous Once    sodium chloride flush  5-40 mL IntraVENous 2 times per day    enoxaparin  40 mg SubCUTAneous Daily    PARoxetine  30 mg Oral BID    amLODIPine  5 mg Oral Daily    insulin lispro  0-6 Units SubCUTAneous TID WC    insulin lispro  0-3 Units SubCUTAneous Nightly    dexamethasone  6 mg Oral Daily     sodium chloride flush, 5-40 mL, PRN  sodium chloride, 25 mL, PRN  ondansetron, 4 mg, Q8H PRN   Or  ondansetron, 4 mg, Q6H PRN  polyethylene glycol, 17 g, Daily PRN  acetaminophen, 650 mg, Q6H PRN   Or  acetaminophen, 650 mg, Q6H PRN  glucose, 15 g, PRN  dextrose, 12.5 g, PRN  glucagon (rDNA), 1 mg, PRN  dextrose, 100 mL/hr, PRN         Objective:    PHYSICAL EXAM:    Vitals:  BP (!) 167/79   Pulse 79   Temp 98.3 °F (36.8 °C) (Oral)   Resp 22   Ht 5' 7\" (1.702 m)   Wt 184 lb 11.9 oz (83.8 kg)   SpO2 94%   BMI 28.94 kg/m²     General:  Appears uncomfortable. Answers questions appropriately and cooperative with exam. Pt appears anxious  HEENT:  Mucous membranes moist. No erythema, rhinorrhea, or post-nasal drip noted. Neck:  No carotid bruits. Heart:  Rhythm regular at rate of 80  Lungs:  CTA.   No wheeze, rales, or rhonchi  Abdomen: Positive bowel sounds positive. Soft. Non-tender. No guarding, rebound or rigidity. Breast/Rectal/Genitourinary: not pertinent. Extremities:  Negative for lower extremity edema  Skin:  Warm and dry. Pt appears flushed. Vascular: 2/4 Dorsalis Pedis pulses bilaterally. Neuro:  Cranial nerves 2-12 grossly intact, no focal weakness or change in sensation noted. Extraocular muscles intact. Pupils equal, round, reactive to light.                 Recent Labs     09/22/21 2218 09/23/21  0435 09/24/21  0850   * 133 134   K 3.0* 3.6 3.4*   CL 90* 92* 97*   CO2 29 30* 26   BUN 18 19 19   CREATININE 0.8 0.8 0.7   GLUCOSE 177* 224* 130*   CALCIUM 8.7 8.6 8.5*       Recent Labs     09/22/21 2218 09/23/21  0435 09/24/21  0850   WBC 5.1 3.7* 7.5   RBC 5.03 6.12* 4.69   HGB 13.5 16.4 12.6   HCT 41.7 51.6 38.7   MCV 82.9 84.3 82.5   MCH 26.8 26.8 26.9   MCHC 32.4 31.8* 32.6   RDW 14.1 14.4 14.2    143 221   MPV 10.6 10.5 10.5       CBC with Differential:    Lab Results   Component Value Date    WBC 7.5 09/24/2021    RBC 4.69 09/24/2021    HGB 12.6 09/24/2021    HCT 38.7 09/24/2021     09/24/2021    MCV 82.5 09/24/2021    MCH 26.9 09/24/2021    MCHC 32.6 09/24/2021    RDW 14.2 09/24/2021    LYMPHOPCT 15.4 09/24/2021    MONOPCT 6.0 09/24/2021    BASOPCT 0.1 09/24/2021    MONOSABS 0.45 09/24/2021    LYMPHSABS 1.16 09/24/2021    EOSABS 0.00 09/24/2021    BASOSABS 0.01 09/24/2021     CMP:    Lab Results   Component Value Date     09/24/2021    K 3.4 09/24/2021    K 3.6 09/23/2021    CL 97 09/24/2021    CO2 26 09/24/2021    BUN 19 09/24/2021    CREATININE 0.7 09/24/2021    GFRAA >60 09/24/2021    LABGLOM >60 09/24/2021    GLUCOSE 130 09/24/2021    PROT 6.6 09/24/2021    LABALBU 3.7 09/24/2021    CALCIUM 8.5 09/24/2021    BILITOT 0.3 09/24/2021    ALKPHOS 61 09/24/2021    AST 44 09/24/2021    ALT 61 09/24/2021     Magnesium:    Lab Results   Component Value Date    MG 1.5 09/24/2021     Phosphorus: Lab Results   Component Value Date    PHOS 2.0 09/24/2021        Radiology:   CTA PULMONARY W CONTRAST   Final Result   Within described exam limitations, no evidence of pulmonary embolism. Scattered multifocal low density infiltrates throughout both lungs, most   suggestive of COVID pneumonia in context of current pandemic. At least moderate diffuse hepatic steatosis. Cholecystectomy. RECOMMENDATIONS:   Multiple pulmonary nodules. Most severe: 4 mm left solid pulmonary nodule   within the upper lobe. A non-contrast Chest CT at 12 months is optional. If   performed and the nodule is stable at 12 months, no further follow-up is   recommended. These guidelines do not apply to immunocompromised patients and patients with   cancer. Follow up in patients with significant comorbidities as clinically   warranted. For lung cancer screening, adhere to Lung-RADS guidelines. Reference: Radiology. 2017; 284(1):228-43. XR CHEST PORTABLE   Final Result   There are bilateral multifocal ground-glass areas of consolidation with the   lungs concerning for in infectious or inflammatory process and developing   ARDS or viral pneumonia could give this appearance. Assessment:    Active Problems:    COVID-19    Acute respiratory failure due to COVID-19 [facility])  Resolved Problems:    * No resolved hospital problems. *      Plan:  1. Acute respiratory failure with hypoxia (88%o2sat) continue o2 and adjust as needed  2. Pneumonia due to covid 19 continue to monitor labs. Pt on steroids and baricitinib. 3. Elevated lfts monitor  4. Hyperglycemia monitor and tx with insulin ssi  5. Hypokalemia monitor and replace prn  6. Hypophosphatemia monitor and replace prn  7. Hypomagnesemia monitor and replace prn  8. Htn continue med    Chart reviewed and updated by nursing    D/w pt about pt's concerns. Gave time for pt to convey his concerns and addressed concerns.  Discussed with him about staying active, use of IS, and potential for worsening.  Discussed importance of staying active, IS and iv fluids    Time spent is 35 min    Electronically signed by Yamilka Cummings DO on 9/24/2021 at 10:01 AM Render In Strict Bullet Format?: No Initiate Treatment: TRIAMCINOLONE 0.1% cream Detail Level: Zone

## 2025-04-26 NOTE — PROGRESS NOTES
Ambulated patient with pulse ox, patient remained at 94% on room air PAST MEDICAL HISTORY:  Advanced COPD     H/O: HTN (hypertension)     Nikolai (hard of hearing)